# Patient Record
Sex: FEMALE | Race: WHITE | NOT HISPANIC OR LATINO | Employment: FULL TIME | URBAN - METROPOLITAN AREA
[De-identification: names, ages, dates, MRNs, and addresses within clinical notes are randomized per-mention and may not be internally consistent; named-entity substitution may affect disease eponyms.]

---

## 2018-01-10 NOTE — MISCELLANEOUS
Message  Return to work or school:   Ludin Baeza is under my professional care  She was seen in my office on 02/02/2016   She is able to return to work on  02/03/2016      Please excuse from work 02/02/2016     India Salgado MD       Signatures   Electronically signed by : BERNARDA Torres ; Feb 2 2016  2:50PM EST                       (Author)

## 2018-01-16 NOTE — PROGRESS NOTES
Assessment    1  Cough (786 2) (R05)   2  Sinusitis, acute (461 9) (J01 90)    Plan  Cough    · Mucinex DM Maximum Strength  MG Oral Tablet Extended Release 12  Hour; TAKE 1 TABLET EVERY 12 HOURS AS NEEDED FOR CONGESTION  Cough, Sinusitis, acute    · Benadryl 25 MG Oral Tablet; TAKE 1 TABLET EVERY 6 HOURS AS NEEDED   · You need to quit smoking ; Status:Complete;   Done: [id number] 01:38PM  PMH: History of diarrhea    · (1) C  DIFFICILE TOXIN BY PCR; Status:Canceled;    · (1) STOOL CULTURE; Status:Canceled;   Sinusitis, acute    · Azithromycin 250 MG Oral Tablet; TAKE 2 TABLETS ON DAY 1 THEN TAKE 1  TABLET A DAY FOR 4 DAYS    Discussion/Summary  The patient, patient's family was counseled regarding instructions for management, risk factor reductions, prognosis, impressions, risks and benefits of treatment options, importance of compliance with treatment  Possible side effects of new medications were reviewed with the patient/guardian today  The treatment plan was reviewed with the patient/guardian  The patient/guardian understands and agrees with the treatment plan      Chief Complaint  Pt exp sore throat and swollen glands x 1 wk      History of Present Illness  Sinusitis (Brief): The sinusitis is classified as acute  The patient is currently experiencing symptoms  facial pressure nasal congestion purulent rhinorrhea postnasal drainage   Associated symptoms:  chills, ear fullness, sore throat and cough, but no fever  The patient is not currently being treated for this problem  Risk factors:  tobacco use  Review of Systems    Constitutional: as noted in HPI    ENT: as noted in HPI  Cardiovascular: no complaints of slow or fast heart rate, no chest pain, no palpitations, no leg claudication or lower extremity edema  Respiratory: as noted in HPI  Active Problems    1   Backache (724 5) (M54 9)   2  Obesity (278 00) (E66 9)    Past Medical History  Active Problems And Past Medical History Reviewed: The active problems and past medical history were reviewed and updated today  Social History    · Current Every Day Smoker (305 1)   · 1ppd x 7 yrs   · Never Drank Alcohol  The social history was reviewed and updated today  The social history was reviewed and is unchanged  Current Meds    The medication list was reviewed and updated today  Allergies    1  Biaxin SUSR   2  Cefzil SUSR    Vitals   Recorded: [id number] 12:50PM   Temperature 97 6 F, Tympanic   Heart Rate 84   Systolic 290, LUE, Sitting   Diastolic 82, LUE, Sitting   Height 5 ft 4 in   Weight 235 lb    BMI Calculated 40 34   BSA Calculated 2 09     Physical Exam    Constitutional   General appearance: Abnormal   acutely ill and obese  Eyes   Conjunctiva and lids: No swelling, erythema or discharge  Pupils and irises: Equal, round and reactive to light  Ears, Nose, Mouth, and Throat   External inspection of ears and nose: Normal     Otoscopic examination: Tympanic membranes translucent with normal light reflex  Canals patent without erythema  Nasal mucosa, septum, and turbinates: Abnormal   marked congestion with purulent discharge  Oropharynx: Abnormal   post nasal drip  Pulmonary   Respiratory effort: No increased work of breathing or signs of respiratory distress  Auscultation of lungs: Clear to auscultation  Lymphatic   Palpation of lymph nodes in neck: No lymphadenopathy           Signatures   Electronically signed by : BERNARDA Costello ; Feb 2 2016  1:39PM EST                       (Author)

## 2018-05-11 ENCOUNTER — TELEPHONE (OUTPATIENT)
Dept: INTERNAL MEDICINE CLINIC | Age: 23
End: 2018-05-11

## 2019-10-11 ENCOUNTER — HOSPITAL ENCOUNTER (EMERGENCY)
Facility: HOSPITAL | Age: 24
Discharge: HOME | End: 2019-10-11
Attending: EMERGENCY MEDICINE
Payer: COMMERCIAL

## 2019-10-11 ENCOUNTER — APPOINTMENT (EMERGENCY)
Dept: CT IMAGING | Facility: HOSPITAL | Age: 24
End: 2019-10-11
Payer: COMMERCIAL

## 2019-10-11 VITALS
RESPIRATION RATE: 16 BRPM | TEMPERATURE: 98.7 F | OXYGEN SATURATION: 99 % | HEIGHT: 64 IN | HEART RATE: 76 BPM | DIASTOLIC BLOOD PRESSURE: 87 MMHG | SYSTOLIC BLOOD PRESSURE: 154 MMHG | BODY MASS INDEX: 31.95 KG/M2 | WEIGHT: 187.17 LBS

## 2019-10-11 DIAGNOSIS — K80.20 CHOLELITHIASIS: ICD-10-CM

## 2019-10-11 DIAGNOSIS — N23 RENAL COLIC ON RIGHT SIDE: Primary | ICD-10-CM

## 2019-10-11 LAB
ALBUMIN SERPL BCP-MCNC: 4.2 G/DL (ref 3.5–5)
ALP SERPL-CCNC: 73 U/L (ref 46–116)
ALT SERPL W P-5'-P-CCNC: 30 U/L (ref 12–78)
AMORPH PHOS CRY URNS QL MICRO: ABNORMAL /HPF
ANION GAP SERPL CALCULATED.3IONS-SCNC: 7 MMOL/L (ref 4–13)
AST SERPL W P-5'-P-CCNC: 28 U/L (ref 5–45)
BACTERIA UR QL AUTO: ABNORMAL /HPF
BACTERIA UR QL AUTO: ABNORMAL /HPF
BASOPHILS # BLD AUTO: 0.04 THOUSANDS/ΜL (ref 0–0.1)
BASOPHILS NFR BLD AUTO: 0 % (ref 0–1)
BILIRUB SERPL-MCNC: 0.2 MG/DL (ref 0.2–1)
BILIRUB UR QL STRIP: NEGATIVE
BILIRUB UR QL STRIP: NEGATIVE
BUN SERPL-MCNC: 17 MG/DL (ref 5–25)
CALCIUM SERPL-MCNC: 9.4 MG/DL (ref 8.3–10.1)
CHLORIDE SERPL-SCNC: 104 MMOL/L (ref 100–108)
CLARITY UR: CLEAR
CLARITY UR: CLEAR
CO2 SERPL-SCNC: 26 MMOL/L (ref 21–32)
COLOR UR: YELLOW
COLOR UR: YELLOW
CREAT SERPL-MCNC: 0.85 MG/DL (ref 0.6–1.3)
EOSINOPHIL # BLD AUTO: 0.07 THOUSAND/ΜL (ref 0–0.61)
EOSINOPHIL NFR BLD AUTO: 1 % (ref 0–6)
ERYTHROCYTE [DISTWIDTH] IN BLOOD BY AUTOMATED COUNT: 13 % (ref 11.6–15.1)
EXT PREG TEST URINE: NEGATIVE
EXT. CONTROL ED NAV: NORMAL
GFR SERPL CREATININE-BSD FRML MDRD: 95 ML/MIN/1.73SQ M
GLUCOSE SERPL-MCNC: 110 MG/DL (ref 65–140)
GLUCOSE UR STRIP-MCNC: NEGATIVE MG/DL
GLUCOSE UR STRIP-MCNC: NEGATIVE MG/DL
HCT VFR BLD AUTO: 43.7 % (ref 34.8–46.1)
HGB BLD-MCNC: 14.2 G/DL (ref 11.5–15.4)
HGB UR QL STRIP.AUTO: ABNORMAL
HGB UR QL STRIP.AUTO: ABNORMAL
IMM GRANULOCYTES # BLD AUTO: 0.04 THOUSAND/UL (ref 0–0.2)
IMM GRANULOCYTES NFR BLD AUTO: 0 % (ref 0–2)
KETONES UR STRIP-MCNC: ABNORMAL MG/DL
KETONES UR STRIP-MCNC: ABNORMAL MG/DL
LEUKOCYTE ESTERASE UR QL STRIP: NEGATIVE
LEUKOCYTE ESTERASE UR QL STRIP: NEGATIVE
LIPASE SERPL-CCNC: 73 U/L (ref 73–393)
LYMPHOCYTES # BLD AUTO: 1.43 THOUSANDS/ΜL (ref 0.6–4.47)
LYMPHOCYTES NFR BLD AUTO: 15 % (ref 14–44)
MCH RBC QN AUTO: 29.6 PG (ref 26.8–34.3)
MCHC RBC AUTO-ENTMCNC: 32.5 G/DL (ref 31.4–37.4)
MCV RBC AUTO: 91 FL (ref 82–98)
MONOCYTES # BLD AUTO: 0.73 THOUSAND/ΜL (ref 0.17–1.22)
MONOCYTES NFR BLD AUTO: 8 % (ref 4–12)
NEUTROPHILS # BLD AUTO: 7.26 THOUSANDS/ΜL (ref 1.85–7.62)
NEUTS SEG NFR BLD AUTO: 76 % (ref 43–75)
NITRITE UR QL STRIP: NEGATIVE
NITRITE UR QL STRIP: NEGATIVE
NON-SQ EPI CELLS URNS QL MICRO: ABNORMAL /HPF
NON-SQ EPI CELLS URNS QL MICRO: ABNORMAL /HPF
NRBC BLD AUTO-RTO: 0 /100 WBCS
PH UR STRIP.AUTO: 7 [PH] (ref 4.5–8)
PH UR STRIP.AUTO: 7 [PH] (ref 4.5–8)
PLATELET # BLD AUTO: 236 THOUSANDS/UL (ref 149–390)
PMV BLD AUTO: 9.5 FL (ref 8.9–12.7)
POTASSIUM SERPL-SCNC: 4 MMOL/L (ref 3.5–5.3)
PROT SERPL-MCNC: 8.1 G/DL (ref 6.4–8.2)
PROT UR STRIP-MCNC: ABNORMAL MG/DL
PROT UR STRIP-MCNC: NEGATIVE MG/DL
RBC # BLD AUTO: 4.8 MILLION/UL (ref 3.81–5.12)
RBC #/AREA URNS AUTO: ABNORMAL /HPF
RBC #/AREA URNS AUTO: ABNORMAL /HPF
SODIUM SERPL-SCNC: 137 MMOL/L (ref 136–145)
SP GR UR STRIP.AUTO: 1.01 (ref 1–1.03)
SP GR UR STRIP.AUTO: 1.02 (ref 1–1.03)
UROBILINOGEN UR QL STRIP.AUTO: 0.2 E.U./DL
UROBILINOGEN UR QL STRIP.AUTO: 0.2 E.U./DL
WBC # BLD AUTO: 9.57 THOUSAND/UL (ref 4.31–10.16)
WBC #/AREA URNS AUTO: ABNORMAL /HPF
WBC #/AREA URNS AUTO: ABNORMAL /HPF

## 2019-10-11 PROCEDURE — 81001 URINALYSIS AUTO W/SCOPE: CPT

## 2019-10-11 PROCEDURE — 81025 URINE PREGNANCY TEST: CPT | Performed by: EMERGENCY MEDICINE

## 2019-10-11 PROCEDURE — 99284 EMERGENCY DEPT VISIT MOD MDM: CPT | Performed by: EMERGENCY MEDICINE

## 2019-10-11 PROCEDURE — 36415 COLL VENOUS BLD VENIPUNCTURE: CPT | Performed by: EMERGENCY MEDICINE

## 2019-10-11 PROCEDURE — 96375 TX/PRO/DX INJ NEW DRUG ADDON: CPT

## 2019-10-11 PROCEDURE — 83690 ASSAY OF LIPASE: CPT | Performed by: EMERGENCY MEDICINE

## 2019-10-11 PROCEDURE — 99284 EMERGENCY DEPT VISIT MOD MDM: CPT

## 2019-10-11 PROCEDURE — 96374 THER/PROPH/DIAG INJ IV PUSH: CPT

## 2019-10-11 PROCEDURE — 74177 CT ABD & PELVIS W/CONTRAST: CPT

## 2019-10-11 PROCEDURE — 80053 COMPREHEN METABOLIC PANEL: CPT | Performed by: EMERGENCY MEDICINE

## 2019-10-11 PROCEDURE — 85025 COMPLETE CBC W/AUTO DIFF WBC: CPT | Performed by: EMERGENCY MEDICINE

## 2019-10-11 PROCEDURE — 96361 HYDRATE IV INFUSION ADD-ON: CPT

## 2019-10-11 RX ORDER — ONDANSETRON 2 MG/ML
4 INJECTION INTRAMUSCULAR; INTRAVENOUS ONCE
Status: COMPLETED | OUTPATIENT
Start: 2019-10-11 | End: 2019-10-11

## 2019-10-11 RX ORDER — KETOROLAC TROMETHAMINE 30 MG/ML
15 INJECTION, SOLUTION INTRAMUSCULAR; INTRAVENOUS ONCE
Status: COMPLETED | OUTPATIENT
Start: 2019-10-11 | End: 2019-10-11

## 2019-10-11 RX ORDER — IBUPROFEN 600 MG/1
600 TABLET ORAL EVERY 6 HOURS PRN
Qty: 28 TABLET | Refills: 0 | Status: SHIPPED | OUTPATIENT
Start: 2019-10-11 | End: 2022-03-25

## 2019-10-11 RX ORDER — OXYCODONE HYDROCHLORIDE AND ACETAMINOPHEN 5; 325 MG/1; MG/1
1 TABLET ORAL EVERY 6 HOURS PRN
Qty: 15 TABLET | Refills: 0 | Status: SHIPPED | OUTPATIENT
Start: 2019-10-11 | End: 2019-10-15

## 2019-10-11 RX ORDER — ONDANSETRON 4 MG/1
4 TABLET, ORALLY DISINTEGRATING ORAL EVERY 8 HOURS PRN
Qty: 12 TABLET | Refills: 0 | Status: SHIPPED | OUTPATIENT
Start: 2019-10-11 | End: 2022-03-25

## 2019-10-11 RX ADMIN — IOHEXOL 100 ML: 350 INJECTION, SOLUTION INTRAVENOUS at 19:03

## 2019-10-11 RX ADMIN — KETOROLAC TROMETHAMINE 15 MG: 30 INJECTION, SOLUTION INTRAMUSCULAR at 18:11

## 2019-10-11 RX ADMIN — SODIUM CHLORIDE 1000 ML: 0.9 INJECTION, SOLUTION INTRAVENOUS at 18:07

## 2019-10-11 RX ADMIN — MORPHINE SULFATE 2 MG: 2 INJECTION, SOLUTION INTRAMUSCULAR; INTRAVENOUS at 19:53

## 2019-10-11 RX ADMIN — ONDANSETRON 4 MG: 2 INJECTION INTRAMUSCULAR; INTRAVENOUS at 18:11

## 2019-10-12 NOTE — ED PROVIDER NOTES
History  Chief Complaint   Patient presents with    Abdominal Pain     Pt complains of abdominal pain since 1400 today, Pt complains of pain that is on her right side from back flank to front  Pt vomited 2 times before arrival         History provided by:  Patient   used: No       patient is a 49-year-old female presenting to emergency department with abdominal flank pain starting two p m     Associated nausea vomiting  No fevers  No chills  Having urinary frequency  No diarrhea  No history of kidney stones  No vaginal discharge  MDM  eval with abdominal labs, urine, urine preg, CT, pain control     patient does not want narcotic pain medications  None       History reviewed  No pertinent past medical history  History reviewed  No pertinent surgical history  History reviewed  No pertinent family history  I have reviewed and agree with the history as documented  Social History     Tobacco Use    Smoking status: Current Every Day Smoker     Packs/day: 0 50     Types: Cigarettes    Smokeless tobacco: Never Used   Substance Use Topics    Alcohol use: Yes     Comment: drinks liquor every day   Drug use: Not Currently        Review of Systems   Constitutional: Negative for chills, diaphoresis and fever  HENT: Negative for congestion and sore throat  Respiratory: Negative for cough, shortness of breath, wheezing and stridor  Cardiovascular: Negative for chest pain, palpitations and leg swelling  Gastrointestinal: Positive for abdominal pain, nausea and vomiting  Negative for blood in stool and diarrhea  Genitourinary: Positive for flank pain  Negative for dysuria, frequency and urgency  Musculoskeletal: Negative for neck pain and neck stiffness  Skin: Negative for pallor and rash  Neurological: Negative for dizziness, syncope, weakness, light-headedness and headaches  All other systems reviewed and are negative        Physical Exam  Physical Exam Constitutional: She is oriented to person, place, and time  She appears well-developed and well-nourished  HENT:   Head: Normocephalic and atraumatic  Eyes: Pupils are equal, round, and reactive to light  Neck: Neck supple  Cardiovascular: Normal rate, regular rhythm, normal heart sounds and intact distal pulses  Pulmonary/Chest: Effort normal and breath sounds normal  No respiratory distress  Abdominal: Soft  Bowel sounds are normal  There is tenderness in the right lower quadrant  Neurological: She is alert and oriented to person, place, and time  Skin: Skin is warm and dry  Capillary refill takes less than 2 seconds  Vitals reviewed        Vital Signs  ED Triage Vitals   Temperature Pulse Respirations Blood Pressure SpO2   10/11/19 1746 10/11/19 1741 10/11/19 1741 10/11/19 1741 10/11/19 1741   98 7 °F (37 1 °C) (!) 52 16 (!) 179/101 100 %      Temp Source Heart Rate Source Patient Position - Orthostatic VS BP Location FiO2 (%)   10/11/19 1746 10/11/19 1741 10/11/19 1741 10/11/19 1741 --   Oral Monitor Lying Right arm       Pain Score       10/11/19 1811       7           Vitals:    10/11/19 2000 10/11/19 2045 10/11/19 2100 10/11/19 2145   BP: 166/92 131/98 156/89 154/87   Pulse: 60 60 64 76   Patient Position - Orthostatic VS: Lying Lying Lying Lying         Visual Acuity      ED Medications  Medications   sodium chloride 0 9 % bolus 1,000 mL (0 mL Intravenous Stopped 10/11/19 2043)   ondansetron (ZOFRAN) injection 4 mg (4 mg Intravenous Given 10/11/19 1811)   ketorolac (TORADOL) injection 15 mg (15 mg Intravenous Given 10/11/19 1811)   iohexol (OMNIPAQUE) 350 MG/ML injection (SINGLE-DOSE) 100 mL (100 mL Intravenous Given 10/11/19 1903)   morphine injection 2 mg (2 mg Intravenous Given 10/11/19 1953)       Diagnostic Studies  Results Reviewed     Procedure Component Value Units Date/Time    Urine Microscopic [[id number]]  (Abnormal) Collected:  10/11/19 2017    Lab Status:  Final result Specimen:  Urine, Clean Catch Updated:  10/11/19 2123     RBC, UA 0-5 /hpf      WBC, UA 0-1 /hpf      Epithelial Cells Occasional /hpf      Bacteria, UA Occasional /hpf     ED Urine Macroscopic [[id number]]  (Abnormal) Collected:  10/11/19 2017    Lab Status:  Final result Specimen:  Urine Updated:  10/11/19 2001     Color, UA Yellow     Clarity, UA Clear     pH, UA 7 0     Leukocytes, UA Negative     Nitrite, UA Negative     Protein, UA Negative mg/dl      Glucose, UA Negative mg/dl      Ketones, UA 15 (1+) mg/dl      Urobilinogen, UA 0 2 E U /dl      Bilirubin, UA Negative     Blood, UA Moderate     Specific [place], UA 1 010    Narrative:       CLINITEK RESULT    Urine Microscopic [[id number]]  (Abnormal) Collected:  10/11/19 1851    Lab Status:  Final result Specimen:  Urine, Clean Catch Updated:  10/11/19 1915     RBC, UA 1-2 /hpf      WBC, UA 0-1 /hpf      Epithelial Cells Occasional /hpf      Bacteria, UA Moderate /hpf      AMORPH PHOSPATES Moderate /hpf     POCT pregnancy, urine [[id number]]  (Normal) Resulted:  10/11/19 1842    Lab Status:  Final result Updated:  10/11/19 1842     EXT PREG TEST UR (Ref: Negative) negative     Control valid    ED Urine Macroscopic [[id number]]  (Abnormal) Collected:  10/11/19 1851    Lab Status:  Final result Specimen:  Urine Updated:  10/11/19 1836     Color, UA Yellow     Clarity, UA Clear     pH, UA 7 0     Leukocytes, UA Negative     Nitrite, UA Negative     Protein, UA Trace mg/dl      Glucose, UA Negative mg/dl      Ketones, UA Trace mg/dl      Urobilinogen, UA 0 2 E U /dl      Bilirubin, UA Negative     Blood, UA Moderate     Specific Gravity, UA 1 025    Narrative:       CLINITEK RESULT    Comprehensive metabolic panel [[id number]] Collected:  10/11/19 1805    Lab Status:  Final result Specimen:  Blood from Arm, Right Updated:  10/11/19 1831     Sodium 137 mmol/L      Potassium 4 0 mmol/L      Chloride 104 mmol/L      CO2 26 mmol/L      ANION GAP 7 mmol/L      BUN 17 mg/dL      Creatinine 0 85 mg/dL      Glucose 110 mg/dL      Calcium 9 4 mg/dL      AST 28 U/L      ALT 30 U/L      Alkaline Phosphatase 73 U/L      Total Protein 8 1 g/dL      Albumin 4 2 g/dL      Total Bilirubin 0 20 mg/dL      eGFR 95 ml/min/1 73sq m     Narrative:       National Kidney Disease Foundation guidelines for Chronic Kidney Disease (CKD):     Stage 1 with normal or high GFR (GFR > 90 mL/min/1 73 square meters)    Stage 2 Mild CKD (GFR = 60-89 mL/min/1 73 square meters)    Stage 3A Moderate CKD (GFR = 45-59 mL/min/1 73 square meters)    Stage 3B Moderate CKD (GFR = 30-44 mL/min/1 73 square meters)    Stage 4 Severe CKD (GFR = 15-29 mL/min/1 73 square meters)    Stage 5 End Stage CKD (GFR <15 mL/min/1 73 square meters)  Note: GFR calculation is accurate only with a steady state creatinine    Lipase [[id number]]  (Normal) Collected:  10/11/19 1805    Lab Status:  Final result Specimen:  Blood from Arm, Right Updated:  10/11/19 1831     Lipase 73 u/L     CBC and differential [[id number]]  (Abnormal) Collected:  10/11/19 1805    Lab Status:  Final result Specimen:  Blood from Arm, Right Updated:  10/11/19 1814     WBC 9 57 Thousand/uL      RBC 4 80 Million/uL      Hemoglobin 14 2 g/dL      Hematocrit 43 7 %      MCV 91 fL      MCH 29 6 pg      MCHC 32 5 g/dL      RDW 13 0 %      MPV 9 5 fL      Platelets 234 Thousands/uL      nRBC 0 /100 WBCs      Neutrophils Relative 76 %      Immat GRANS % 0 %      Lymphocytes Relative 15 %      Monocytes Relative 8 %      Eosinophils Relative 1 %      Basophils Relative 0 %      Neutrophils Absolute 7 26 Thousands/µL      Immature Grans Absolute 0 04 Thousand/uL      Lymphocytes Absolute 1 43 Thousands/µL      Monocytes Absolute 0 73 Thousand/µL      Eosinophils Absolute 0 07 Thousand/µL      Basophils Absolute 0 04 Thousands/µL                  CT abdomen pelvis with contrast   Final Result by Rich Milan MD (10/11 1930)   1   4 mm obstructing calculus in the right UVJ, with mild hydroureteronephrosis, decreased enhancement and perinephric stranding  2   Cholelithiasis  Workstation performed: [id number]                    Procedures  Procedures       ED Course  ED Course as of Oct 14 1102   Fri Oct 11, 2019   1939 Patient still in pain  Will give dose of morphine, she requests small dose  1939 Will recheck urine is patient's last urine was not clean  MDM    Disposition  Final diagnoses:   Renal colic on right side   Cholelithiasis     Time reflects when diagnosis was documented in both MDM as applicable and the Disposition within this note     Time User Action Codes Description Comment    10/11/2019  9:46 PM Tadevosyan, Bria Add [A14] Renal colic on right side     10/11/2019  9:46 PM Tadevosyan, Bria Add [K80 20] Cholelithiases     10/11/2019  9:46 PM Tadevosyan, Bria Remove [K80 20] Cholelithiases     10/11/2019  9:46 PM Jesus Gan Bria Add [K80 20] Cholelithiasis       ED Disposition     ED Disposition Condition Date/Time Comment    Discharge Stable Fri Oct 11, 2019  9:46 PM [address] discharge to home/self care              Follow-up Information     Follow up With Specialties Details Why Contact Info Additional Information    [facility] Emergency Department Emergency Medicine  As needed, If symptoms worsen [address] [facility]  [facility] ED, [facility] [place], [place], [address], MD Internal Medicine In 3 days re-evaluation [address] [facility] Schedule an appointment as soon as possible for a visit in 5 days renal colic [address] [facility] [phone]  709  [facility] [place], [address] [place], [place], [address] [facility]          Discharge Medication List as of 10/11/2019  9:48 PM      START taking these medications    Details   ibuprofen (MOTRIN) 600 mg tablet Take 1 tablet (600 mg total) by mouth every 6 (six) hours as needed for mild pain for up to 7 days, Starting Fri 10/11/2019, Until Fri 10/18/2019, Print      ondansetron (ZOFRAN-ODT) 4 mg disintegrating tablet Take 1 tablet (4 mg total) by mouth every 8 (eight) hours as needed for nausea or vomiting for up to 4 days, Starting Fri 10/11/2019, Until Tue 10/15/2019, Print      oxyCODONE-acetaminophen (PERCOCET) 5-325 mg per tablet Take 1 tablet by mouth every 6 (six) hours as needed for moderate pain for up to 4 daysMax Daily Amount: 4 tablets, Starting Fri 10/11/2019, Until Tue 10/15/2019, Print           No discharge procedures on file      ED Provider  Electronically Signed by           Ilana Zamora MD  10/14/19 0074

## 2019-10-14 ENCOUNTER — TELEPHONE (OUTPATIENT)
Dept: UROLOGY | Facility: MEDICAL CENTER | Age: 24
End: 2019-10-14

## 2019-10-14 NOTE — TELEPHONE ENCOUNTER
[facility] NEW PATIENT  S/P ER visits  Diagnosis Kidney stone  No previous [address] for testing  No personal history of cancer  Prefers [facility] office  She can be reached at [phone]  Thank you

## 2019-10-14 NOTE — TELEPHONE ENCOUNTER
Patient seen in the emergency department on 22/99/04 for Renal colic on right side  Patients CT scan shows 4 mm obstructing calculus in the right UVJ, with mild hydroureteronephrosis, decreased enhancement and perinephric stranding  2   Cholelithiasis    Please review and advise on appropriate time frame for patient Can patient be seen with either an MD or PA? Thank you!

## 2019-10-14 NOTE — TELEPHONE ENCOUNTER
Call placed to patient, she states that she is having pain and requests appointment for tomorrow  Patient scheduled for [place] office due to availability  Office information provided

## 2019-10-15 ENCOUNTER — OFFICE VISIT (OUTPATIENT)
Dept: UROLOGY | Facility: CLINIC | Age: 24
End: 2019-10-15
Payer: COMMERCIAL

## 2019-10-15 VITALS
WEIGHT: 189 LBS | HEIGHT: 64 IN | BODY MASS INDEX: 32.27 KG/M2 | HEART RATE: 96 BPM | SYSTOLIC BLOOD PRESSURE: 112 MMHG | DIASTOLIC BLOOD PRESSURE: 60 MMHG

## 2019-10-15 DIAGNOSIS — N20.0 KIDNEY STONES: Primary | ICD-10-CM

## 2019-10-15 PROCEDURE — 99213 OFFICE O/P EST LOW 20 MIN: CPT | Performed by: PHYSICIAN ASSISTANT

## 2019-10-15 NOTE — PROGRESS NOTES
UROLOGY  NEW PATIENT NOTE     Patient Identifiers: Aliza Lozano (MRN: [id number])    Service Providing Consultation:  Urology, Gaviota Alvarez PA-C  Consults  Date of Service: 10/15/2019      History of Present Illness:     Aliza Lozano is a 30 y o  Female with no prior urologic history went to emergency room with right flank pain  CT scan showed a 4 mm right UVJ calculus with mild hydronephrosis  She has no burning  She did have some hematuria  No fever nausea or vomiting  There were no other stones seen  She may have a family history kidney stones but she has never had a stone in the past     Past Medical, Past Surgical History:     Past Medical History:   Diagnosis Date    Kidney stones    :    History reviewed  No pertinent surgical history :    Medications, Allergies:     Current Outpatient Medications:     ibuprofen (MOTRIN) 600 mg tablet, Take 1 tablet (600 mg total) by mouth every 6 (six) hours as needed for mild pain for up to 7 days, Disp: 28 tablet, Rfl: 0    ondansetron (ZOFRAN-ODT) 4 mg disintegrating tablet, Take 1 tablet (4 mg total) by mouth every 8 (eight) hours as needed for nausea or vomiting for up to 4 days, Disp: 12 tablet, Rfl: 0    oxyCODONE-acetaminophen (PERCOCET) 5-325 mg per tablet, Take 1 tablet by mouth every 6 (six) hours as needed for moderate pain for up to 4 daysMax Daily Amount: 4 tablets, Disp: 15 tablet, Rfl: 0    Allergies: Allergies   Allergen Reactions    Biaxin [Clarithromycin]     Other      cefcil     :    Social and Family History:   Social History:   Social History     Tobacco Use    Smoking status: Current Every Day Smoker     Packs/day: 0 50     Types: Cigarettes    Smokeless tobacco: Never Used   Substance Use Topics    Alcohol use: Yes     Comment: drinks liquor every day   Drug use: Not Currently         Social History     Tobacco Use   Smoking Status Current Every Day Smoker    Packs/day: 0 50    Types: Cigarettes   Smokeless Tobacco Never Used       Family History:  Family History   Problem Relation Age of Onset    Heart disease Mother     Stroke Mother     Diabetes Mother     Hypertension Mother     Cancer Paternal Grandmother     Kidney disease Paternal Grandmother     Diabetes Paternal Grandmother    :     Review of Systems:     General: Fever, chills, or night sweats: negative  Cardiac: Negative for chest pain  Pulmonary: Negative for shortness of breath  Gastrointestinal: Abdominal pain negative  Nausea, vomiting, or diarrhea negative,  Genitourinary: See HPI above  Patient does not have hematuria  All other systems queried were negative  Physical Exam:   General: Patient is pleasant and in NAD  Awake and alert  /60   Pulse 96   Ht 5' 4" (1 626 m)   Wt 85 7 kg (189 lb)   BMI 32 44 kg/m²   Cardiac: Peripheral edema: negative  Pulmonary: Non-labored breathing  Abdomen: Soft, non-tender, non-distended  No surgical scars  No masses, tenderness, hernias noted  Genitourinary: Negative CVA tenderness, positive suprapubic tenderness  Labs:     Lab Results   Component Value Date    HGB 14 2 10/11/2019    HCT 43 7 10/11/2019    WBC 9 57 10/11/2019     10/11/2019   ]    Lab Results   Component Value Date    K 4 0 10/11/2019     10/11/2019    CO2 26 10/11/2019    BUN 17 10/11/2019    CREATININE 0 85 10/11/2019    CALCIUM 9 4 10/11/2019   ]    Imaging:   I personally reviewed the images and report of the following studies, and reviewed them with the patient:  CT ABDOMEN AND PELVIS WITH IV CONTRAST        IMPRESSION:  1   4 mm obstructing calculus in the right UVJ, with mild hydroureteronephrosis, decreased enhancement and perinephric stranding  2   Cholelithiasis      ASSESSMENT:       1   Right ureteral calculus      PLAN:   - options of management reviewed with the patient and her mother  - I explained ureteroscopy laser stone removal and stent insertion if needed  - she will follow up in 10 days with ultrasound and KUB prior to visit as this stone is right at the UVJ and will hopefully pass  - she should call at any time with any questions or concerns or if she has increased pain or fever      Thank you for allowing me to participate in this patients care  Please do not hesitate to call with any additional questions    Kiarra Larsen PA-C

## 2019-10-22 ENCOUNTER — HOSPITAL ENCOUNTER (OUTPATIENT)
Dept: RADIOLOGY | Facility: MEDICAL CENTER | Age: 24
Discharge: HOME | End: 2019-10-22
Payer: COMMERCIAL

## 2019-10-22 ENCOUNTER — APPOINTMENT (OUTPATIENT)
Dept: RADIOLOGY | Facility: MEDICAL CENTER | Age: 24
End: 2019-10-22
Payer: COMMERCIAL

## 2019-10-22 DIAGNOSIS — N20.0 KIDNEY STONES: ICD-10-CM

## 2019-10-22 PROCEDURE — 76770 US EXAM ABDO BACK WALL COMP: CPT

## 2019-10-22 PROCEDURE — 74018 RADEX ABDOMEN 1 VIEW: CPT

## 2019-10-24 ENCOUNTER — OFFICE VISIT (OUTPATIENT)
Dept: UROLOGY | Facility: CLINIC | Age: 24
End: 2019-10-24
Payer: COMMERCIAL

## 2019-10-24 VITALS
SYSTOLIC BLOOD PRESSURE: 122 MMHG | HEIGHT: 64 IN | WEIGHT: 190 LBS | HEART RATE: 73 BPM | DIASTOLIC BLOOD PRESSURE: 80 MMHG | BODY MASS INDEX: 32.44 KG/M2

## 2019-10-24 DIAGNOSIS — N20.0 CALCULUS OF KIDNEY: Primary | ICD-10-CM

## 2019-10-24 PROCEDURE — 99213 OFFICE O/P EST LOW 20 MIN: CPT | Performed by: PHYSICIAN ASSISTANT

## 2019-10-25 NOTE — PROGRESS NOTES
UROLOGY PROGRESS NOTE   Patient Identifiers: Mike Rodrigez (MRN [id number])  Date of Service: 10/25/2019    Subjective:     78-year-old female I saw 2 weeks ago with a 4 mm right UVJ calculus  She was asymptomatic at that time  She is here for follow-up ultrasound KUB and urinalysis are all negative  There were no other stones seen  She is accompanied by her mother  Patient   Is here for kidney stone follow-up    Objective:     VITALS:    Vitals:    10/24/19 1123   BP: 122/80   Pulse: 73           LABS:  Lab Results   Component Value Date    HGB 14 2 10/11/2019    HCT 43 7 10/11/2019    WBC 9 57 10/11/2019     10/11/2019   ]    Lab Results   Component Value Date    K 4 0 10/11/2019     10/11/2019    CO2 26 10/11/2019    BUN 17 10/11/2019    CREATININE 0 85 10/11/2019    CALCIUM 9 4 10/11/2019   ]        INPATIENT MEDS:    Current Outpatient Medications:     ibuprofen (MOTRIN) 600 mg tablet, Take 1 tablet (600 mg total) by mouth every 6 (six) hours as needed for mild pain for up to 7 days, Disp: 28 tablet, Rfl: 0    ondansetron (ZOFRAN-ODT) 4 mg disintegrating tablet, Take 1 tablet (4 mg total) by mouth every 8 (eight) hours as needed for nausea or vomiting for up to 4 days, Disp: 12 tablet, Rfl: 0      Physical Exam:   /80 (BP Location: Left arm, Patient Position: Sitting, Cuff Size: Adult)   Pulse 73   Ht 5' 4" (1 626 m)   Wt 86 2 kg (190 lb)   BMI 32 61 kg/m²   GEN: no acute distress    RESP: breathing comfortably with no accessory muscle use    ABD: soft, non-tender, non-distended   INCISION:    EXT: no significant peripheral edema       RADIOLOGY:   RENAL ULTRASOUND      IMPRESSION:     Normal renal ultrasound  (No evidence of hydronephrosis  Bilateral ureteral jets are seen  No shadowing calculi are identified)      ABDOMEN   IMPRESSION:     No evidence of radiopaque urinary tract calculus     Cholelithiasis      Assessment:    1   Right  ureter calculus - now likely passed    Plan:   - It appears he passed the stone in question  - she may follow up on an as-needed basis  - we did review strategies of stone prevention in new stone formers  - she will call with any questions or concerns

## 2020-01-31 ENCOUNTER — OFFICE VISIT (OUTPATIENT)
Dept: URGENT CARE | Facility: MEDICAL CENTER | Age: 25
End: 2020-01-31
Payer: COMMERCIAL

## 2020-01-31 VITALS
RESPIRATION RATE: 18 BRPM | HEIGHT: 64 IN | WEIGHT: 207.2 LBS | OXYGEN SATURATION: 100 % | HEART RATE: 91 BPM | TEMPERATURE: 97.4 F | BODY MASS INDEX: 35.37 KG/M2

## 2020-01-31 DIAGNOSIS — J06.9 ACUTE URI: Primary | ICD-10-CM

## 2020-01-31 PROCEDURE — 99213 OFFICE O/P EST LOW 20 MIN: CPT | Performed by: PHYSICIAN ASSISTANT

## 2020-01-31 NOTE — LETTER
January 31, 2020     Patient: Cherelle Ronquillo   YOB: 1995   Date of Visit: 1/31/2020       To Whom it May Concern:    Cherelle Ronquillo was seen in my clinic on 1/31/2020  Please excuse from work today  If you have any questions or concerns, please don't hesitate to call           Sincerely,          Roman Segura PA-C        CC: Cherelle Yg

## 2020-01-31 NOTE — PROGRESS NOTES
[facility]        NAME: Nella Massey is a 30 y o  female  : 1995    MRN: [id number]  DATE: 2020  TIME: 4:21 PM    Assessment and Plan   Acute URI [J06 9]  1  Acute URI           Patient Instructions     Tylenol or Motrin for pain   Mucinex or Sudafed for congestion   follow-up with PCP if symptoms do not improve  Chief Complaint     Chief Complaint   Patient presents with    Cough     has been going on for three days, pt says it is sometimes dry, and sometimes mucusy  Greenish mucus   Sinus Problem     has been going on got 2 days, has been getting worse, has been taking dayquil & musinex, pt says it does not help much  History of Present Illness        Patient is a 22-year-old female presents today with complaints of cough, congestion, sore throat for the past 3 days  No fevers or body aches  Has been taking DayQuil and Mucinex with little relief  Review of Systems   Review of Systems   Constitutional: Negative for fever  HENT: Positive for congestion, sinus pressure and sore throat  Negative for ear pain  Respiratory: Positive for cough  Negative for shortness of breath  Cardiovascular: Negative for chest pain  Musculoskeletal: Negative for myalgias           Current Medications       Current Outpatient Medications:     ibuprofen (MOTRIN) 600 mg tablet, Take 1 tablet (600 mg total) by mouth every 6 (six) hours as needed for mild pain for up to 7 days, Disp: 28 tablet, Rfl: 0    ondansetron (ZOFRAN-ODT) 4 mg disintegrating tablet, Take 1 tablet (4 mg total) by mouth every 8 (eight) hours as needed for nausea or vomiting for up to 4 days, Disp: 12 tablet, Rfl: 0    Current Allergies     Allergies as of 2020 - Reviewed 2020   Allergen Reaction Noted    Biaxin [clarithromycin]  10/11/2019    Other  10/11/2019            The following portions of the patient's history were reviewed and updated as appropriate: allergies, current medications, past family history, past medical history, past social history, past surgical history and problem list      Past Medical History:   Diagnosis Date    Kidney stones        History reviewed  No pertinent surgical history  Family History   Problem Relation Age of Onset    Heart disease Mother     Stroke Mother     Diabetes Mother     Hypertension Mother     Cancer Paternal Grandmother     Kidney disease Paternal Grandmother     Diabetes Paternal Grandmother          Medications have been verified  Objective   Pulse 91   Temp (!) 97 4 °F (36 3 °C) (Temporal)   Resp 18   Ht 5' 4" (1 626 m)   Wt 94 kg (207 lb 3 2 oz)   SpO2 100%   BMI 35 57 kg/m²        Physical Exam     Physical Exam   Constitutional: She appears well-developed and well-nourished  No distress  HENT:   Head: Normocephalic and atraumatic  Right Ear: Tympanic membrane and ear canal normal    Left Ear: Tympanic membrane and ear canal normal    Nose: Mucosal edema present  Mouth/Throat: Uvula is midline, oropharynx is clear and moist and mucous membranes are normal  Tonsils are 0 on the right  Tonsils are 0 on the left  No tonsillar exudate  Eyes: Pupils are equal, round, and reactive to light  Conjunctivae and EOM are normal    Neck: Neck supple  Cardiovascular: Normal rate and regular rhythm  Pulmonary/Chest: Effort normal and breath sounds normal    Lymphadenopathy:     She has no cervical adenopathy  Skin: Skin is warm and dry

## 2022-03-21 ENCOUNTER — HOSPITAL ENCOUNTER (EMERGENCY)
Facility: HOSPITAL | Age: 27
Discharge: HOME | End: 2022-03-21
Attending: EMERGENCY MEDICINE | Admitting: EMERGENCY MEDICINE
Payer: COMMERCIAL

## 2022-03-21 ENCOUNTER — APPOINTMENT (EMERGENCY)
Dept: RADIOLOGY | Facility: HOSPITAL | Age: 27
End: 2022-03-21
Payer: COMMERCIAL

## 2022-03-21 ENCOUNTER — OFFICE VISIT (OUTPATIENT)
Dept: URGENT CARE | Facility: MEDICAL CENTER | Age: 27
End: 2022-03-21
Payer: COMMERCIAL

## 2022-03-21 VITALS
SYSTOLIC BLOOD PRESSURE: 170 MMHG | DIASTOLIC BLOOD PRESSURE: 110 MMHG | TEMPERATURE: 97.8 F | RESPIRATION RATE: 18 BRPM | HEART RATE: 78 BPM | HEIGHT: 63 IN | WEIGHT: 250 LBS | BODY MASS INDEX: 44.3 KG/M2 | OXYGEN SATURATION: 99 %

## 2022-03-21 VITALS
DIASTOLIC BLOOD PRESSURE: 108 MMHG | HEIGHT: 63 IN | HEART RATE: 78 BPM | BODY MASS INDEX: 44.3 KG/M2 | OXYGEN SATURATION: 99 % | TEMPERATURE: 97.9 F | SYSTOLIC BLOOD PRESSURE: 165 MMHG | WEIGHT: 250 LBS | RESPIRATION RATE: 17 BRPM

## 2022-03-21 DIAGNOSIS — R03.0 ELEVATED BP WITHOUT DIAGNOSIS OF HYPERTENSION: ICD-10-CM

## 2022-03-21 DIAGNOSIS — R07.9 CHEST PAIN, UNSPECIFIED TYPE: Primary | ICD-10-CM

## 2022-03-21 PROBLEM — Z82.49 FAMILY HISTORY OF CORONARY ARTERY DISEASE OCCURRING PRIOR TO 55 YEARS OF AGE: Status: ACTIVE | Noted: 2022-03-21

## 2022-03-21 PROBLEM — F10.11 ALCOHOL ABUSE, IN REMISSION: Chronic | Status: ACTIVE | Noted: 2022-03-21

## 2022-03-21 PROBLEM — F17.200 SMOKER: Status: ACTIVE | Noted: 2022-03-21

## 2022-03-21 LAB
ALBUMIN SERPL BCP-MCNC: 4.6 G/DL (ref 3.5–5)
ALP SERPL-CCNC: 78 U/L (ref 34–104)
ALT SERPL W P-5'-P-CCNC: 19 U/L (ref 7–52)
AMPHETAMINES SERPL QL SCN: NEGATIVE
ANION GAP SERPL CALCULATED.3IONS-SCNC: 8 MMOL/L (ref 4–13)
AST SERPL W P-5'-P-CCNC: 22 U/L (ref 13–39)
ATRIAL RATE: 73 BPM
BARBITURATES UR QL: NEGATIVE
BASOPHILS # BLD AUTO: 0.03 THOUSANDS/ΜL (ref 0–0.1)
BASOPHILS NFR BLD AUTO: 0 % (ref 0–1)
BENZODIAZ UR QL: NEGATIVE
BILIRUB SERPL-MCNC: 0.38 MG/DL (ref 0.2–1)
BILIRUB UR QL STRIP: NEGATIVE
BUN SERPL-MCNC: 8 MG/DL (ref 5–25)
CALCIUM SERPL-MCNC: 9.8 MG/DL (ref 8.4–10.2)
CARDIAC TROPONIN I PNL SERPL HS: <2 NG/L
CHLORIDE SERPL-SCNC: 101 MMOL/L (ref 96–108)
CLARITY UR: CLEAR
CO2 SERPL-SCNC: 26 MMOL/L (ref 21–32)
COCAINE UR QL: NEGATIVE
COLOR UR: YELLOW
CREAT SERPL-MCNC: 0.73 MG/DL (ref 0.6–1.3)
D DIMER PPP FEU-MCNC: 0.41 UG/ML FEU
EOSINOPHIL # BLD AUTO: 0.08 THOUSAND/ΜL (ref 0–0.61)
EOSINOPHIL NFR BLD AUTO: 1 % (ref 0–6)
ERYTHROCYTE [DISTWIDTH] IN BLOOD BY AUTOMATED COUNT: 13.3 % (ref 11.6–15.1)
EXT PREG TEST URINE: NEGATIVE
EXT. CONTROL ED NAV: NORMAL
GFR SERPL CREATININE-BSD FRML MDRD: 112 ML/MIN/1.73SQ M
GLUCOSE SERPL-MCNC: 80 MG/DL (ref 65–140)
GLUCOSE UR STRIP-MCNC: NEGATIVE MG/DL
HCT VFR BLD AUTO: 41.9 % (ref 34.8–46.1)
HGB BLD-MCNC: 13.5 G/DL (ref 11.5–15.4)
HGB UR QL STRIP.AUTO: NEGATIVE
IMM GRANULOCYTES # BLD AUTO: 0.03 THOUSAND/UL (ref 0–0.2)
IMM GRANULOCYTES NFR BLD AUTO: 0 % (ref 0–2)
KETONES UR STRIP-MCNC: NEGATIVE MG/DL
LEUKOCYTE ESTERASE UR QL STRIP: NEGATIVE
LYMPHOCYTES # BLD AUTO: 2.13 THOUSANDS/ΜL (ref 0.6–4.47)
LYMPHOCYTES NFR BLD AUTO: 21 % (ref 14–44)
MCH RBC QN AUTO: 26.8 PG (ref 26.8–34.3)
MCHC RBC AUTO-ENTMCNC: 32.2 G/DL (ref 31.4–37.4)
MCV RBC AUTO: 83 FL (ref 82–98)
METHADONE UR QL: NEGATIVE
MONOCYTES # BLD AUTO: 0.69 THOUSAND/ΜL (ref 0.17–1.22)
MONOCYTES NFR BLD AUTO: 7 % (ref 4–12)
NEUTROPHILS # BLD AUTO: 7.37 THOUSANDS/ΜL (ref 1.85–7.62)
NEUTS SEG NFR BLD AUTO: 71 % (ref 43–75)
NITRITE UR QL STRIP: NEGATIVE
NRBC BLD AUTO-RTO: 0 /100 WBCS
OPIATES UR QL SCN: NEGATIVE
OXYCODONE+OXYMORPHONE UR QL SCN: NEGATIVE
P AXIS: 5 DEGREES
PCP UR QL: NEGATIVE
PH UR STRIP.AUTO: 7 [PH]
PLATELET # BLD AUTO: 245 THOUSANDS/UL (ref 149–390)
PMV BLD AUTO: 9.9 FL (ref 8.9–12.7)
POTASSIUM SERPL-SCNC: 3.6 MMOL/L (ref 3.5–5.3)
PR INTERVAL: 134 MS
PROT SERPL-MCNC: 8 G/DL (ref 6.4–8.4)
PROT UR STRIP-MCNC: NEGATIVE MG/DL
QRS AXIS: 51 DEGREES
QRSD INTERVAL: 78 MS
QT INTERVAL: 404 MS
QTC INTERVAL: 445 MS
RBC # BLD AUTO: 5.04 MILLION/UL (ref 3.81–5.12)
SODIUM SERPL-SCNC: 135 MMOL/L (ref 135–147)
SP GR UR STRIP.AUTO: 1.01 (ref 1–1.03)
T WAVE AXIS: 3 DEGREES
THC UR QL: POSITIVE
UROBILINOGEN UR QL STRIP.AUTO: 0.2 E.U./DL
VENTRICULAR RATE: 73 BPM
WBC # BLD AUTO: 10.33 THOUSAND/UL (ref 4.31–10.16)

## 2022-03-21 PROCEDURE — 85379 FIBRIN DEGRADATION QUANT: CPT | Performed by: EMERGENCY MEDICINE

## 2022-03-21 PROCEDURE — 81003 URINALYSIS AUTO W/O SCOPE: CPT | Performed by: EMERGENCY MEDICINE

## 2022-03-21 PROCEDURE — 99285 EMERGENCY DEPT VISIT HI MDM: CPT | Performed by: EMERGENCY MEDICINE

## 2022-03-21 PROCEDURE — 71045 X-RAY EXAM CHEST 1 VIEW: CPT

## 2022-03-21 PROCEDURE — 99285 EMERGENCY DEPT VISIT HI MDM: CPT

## 2022-03-21 PROCEDURE — 81025 URINE PREGNANCY TEST: CPT | Performed by: EMERGENCY MEDICINE

## 2022-03-21 PROCEDURE — 80307 DRUG TEST PRSMV CHEM ANLYZR: CPT | Performed by: EMERGENCY MEDICINE

## 2022-03-21 PROCEDURE — 96365 THER/PROPH/DIAG IV INF INIT: CPT

## 2022-03-21 PROCEDURE — 93005 ELECTROCARDIOGRAM TRACING: CPT | Performed by: PHYSICIAN ASSISTANT

## 2022-03-21 PROCEDURE — 93010 ELECTROCARDIOGRAM REPORT: CPT | Performed by: INTERNAL MEDICINE

## 2022-03-21 PROCEDURE — 80053 COMPREHEN METABOLIC PANEL: CPT | Performed by: EMERGENCY MEDICINE

## 2022-03-21 PROCEDURE — 84484 ASSAY OF TROPONIN QUANT: CPT | Performed by: EMERGENCY MEDICINE

## 2022-03-21 PROCEDURE — 99213 OFFICE O/P EST LOW 20 MIN: CPT | Performed by: PHYSICIAN ASSISTANT

## 2022-03-21 PROCEDURE — 85025 COMPLETE CBC W/AUTO DIFF WBC: CPT | Performed by: EMERGENCY MEDICINE

## 2022-03-21 PROCEDURE — 36415 COLL VENOUS BLD VENIPUNCTURE: CPT | Performed by: EMERGENCY MEDICINE

## 2022-03-21 RX ORDER — LOSARTAN POTASSIUM 50 MG/1
50 TABLET ORAL DAILY
Qty: 30 TABLET | Refills: 0 | Status: SHIPPED | OUTPATIENT
Start: 2022-03-21 | End: 2022-04-22 | Stop reason: SDUPTHER

## 2022-03-21 RX ORDER — LOSARTAN POTASSIUM 50 MG/1
50 TABLET ORAL ONCE
Status: COMPLETED | OUTPATIENT
Start: 2022-03-21 | End: 2022-03-21

## 2022-03-21 RX ORDER — DIPHENHYDRAMINE HCL 25 MG
25 CAPSULE ORAL EVERY 6 HOURS PRN
COMMUNITY
End: 2022-03-25

## 2022-03-21 RX ADMIN — SODIUM CHLORIDE, SODIUM LACTATE, POTASSIUM CHLORIDE, AND CALCIUM CHLORIDE 1000 ML: .6; .31; .03; .02 INJECTION, SOLUTION INTRAVENOUS at 15:34

## 2022-03-21 RX ADMIN — LOSARTAN POTASSIUM 50 MG: 50 TABLET, FILM COATED ORAL at 16:50

## 2022-03-21 NOTE — PATIENT INSTRUCTIONS
Chest pain   Referred to the emergency room  Follow up with PCP in 3-5 days  Proceed to  ER if symptoms worsen

## 2022-03-21 NOTE — PROGRESS NOTES
[facility]        NAME: Dragan Bullock is a 27 y o  female  : 1995    MRN: [id number]  DATE: 2022  TIME: 1:24 PM    Assessment and Plan   Chest pain, unspecified type [R07 9]  1  Chest pain, unspecified type           Patient Instructions     Chest pain   Referred to the emergency room  Follow up with PCP in 3-5 days  Proceed to  ER if symptoms worsen  Chief Complaint     Chief Complaint   Patient presents with    Chest Pain     Pt  with pain in her left rib cage under left breast         History of Present Illness       43-year-old female who presents complaining of left-sided chest pain radiating to the back that started last night  Patient states the pain is associated with nausea, and diaphoresis, is intermittent and each episode lasts approximately 5 minutes  Patient states there is a history of cardiac disease in the family  Review of Systems   Review of Systems   Constitutional: Negative  HENT: Negative  Eyes: Negative  Respiratory: Negative  Negative for cough, chest tightness, shortness of breath, wheezing and stridor  Cardiovascular: Positive for chest pain  Negative for palpitations and leg swelling           Current Medications       Current Outpatient Medications:     diphenhydrAMINE (BENADRYL) 25 mg capsule, Take 25 mg by mouth every 6 (six) hours as needed for itching, Disp: , Rfl:     Naproxen Sodium (ALEVE PO), Take by mouth, Disp: , Rfl:     ibuprofen (MOTRIN) 600 mg tablet, Take 1 tablet (600 mg total) by mouth every 6 (six) hours as needed for mild pain for up to 7 days, Disp: 28 tablet, Rfl: 0    ondansetron (ZOFRAN-ODT) 4 mg disintegrating tablet, Take 1 tablet (4 mg total) by mouth every 8 (eight) hours as needed for nausea or vomiting for up to 4 days, Disp: 12 tablet, Rfl: 0    Current Allergies     Allergies as of 2022 - Reviewed 2022   Allergen Reaction Noted    Biaxin [clarithromycin]  10/11/2019    Other  10/11/2019 The following portions of the patient's history were reviewed and updated as appropriate: allergies, current medications, past family history, past medical history, past social history, past surgical history and problem list      Past Medical History:   Diagnosis Date    Kidney stones        No past surgical history on file  Family History   Problem Relation Age of Onset    Heart disease Mother     Stroke Mother     Diabetes Mother     Hypertension Mother     Cancer Paternal Grandmother     Kidney disease Paternal Grandmother     Diabetes Paternal Grandmother          Medications have been verified  Objective   Pulse 78   Temp 97 8 °F (36 6 °C)   Resp 18   Ht 5' 3" (1 6 m)   Wt 113 kg (250 lb)   SpO2 99%   BMI 44 29 kg/m²        Physical Exam     Physical Exam  Constitutional:       Appearance: She is well-developed  HENT:      Head: Normocephalic and atraumatic  Right Ear: External ear normal       Left Ear: External ear normal       Nose: Nose normal       Mouth/Throat:      Pharynx: No oropharyngeal exudate  Cardiovascular:      Rate and Rhythm: Normal rate and regular rhythm  Heart sounds: Normal heart sounds  Pulmonary:      Effort: Pulmonary effort is normal  No respiratory distress  Breath sounds: Normal breath sounds  No wheezing or rales  Chest:      Chest wall: No tenderness  Abdominal:      General: Bowel sounds are normal  There is no distension  Palpations: Abdomen is soft  There is no mass  Tenderness: There is no abdominal tenderness  There is no guarding or rebound  Musculoskeletal:      Cervical back: Normal range of motion and neck supple  Lymphadenopathy:      Cervical: No cervical adenopathy  Neurological:      Mental Status: She is alert           EKG sinus rhythm, rate 73, no ST abnormalities

## 2022-03-21 NOTE — ED PROVIDER NOTES
History  Chief Complaint   Patient presents with    Chest Pain     Pt presents to the ED from home with complaint of "yesterday I got a sharp pain under my left breast which has stayed the same until today"; states "I feel pain in my back too"; was seen at Urgent Care in [address] [place] [address] and sent to ED for further evaluation; states EKG completed and "normal"; admits to nausea; denies vomiting, diarrhea and/or fever; states pain is intermittent "like every 5 minutes"; took Aleve at 0500 today with no relief; denies SOB and/or diaphoresis; denies recent accident, injury and/or      Around 0430 developed pain in a band left inframammary fold wrapping around left side of chest  Comes for a few seconds, then goes for 5 minutes  Never had this before  PMH ex-EtOH, smoker, [facility] early onset CAD, obesity, [facility] DM  Some nausea, no vomiting, no d/f/c/sob/ex mike change/ex relationship  No dysuria/freq/hematuria/change bh  No rash  No shingles  No PMH DVT/PE  Prior to Admission Medications   Prescriptions Last Dose Informant Patient Reported? Taking? Naproxen Sodium (ALEVE PO)   Yes No   Sig: Take by mouth      Facility-Administered Medications: None       Past Medical History:   Diagnosis Date    Kidney stones        History reviewed  No pertinent surgical history  Family History   Problem Relation Age of Onset    Heart disease Mother     Stroke Mother     Diabetes Mother     Hypertension Mother     Cancer Paternal Grandmother     Kidney disease Paternal Grandmother     Diabetes Paternal Grandmother      I have reviewed and agree with the history as documented      E-Cigarette/Vaping    E-Cigarette Use Never User      E-Cigarette/Vaping Substances    Nicotine No     THC No     CBD No     Flavoring No     Other No     Unknown No      Social History     Tobacco Use    Smoking status: Current Every Day Smoker     Packs/day: 1 00     Types: Cigarettes    Smokeless tobacco: Never Used   Vaping Use    Vaping Use: Never used   Substance Use Topics    Alcohol use: Not Currently     Alcohol/week: 0 0 standard drinks     Comment: drinks liquor every day   Drug use: Yes     Comment: THC edibles       Review of Systems   Constitutional: Negative for fever  HENT: Negative for rhinorrhea  Eyes: Negative for visual disturbance  Respiratory: Negative for shortness of breath  Cardiovascular: Positive for chest pain  Gastrointestinal: Positive for nausea  Negative for abdominal pain, diarrhea and vomiting  Endocrine: Negative for polydipsia  Genitourinary: Negative for dysuria, frequency and hematuria  Musculoskeletal: Negative for neck stiffness  Skin: Negative for rash  Allergic/Immunologic: Negative for immunocompromised state  Neurological: Negative for speech difficulty, weakness and numbness  Psychiatric/Behavioral: Negative for suicidal ideas  Physical Exam  Physical Exam  Constitutional:       General: She is not in acute distress  HENT:      Head: Normocephalic and atraumatic  Right Ear: External ear normal       Left Ear: External ear normal       Nose: Nose normal       Mouth/Throat:      Pharynx: Oropharynx is clear  Eyes:      Conjunctiva/sclera: Conjunctivae normal    Cardiovascular:      Rate and Rhythm: Normal rate and regular rhythm  Heart sounds: Normal heart sounds  No murmur heard  Pulmonary:      Effort: Pulmonary effort is normal       Breath sounds: Normal breath sounds  Chest:      Chest wall: No tenderness  Abdominal:      General: Bowel sounds are normal       Palpations: Abdomen is soft  There is no mass  Tenderness: There is no abdominal tenderness  There is no guarding  Musculoskeletal:         General: No swelling or tenderness  Cervical back: Normal range of motion and neck supple  Right lower leg: No edema  Left lower leg: No edema  Skin:     General: Skin is warm and dry        Capillary Refill: Capillary refill takes less than 2 seconds  Findings: No rash  Neurological:      General: No focal deficit present  Mental Status: She is alert and oriented to person, place, and time  Psychiatric:         Mood and Affect: Mood normal          Vital Signs  ED Triage Vitals   Temperature Pulse Respirations Blood Pressure SpO2   03/21/22 1505 03/21/22 1505 03/21/22 1505 03/21/22 1508 03/21/22 1505   97 9 °F (36 6 °C) 78 17 (!) 165/108 99 %      Temp Source Heart Rate Source Patient Position - Orthostatic VS BP Location FiO2 (%)   03/21/22 1505 03/21/22 1505 03/21/22 1505 03/21/22 1505 --   Oral Monitor Sitting Left arm       Pain Score       03/21/22 1505       5           Vitals:    03/21/22 1505 03/21/22 1508   BP:  (!) 165/108   Pulse: 78    Patient Position - Orthostatic VS: Sitting          Visual Acuity      ED Medications  Medications   lactated ringers bolus 1,000 mL (0 mL Intravenous Stopped 3/21/22 1644)   losartan (COZAAR) tablet 50 mg (50 mg Oral Given 3/21/22 1650)       Diagnostic Studies  Results Reviewed     Procedure Component Value Units Date/Time    Rapid drug screen, urine [[id number]]  (Abnormal) Collected: 03/21/22 1532    Lab Status: Final result Specimen: Urine, Clean Catch Updated: 03/21/22 1624     Amph/Meth UR Negative     Barbiturate Ur Negative     Benzodiazepine Urine Negative     Cocaine Urine Negative     Methadone Urine Negative     Opiate Urine Negative     PCP Ur Negative     THC Urine Positive     Oxycodone Urine Negative    Narrative:      Presumptive report  If requested, specimen will be sent to reference lab for confirmation  FOR MEDICAL PURPOSES ONLY  IF CONFIRMATION NEEDED PLEASE CONTACT THE LAB WITHIN 5 DAYS      Drug Screen Cutoff Levels:  AMPHETAMINE/METHAMPHETAMINES  1000 ng/mL  BARBITURATES     200 ng/mL  BENZODIAZEPINES     200 ng/mL  COCAINE      300 ng/mL  METHADONE      300 ng/mL  OPIATES      300 ng/mL  PHENCYCLIDINE     25 ng/mL  THC       50 ng/mL  OXYCODONE      100 ng/mL    HS Troponin 0hr (reflex protocol) [[id number]]  (Normal) Collected: 03/21/22 1532    Lab Status: Final result Specimen: Blood from Arm, Left Updated: 03/21/22 1617     hs TnI 0hr <2 ng/L     Comprehensive metabolic panel [[id number]] Collected: 03/21/22 1532    Lab Status: Final result Specimen: Blood from Arm, Left Updated: 03/21/22 1612     Sodium 135 mmol/L      Potassium 3 6 mmol/L      Chloride 101 mmol/L      CO2 26 mmol/L      ANION GAP 8 mmol/L      BUN 8 mg/dL      Creatinine 0 73 mg/dL      Glucose 80 mg/dL      Calcium 9 8 mg/dL      AST 22 U/L      ALT 19 U/L      Alkaline Phosphatase 78 U/L      Total Protein 8 0 g/dL      Albumin 4 6 g/dL      Total Bilirubin 0 38 mg/dL      eGFR 112 ml/min/1 73sq m     Narrative:      National Kidney Disease Foundation guidelines for Chronic Kidney Disease (CKD):     Stage 1 with normal or high GFR (GFR > 90 mL/min/1 73 square meters)    Stage 2 Mild CKD (GFR = 60-89 mL/min/1 73 square meters)    Stage 3A Moderate CKD (GFR = 45-59 mL/min/1 73 square meters)    Stage 3B Moderate CKD (GFR = 30-44 mL/min/1 73 square meters)    Stage 4 Severe CKD (GFR = 15-29 mL/min/1 73 square meters)    Stage 5 End Stage CKD (GFR <15 mL/min/1 73 square meters)  Note: GFR calculation is accurate only with a steady state creatinine    D-Dimer [[id number]]  (Normal) Collected: 03/21/22 1532    Lab Status: Final result Specimen: Blood from Arm, Left Updated: 03/21/22 1612     D-Dimer, Quant 0 41 ug/ml FEU     UA w Reflex to Microscopic w Reflex to Culture [[id number]]  (Normal) Collected: 03/21/22 1531    Lab Status: Final result Specimen: Urine, Clean Catch Updated: 03/21/22 1604     Color, UA Yellow     Clarity, UA Clear     Specific Gravity, UA 1 010     pH, UA 7 0     Leukocytes, UA Negative     Nitrite, UA Negative     Protein, UA Negative mg/dl      Glucose, UA Negative mg/dl      Ketones, UA Negative mg/dl      Urobilinogen, UA 0 2 E U /dl      Bilirubin, UA Negative Blood, UA Negative    CBC and differential [[id number]]  (Abnormal) Collected: 03/21/22 1532    Lab Status: Final result Specimen: Blood from Arm, Left Updated: 03/21/22 1550     WBC 10 33 Thousand/uL      RBC 5 04 Million/uL      Hemoglobin 13 5 g/dL      Hematocrit 41 9 %      MCV 83 fL      MCH 26 8 pg      MCHC 32 2 g/dL      RDW 13 3 %      MPV 9 9 fL      Platelets 728 Thousands/uL      nRBC 0 /100 WBCs      Neutrophils Relative 71 %      Immat GRANS % 0 %      Lymphocytes Relative 21 %      Monocytes Relative 7 %      Eosinophils Relative 1 %      Basophils Relative 0 %      Neutrophils Absolute 7 37 Thousands/µL      Immature Grans Absolute 0 03 Thousand/uL      Lymphocytes Absolute 2 13 Thousands/µL      Monocytes Absolute 0 69 Thousand/µL      Eosinophils Absolute 0 08 Thousand/µL      Basophils Absolute 0 03 Thousands/µL     POCT pregnancy, urine [[id number]]  (Normal) Resulted: 03/21/22 1539    Lab Status: Final result Updated: 03/21/22 1540     EXT PREG TEST UR (Ref: Negative) negative     Control valid                 XR chest 1 view portable   ED Interpretation by Catracho Christianson MD (03/21 1615)   CXR: (my "wet" read interpretation) no acute cardiopulmonary abnormalities identified          Final Result by Chan Briggs MD (03/21 1345)      No acute cardiopulmonary disease  Workstation performed: [id number]                    Procedures  Procedures         ED Course  ED Course as of 03/27/22 1954   Mon Mar 21, 2022   1615 ECG 12 lead  EKG: Sinus rhythm, rate within normal limits, QRS axis within normal limits, no acute ischemic changes                                 SBIRT 20yo+      Most Recent Value   SBIRT (25 yo +)    In order to provide better care to our patients, we are screening all of our patients for alcohol and drug use  Would it be okay to ask you these screening questions?  No Filed at: 03/21/2022 1511                    MDM  Number of Diagnoses or Management Options  Chest pain, unspecified type  Elevated BP without diagnosis of hypertension  Diagnosis management comments: CP, no emergent findings, started Rx for HTN to be managed by PMD  Counseled on weight, diet, smoking, marijuana  Disposition  Final diagnoses:   Chest pain, unspecified type   Elevated BP without diagnosis of hypertension     Time reflects when diagnosis was documented in both MDM as applicable and the Disposition within this note     Time User Action Codes Description Comment    3/21/2022  4:36 PM Fannyfacundoraoul Corrales Add [R07 9] Chest pain, unspecified type     3/21/2022  4:36 PM Hanna Holliday Kirby Aburto [address] [R03 0] Elevated BP without diagnosis of hypertension       ED Disposition     ED Disposition Condition Date/Time Comment    Discharge Stable Mon Mar 21, 2022  4:36 PM Jorge Otero discharge to home/self care  Follow-up Information    None         Discharge Medication List as of 3/21/2022  4:37 PM      START taking these medications    Details   losartan (COZAAR) 50 mg tablet Take 1 tablet (50 mg total) by mouth daily, Starting Mon 3/21/2022, Normal         CONTINUE these medications which have NOT CHANGED    Details   Naproxen Sodium (ALEVE PO) Take by mouth, Historical Med      diphenhydrAMINE (BENADRYL) 25 mg capsule Take 25 mg by mouth every 6 (six) hours as needed for itching, Historical Med      ibuprofen (MOTRIN) 600 mg tablet Take 1 tablet (600 mg total) by mouth every 6 (six) hours as needed for mild pain for up to 7 days, Starting Fri 10/11/2019, Until Fri 10/18/2019, Print      ondansetron (ZOFRAN-ODT) 4 mg disintegrating tablet Take 1 tablet (4 mg total) by mouth every 8 (eight) hours as needed for nausea or vomiting for up to 4 days, Starting Fri 10/11/2019, Until Tue 10/15/2019, Print             No discharge procedures on file      PDMP Review     None          ED Provider  Electronically Signed by           Matt Ryan MD  03/27/22 0908

## 2022-03-21 NOTE — ED TRIAGE NOTES
Pt presents to the ED from home with complaint of "yesterday I got a sharp pain under my left breast which has stayed the same until today"; states "I feel pain in my back too"; was seen at Urgent Care in [address] [place] [address] and sent to ED for further evaluation; states EKG completed and "normal"; admits to nausea; denies vomiting, diarrhea and/or fever; states pain is intermittent "like every 5 minutes"; took Aleve at 0500 today with no relief; denies SOB and/or diaphoresis; denies recent accident, injury and/or trauma; states has been informed she has hypertension but has never been prescribed medications; BP in triage =156/108 &165/108

## 2022-03-25 ENCOUNTER — OFFICE VISIT (OUTPATIENT)
Dept: INTERNAL MEDICINE CLINIC | Age: 27
End: 2022-03-25
Payer: COMMERCIAL

## 2022-03-25 VITALS
HEIGHT: 63 IN | SYSTOLIC BLOOD PRESSURE: 140 MMHG | TEMPERATURE: 97.6 F | BODY MASS INDEX: 44.05 KG/M2 | WEIGHT: 248.6 LBS | DIASTOLIC BLOOD PRESSURE: 90 MMHG | HEART RATE: 104 BPM | OXYGEN SATURATION: 96 %

## 2022-03-25 DIAGNOSIS — R21 RASH: ICD-10-CM

## 2022-03-25 DIAGNOSIS — F10.11 ALCOHOL ABUSE, IN REMISSION: Chronic | ICD-10-CM

## 2022-03-25 DIAGNOSIS — F17.200 SMOKER: Primary | ICD-10-CM

## 2022-03-25 DIAGNOSIS — F41.9 ANXIETY: ICD-10-CM

## 2022-03-25 DIAGNOSIS — K21.9 GASTROESOPHAGEAL REFLUX DISEASE, UNSPECIFIED WHETHER ESOPHAGITIS PRESENT: ICD-10-CM

## 2022-03-25 PROBLEM — R07.89 OTHER CHEST PAIN: Status: ACTIVE | Noted: 2022-03-25

## 2022-03-25 PROBLEM — I10 PRIMARY HYPERTENSION: Status: ACTIVE | Noted: 2022-03-25

## 2022-03-25 PROCEDURE — 99214 OFFICE O/P EST MOD 30 MIN: CPT | Performed by: INTERNAL MEDICINE

## 2022-03-25 PROCEDURE — 3725F SCREEN DEPRESSION PERFORMED: CPT | Performed by: INTERNAL MEDICINE

## 2022-03-25 PROCEDURE — 3008F BODY MASS INDEX DOCD: CPT | Performed by: INTERNAL MEDICINE

## 2022-03-25 RX ORDER — OMEPRAZOLE 40 MG/1
40 CAPSULE, DELAYED RELEASE ORAL
Qty: 90 CAPSULE | Refills: 3 | Status: SHIPPED | OUTPATIENT
Start: 2022-03-25

## 2022-03-25 RX ORDER — NYSTATIN 100000 U/G
OINTMENT TOPICAL 2 TIMES DAILY
Qty: 30 G | Refills: 0 | Status: SHIPPED | OUTPATIENT
Start: 2022-03-25

## 2022-03-25 NOTE — PROGRESS NOTES
Assessment/Plan:    Alcohol abuse, in remission  She has not had alcohol in more than a year    Anxiety  Unfortunate 78 it does sound like she has rather severe anxiety with difficulty falling asleep or leaving the house  However she is holding down a job at [employer]  Will try Zoloft    Smoker  He does continue to smoke on a daily basis    Primary hypertension  She was found to have hypertension in the ER and  on placed on losartan 50 mg her blood pressure is up and down it is fine at home but a little bit up if she goes somewhere will observe her for a little longer specially since I placed her on Zoloft    Other chest pain  Under reason for going to the ER with his left-sided rib/chest pain  Workup was totally unremarkable and sounded atypical   It is either from her anxiety or from her chronic use of Advil    BMI 40 0-44 9, adult ([facility] [address] )  She was counseled on diet and exercise for weight on       Diagnoses and all orders for this visit:    Smoker    Alcohol abuse, in remission    Anxiety  -     sertraline (Zoloft) 50 mg tablet; Take 1 tablet (50 mg total) by mouth daily    Gastroesophageal reflux disease, unspecified whether esophagitis present  -     omeprazole (PriLOSEC) 40 MG capsule; Take 1 capsule (40 mg total) by mouth daily before breakfast    Rash  -     nystatin (MYCOSTATIN) ointment; Apply topically 2 (two) times a day          Subjective:      Patient ID: Ana Lazaro is a 27 y o  female  Anxiety  Presents for initial visit  Onset was 1 to 5 years ago  The problem has been gradually worsening  Symptoms include chest pain, depressed mood, excessive worry, insomnia and nervous/anxious behavior  Patient reports no confusion, dizziness, nausea, palpitations, shortness of breath or suicidal ideas  Symptoms occur most days  The severity of symptoms is moderate  The symptoms are aggravated by social activities  The quality of sleep is fair  Nighttime awakenings: one to two                 Review of Systems   Constitutional: Negative for chills, fatigue, fever and unexpected weight change  HENT: Negative for congestion, ear pain, hearing loss, postnasal drip, sinus pressure, sore throat, trouble swallowing and voice change  Eyes: Negative for visual disturbance  Respiratory: Negative for cough, chest tightness, shortness of breath and wheezing  Cardiovascular: Positive for chest pain  Negative for palpitations and leg swelling  Gastrointestinal: Negative for abdominal distention, abdominal pain, anal bleeding, blood in stool, constipation, diarrhea and nausea  Endocrine: Negative for cold intolerance, polydipsia, polyphagia and polyuria  Genitourinary: Negative for dysuria, flank pain, frequency, hematuria and urgency  Musculoskeletal: Negative for arthralgias, back pain, gait problem, joint swelling, myalgias and neck pain  Skin: Negative for rash  Allergic/Immunologic: Negative for immunocompromised state  Neurological: Negative for dizziness, syncope, facial asymmetry, weakness, light-headedness, numbness and headaches  Hematological: Negative for adenopathy  Psychiatric/Behavioral: Negative for confusion, sleep disturbance and suicidal ideas  The patient is nervous/anxious and has insomnia  Objective:      /90 (BP Location: Left arm, Patient Position: Sitting, Cuff Size: Standard)   Pulse 104   Temp 97 6 °F (36 4 °C)   Ht 5' 3" (1 6 m)   Wt 113 kg (248 lb 9 6 oz)   LMP 03/07/2022   SpO2 96%   BMI 44 04 kg/m²          Physical Exam  Constitutional:       General: She is not in acute distress  Appearance: She is well-developed  She is obese  HENT:      Right Ear: External ear normal       Left Ear: External ear normal       Nose: Nose normal       Mouth/Throat:      Pharynx: No oropharyngeal exudate  Eyes:      Pupils: Pupils are equal, round, and reactive to light  Neck:      Thyroid: No thyromegaly  Vascular: No JVD  Comments:  [facility] wound  Cardiovascular:      Rate and Rhythm: Normal rate and regular rhythm  Heart sounds: Normal heart sounds  No murmur heard  No gallop  Pulmonary:      Effort: Pulmonary effort is normal  No respiratory distress  Breath sounds: Normal breath sounds  No wheezing or rales  Abdominal:      General: Bowel sounds are normal  There is no distension  Palpations: Abdomen is soft  There is no mass  Tenderness: There is no abdominal tenderness  Musculoskeletal:         General: No tenderness  Normal range of motion  Cervical back: Normal range of motion and neck supple  Lymphadenopathy:      Cervical: No cervical adenopathy  Skin:     Findings: No rash  Neurological:      Mental Status: She is alert and oriented to person, place, and time  Cranial Nerves: No cranial nerve deficit  Coordination: Coordination normal    Psychiatric:         Behavior: Behavior normal          Thought Content:  Thought content normal          Judgment: Judgment normal       Comments: Anxious

## 2022-03-25 NOTE — ASSESSMENT & PLAN NOTE
Under reason for going to the ER with his left-sided rib/chest pain    Workup was totally unremarkable and sounded atypical   It is either from her anxiety or from her chronic use of Advil

## 2022-03-25 NOTE — ASSESSMENT & PLAN NOTE
Unfortunate 78 it does sound like she has rather severe anxiety with difficulty falling asleep or leaving the house  However she is holding down a job at [employer]    Will try Zoloft

## 2022-03-25 NOTE — ASSESSMENT & PLAN NOTE
She was found to have hypertension in the ER and  on placed on losartan 50 mg her blood pressure is up and down it is fine at home but a little bit up if she goes somewhere will observe her for a little longer specially since I placed her on Zoloft

## 2022-04-22 ENCOUNTER — OFFICE VISIT (OUTPATIENT)
Dept: INTERNAL MEDICINE CLINIC | Age: 27
End: 2022-04-22
Payer: COMMERCIAL

## 2022-04-22 VITALS
HEART RATE: 88 BPM | WEIGHT: 250.4 LBS | SYSTOLIC BLOOD PRESSURE: 146 MMHG | HEIGHT: 63 IN | DIASTOLIC BLOOD PRESSURE: 92 MMHG | BODY MASS INDEX: 44.37 KG/M2 | TEMPERATURE: 98.2 F | OXYGEN SATURATION: 98 %

## 2022-04-22 DIAGNOSIS — R03.0 ELEVATED BP WITHOUT DIAGNOSIS OF HYPERTENSION: ICD-10-CM

## 2022-04-22 PROCEDURE — 3008F BODY MASS INDEX DOCD: CPT | Performed by: INTERNAL MEDICINE

## 2022-04-22 PROCEDURE — 99213 OFFICE O/P EST LOW 20 MIN: CPT | Performed by: INTERNAL MEDICINE

## 2022-04-22 RX ORDER — LOSARTAN POTASSIUM 50 MG/1
50 TABLET ORAL DAILY
Qty: 90 TABLET | Refills: 3 | Status: SHIPPED | OUTPATIENT
Start: 2022-04-22

## 2022-04-22 NOTE — PROGRESS NOTES
Assessment/Plan:    Primary hypertension  She ran out of her blood pressure meds some going to put her back on it and she obviously needs it    Anxiety  For anxiety is dramatically better on the Zoloft given her coworkers have noticed       Diagnoses and all orders for this visit:    Elevated BP without diagnosis of hypertension  -     losartan (COZAAR) 50 mg tablet; Take 1 tablet (50 mg total) by mouth daily          Subjective:      Patient ID: Blake Smalls is a 27 y o  female  Anxiety  Presents for follow-up visit  Symptoms include decreased concentration, depressed mood, irritability and nervous/anxious behavior  Patient reports no chest pain, confusion, dizziness, insomnia, nausea, palpitations, shortness of breath or suicidal ideas  Symptoms occur occasionally  The severity of symptoms is mild  The quality of sleep is good  Nighttime awakenings: occasional      Compliance with medications is %  Review of Systems   Constitutional: Positive for irritability  Negative for chills, fatigue, fever and unexpected weight change  HENT: Negative for congestion, ear pain, hearing loss, postnasal drip, sinus pressure, sore throat, trouble swallowing and voice change  Eyes: Negative for visual disturbance  Respiratory: Negative for cough, chest tightness, shortness of breath and wheezing  Cardiovascular: Negative for chest pain, palpitations and leg swelling  Gastrointestinal: Negative for abdominal distention, abdominal pain, anal bleeding, blood in stool, constipation, diarrhea and nausea  Endocrine: Negative for cold intolerance, polydipsia, polyphagia and polyuria  Genitourinary: Negative for dysuria, flank pain, frequency, hematuria and urgency  Musculoskeletal: Negative for arthralgias, back pain, gait problem, joint swelling, myalgias and neck pain  Skin: Negative for rash  Allergic/Immunologic: Negative for immunocompromised state     Neurological: Negative for dizziness, syncope, facial asymmetry, weakness, light-headedness, numbness and headaches  Hematological: Negative for adenopathy  Psychiatric/Behavioral: Positive for decreased concentration  Negative for confusion, sleep disturbance and suicidal ideas  The patient is nervous/anxious  The patient does not have insomnia  Objective:      /92 (BP Location: Left arm, Patient Position: Sitting)   Pulse 88   Temp 98 2 °F (36 8 °C) (Tympanic)   Ht 5' 3" (1 6 m)   Wt 114 kg (250 lb 6 4 oz)   SpO2 98%   BMI 44 36 kg/m²          Physical Exam  Constitutional:       General: She is not in acute distress  Appearance: She is well-developed  She is obese  HENT:      Right Ear: External ear normal       Left Ear: External ear normal       Nose: Nose normal       Mouth/Throat:      Pharynx: No oropharyngeal exudate  Eyes:      Pupils: Pupils are equal, round, and reactive to light  Neck:      Thyroid: No thyromegaly  Vascular: No JVD  Cardiovascular:      Rate and Rhythm: Normal rate and regular rhythm  Heart sounds: Normal heart sounds  No murmur heard  No gallop  Pulmonary:      Effort: Pulmonary effort is normal  No respiratory distress  Breath sounds: Normal breath sounds  No wheezing or rales  Abdominal:      General: Bowel sounds are normal  There is no distension  Palpations: Abdomen is soft  There is no mass  Tenderness: There is no abdominal tenderness  Musculoskeletal:         General: No tenderness  Normal range of motion  Cervical back: Normal range of motion and neck supple  Lymphadenopathy:      Cervical: No cervical adenopathy  Skin:     Findings: No rash  Neurological:      Mental Status: She is alert and oriented to person, place, and time  Cranial Nerves: No cranial nerve deficit  Coordination: Coordination normal    Psychiatric:         Behavior: Behavior normal          Thought Content:  Thought content normal          Judgment: Judgment normal

## 2022-04-22 NOTE — PATIENT INSTRUCTIONS
Anxiety   AMBULATORY CARE:   Anxiety  is a condition that causes you to feel extremely worried or nervous  The feelings are so strong that they can cause problems with your daily activities or sleep  Anxiety may be triggered by something you fear, or it may happen without a cause  Family or work stress, smoking, caffeine, and alcohol can increase your risk for anxiety  Certain medicines or health conditions can also increase your risk  Anxiety can become a long-term condition if it is not managed or treated  Common signs and symptoms that may occur with anxiety:   · Fatigue or muscle tightness    · Shaking, restlessness, or irritability    · Problems focusing    · Trouble sleeping    · Feeling jumpy, easily startled, or dizzy    · Rapid heartbeat or shortness of breath    Call your local emergency number (911 in the [address]) if:   · You have chest pain, tightness, or heaviness that may spread to your shoulders, arms, jaw, neck, or back  · You feel like hurting yourself or someone else  Call your doctor if:   · Your symptoms get worse or do not get better with treatment  · Your anxiety keeps you from doing your regular daily activities  · You have new symptoms since your last visit  · You have questions or concerns about your condition or care  Treatment for anxiety  may include medicines to help you feel calm and relaxed, and decrease your symptoms  Medicines are usually given together with therapy or other treatments  Manage anxiety:   · Talk to someone about your anxiety  Your healthcare provider may suggest counseling  Cognitive behavioral therapy can help you understand and change how you react to events that trigger your symptoms  You might feel more comfortable talking with a friend or family member about your anxiety  Choose someone you know will be supportive and encouraging  · Find ways to relax  Activities such as exercise, meditation, or listening to music can help you relax   Spend time with friends, or do things you enjoy  · Practice deep breathing  Deep breathing can help you relax when you feel anxious  Focus on taking slow, deep breaths several times a day, or during an anxiety attack  Breathe in through your nose and out through your mouth  · Create a regular sleep routine  Regular sleep can help you feel calmer during the day  Go to sleep and wake up at the same times every day  Do not watch television or use the computer right before bed  Your room should be comfortable, dark, and quiet  · Eat a variety of healthy foods  Healthy foods include fruits, vegetables, low-fat dairy products, lean meats, fish, whole-grain breads, and cooked beans  Healthy foods can help you feel less anxious and have more energy  · Exercise regularly  Exercise can increase your energy level  Exercise may also lift your mood and help you sleep better  Your healthcare provider can help you create an exercise plan  · Do not smoke  Nicotine and other chemicals in cigarettes and cigars can increase anxiety  Ask your healthcare provider for information if you currently smoke and need help to quit  E-cigarettes or smokeless tobacco still contain nicotine  Talk to your healthcare provider before you use these products  · Do not have caffeine  Caffeine can make your symptoms worse  Do not have foods or drinks that are meant to increase your energy level  · Limit or do not drink alcohol  Ask your healthcare provider if alcohol is safe for you  You may not be able to drink alcohol if you take certain anxiety or depression medicines  Limit alcohol to 1 drink per day if you are a woman  Limit alcohol to 2 drinks per day if you are a man  A drink of alcohol is 12 ounces of beer, 5 ounces of wine, or 1½ ounces of liquor  · Do not use drugs  Drugs can make your anxiety worse  It can also make anxiety hard to manage   Talk to your healthcare provider if you use drugs and want help to quit     Follow up with your doctor within 2 weeks or as directed:  Write down your questions so you remember to ask them during your visits  © Copyright [employer] 2022 Information is for End User's use only and may not be sold, redistributed or otherwise used for commercial purposes  All illustrations and images included in CareNotes® are the copyrighted property of A D A M , Inc  or [facility] Noemi Friend   The above information is an  only  It is not intended as medical advice for individual conditions or treatments  Talk to your doctor, nurse or pharmacist before following any medical regimen to see if it is safe and effective for you

## 2023-06-20 ENCOUNTER — OFFICE VISIT (OUTPATIENT)
Dept: FAMILY MEDICINE CLINIC | Facility: CLINIC | Age: 28
End: 2023-06-20
Payer: COMMERCIAL

## 2023-06-20 ENCOUNTER — APPOINTMENT (OUTPATIENT)
Dept: LAB | Facility: MEDICAL CENTER | Age: 28
End: 2023-06-20
Payer: COMMERCIAL

## 2023-06-20 VITALS
BODY MASS INDEX: 41.46 KG/M2 | HEIGHT: 63 IN | WEIGHT: 234 LBS | DIASTOLIC BLOOD PRESSURE: 104 MMHG | TEMPERATURE: 97.2 F | SYSTOLIC BLOOD PRESSURE: 160 MMHG | OXYGEN SATURATION: 99 % | HEART RATE: 103 BPM

## 2023-06-20 DIAGNOSIS — F10.10 ETOH ABUSE: ICD-10-CM

## 2023-06-20 DIAGNOSIS — F41.9 ANXIETY: ICD-10-CM

## 2023-06-20 DIAGNOSIS — K29.70 GASTRITIS WITHOUT BLEEDING, UNSPECIFIED CHRONICITY, UNSPECIFIED GASTRITIS TYPE: ICD-10-CM

## 2023-06-20 DIAGNOSIS — I10 PRIMARY HYPERTENSION: ICD-10-CM

## 2023-06-20 DIAGNOSIS — Z13.220 SCREENING, LIPID: ICD-10-CM

## 2023-06-20 DIAGNOSIS — F17.200 SMOKER: ICD-10-CM

## 2023-06-20 DIAGNOSIS — K21.9 GASTROESOPHAGEAL REFLUX DISEASE, UNSPECIFIED WHETHER ESOPHAGITIS PRESENT: ICD-10-CM

## 2023-06-20 DIAGNOSIS — F41.8 MIXED ANXIETY DEPRESSIVE DISORDER: Primary | ICD-10-CM

## 2023-06-20 PROBLEM — R07.89 OTHER CHEST PAIN: Status: RESOLVED | Noted: 2022-03-25 | Resolved: 2023-06-20

## 2023-06-20 PROBLEM — F10.11 ALCOHOL ABUSE, IN REMISSION: Chronic | Status: RESOLVED | Noted: 2022-03-21 | Resolved: 2023-06-20

## 2023-06-20 LAB
ALBUMIN SERPL BCP-MCNC: 3.9 G/DL (ref 3.5–5)
ALP SERPL-CCNC: 133 U/L (ref 46–116)
ALT SERPL W P-5'-P-CCNC: 104 U/L (ref 12–78)
ANION GAP SERPL CALCULATED.3IONS-SCNC: 5 MMOL/L
AST SERPL W P-5'-P-CCNC: 118 U/L (ref 5–45)
BASOPHILS # BLD AUTO: 0.01 THOUSANDS/ÂΜL (ref 0–0.1)
BASOPHILS NFR BLD AUTO: 0 % (ref 0–1)
BILIRUB SERPL-MCNC: 0.95 MG/DL (ref 0.2–1)
BUN SERPL-MCNC: 12 MG/DL (ref 5–25)
CALCIUM SERPL-MCNC: 9.9 MG/DL (ref 8.3–10.1)
CHLORIDE SERPL-SCNC: 103 MMOL/L (ref 96–108)
CHOLEST SERPL-MCNC: 175 MG/DL
CO2 SERPL-SCNC: 25 MMOL/L (ref 21–32)
CREAT SERPL-MCNC: 0.9 MG/DL (ref 0.6–1.3)
EOSINOPHIL # BLD AUTO: 0.1 THOUSAND/ÂΜL (ref 0–0.61)
EOSINOPHIL NFR BLD AUTO: 1 % (ref 0–6)
ERYTHROCYTE [DISTWIDTH] IN BLOOD BY AUTOMATED COUNT: 14.1 % (ref 11.6–15.1)
GFR SERPL CREATININE-BSD FRML MDRD: 86 ML/MIN/1.73SQ M
GLUCOSE P FAST SERPL-MCNC: 95 MG/DL (ref 65–99)
HCT VFR BLD AUTO: 51 % (ref 34.8–46.1)
HDLC SERPL-MCNC: 80 MG/DL
HGB BLD-MCNC: 15.7 G/DL (ref 11.5–15.4)
IMM GRANULOCYTES # BLD AUTO: 0.03 THOUSAND/UL (ref 0–0.2)
IMM GRANULOCYTES NFR BLD AUTO: 0 % (ref 0–2)
LDLC SERPL CALC-MCNC: 79 MG/DL (ref 0–100)
LYMPHOCYTES # BLD AUTO: 1.13 THOUSANDS/ÂΜL (ref 0.6–4.47)
LYMPHOCYTES NFR BLD AUTO: 14 % (ref 14–44)
MCH RBC QN AUTO: 26.7 PG (ref 26.8–34.3)
MCHC RBC AUTO-ENTMCNC: 30.8 G/DL (ref 31.4–37.4)
MCV RBC AUTO: 87 FL (ref 82–98)
MONOCYTES # BLD AUTO: 0.65 THOUSAND/ÂΜL (ref 0.17–1.22)
MONOCYTES NFR BLD AUTO: 8 % (ref 4–12)
NEUTROPHILS # BLD AUTO: 6.47 THOUSANDS/ÂΜL (ref 1.85–7.62)
NEUTS SEG NFR BLD AUTO: 77 % (ref 43–75)
NONHDLC SERPL-MCNC: 95 MG/DL
NRBC BLD AUTO-RTO: 0 /100 WBCS
PLATELET # BLD AUTO: 320 THOUSANDS/UL (ref 149–390)
PMV BLD AUTO: 10.4 FL (ref 8.9–12.7)
POTASSIUM SERPL-SCNC: 4.3 MMOL/L (ref 3.5–5.3)
PROT SERPL-MCNC: 8.2 G/DL (ref 6.4–8.4)
RBC # BLD AUTO: 5.87 MILLION/UL (ref 3.81–5.12)
SODIUM SERPL-SCNC: 133 MMOL/L (ref 135–147)
TRIGL SERPL-MCNC: 79 MG/DL
TSH SERPL DL<=0.05 MIU/L-ACNC: 3.21 UIU/ML (ref 0.45–4.5)
WBC # BLD AUTO: 8.39 THOUSAND/UL (ref 4.31–10.16)

## 2023-06-20 PROCEDURE — 84443 ASSAY THYROID STIM HORMONE: CPT

## 2023-06-20 PROCEDURE — 85025 COMPLETE CBC W/AUTO DIFF WBC: CPT

## 2023-06-20 PROCEDURE — 36415 COLL VENOUS BLD VENIPUNCTURE: CPT

## 2023-06-20 PROCEDURE — 80053 COMPREHEN METABOLIC PANEL: CPT

## 2023-06-20 PROCEDURE — 99214 OFFICE O/P EST MOD 30 MIN: CPT | Performed by: INTERNAL MEDICINE

## 2023-06-20 PROCEDURE — 80061 LIPID PANEL: CPT

## 2023-06-20 RX ORDER — OMEPRAZOLE 40 MG/1
40 CAPSULE, DELAYED RELEASE ORAL
Qty: 90 CAPSULE | Refills: 3 | Status: SHIPPED | OUTPATIENT
Start: 2023-06-20

## 2023-06-20 RX ORDER — METOPROLOL SUCCINATE 50 MG/1
50 TABLET, EXTENDED RELEASE ORAL DAILY
Qty: 30 TABLET | Refills: 5 | Status: SHIPPED | OUTPATIENT
Start: 2023-06-20

## 2023-06-20 RX ORDER — CITALOPRAM 20 MG/1
20 TABLET ORAL DAILY
Qty: 30 TABLET | Refills: 5 | Status: SHIPPED | OUTPATIENT
Start: 2023-06-20

## 2023-06-20 NOTE — ASSESSMENT & PLAN NOTE
Patient has had a history of anxiety and depression but stopped her medications on her own she has been self-medicating with alcohol  We will restart her on an antidepressant and put referrals into psychiatry and psychology

## 2023-06-20 NOTE — ASSESSMENT & PLAN NOTE
She again has not taken her blood pressure meds and we will restart her on something to try beta-blocker to see if it helps calm her

## 2023-06-20 NOTE — ASSESSMENT & PLAN NOTE
She remains stable but obese at the moment this is one of her lesser problems although she does appear to be getting a fair amount of calories from alcohol

## 2023-06-20 NOTE — ASSESSMENT & PLAN NOTE
She also smokes at least a pack a day and was counseled 3 to 10 minutes on the benefits of a ways to quit smoking but to be honest her alcohol is the priority

## 2023-06-20 NOTE — ASSESSMENT & PLAN NOTE
She also is complaining of a lot of indigestion and has been drinking at least a bottle of liquor about every 2 days    We will restart her on a PPI and highly encouraged her to link up with AA

## 2023-06-20 NOTE — PROGRESS NOTES
Name: Radha Medina      : 1995      MRN: [id number]  Encounter Provider: Hi Morataya MD  Encounter Date: 2023   Encounter department: [address] [place] [address]  Mixed anxiety depressive disorder  Assessment & Plan:  Patient has had a history of anxiety and depression but stopped her medications on her own she has been self-medicating with alcohol  We will restart her on an antidepressant and put referrals into psychiatry and psychology  Orders:  -     Ambulatory Referral to Psychiatry; Future  -     Ambulatory Referral to Psychology; Future  -     citalopram (CeleXA) 20 mg tablet; Take 1 tablet (20 mg total) by mouth daily    2  Anxiety  -     TSH, 3rd generation with Free T4 reflex; Future    3  BMI 40 0-44 9, adult [facility])  Assessment & Plan:  She remains stable but obese at the moment this is one of her lesser problems although she does appear to be getting a fair amount of calories from alcohol      4  Primary hypertension  Assessment & Plan:  She again has not taken her blood pressure meds and we will restart her on something to try beta-blocker to see if it helps calm her    Orders:  -     metoprolol succinate (Toprol XL) 50 mg 24 hr tablet; Take 1 tablet (50 mg total) by mouth daily    5  ETOH abuse  Assessment & Plan:  As mentioned she is not alcohol abusing with hard liquor  Referred her to AA    Orders:  -     Comprehensive metabolic panel; Future    6  Smoker  Assessment & Plan:  She also smokes at least a pack a day and was counseled 3 to 10 minutes on the benefits of a ways to quit smoking but to be honest her alcohol is the priority      7  Gastritis without bleeding, unspecified chronicity, unspecified gastritis type  Assessment & Plan:  She also is complaining of a lot of indigestion and has been drinking at least a bottle of liquor about every 2 days    We will restart her on a PPI and highly encouraged her to link up with AA    Orders:  -     CBC and differential; Future    8  Screening, lipid  -     Lipid panel; Future      BMI Counseling: Body mass index is 41 45 kg/m²  The BMI is above normal  Nutrition recommendations include decreasing portion sizes and encouraging healthy choices of fruits and vegetables  Exercise recommendations include moderate physical activity 150 minutes/week  No pharmacotherapy was ordered  Rationale for BMI follow-up plan is due to patient being overweight or obese  Depression Screening and Follow-up Plan: Patient's depression screening was positive with a PHQ-2 score of 3  Their PHQ-9 score was 19  Subjective      Patient has had anxiety depression for a long period of time but is never consistently followed up with medications or treatment  At present she is severely depressed and anxious but is managing to keep a job    Depression  This is a chronic problem  The current episode started more than 1 month ago  The problem occurs constantly  The problem has been gradually worsening  Associated symptoms include abdominal pain and fatigue  Pertinent negatives include no arthralgias, chest pain, chills, coughing, fever, rash, sore throat or vomiting  The symptoms are aggravated by drinking  She has tried drinking for the symptoms  The treatment provided no relief  Review of Systems   Constitutional: Positive for fatigue  Negative for chills and fever  HENT: Negative for ear pain and sore throat  Eyes: Negative for pain and visual disturbance  Respiratory: Negative for cough and shortness of breath  Cardiovascular: Negative for chest pain and palpitations  Gastrointestinal: Positive for abdominal pain  Negative for anal bleeding, blood in stool, constipation, diarrhea and vomiting  Genitourinary: Negative for dysuria and hematuria  Musculoskeletal: Negative for arthralgias and back pain  Skin: Negative for color change and rash  Neurological: Negative for seizures and syncope  "  Psychiatric/Behavioral: Positive for behavioral problems, decreased concentration, depression, dysphoric mood and sleep disturbance  The patient is nervous/anxious  All other systems reviewed and are negative  Current Outpatient Medications on File Prior to Visit   Medication Sig   • [DISCONTINUED] Naproxen Sodium (ALEVE PO) Take by mouth   • omeprazole (PriLOSEC) 40 MG capsule Take 1 capsule (40 mg total) by mouth daily before breakfast   • [DISCONTINUED] losartan (COZAAR) 50 mg tablet Take 1 tablet (50 mg total) by mouth daily   • [DISCONTINUED] nystatin (MYCOSTATIN) ointment Apply topically 2 (two) times a day (Patient not taking: Reported on 6/20/2023)   • [DISCONTINUED] sertraline (Zoloft) 50 mg tablet Take 1 tablet (50 mg total) by mouth daily       Objective     BP (!) 160/104 (BP Location: Left arm, Patient Position: Sitting, Cuff Size: Extra-Large)   Pulse 103   Temp (!) 97 2 °F (36 2 °C) (Temporal)   Ht 5' 3\" (1 6 m)   Wt 106 kg (234 lb)   LMP 05/21/2023   SpO2 99%   BMI 41 45 kg/m²     Physical Exam  Vitals reviewed  Constitutional:       General: She is not in acute distress  Appearance: She is well-developed  She is obese  HENT:      Right Ear: External ear normal       Left Ear: External ear normal       Nose: Nose normal       Mouth/Throat:      Pharynx: No oropharyngeal exudate  Eyes:      Pupils: Pupils are equal, round, and reactive to light  Neck:      Thyroid: No thyromegaly  Vascular: No JVD  Cardiovascular:      Rate and Rhythm: Normal rate and regular rhythm  Heart sounds: Normal heart sounds  No murmur heard  No gallop  Pulmonary:      Effort: Pulmonary effort is normal  No respiratory distress  Breath sounds: Normal breath sounds  No wheezing or rales  Abdominal:      General: Bowel sounds are normal  There is no distension  Palpations: Abdomen is soft  There is no mass  Tenderness: There is no abdominal tenderness   " Musculoskeletal:         General: No tenderness  Normal range of motion  Cervical back: Normal range of motion and neck supple  Lymphadenopathy:      Cervical: No cervical adenopathy  Skin:     Findings: No rash  Neurological:      General: No focal deficit present  Mental Status: She is alert and oriented to person, place, and time  Cranial Nerves: No cranial nerve deficit  Coordination: Coordination normal    Psychiatric:         Behavior: Behavior normal          Thought Content:  Thought content normal          Judgment: Judgment normal       Comments: Steven Brown MD

## 2023-07-07 ENCOUNTER — TELEPHONE (OUTPATIENT)
Dept: PSYCHIATRY | Facility: CLINIC | Age: 28
End: 2023-07-07

## 2023-07-07 NOTE — TELEPHONE ENCOUNTER
Was calling pt in regards to routine referral and adding to proper wait list. LVM for pt to contact intake dept.

## 2023-07-19 ENCOUNTER — OFFICE VISIT (OUTPATIENT)
Dept: FAMILY MEDICINE CLINIC | Facility: CLINIC | Age: 28
End: 2023-07-19
Payer: COMMERCIAL

## 2023-07-19 VITALS
HEART RATE: 120 BPM | RESPIRATION RATE: 16 BRPM | OXYGEN SATURATION: 98 % | WEIGHT: 229 LBS | HEIGHT: 63 IN | DIASTOLIC BLOOD PRESSURE: 108 MMHG | TEMPERATURE: 97.4 F | SYSTOLIC BLOOD PRESSURE: 144 MMHG | BODY MASS INDEX: 40.57 KG/M2

## 2023-07-19 DIAGNOSIS — F17.200 SMOKER: ICD-10-CM

## 2023-07-19 DIAGNOSIS — Z12.4 SCREENING FOR CERVICAL CANCER: ICD-10-CM

## 2023-07-19 DIAGNOSIS — K29.70 GASTRITIS WITHOUT BLEEDING, UNSPECIFIED CHRONICITY, UNSPECIFIED GASTRITIS TYPE: ICD-10-CM

## 2023-07-19 DIAGNOSIS — F41.8 MIXED ANXIETY DEPRESSIVE DISORDER: Primary | ICD-10-CM

## 2023-07-19 DIAGNOSIS — I10 PRIMARY HYPERTENSION: ICD-10-CM

## 2023-07-19 DIAGNOSIS — F10.10 ETOH ABUSE: ICD-10-CM

## 2023-07-19 PROCEDURE — 99214 OFFICE O/P EST MOD 30 MIN: CPT | Performed by: INTERNAL MEDICINE

## 2023-07-19 RX ORDER — LISINOPRIL 20 MG/1
40 TABLET ORAL DAILY
Qty: 30 TABLET | Refills: 3 | Status: SHIPPED | OUTPATIENT
Start: 2023-07-19 | End: 2023-07-19

## 2023-07-19 RX ORDER — CITALOPRAM 20 MG/1
40 TABLET ORAL DAILY
Qty: 30 TABLET | Refills: 5 | Status: SHIPPED | OUTPATIENT
Start: 2023-07-19 | End: 2023-07-19 | Stop reason: SDUPTHER

## 2023-07-19 RX ORDER — LISINOPRIL 20 MG/1
20 TABLET ORAL DAILY
Qty: 30 TABLET | Refills: 3 | Status: SHIPPED | OUTPATIENT
Start: 2023-07-19

## 2023-07-19 RX ORDER — CITALOPRAM 40 MG/1
40 TABLET ORAL DAILY
Qty: 30 TABLET | Refills: 3 | Status: SHIPPED | OUTPATIENT
Start: 2023-07-19

## 2023-07-19 NOTE — ASSESSMENT & PLAN NOTE
She has not started with AA and was trying to get in with the doctor who is treating with her with psychotropic drugs or alcohol use.   Again encouraged her if she can find someone that she wants to go to she needs to go to [address] [place] [address]

## 2023-07-19 NOTE — ASSESSMENT & PLAN NOTE
She does not feel mentally equipped to quit smoking at this point in time with her anxiety depression and alcohol use.   She was however counseled 3 to 10 minutes on the benefits of the ways to quit smoking

## 2023-07-19 NOTE — ASSESSMENT & PLAN NOTE
Her blood pressure continues to be out of control although she is obviously quite anxious and upset.   She had been on lisinopril in the past we will try it again

## 2023-07-19 NOTE — PROGRESS NOTES
Name: Sri Bedoya      : 1995      MRN: [id number]  Encounter Provider: Nidia Lockwood MD  Encounter Date: 2023   Encounter department: [facility]     1. Mixed anxiety depressive disorder  Assessment & Plan:  She continues to wait for placement with psych so we will increase her citalopram to 40    Orders:  -     citalopram (CeleXA) 40 mg tablet; Take 1 tablet (40 mg total) by mouth daily    2. Screening for cervical cancer  -     Ambulatory Referral to Gynecology; Future    3. ETOH abuse  Assessment & Plan:  She has not started with AA and was trying to get in with the doctor who is treating with her with psychotropic drugs or alcohol use. Again encouraged her if she can find someone that she wants to go to she needs to go to AA      4. Primary hypertension  Assessment & Plan:  Her blood pressure continues to be out of control although she is obviously quite anxious and upset. She had been on lisinopril in the past we will try it again    Orders:  -     lisinopril (ZESTRIL) 20 mg tablet; Take 1 tablet (20 mg total) by mouth daily    5. Gastritis without bleeding, unspecified chronicity, unspecified gastritis type  Assessment & Plan:  She continues to have GI upset from alcohol but is taking her omeprazole regularly      6. Smoker  Assessment & Plan:  She does not feel mentally equipped to quit smoking at this point in time with her anxiety depression and alcohol use. She was however counseled 3 to 10 minutes on the benefits of the ways to quit smoking        Tobacco Cessation Counseling: Tobacco cessation counseling was provided. The patient is sincerely urged to quit consumption of tobacco. She is not ready to quit tobacco.         Subjective      Patient has a long history of anxiety and alcohol addiction for which she has been treated in the past by several chronic some doctors.   At present she is having a hard time finding someone to get her in in a timely manner. She is increasingly anxious depressed and her blood pressure is elevated. Alcohol Problem  The patient's primary symptoms include agitation. Pertinent negatives include no seizures. This is a recurrent problem. The current episode started more than 1 year ago. The problem has been waxing and waning since onset. Suspected agents include alcohol. Pertinent negatives include no fever, nausea or vomiting. Past treatments include Alcoholics Anonymous and outpatient rehab. The treatment provided mild relief. Her past medical history is significant for a mental illness. Review of Systems   Constitutional: Positive for fatigue. Negative for chills and fever. HENT: Negative for ear pain and sore throat. Eyes: Negative for pain and visual disturbance. Respiratory: Negative for cough and shortness of breath. Cardiovascular: Negative for chest pain and palpitations. Gastrointestinal: Positive for abdominal pain. Negative for nausea and vomiting. Genitourinary: Negative for dysuria and hematuria. Musculoskeletal: Negative for arthralgias and back pain. Skin: Negative for color change and rash. Neurological: Negative for seizures and syncope. Psychiatric/Behavioral: Positive for agitation, decreased concentration, dysphoric mood and sleep disturbance. Negative for suicidal ideas. The patient is nervous/anxious. All other systems reviewed and are negative.       Current Outpatient Medications on File Prior to Visit   Medication Sig   • omeprazole (PriLOSEC) 40 MG capsule Take 1 capsule (40 mg total) by mouth daily before breakfast   • [DISCONTINUED] citalopram (CeleXA) 20 mg tablet Take 1 tablet (20 mg total) by mouth daily   • [DISCONTINUED] metoprolol succinate (Toprol XL) 50 mg 24 hr tablet Take 1 tablet (50 mg total) by mouth daily       Objective     BP (!) 144/108 (BP Location: Left arm, Patient Position: Sitting, Cuff Size: Large)   Pulse (!) 120   Temp (!) 97.4 °F (36.3 °C) (Temporal)   Resp 16   Ht 5' 3" (1.6 m)   Wt 104 kg (229 lb)   LMP  (LMP Unknown) Comment: irregular periods  SpO2 98%   BMI 40.57 kg/m²     Physical Exam  Constitutional:       General: She is not in acute distress. Appearance: She is well-developed. She is obese. HENT:      Right Ear: External ear normal.      Left Ear: External ear normal.      Nose: Nose normal.      Mouth/Throat:      Pharynx: No oropharyngeal exudate. Eyes:      Pupils: Pupils are equal, round, and reactive to light. Neck:      Thyroid: No thyromegaly. Vascular: No JVD. Cardiovascular:      Rate and Rhythm: Normal rate and regular rhythm. Heart sounds: Normal heart sounds. No murmur heard. No gallop. Pulmonary:      Effort: Pulmonary effort is normal. No respiratory distress. Breath sounds: Normal breath sounds. No wheezing or rales. Abdominal:      General: Bowel sounds are normal. There is no distension. Palpations: Abdomen is soft. There is no mass. Tenderness: There is no abdominal tenderness. Musculoskeletal:         General: No tenderness. Normal range of motion. Cervical back: Normal range of motion and neck supple. Right lower leg: No edema. Left lower leg: No edema. Lymphadenopathy:      Cervical: No cervical adenopathy. Skin:     Findings: No rash. Neurological:      Mental Status: She is alert and oriented to person, place, and time. Mental status is at baseline. Cranial Nerves: No cranial nerve deficit. Coordination: Coordination normal.   Psychiatric:         Behavior: Behavior normal.         Thought Content:  Thought content normal.         Judgment: Judgment normal.      Comments: She is anxiously depressed       Lexy Hemphill MD

## 2023-08-03 ENCOUNTER — APPOINTMENT (EMERGENCY)
Dept: RADIOLOGY | Facility: HOSPITAL | Age: 28
End: 2023-08-03
Payer: COMMERCIAL

## 2023-08-03 ENCOUNTER — HOSPITAL ENCOUNTER (EMERGENCY)
Facility: HOSPITAL | Age: 28
End: 2023-08-03
Attending: EMERGENCY MEDICINE
Payer: COMMERCIAL

## 2023-08-03 ENCOUNTER — HOSPITAL ENCOUNTER (INPATIENT)
Facility: HOSPITAL | Age: 28
LOS: 2 days | DRG: 897 | End: 2023-08-05
Attending: EMERGENCY MEDICINE | Admitting: EMERGENCY MEDICINE
Payer: COMMERCIAL

## 2023-08-03 ENCOUNTER — APPOINTMENT (EMERGENCY)
Dept: CT IMAGING | Facility: HOSPITAL | Age: 28
End: 2023-08-03
Payer: COMMERCIAL

## 2023-08-03 VITALS
HEART RATE: 116 BPM | RESPIRATION RATE: 20 BRPM | SYSTOLIC BLOOD PRESSURE: 188 MMHG | TEMPERATURE: 98 F | OXYGEN SATURATION: 96 % | DIASTOLIC BLOOD PRESSURE: 122 MMHG

## 2023-08-03 DIAGNOSIS — F10.930 ALCOHOL WITHDRAWAL SYNDROME WITHOUT COMPLICATION (HCC): ICD-10-CM

## 2023-08-03 DIAGNOSIS — R45.851 SUICIDAL IDEATION: Primary | ICD-10-CM

## 2023-08-03 DIAGNOSIS — F10.939 ALCOHOL WITHDRAWAL SYNDROME WITH COMPLICATION (HCC): ICD-10-CM

## 2023-08-03 DIAGNOSIS — F41.8 MIXED ANXIETY DEPRESSIVE DISORDER: ICD-10-CM

## 2023-08-03 DIAGNOSIS — F10.10 ALCOHOL ABUSE: Primary | ICD-10-CM

## 2023-08-03 DIAGNOSIS — Z00.8 MEDICAL CLEARANCE FOR PSYCHIATRIC ADMISSION: ICD-10-CM

## 2023-08-03 DIAGNOSIS — R79.89 ELEVATED LFTS: ICD-10-CM

## 2023-08-03 DIAGNOSIS — E16.2 HYPOGLYCEMIA: ICD-10-CM

## 2023-08-03 PROBLEM — F10.90 ALCOHOL USE DISORDER: Status: ACTIVE | Noted: 2023-08-03

## 2023-08-03 LAB
2HR DELTA HS TROPONIN: -1 NG/L
ALBUMIN SERPL BCP-MCNC: 4.7 G/DL (ref 3.5–5)
ALP SERPL-CCNC: 151 U/L (ref 34–104)
ALT SERPL W P-5'-P-CCNC: 107 U/L (ref 7–52)
AMMONIA PLAS-SCNC: 40 UMOL/L (ref 18–72)
AMPHETAMINES SERPL QL SCN: NEGATIVE
ANION GAP SERPL CALCULATED.3IONS-SCNC: 22 MMOL/L
APAP SERPL-MCNC: <10 UG/ML (ref 10–20)
APTT PPP: 27 SECONDS (ref 23–37)
AST SERPL W P-5'-P-CCNC: 108 U/L (ref 13–39)
ATRIAL RATE: 103 BPM
ATRIAL RATE: 166 BPM
ATRIAL RATE: 94 BPM
BACTERIA UR QL AUTO: ABNORMAL /HPF
BARBITURATES UR QL: NEGATIVE
BASE EX.OXY STD BLDV CALC-SCNC: 83.2 % (ref 60–80)
BASE EXCESS BLDV CALC-SCNC: -7.7 MMOL/L
BASOPHILS # BLD AUTO: 0.04 THOUSANDS/ÂΜL (ref 0–0.1)
BASOPHILS NFR BLD AUTO: 0 % (ref 0–1)
BENZODIAZ UR QL: NEGATIVE
BILIRUB SERPL-MCNC: 0.59 MG/DL (ref 0.2–1)
BILIRUB UR QL STRIP: NEGATIVE
BUN SERPL-MCNC: 13 MG/DL (ref 5–25)
CALCIUM SERPL-MCNC: 10 MG/DL (ref 8.4–10.2)
CARDIAC TROPONIN I PNL SERPL HS: 4 NG/L
CARDIAC TROPONIN I PNL SERPL HS: 5 NG/L
CHLORIDE SERPL-SCNC: 102 MMOL/L (ref 96–108)
CLARITY UR: CLEAR
CO2 SERPL-SCNC: 16 MMOL/L (ref 21–32)
COCAINE UR QL: NEGATIVE
COLOR UR: ABNORMAL
CREAT SERPL-MCNC: 0.64 MG/DL (ref 0.6–1.3)
D DIMER PPP FEU-MCNC: 0.89 UG/ML FEU
EOSINOPHIL # BLD AUTO: 0.02 THOUSAND/ÂΜL (ref 0–0.61)
EOSINOPHIL NFR BLD AUTO: 0 % (ref 0–6)
ERYTHROCYTE [DISTWIDTH] IN BLOOD BY AUTOMATED COUNT: 17.6 % (ref 11.6–15.1)
ETHANOL SERPL-MCNC: 221 MG/DL
GFR SERPL CREATININE-BSD FRML MDRD: 120 ML/MIN/1.73SQ M
GLUCOSE SERPL-MCNC: 66 MG/DL (ref 65–140)
GLUCOSE SERPL-MCNC: 70 MG/DL (ref 65–140)
GLUCOSE SERPL-MCNC: 72 MG/DL (ref 65–140)
GLUCOSE SERPL-MCNC: 76 MG/DL (ref 65–140)
GLUCOSE SERPL-MCNC: 94 MG/DL (ref 65–140)
GLUCOSE UR STRIP-MCNC: ABNORMAL MG/DL
HCG SERPL QL: NEGATIVE
HCO3 BLDV-SCNC: 17 MMOL/L (ref 24–30)
HCT VFR BLD AUTO: 52.4 % (ref 34.8–46.1)
HGB BLD-MCNC: 16.7 G/DL (ref 11.5–15.4)
HGB UR QL STRIP.AUTO: ABNORMAL
IMM GRANULOCYTES # BLD AUTO: 0.04 THOUSAND/UL (ref 0–0.2)
IMM GRANULOCYTES NFR BLD AUTO: 0 % (ref 0–2)
INR PPP: 0.94 (ref 0.84–1.19)
KETONES UR STRIP-MCNC: ABNORMAL MG/DL
LEUKOCYTE ESTERASE UR QL STRIP: NEGATIVE
LIPASE SERPL-CCNC: 19 U/L (ref 11–82)
LYMPHOCYTES # BLD AUTO: 2.04 THOUSANDS/ÂΜL (ref 0.6–4.47)
LYMPHOCYTES NFR BLD AUTO: 21 % (ref 14–44)
MAGNESIUM SERPL-MCNC: 2.1 MG/DL (ref 1.9–2.7)
MCH RBC QN AUTO: 27.6 PG (ref 26.8–34.3)
MCHC RBC AUTO-ENTMCNC: 31.9 G/DL (ref 31.4–37.4)
MCV RBC AUTO: 87 FL (ref 82–98)
METHADONE UR QL: NEGATIVE
MONOCYTES # BLD AUTO: 0.7 THOUSAND/ÂΜL (ref 0.17–1.22)
MONOCYTES NFR BLD AUTO: 7 % (ref 4–12)
MUCOUS THREADS UR QL AUTO: ABNORMAL
NEUTROPHILS # BLD AUTO: 6.75 THOUSANDS/ÂΜL (ref 1.85–7.62)
NEUTS SEG NFR BLD AUTO: 72 % (ref 43–75)
NITRITE UR QL STRIP: NEGATIVE
NON-SQ EPI CELLS URNS QL MICRO: ABNORMAL /HPF
NRBC BLD AUTO-RTO: 0 /100 WBCS
O2 CT BLDV-SCNC: 20.9 ML/DL
OPIATES UR QL SCN: NEGATIVE
OXYCODONE+OXYMORPHONE UR QL SCN: NEGATIVE
P AXIS: 38 DEGREES
P AXIS: 43 DEGREES
P AXIS: 59 DEGREES
PCO2 BLDV: 33.5 MM HG (ref 42–50)
PCP UR QL: NEGATIVE
PH BLDV: 7.32 [PH] (ref 7.3–7.4)
PH UR STRIP.AUTO: 6 [PH]
PLATELET # BLD AUTO: 328 THOUSANDS/UL (ref 149–390)
PMV BLD AUTO: 9.2 FL (ref 8.9–12.7)
PO2 BLDV: 56.5 MM HG (ref 35–45)
POTASSIUM SERPL-SCNC: 4.2 MMOL/L (ref 3.5–5.3)
PR INTERVAL: 124 MS
PR INTERVAL: 128 MS
PR INTERVAL: 134 MS
PROT SERPL-MCNC: 8.5 G/DL (ref 6.4–8.4)
PROT UR STRIP-MCNC: ABNORMAL MG/DL
PROTHROMBIN TIME: 12.8 SECONDS (ref 11.6–14.5)
QRS AXIS: 15 DEGREES
QRS AXIS: 16 DEGREES
QRS AXIS: 35 DEGREES
QRSD INTERVAL: 74 MS
QRSD INTERVAL: 82 MS
QRSD INTERVAL: 84 MS
QT INTERVAL: 302 MS
QT INTERVAL: 334 MS
QT INTERVAL: 412 MS
QTC INTERVAL: 437 MS
QTC INTERVAL: 501 MS
QTC INTERVAL: 515 MS
RBC # BLD AUTO: 6.06 MILLION/UL (ref 3.81–5.12)
RBC #/AREA URNS AUTO: ABNORMAL /HPF
SALICYLATES SERPL-MCNC: <5 MG/DL (ref 3–20)
SODIUM SERPL-SCNC: 140 MMOL/L (ref 135–147)
SP GR UR STRIP.AUTO: >=1.05 (ref 1–1.03)
T WAVE AXIS: 12 DEGREES
T WAVE AXIS: 18 DEGREES
T WAVE AXIS: 22 DEGREES
THC UR QL: NEGATIVE
TSH SERPL DL<=0.05 MIU/L-ACNC: 1.15 UIU/ML (ref 0.45–4.5)
UROBILINOGEN UR STRIP-ACNC: <2 MG/DL
VENTRICULAR RATE: 103 BPM
VENTRICULAR RATE: 166 BPM
VENTRICULAR RATE: 94 BPM
WBC # BLD AUTO: 9.59 THOUSAND/UL (ref 4.31–10.16)
WBC #/AREA URNS AUTO: ABNORMAL /HPF

## 2023-08-03 PROCEDURE — G1004 CDSM NDSC: HCPCS

## 2023-08-03 PROCEDURE — 80179 DRUG ASSAY SALICYLATE: CPT

## 2023-08-03 PROCEDURE — NC001 PR NO CHARGE

## 2023-08-03 PROCEDURE — 83690 ASSAY OF LIPASE: CPT | Performed by: EMERGENCY MEDICINE

## 2023-08-03 PROCEDURE — 80307 DRUG TEST PRSMV CHEM ANLYZR: CPT | Performed by: EMERGENCY MEDICINE

## 2023-08-03 PROCEDURE — 80143 DRUG ASSAY ACETAMINOPHEN: CPT

## 2023-08-03 PROCEDURE — HZ2ZZZZ DETOXIFICATION SERVICES FOR SUBSTANCE ABUSE TREATMENT: ICD-10-PCS | Performed by: EMERGENCY MEDICINE

## 2023-08-03 PROCEDURE — 80053 COMPREHEN METABOLIC PANEL: CPT

## 2023-08-03 PROCEDURE — 36415 COLL VENOUS BLD VENIPUNCTURE: CPT

## 2023-08-03 PROCEDURE — 99291 CRITICAL CARE FIRST HOUR: CPT | Performed by: PHYSICIAN ASSISTANT

## 2023-08-03 PROCEDURE — 85730 THROMBOPLASTIN TIME PARTIAL: CPT | Performed by: EMERGENCY MEDICINE

## 2023-08-03 PROCEDURE — 82948 REAGENT STRIP/BLOOD GLUCOSE: CPT

## 2023-08-03 PROCEDURE — 83735 ASSAY OF MAGNESIUM: CPT

## 2023-08-03 PROCEDURE — 82077 ASSAY SPEC XCP UR&BREATH IA: CPT

## 2023-08-03 PROCEDURE — 70450 CT HEAD/BRAIN W/O DYE: CPT

## 2023-08-03 PROCEDURE — 93010 ELECTROCARDIOGRAM REPORT: CPT | Performed by: INTERNAL MEDICINE

## 2023-08-03 PROCEDURE — 71275 CT ANGIOGRAPHY CHEST: CPT

## 2023-08-03 PROCEDURE — 93010 ELECTROCARDIOGRAM REPORT: CPT

## 2023-08-03 PROCEDURE — 82805 BLOOD GASES W/O2 SATURATION: CPT

## 2023-08-03 PROCEDURE — 93005 ELECTROCARDIOGRAM TRACING: CPT

## 2023-08-03 PROCEDURE — 71045 X-RAY EXAM CHEST 1 VIEW: CPT

## 2023-08-03 PROCEDURE — 82140 ASSAY OF AMMONIA: CPT | Performed by: EMERGENCY MEDICINE

## 2023-08-03 PROCEDURE — 85610 PROTHROMBIN TIME: CPT | Performed by: EMERGENCY MEDICINE

## 2023-08-03 PROCEDURE — 84484 ASSAY OF TROPONIN QUANT: CPT | Performed by: EMERGENCY MEDICINE

## 2023-08-03 PROCEDURE — 81001 URINALYSIS AUTO W/SCOPE: CPT | Performed by: EMERGENCY MEDICINE

## 2023-08-03 PROCEDURE — 85025 COMPLETE CBC W/AUTO DIFF WBC: CPT

## 2023-08-03 PROCEDURE — 85379 FIBRIN DEGRADATION QUANT: CPT | Performed by: EMERGENCY MEDICINE

## 2023-08-03 PROCEDURE — 84703 CHORIONIC GONADOTROPIN ASSAY: CPT | Performed by: EMERGENCY MEDICINE

## 2023-08-03 PROCEDURE — 84443 ASSAY THYROID STIM HORMONE: CPT | Performed by: EMERGENCY MEDICINE

## 2023-08-03 RX ORDER — ONDANSETRON 2 MG/ML
4 INJECTION INTRAMUSCULAR; INTRAVENOUS ONCE
Status: COMPLETED | OUTPATIENT
Start: 2023-08-03 | End: 2023-08-03

## 2023-08-03 RX ORDER — DIAZEPAM 5 MG/ML
5 INJECTION, SOLUTION INTRAMUSCULAR; INTRAVENOUS ONCE
Status: COMPLETED | OUTPATIENT
Start: 2023-08-03 | End: 2023-08-03

## 2023-08-03 RX ORDER — DEXTROSE AND SODIUM CHLORIDE 5; .9 G/100ML; G/100ML
100 INJECTION, SOLUTION INTRAVENOUS CONTINUOUS
Status: DISCONTINUED | OUTPATIENT
Start: 2023-08-03 | End: 2023-08-04

## 2023-08-03 RX ORDER — DEXTROSE MONOHYDRATE 25 G/50ML
50 INJECTION, SOLUTION INTRAVENOUS ONCE
Status: COMPLETED | OUTPATIENT
Start: 2023-08-03 | End: 2023-08-03

## 2023-08-03 RX ORDER — DEXTROSE AND SODIUM CHLORIDE 5; .9 G/100ML; G/100ML
125 INJECTION, SOLUTION INTRAVENOUS CONTINUOUS
Status: DISCONTINUED | OUTPATIENT
Start: 2023-08-03 | End: 2023-08-03 | Stop reason: HOSPADM

## 2023-08-03 RX ORDER — NICOTINE 21 MG/24HR
1 PATCH, TRANSDERMAL 24 HOURS TRANSDERMAL DAILY
Status: DISCONTINUED | OUTPATIENT
Start: 2023-08-03 | End: 2023-08-05 | Stop reason: HOSPADM

## 2023-08-03 RX ORDER — ONDANSETRON 2 MG/ML
4 INJECTION INTRAMUSCULAR; INTRAVENOUS EVERY 6 HOURS PRN
Status: DISCONTINUED | OUTPATIENT
Start: 2023-08-03 | End: 2023-08-05 | Stop reason: HOSPADM

## 2023-08-03 RX ORDER — MAGNESIUM SULFATE HEPTAHYDRATE 40 MG/ML
2 INJECTION, SOLUTION INTRAVENOUS ONCE
Status: DISCONTINUED | OUTPATIENT
Start: 2023-08-03 | End: 2023-08-03

## 2023-08-03 RX ORDER — ACETAMINOPHEN 325 MG/1
650 TABLET ORAL EVERY 6 HOURS PRN
Status: DISCONTINUED | OUTPATIENT
Start: 2023-08-03 | End: 2023-08-05 | Stop reason: HOSPADM

## 2023-08-03 RX ORDER — ENOXAPARIN SODIUM 100 MG/ML
40 INJECTION SUBCUTANEOUS DAILY
Status: DISCONTINUED | OUTPATIENT
Start: 2023-08-03 | End: 2023-08-05 | Stop reason: HOSPADM

## 2023-08-03 RX ORDER — PHENOBARBITAL SODIUM 130 MG/ML
130 INJECTION INTRAMUSCULAR ONCE
Status: COMPLETED | OUTPATIENT
Start: 2023-08-03 | End: 2023-08-03

## 2023-08-03 RX ORDER — MAGNESIUM SULFATE HEPTAHYDRATE 40 MG/ML
4 INJECTION, SOLUTION INTRAVENOUS ONCE
Status: COMPLETED | OUTPATIENT
Start: 2023-08-03 | End: 2023-08-04

## 2023-08-03 RX ORDER — ONDANSETRON 4 MG/1
8 TABLET, ORALLY DISINTEGRATING ORAL ONCE
Status: COMPLETED | OUTPATIENT
Start: 2023-08-03 | End: 2023-08-03

## 2023-08-03 RX ORDER — PANTOPRAZOLE SODIUM 40 MG/1
40 TABLET, DELAYED RELEASE ORAL
Status: DISCONTINUED | OUTPATIENT
Start: 2023-08-04 | End: 2023-08-05 | Stop reason: HOSPADM

## 2023-08-03 RX ORDER — LISINOPRIL 20 MG/1
20 TABLET ORAL DAILY
Status: DISCONTINUED | OUTPATIENT
Start: 2023-08-03 | End: 2023-08-05 | Stop reason: HOSPADM

## 2023-08-03 RX ORDER — METOCLOPRAMIDE HYDROCHLORIDE 5 MG/ML
10 INJECTION INTRAMUSCULAR; INTRAVENOUS EVERY 6 HOURS PRN
Status: DISCONTINUED | OUTPATIENT
Start: 2023-08-03 | End: 2023-08-05 | Stop reason: HOSPADM

## 2023-08-03 RX ORDER — PHENOBARBITAL SODIUM 65 MG/ML
65 INJECTION INTRAMUSCULAR ONCE
Status: COMPLETED | OUTPATIENT
Start: 2023-08-03 | End: 2023-08-03

## 2023-08-03 RX ADMIN — METOCLOPRAMIDE 10 MG: 5 INJECTION, SOLUTION INTRAMUSCULAR; INTRAVENOUS at 20:01

## 2023-08-03 RX ADMIN — ONDANSETRON 4 MG: 2 INJECTION INTRAMUSCULAR; INTRAVENOUS at 06:22

## 2023-08-03 RX ADMIN — NICOTINE 1 PATCH: 21 PATCH, EXTENDED RELEASE TRANSDERMAL at 15:35

## 2023-08-03 RX ADMIN — IOHEXOL 55 ML: 350 INJECTION, SOLUTION INTRAVENOUS at 08:28

## 2023-08-03 RX ADMIN — DEXTROSE MONOHYDRATE 50 ML: 25 INJECTION, SOLUTION INTRAVENOUS at 06:30

## 2023-08-03 RX ADMIN — ONDANSETRON 8 MG: 4 TABLET, ORALLY DISINTEGRATING ORAL at 12:57

## 2023-08-03 RX ADMIN — MAGNESIUM SULFATE HEPTAHYDRATE 4 G: 40 INJECTION, SOLUTION INTRAVENOUS at 20:24

## 2023-08-03 RX ADMIN — LISINOPRIL 20 MG: 20 TABLET ORAL at 15:34

## 2023-08-03 RX ADMIN — PHENOBARBITAL SODIUM 65 MG: 65 INJECTION INTRAMUSCULAR; INTRAVENOUS at 17:59

## 2023-08-03 RX ADMIN — DEXTROSE AND SODIUM CHLORIDE 100 ML/HR: 5; .9 INJECTION, SOLUTION INTRAVENOUS at 15:34

## 2023-08-03 RX ADMIN — FOLIC ACID: 5 INJECTION, SOLUTION INTRAMUSCULAR; INTRAVENOUS; SUBCUTANEOUS at 17:47

## 2023-08-03 RX ADMIN — PHENOBARBITAL SODIUM 130 MG: 130 INJECTION INTRAMUSCULAR at 20:24

## 2023-08-03 RX ADMIN — PHENOBARBITAL SODIUM 1040 MG: 65 INJECTION INTRAMUSCULAR at 12:41

## 2023-08-03 RX ADMIN — THIAMINE HYDROCHLORIDE 100 MG: 100 INJECTION, SOLUTION INTRAMUSCULAR; INTRAVENOUS at 09:36

## 2023-08-03 RX ADMIN — ENOXAPARIN SODIUM 40 MG: 40 INJECTION SUBCUTANEOUS at 15:35

## 2023-08-03 RX ADMIN — DIAZEPAM 5 MG: 10 INJECTION, SOLUTION INTRAMUSCULAR; INTRAVENOUS at 09:09

## 2023-08-03 RX ADMIN — ONDANSETRON 4 MG: 2 INJECTION INTRAMUSCULAR; INTRAVENOUS at 07:04

## 2023-08-03 RX ADMIN — ONDANSETRON 4 MG: 2 INJECTION INTRAMUSCULAR; INTRAVENOUS at 09:17

## 2023-08-03 RX ADMIN — DEXTROSE AND SODIUM CHLORIDE 125 ML/HR: 5; .9 INJECTION, SOLUTION INTRAVENOUS at 06:26

## 2023-08-03 RX ADMIN — ONDANSETRON 4 MG: 2 INJECTION INTRAMUSCULAR; INTRAVENOUS at 17:59
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
## 2023-08-03 NOTE — H&P
HISTORY & PHYSICAL EXAM  DEPARTMENT OF MEDICAL TOXICOLOGY  LEVEL 4 MEDICAL DETOX UNIT  José Bourne 25 y.o. female MRN: [id number]  Unit/Bed#: 5T DETOX 512-01 Encounter: [id number]      Reason for Admission/Principal Problem: Ethanol withdrawal, Ethanol use disorder  Admitting Provider: Thierry Maldonado PA-C  Attending Provider: Radha Lyles DO   8/3/2023  2:13 PM        * Alcohol withdrawal syndrome with complication [facility])  Assessment & Plan  · Patient with a history of chronic daily alcohol use  · Last drink: Unknown believed yesterday - Reports black out since Thursday - Found on floor by family (I could not move)  · Serum alcohol 221 in the ED  · Received  in the ED PTA  · Initiate SEWS protocol for medical management of alcohol withdrawal  · Current alcohol withdrawal signs/symptoms include nausea, vomiting, tremors  · Received 1040 mg of phenobarbital as initial dose in the ED  · Continue monitoring under protocol and administer phenobarbital as indicated  · Continuous pulse ox and telemetry monitoring  · IV Thiamine started  · Open gap of 22, without acidosis starting on 100 and hour D5 normal saline  · We will trend the labs. Alcohol use disorder  Assessment & Plan  - Daily drinking  - Current withdrawal, see above for management  - No inpatient treatment in past, Only Outpatient, [facility]      Mixed anxiety depressive disorder  Assessment & Plan  - Long standing  - Take Citalopram - Holding for prolonged QTC  - + for SI - plan is to cut wrists  - Consult placed for psychiatry  - 1:1 Observation      Primary hypertension  Assessment & Plan  - Take lisinopril daily.   - Continuing home meds  - Escalated as needed    Smoker  Assessment & Plan  - Daily smoker 1 pack/day  - Starting on nicotine patch             VTE Prophylaxis: Enoxaparin (Lovenox)  / sequential compression device Encourage  Code Status: FULL Code      Anticipated Length of Stay:  Patient will be admitted on an Inpatient basis with an anticipated length of stay of  2  midnights. Justification for Hospital Stay: Medical management of Alcohol detox    For any questions or concerns, please Tiger Text the advanced practitioner in the role of [facility]-DETOX-AP On Call      This patient qualifies for Level IV medically managed intensive inpatient services under the criteria set by the American Society of Addiction Medicine, including dimensions 1-3. The patient is in withdrawal (or is intoxicated with high risk of withdrawal), with severe and unstable medical and/or psychiatric (dual diagnosis) problems, requiring requires 24-hour medical and nursing care and the full resources of a licensed hospital.          [facility] patient to medical detox unit and continue supportive care and stabilization of acute ethanol withdrawal per medical toxicology/detox treatment pathway. Monitor ethanol withdrawal severity via the Severity of Ethanol Withdrawal Scale (SEWS) Q4 hours and then hourly if/when SEWS > 6. Treat withdrawal per pathway and reassess Q30-60 minutes. Mild SEWS Score 1-6  Administer medications* (IV or PO; PO preferred):  • If initial SEWS score: diazepam 10mg PO/IV x 1 AND phenobarbital 65 mg PO/IV x 1  • If repeat SEWS score 1-6: phenobarbital 65 mg PO/IV q1 hour x 5 doses maximum   Reassessment:   • SEWS q1 hour after each dose until SEWS 0 x 2 hours  • VS q1 hours (until SEWS 0, then q4 hours)  • Notify provider for bedside evaluation if 5-dose maximum is reached, RASS of -3 to -5, or SEWS score escalates to moderate or severe.    Moderate SEWS Score 7-12  Administer medications* (IV):  • If initial SEWS score: diazepam 10mg IV x 1 AND phenobarbital 260 mg IV x 1  • If repeat SEWS score 7-12 or score escalated from mild: phenobarbital 130 mg IV q30 minutes x 5 doses maximum   Reassessment:  • SEWS q30 minutes after each dose until SEWS < 7 (then hourly until SEWS 0 x 2 hours)  • VS q30 minutes until SEWS < 7 (then hourly until SEWS 0, then q4 hours)  • Notify provider for bedside evaluation if 5-dose maximum is reached, RASS of -3 to -5, or SEWS score escalates to severe. Severe SEWS Score ? 13  Administer medications* (IV):  • If initial SEWS score: Diazepam 10 mg IV x 1 AND phenobarbital 650 mg IV piggyback x 1 over 15-30 minutes  • If repeat SEWS score ? 13 or score escalated from mild or moderate: phenobarbital 130 mg IV q30 minutes x 5 doses maximum   Reassessment:  • SEWS q30 minutes after each dose until SEWS < 7 (then hourly until SEWS 0 x 2 hours)   • VS q30 minutes until SEWS < 7 (then hourly until SEWS 0, then q4 hours)  • Notify provider for bedside evaluation if 5-dose maximum is reached or RASS of -3 to -5   *Hold medications and notify provider if CNS depression, respirations < 10/min, or RASS of -3 to -5. Medications to be administered adjunctively if more than 2 grams of phenobarbital is needed for stabilization of withdrawal; require attending approval.   • Dexmedetomidine infusion 0.1-1mcg/kg/hr IV infusion, titratable to reduced agitation (Goal: RASS -2)  • Ketamine   o Acute agitated delirium: 1-2 mg/kg IV or 4-5 mg/kg IM  o Refractory withdrawal: 0.1-1mg/kg/hr IV infusion, titratable to reduced agitation (Goal: RASS -2)    Further evaluation, screening and treatment:  Evaluate complete metabolic panel, transaminases, INR, and lipase. Assess hepatic ultrasound for any sign of alcoholic liver disease or cirrhosis, and ultimately refer for further hepatic evaluation and care as/if indicated. Additional medications for ethanol associated malnutrition:   Thiamine 100 mg IV daily, increase to 500 mg TID for signs/symptoms of Wernicke's Encephalopathy or Wernicke Korsakoff Syndrome   Folic acid 1 mg IV daily   Multivitamin PO daily      Will offer first monthly injection of Naltrexone 380 mg IM, once patient is stabilized, as it has been shown to assist in decreasing cravings for ethanol. Evaluate and treat for coexisting substance use, such as opioids and nicotine. Discuss risk factors for infectious disease, such as history of intravenous drug abuse, and offer hepatitis and HIV screening if indicated. Case management consultation to assist with coordination of subsequent treatment after discharge. Hx and PE limited by: N/A    HPI: José Bourne is a 25y.o. year old female who presents with alcohol withdrawal.  She was transferred here via [address] [place] [address] from [place]. It was reported that she was brought into the ER due to an episode of unresponsiveness, work-up in the ER and be found to be intoxicated, not withdrawing. Discussed with patient she states that she bought her alcohol on Thursday, July 27, and does not know what occurred. She states that she believes today is Tuesday. She currently is awake alert and oriented x3 with a GCS of 15. She is not experiencing any signs of withdrawal at this point, no agitation, no nausea or vomiting, no abdominal pain or diaphoresis. She received 1040 mg of phenobarbital in the ED. She states her go to alcohol is a problem, she drinks on a daily basis and will drink what ever is in front of her, meeting if there is a gallon she will drink a gallon if there is a half a gallon she will drink half gallon. She does admit to intermittently substituting alcohol with mouthwash. She denies any drug usage, she admits to daily 1 pack/day smoking tobacco.  She wishes to be a full code.     Preferred alcoholic beverage(s): Rum  Quantity and frequency of alcohol intake: Daily, will drink what ever is in front of her, meeting if there is a gallon she will drink a gallon if there is a half a gallon she will drink half gallon  Use of any ethanol substitutes (toxic alcohols): yes  Date/Time of last alcohol intake: Unknown, Believed to be yesterday  Current signs and symptoms of ethanol withdrawal: Anxious    SEWS Total Score: 0 (8/3/2023  3:00 PM)          Ethanol Withdrawal History  Previous ethanol withdrawal? yes  Prior inpatient treatment for ethanol withdrawal? no  Prior outpatient treatment for ethanol withdrawal? Yes - [facility]  History of seizures with prior ethanol withdrawal? no  Prior treatment with naltrexone (Vivitrol)? yes  Current treatment with naltrexone (Vivitrol)? no  Other current treatment for ethanol use disorder? no  Co-existing substance use? no    Review of PDMP: yes     Social History     Substance and Sexual Activity   Alcohol Use Yes    Comment: drinks liquor every day. excessive consumption up to 1 fifth a day     Social History     Substance and Sexual Activity   Drug Use Not Currently    Comment: THC edibles     Social History     Tobacco Use   Smoking Status Every Day   • Packs/day: 1.00   • Years: 18.00   • Total pack years: 18.00   • Types: Cigarettes   • Start date: 2006   Smokeless Tobacco Never       Review of Systems   Constitutional: Negative for chills and fever. HENT: Negative for ear pain and sore throat. Eyes: Negative for pain and visual disturbance. Respiratory: Negative for cough and shortness of breath. Cardiovascular: Negative for chest pain and palpitations. Gastrointestinal: Positive for nausea. Negative for abdominal pain and vomiting. Genitourinary: Negative for dysuria and hematuria. Musculoskeletal: Negative for arthralgias and back pain. Skin: Negative for color change and rash. Neurological: Negative for seizures and syncope. Psychiatric/Behavioral: Positive for suicidal ideas. The patient is nervous/anxious. All other systems reviewed and are negative. Historical Information   Past Medical History:   Diagnosis Date   • Anxiety    • Depression    • Hypertension    • Kidney stones      No past surgical history on file.   Family History   Problem Relation Age of Onset   • Heart disease Mother    • Stroke Mother    • Diabetes Mother    • Hypertension Mother    • Cancer Paternal Grandmother    • Kidney disease Paternal Grandmother    • Diabetes Paternal Grandmother      Social History   Marital Status: Single   Occupation: [employer]  Patient Pre-hospital Living Situation: Rents Home  Patient Pre-hospital Level of Mobility: Up as tolerated  Patient Pre-hospital Diet Restrictions: None    Allergies   Allergen Reactions   • Biaxin [Clarithromycin] GI Intolerance   • Other GI Intolerance     cefcil         Prior to Admission medications    Medication Sig Start Date End Date Taking?  Authorizing Provider   citalopram (CeleXA) 40 mg tablet Take 1 tablet (40 mg total) by mouth daily 7/19/23  Yes France Tripp MD   lisinopril (ZESTRIL) 20 mg tablet Take 1 tablet (20 mg total) by mouth daily 7/19/23  Yes France Tripp MD   omeprazole (PriLOSEC) 40 MG capsule Take 1 capsule (40 mg total) by mouth daily before breakfast 6/20/23  Yes France Tripp MD       Current Facility-Administered Medications   Medication Dose Route Frequency   • acetaminophen (TYLENOL) tablet 650 mg  650 mg Oral Q6H PRN   • dextrose 5 % and sodium chloride 0.9 % infusion  100 mL/hr Intravenous Continuous   • enoxaparin (LOVENOX) subcutaneous injection 40 mg  40 mg Subcutaneous Daily   • folic acid 1 mg, thiamine (VITAMIN B1) 100 mg in sodium chloride 0.9 % 100 mL IV piggyback   Intravenous Daily   • lisinopril (ZESTRIL) tablet 20 mg  20 mg Oral Daily   • nicotine (NICODERM CQ) 21 mg/24 hr TD 24 hr patch 1 patch  1 patch Transdermal Daily   • ondansetron (ZOFRAN) injection 4 mg  4 mg Intravenous Q6H PRN   • [START ON 8/4/2023] pantoprazole (PROTONIX) EC tablet 40 mg  40 mg Oral Early Morning       Continuous Infusions:dextrose 5 % and sodium chloride 0.9 %, 100 mL/hr, Last Rate: 100 mL/hr (08/03/23 1534)             Objective     No intake or output data in the 24 hours ending 08/03/23 1432    Invasive Devices:   Peripheral IV 08/03/23 Left;Ventral (anterior) Forearm (Active)   Site Assessment WDL;Clean;Dry; Intact 08/03/23 0610   Dressing Type Transparent 08/03/23 0610   Line Status Blood return noted; Saline locked; Flushed 08/03/23 0610   Dressing Status Dry;Clean; Intact 08/03/23 0610       Vitals   Vitals:    08/03/23 1420 08/03/23 1513   BP: 170/100 162/90   TempSrc: Temporal Temporal   Pulse: (!) 112 88   Resp: 18 18   Patient Position - Orthostatic VS:  Lying   Temp: 97.9 °F (36.6 °C) 97.9 °F (36.6 °C)       Physical Exam  HENT:      Head: Normocephalic. Nose: Nose normal.      Mouth/Throat:      Mouth: Mucous membranes are moist.   Eyes:      Extraocular Movements: Extraocular movements intact. Pupils: Pupils are equal, round, and reactive to light. Comments: No Nystagmus   Cardiovascular:      Rate and Rhythm: Normal rate. Pulses: Normal pulses. Heart sounds: Normal heart sounds. Pulmonary:      Effort: Pulmonary effort is normal.      Breath sounds: Normal breath sounds. Abdominal:      General: There is no distension. Palpations: Abdomen is soft. Tenderness: There is no abdominal tenderness. Musculoskeletal:         General: No swelling or tenderness. Normal range of motion. Cervical back: Normal range of motion. Skin:     General: Skin is warm and dry. Capillary Refill: Capillary refill takes less than 2 seconds. Neurological:      Mental Status: She is alert and oriented to person, place, and time. Mental status is at baseline. Comments: CN 2-12 intact  No Asterixis   Psychiatric:      Comments: Tearful, Will not make eye contact             Data:    EKG, Pathology, and Other Studies: I have personally reviewed pertinent reports. EKG: EKG interpreted by myself  Time: 1543 hrs.   Rhythm: Sinus arrhythmia  Rate 73  Interpretation: QTc is 445, no ST segment elevation or depression depression, there is a T wave inversion in V1  Comparison: No notable changes when compared      Lab Results:  CBC ETOH     Lab Results   Component Value Date WBC 9.59 08/03/2023    RBC 6.06 (H) 08/03/2023    HGB 16.7 (H) 08/03/2023    HCT 52.4 (H) 08/03/2023    MCV 87 08/03/2023    MCH 27.6 08/03/2023    MCHC 31.9 08/03/2023    RDW 17.6 (H) 08/03/2023     08/03/2023    MPV 9.2 08/03/2023      No results found for: "LACTICACID"   CMP UA         Component Value Date/Time    K 4.2 08/03/2023 0615     08/03/2023 0615    CO2 16 (L) 08/03/2023 0615    BUN 13 08/03/2023 0615    CREATININE 0.64 08/03/2023 0615         Component Value Date/Time    CALCIUM 10.0 08/03/2023 0615    ALKPHOS 151 (H) 08/03/2023 0615     (H) 08/03/2023 0615     (H) 08/03/2023 0615      Lab Results   Component Value Date    CLARITYU Clear 08/03/2023    COLORU Light Yellow 08/03/2023    SPECGRAV >=1.050 (H) 08/03/2023    PHUR 6.0 08/03/2023    PHUR 7.0 10/11/2019    GLUCOSEU 100 (1/10%) (A) 08/03/2023    KETONESU >=150 (4+) (A) 08/03/2023    BLOODU Small (A) 08/03/2023    PROTEIN UA 50 (1+) (A) 08/03/2023    NITRITE Negative 08/03/2023    BILIRUBINUR Negative 08/03/2023    UROBILINOGEN <2.0 08/03/2023    UROBILINOGEN 0.2 03/21/2022    LEUKOCYTESUR Negative 08/03/2023    WBCUA 1-2 08/03/2023    RBCUA 4-10 (A) 08/03/2023    BACTERIA Occasional 08/03/2023    EPIS Moderate (A) 08/03/2023        Liver Function Test: ASA     Lab Results   Component Value Date    TBILI 0.59 08/03/2023    ALKPHOS 151 (H) 08/03/2023     (H) 08/03/2023     (H) 08/03/2023    TP 8.5 (H) 08/03/2023    ALB 4.7 08/03/2023      Lab Results   Component Value Date    SALICYLATE <5 83/91/2639      Troponin APAP     No results found for: "TROPONINI"   Lab Results   Component Value Date    ACTMNPHEN <10 (L) 08/03/2023      VBG HCG     No results found for: "PHVEN", "HOW3ZYP", "PO2VEN", "TXA0MZX", "Jaylen Tawanna", "L8ZRQNXNA", "B4UAZTU"   No results found for: "HCGQUANT"   ABG Urine Drug Screen     No results found for: "PHART", "FHY2NSV", "PO2ART", "TLH6TDK", "BEART", "M5TXIUXGM", "O2HGB", "SOURC", "ELIZABETH", "VTAC", "Etheleen Mallet", "INSPIREDAIR", "PEEP"   Lab Results   Component Value Date    AMPMETHUR Negative 08/03/2023    BARBTUR Negative 08/03/2023    BDZUR Negative 08/03/2023    COCAINEUR Negative 08/03/2023    METHADONEUR Negative 08/03/2023    OPIATEUR Negative 08/03/2023    PCPUR Negative 08/03/2023    THCUR Negative 08/03/2023    OXYCODONEUR Negative 08/03/2023      Lactate INR     No results found for: "LACTICACID"   Lab Results   Component Value Date    INR 0.94 08/03/2023      PTT Protime     Lab Results   Component Value Date/Time    PTT 27 08/03/2023 06:20 AM        Lab Results   Component Value Date/Time    PROTIME 12.8 08/03/2023 06:20 AM              Imaging Studies: I have personally reviewed pertinent reports. Counseling / Coordination of Care  Total floor / unit time spent today 20 minutes. Greater than 50% of total time was spent with the patient and / or family counseling and / or coordination of care. Minutes of critical care time 30  -Critical care time was exclusive of separately billable procedures and teaching time.   -Critical care was necessary to treat or prevent imminent or life-threatening deterioration of the following condition: CNS failure/compromise, toxidrome (ethanol withdrawal),  withdrawal  -Critical care time was spent personally by me on the following activities as well as the above as per the course and rest of chart: obtaining history from patient/surrogate, development of a treatment plan, discussions with referring provider(s), evaluation of patient's response to the treatment, examination of the patient, performing treatments and interventions, re-evaluation of the patient's condition, review of old charts, ordering/interpreting laboratory studies, ordering/interpreting of radiographic studies. ** Please Note: This note has been constructed using a voice recognition system.  **

## 2023-08-03 NOTE — ED PROCEDURE NOTE
PROCEDURE  ECG 12 Lead Documentation Only    Date/Time: 8/3/2023 12:41 PM    Performed by: Darryl Blevins MD  Authorized by: Darryl Blevins MD    Indications / Diagnosis:  Tachycardic  ECG reviewed by me, the ED Provider: yes    Patient location:  ED and bedside  Previous ECG:     Previous ECG:  Unavailable    Comparison to cardiac monitor: Yes    Interpretation:     Interpretation: abnormal    Rate:     ECG rate:  160    ECG rate assessment: tachycardic    Rhythm:     Rhythm: sinus tachycardia    Ectopy:     Ectopy: none    QRS:     QRS axis:  Normal    QRS intervals:  Normal  Conduction:     Conduction: normal    ST segments:     ST segments:  Normal  T waves:     T waves: flattening      Flattening:  III and V1  Other findings:     Other findings: poor R wave progression and prolonged qTc interval           Darryl Blevins MD  08/03/23 1249

## 2023-08-03 NOTE — PLAN OF CARE
Problem: SUBSTANCE USE/ABUSE  Goal: By discharge, patient will have ongoing treatment plan addressing chemical dependency  Description: INTERVENTIONS:  - Assist patient with resources and/or appointments for ongoing recovery based living  Outcome: Progressing

## 2023-08-03 NOTE — ED NOTES
Patient transported to CT via stretcher by CT Tech, ED [facility] accompanied pt to CT/continued 1:1 observation     Demetrio Daniels RN  08/03/23 7321

## 2023-08-03 NOTE — ED PROCEDURE NOTE
PROCEDURE  CriticalCare Time    Date/Time: 8/3/2023 12:43 PM    Performed by: Neftaly Miles MD  Authorized by: Neftaly Miles MD    Critical care provider statement:     Critical care time (minutes):  60    Critical care start time:  8/3/2023 11:30 AM    Critical care end time:  8/3/2023 12:30 PM    Critical care time was exclusive of:  Separately billable procedures and treating other patients and teaching time    Critical care was necessary to treat or prevent imminent or life-threatening deterioration of the following conditions:  Toxidrome (acute alcohol withdrawal )    Critical care was time spent personally by me on the following activities:  Examination of patient, discussions with consultants, development of treatment plan with patient or surrogate, obtaining history from patient or surrogate, re-evaluation of patient's condition, ordering and review of radiographic studies, ordering and review of laboratory studies and ordering and performing treatments and interventions    I assumed direction of critical care for this patient from another provider in my specialty: no    Comments:      Frequent patient re-evaluations and assessments and ordering of intervention          Neftaly Miles MD  08/03/23 1244       Neftaly Miles MD  08/03/23 1253

## 2023-08-03 NOTE — ED NOTES
While attempting to obtain CIWA score, pt reluctant to answer questions.  Pt states, "you're annoying me"     Peter Miramontes RN  08/03/23 2005

## 2023-08-03 NOTE — ASSESSMENT & PLAN NOTE
- Daily drinking  - Current withdrawal, see above for management  - No inpatient treatment in past, Only Out[facility]  - Continue to monitor

## 2023-08-03 NOTE — ED NOTES
Transfer Information:   Ambulance Squad: [facility] Time: [address]: [facility] detox [address]   Accepting Physician: Ivon Joy   Report Number: [phone]      Primary RN made aware via [facility] Text     April Kory Bower RN  08/03/23 8529

## 2023-08-03 NOTE — ED PROVIDER NOTES
History  Chief Complaint   Patient presents with   • Alcohol Intoxication     Patient presents for alcohol intoxication at home. Patient has known issues with alcoholism. Per family may have had a brief period of unresponsiveness. • Hypertension     Patient initially for EMS ahd pressures in the 455 systolic. On arrival, around 160 without symptoms. • Hypoglycemia - no symptoms     Patient has history of hypoglycemia, sugar of 75 on initial EMS contact, rechecked multiple times at 69 and 63.    • Psychiatric Evaluation     When prompted for an IV by EMS, patient stated "let me die"     30yo F BIBEMS for unresponsiveness. Approximately 1hr ago, Pt was found unresponsive at home by family. When EMS arrived, they noticed Pt was hypoglycemia. Per family, the Pt has AUD and was intoxicated while at home. In the department, Pt did not answer questions appropriately, but did deny ingestion of substances other than EtOH. Pt does not remember losing conciousness. History provided by:  EMS personnel      Prior to Admission Medications   Prescriptions Last Dose Informant Patient Reported? Taking?   citalopram (CeleXA) 40 mg tablet   No No   Sig: Take 1 tablet (40 mg total) by mouth daily   lisinopril (ZESTRIL) 20 mg tablet   No No   Sig: Take 1 tablet (20 mg total) by mouth daily   omeprazole (PriLOSEC) 40 MG capsule  Self No No   Sig: Take 1 capsule (40 mg total) by mouth daily before breakfast      Facility-Administered Medications: None       Past Medical History:   Diagnosis Date   • Anxiety    • Depression    • Hypertension    • Kidney stones        History reviewed. No pertinent surgical history.     Family History   Problem Relation Age of Onset   • Heart disease Mother    • Stroke Mother    • Diabetes Mother    • Hypertension Mother    • Cancer Paternal Grandmother    • Kidney disease Paternal Grandmother    • Diabetes Paternal Grandmother      I have reviewed and agree with the history as documented. E-Cigarette/Vaping   • E-Cigarette Use Never User      E-Cigarette/Vaping Substances   • Nicotine No    • THC No    • CBD No    • Flavoring No    • Other No    • Unknown No      Social History     Tobacco Use   • Smoking status: Every Day     Packs/day: 1.00     Years: 18.00     Total pack years: 18.00     Types: Cigarettes     Start date: 2006   • Smokeless tobacco: Never   Vaping Use   • Vaping Use: Never used   Substance Use Topics   • Alcohol use: Yes     Comment: drinks liquor every day. excessive consumption up to 1 fifth a day   • Drug use: Not Currently     Comment: THC edibles        Review of Systems   Constitutional: Negative. HENT: Negative. Respiratory: Negative. Gastrointestinal: Positive for nausea and vomiting. Genitourinary: Negative. Musculoskeletal: Negative. Skin: Negative. Neurological: Negative. Psychiatric/Behavioral: Negative. Physical Exam  ED Triage Vitals [08/03/23 0558]   Temperature Pulse Respirations Blood Pressure SpO2   98 °F (36.7 °C) (!) 107 18 158/87 100 %      Temp Source Heart Rate Source Patient Position - Orthostatic VS BP Location FiO2 (%)   Oral Monitor Lying Right arm --      Pain Score       --             Orthostatic Vital Signs  Vitals:    08/03/23 1120 08/03/23 1137 08/03/23 1207 08/03/23 1330   BP: (!) 179/107  (!) 173/110 (!) 188/122   Pulse: (!) 110 93 (!) 142 (!) 116   Patient Position - Orthostatic VS:    Sitting       Physical Exam  Constitutional:       General: She is not in acute distress. Appearance: Normal appearance. HENT:      Head: Normocephalic and atraumatic. Right Ear: External ear normal.      Left Ear: External ear normal.      Nose: Nose normal. No rhinorrhea. Mouth/Throat:      Mouth: Mucous membranes are moist.      Pharynx: Oropharynx is clear. Eyes:      Extraocular Movements: Extraocular movements intact.       Conjunctiva/sclera: Conjunctivae normal.      Pupils: Pupils are equal, round, and reactive to light. Cardiovascular:      Rate and Rhythm: Regular rhythm. Tachycardia present. Pulses: Normal pulses. Heart sounds: Normal heart sounds. Pulmonary:      Effort: Pulmonary effort is normal.      Breath sounds: Normal breath sounds. Abdominal:      General: Abdomen is flat. Palpations: Abdomen is soft. Skin:     General: Skin is warm and dry. Capillary Refill: Capillary refill takes less than 2 seconds. Neurological:      Mental Status: She is alert.       Comments: Oriented to person and place         ED Medications  Medications   ondansetron (ZOFRAN) injection 4 mg (4 mg Intravenous Given 8/3/23 0622)   dextrose 50 % IV solution 50 mL (50 mL Intravenous Given 8/3/23 0630)   ondansetron (ZOFRAN) injection 4 mg (4 mg Intravenous Given 8/3/23 0704)   iohexol (OMNIPAQUE) 350 MG/ML injection (SINGLE-DOSE) 55 mL (55 mL Intravenous Given 8/3/23 0828)   ondansetron (ZOFRAN) injection 4 mg (4 mg Intravenous Given 8/3/23 0917)   diazepam (VALIUM) injection 5 mg (5 mg Intravenous Given 8/3/23 0909)   thiamine (VITAMIN B1) 100 mg in sodium chloride 0.9 % 50 mL IVPB (0 mg Intravenous Stopped 8/3/23 1006)   PHENobarbital 1,040 mg in sodium chloride 0.9 % 100 mL IVPB (0 mg Intravenous Stopped 8/3/23 1330)   ondansetron (ZOFRAN-ODT) dispersible tablet 8 mg (8 mg Oral Given 8/3/23 1257)       Diagnostic Studies  Results Reviewed     Procedure Component Value Units Date/Time    Fingerstick Glucose (POCT) [[id number]]  (Normal) Collected: 08/03/23 1308    Lab Status: Final result Updated: 08/03/23 1310     POC Glucose 72 mg/dl     Fingerstick Glucose (POCT) [[id number]]  (Normal) Collected: 08/03/23 1300    Lab Status: Final result Updated: 08/03/23 1301     POC Glucose 70 mg/dl     Rapid drug screen, urine [[id number]]  (Normal) Collected: 08/03/23 0931    Lab Status: Final result Specimen: Urine, Catheter Updated: 08/03/23 1054     Amph/Meth UR Negative     Barbiturate Ur Negative Benzodiazepine Urine Negative     Cocaine Urine Negative     Methadone Urine Negative     Opiate Urine Negative     PCP Ur Negative     THC Urine Negative     Oxycodone Urine Negative    Narrative:      FOR MEDICAL PURPOSES ONLY. IF CONFIRMATION NEEDED PLEASE CONTACT THE LAB WITHIN 5 DAYS.     Drug Screen Cutoff Levels:  AMPHETAMINE/METHAMPHETAMINES  1000 ng/mL  BARBITURATES     200 ng/mL  BENZODIAZEPINES     200 ng/mL  COCAINE      300 ng/mL  METHADONE      300 ng/mL  OPIATES      300 ng/mL  PHENCYCLIDINE     25 ng/mL  THC       50 ng/mL  OXYCODONE      100 ng/mL    Urine Microscopic [[id number]]  (Abnormal) Collected: 08/03/23 0931    Lab Status: Final result Specimen: Urine, Clean Catch Updated: 08/03/23 0953     RBC, UA 4-10 /hpf      WBC, UA 1-2 /hpf      Epithelial Cells Moderate /hpf      Bacteria, UA Occasional /hpf      MUCUS THREADS Occasional    UA w Reflex to Microscopic w Reflex to Culture [[id number]]  (Abnormal) Collected: 08/03/23 0931    Lab Status: Final result Specimen: Urine, Clean Catch Updated: 08/03/23 0951     Color, UA Light Yellow     Clarity, UA Clear     Specific Gravity, UA >=1.050     pH, UA 6.0     Leukocytes, UA Negative     Nitrite, UA Negative     Protein, UA 50 (1+) mg/dl      Glucose,  (1/10%) mg/dl      Ketones, UA >=150 (4+) mg/dl      Urobilinogen, UA <2.0 mg/dl      Bilirubin, UA Negative     Occult Blood, UA Small    Fingerstick Glucose (POCT) [[id number]]  (Normal) Collected: 08/03/23 0909    Lab Status: Final result Updated: 08/03/23 0923     POC Glucose 94 mg/dl     HS Troponin I 2hr [[id number]]  (Normal) Collected: 08/03/23 0835    Lab Status: Final result Specimen: Blood from Arm, Left Updated: 08/03/23 0916     hs TnI 2hr 4 ng/L      Delta 2hr hsTnI -1 ng/L     HS Troponin 0hr (reflex protocol) [[id number]]  (Normal) Collected: 08/03/23 0616    Lab Status: Final result Specimen: Blood from Arm, Left Updated: 08/03/23 0824     hs TnI 0hr 5 ng/L     D-Dimer [[id number]]  (Abnormal) Collected: 08/03/23 0620    Lab Status: Final result Specimen: Blood from Arm, Left Updated: 08/03/23 0715     D-Dimer, Quant 0.89 ug/ml FEU     Protime-INR [[id number]]  (Normal) Collected: 08/03/23 0620    Lab Status: Final result Specimen: Blood from Arm, Left Updated: 08/03/23 0708     Protime 12.8 seconds      INR 0.94    APTT [[id number]]  (Normal) Collected: 08/03/23 0620    Lab Status: Final result Specimen: Blood from Arm, Left Updated: 08/03/23 0708     PTT 27 seconds     TSH, 3rd generation with Free T4 reflex [[id number]]  (Normal) Collected: 08/03/23 0616    Lab Status: Final result Specimen: Blood from Arm, Left Updated: 08/03/23 0707     TSH 3RD GENERATON 1.145 uIU/mL     Lipase [[id number]]  (Normal) Collected: 08/03/23 0616    Lab Status: Final result Specimen: Blood from Arm, Left Updated: 08/03/23 0707     Lipase 19 u/L     hCG, qualitative pregnancy [[id number]]  (Normal) Collected: 08/03/23 0616    Lab Status: Final result Specimen: Blood from Arm, Left Updated: 08/03/23 0707     Preg, Serum Negative    Salicylate level [[id number]]  (Normal) Collected: 08/03/23 0615    Lab Status: Final result Specimen: Blood from Arm, Left Updated: 65/11/61 3994     Salicylate Lvl <5 mg/dL     Acetaminophen level-If concentration is detectable, please discuss with medical  on call.  [[id number]]  (Abnormal) Collected: 08/03/23 0615    Lab Status: Final result Specimen: Blood from Arm, Left Updated: 08/03/23 0705     Acetaminophen Level <10 ug/mL     Ammonia [[id number]]  (Normal) Collected: 08/03/23 0616    Lab Status: Final result Specimen: Blood from Arm, Left Updated: 08/03/23 0702     Ammonia 40 umol/L     Comprehensive metabolic panel [[id number]]  (Abnormal) Collected: 08/03/23 0615    Lab Status: Final result Specimen: Blood from Arm, Left Updated: 08/03/23 0657     Sodium 140 mmol/L      Potassium 4.2 mmol/L      Chloride 102 mmol/L      CO2 16 mmol/L      ANION GAP 22 mmol/L BUN 13 mg/dL      Creatinine 0.64 mg/dL      Glucose 76 mg/dL      Calcium 10.0 mg/dL       U/L       U/L      Alkaline Phosphatase 151 U/L      Total Protein 8.5 g/dL      Albumin 4.7 g/dL      Total Bilirubin 0.59 mg/dL      eGFR 120 ml/min/1.73sq m     Narrative:      National Kidney Disease Foundation guidelines for Chronic Kidney Disease (CKD):   •  Stage 1 with normal or high GFR (GFR > 90 mL/min/1.73 square meters)  •  Stage 2 Mild CKD (GFR = 60-89 mL/min/1.73 square meters)  •  Stage 3A Moderate CKD (GFR = 45-59 mL/min/1.73 square meters)  •  Stage 3B Moderate CKD (GFR = 30-44 mL/min/1.73 square meters)  •  Stage 4 Severe CKD (GFR = 15-29 mL/min/1.73 square meters)  •  Stage 5 End Stage CKD (GFR <15 mL/min/1.73 square meters)  Note: GFR calculation is accurate only with a steady state creatinine    Magnesium [[id number]]  (Normal) Collected: 08/03/23 0615    Lab Status: Final result Specimen: Blood from Arm, Left Updated: 08/03/23 0657     Magnesium 2.1 mg/dL     Ethanol [[id number]]  (Abnormal) Collected: 08/03/23 0615    Lab Status: Final result Specimen: Blood from Arm, Left Updated: 08/03/23 0649     Ethanol Lvl 221 mg/dL     CBC and differential [[id number]]  (Abnormal) Collected: 08/03/23 0615    Lab Status: Final result Specimen: Blood from Arm, Left Updated: 08/03/23 0631     WBC 9.59 Thousand/uL      RBC 6.06 Million/uL      Hemoglobin 16.7 g/dL      Hematocrit 52.4 %      MCV 87 fL      MCH 27.6 pg      MCHC 31.9 g/dL      RDW 17.6 %      MPV 9.2 fL      Platelets 830 Thousands/uL      nRBC 0 /100 WBCs      Neutrophils Relative 72 %      Immat GRANS % 0 %      Lymphocytes Relative 21 %      Monocytes Relative 7 %      Eosinophils Relative 0 %      Basophils Relative 0 %      Neutrophils Absolute 6.75 Thousands/µL      Immature Grans Absolute 0.04 Thousand/uL      Lymphocytes Absolute 2.04 Thousands/µL      Monocytes Absolute 0.70 Thousand/µL      Eosinophils Absolute 0.02 Thousand/µL Basophils Absolute 0.04 Thousands/µL     Blood gas, venous [[id number]]  (Abnormal) Collected: 08/03/23 0615    Lab Status: Final result Specimen: Blood from Arm, Left Updated: 08/03/23 0629     pH, Rachid 7.324     pCO2, Rachid 33.5 mm Hg      pO2, Rachid 56.5 mm Hg      HCO3, Rachid 17.0 mmol/L      Base Excess, Rachid -7.7 mmol/L      O2 Content, Rachid 20.9 ml/dL      O2 HGB, VENOUS 83.2 %     Fingerstick Glucose (POCT) [[id number]]  (Normal) Collected: 08/03/23 0558    Lab Status: Final result Updated: 08/03/23 0559     POC Glucose 66 mg/dl                  CTA ED chest PE study   Final Result by Adele Christianson MD (08/03 2631)      No pulmonary embolus. No acute pulmonary disease. Workstation performed: [id number]         CT head without contrast   Final Result by Cuong Paulino MD (08/03 4719)      No acute intracranial abnormality. Workstation performed: [id number]         XR chest 1 view portable   ED Interpretation by Yuli Berrios MD (08/03 9275)   No evidence of acute cardiopulmonary pathology. Final Result by Adele Christianson MD (08/03 7695)      No acute cardiopulmonary disease. Workstation performed: [id number]               Procedures  ECG 12 Lead Documentation Only    Date/Time: 8/3/2023 6:44 AM    Performed by: Yuli Berrios MD  Authorized by: Yuli Berrios MD    ECG reviewed by me, the ED Provider: yes    Patient location:  ED  Interpretation:     Interpretation: abnormal    Rate:     ECG rate:  103    ECG rate assessment: tachycardic    Rhythm:     Rhythm: sinus rhythm    Ectopy:     Ectopy: PVCs      PVCs:  Infrequent  QRS:     QRS axis:  Normal    QRS intervals:  Normal  Conduction:     Conduction: normal    ST segments:     ST segments:  Normal  T waves:     T waves: normal            ED Course  ED Course as of 08/03/23 2326   Thu Aug 03, 2023   0657 MEDICAL ALCOHOL(!): 221   0711 Pt with acute EtOH intoxication.  Labs/imaging ordered to r/o tachyarrhythmia, ACS, negro-conde tear, hepatic encephalopathy, co-ingestion, endocrinopathy, AKA, pancreatitis, PE, sepsis, UTI.   0713 Lipase: 19   0713 Ammonia: 40   0713 POC Glucose: 66  50mL D50 given. 5052 D-Dimer, Quant(!): 0.89  CT PE study ordered. [id number] PREGNANCY, SERUM: Negative   0741 CT head without contrast  IMPRESSION:     No acute intracranial abnormality.                        PERC Rule for PE    Flowsheet Row Most Recent Value   PERC Rule for PE    Age >=50 0 Filed at: 08/03/2023 0718   HR >=100 1 Filed at: 08/03/2023 0718   O2 Sat on room air < 95% --   History of PE or DVT 0 Filed at: 08/03/2023 0720   Recent trauma or surgery 0 Filed at: 08/03/2023 0718   Hemoptysis 1 Filed at: 08/03/2023 8637   Exogenous estrogen 0 Filed at: 08/03/2023 1786   Unilateral leg swelling 0 Filed at: 08/03/2023 0733   PERC Rule for PE Results 2 Filed at: 08/03/2023 4909              SBIRT 20yo+    Flowsheet Row Most Recent Value   Initial Alcohol Screen: US AUDIT-C     1. How often do you have a drink containing alcohol? 6 Filed at: 08/03/2023 0611   2. How many drinks containing alcohol do you have on a typical day you are drinking? 6 Filed at: 08/03/2023 0611   3b. FEMALE Any Age, or MALE 65+: How often do you have 4 or more drinks on one occassion? 6 Filed at: 08/03/2023 1909   Audit-C Score 18 Filed at: 08/03/2023 5449   Full Alcohol Screen: US AUDIT    4. How often during the last year have you found that you were not able to stop drinking once you had started? 4 Filed at: 08/03/2023 0611   5. How often during past year have you failed to do what was normally expected of you because of drinking? 4 Filed at: 08/03/2023 0611   6. How often in past year have you needed a first drink in the morning to get yourself going after a heavy drinking session? 4 Filed at: 08/03/2023 0611   7. How often in past year have you had feeling of guilt or remorse after drinking? 4 Filed at: 08/03/2023 0611   8.  How often in past year have you been unable to remember what happened night before because you had been drinking? 4 Filed at: 08/03/2023 0611   9. Have you or someone else been injured as a result of your drinking? 0 Filed at: 08/03/2023 0611   10. Has a relative, friend, doctor or other health worker been concerned about your drinking and suggested you cut down? 4 Filed at: 08/03/2023 8796   AUDIT Total Score 42 Filed at: 08/03/2023 0609   SIDDHARTH: How many times in the past year have you. .. Used an illegal drug or used a prescription medication for non-medical reasons? Never Filed at: 08/03/2023 6785          Wells' Criteria for PE    Flowsheet Row Most Recent Value   Wells' Criteria for PE    Clinical signs and symptoms of DVT 0 Filed at: 08/03/2023 7712   PE is primary diagnosis or equally likely 3 Filed at: 08/03/2023 0718   HR >100 1.5 Filed at: 08/03/2023 9528   Immobilization at least 3 days or Surgery in the previous 4 weeks 0 Filed at: 08/03/2023 7240   Previous, objectively diagnosed PE or DVT 0 Filed at: 08/03/2023 0718   Hemoptysis 1 Filed at: 08/03/2023 1898   Malignancy with treatment within 6 months or palliative 0 Filed at: 08/03/2023 5477   Wells' Criteria Total 5.5 Filed at: 08/03/2023 6357            Medical Decision Making  Pt was signed out to Dr. Romario Simon for further workup and management. While under my care she remained stable with improvement in her sx. Labs and physical exam most c/w acute alcohol intoxication. CT PE study ordered given syncopal episode, tachycardia, and elevated D dimer. Amount and/or Complexity of Data Reviewed  Labs: ordered. Decision-making details documented in ED Course. Radiology: ordered and independent interpretation performed. Decision-making details documented in ED Course. Risk  Prescription drug management.             Disposition  Final diagnoses:   Hypoglycemia   Alcohol abuse   Alcohol withdrawal syndrome without complication (HCC)   Elevated LFTs     Time reflects when diagnosis was documented in both MDM as applicable and the Disposition within this note     Time User Action Codes Description Comment    8/3/2023  7:34 AM Vero Flavors Add [E16.2] Hypoglycemia     8/3/2023  7:34 AM Vero Flavors Add [F10.10] Alcohol abuse     8/3/2023  7:34 AM Vero Flavors Modify [E16.2] Hypoglycemia     8/3/2023  7:34 AM Vero Flavors Modify [F10.10] Alcohol abuse     8/3/2023  9:18 AM Ayush Royalty D Add [F10.930] Alcohol withdrawal syndrome without complication ([facility])     8/1/3755  9:44 AM Ole Dies Add [R79.89] Elevated LFTs       ED Disposition     ED Disposition   Transfer to Another Facility-In Network    Condition   --    Date/Time   Thu Aug 3, 2023  9:23 AM    Comment   Nader Villa should be transferred out to [facility]            MD Documentation    [facility] Sam Most Recent Value   Patient Condition The patient has been stabilized such that within reasonable medical probability, no material deterioration of the patient condition or the condition of the unborn child(angeles) is likely to result from the transfer   Reason for Transfer Level of Care needed not available at this facility   Risks of Transfer Potential for delay in receiving treatment   Accepting Physician DR. Sor[facility] Name, [address]    (Name & Tel number) PACS   Sending MD Guera Hernandez   Provider Certification General risk, such as traffic hazards, adverse weather conditions, rough terrain or turbulence, possible failure of equipment (including vehicle or aircraft), or consequences of actions of persons outside the control of the transport personnel      RN Documentation    [address] Name, [address]    (Name & Tel number) PACS      Follow-up Information    None         Discharge Medication List as of 8/3/2023  1:43 PM      CONTINUE these medications which have NOT CHANGED    Details   citalopram (CeleXA) 40 mg tablet Take 1 tablet (40 mg total) by mouth daily, Starting Wed 7/19/2023, Normal      lisinopril (ZESTRIL) 20 mg tablet Take 1 tablet (20 mg total) by mouth daily, Starting Wed 7/19/2023, Normal      omeprazole (PriLOSEC) 40 MG capsule Take 1 capsule (40 mg total) by mouth daily before breakfast, Starting Tue 6/20/2023, Normal           No discharge procedures on file. PDMP Review       Value Time User    PDMP Reviewed  Yes 8/3/2023  5:58 AM Juan Parra MD           ED Provider  Attending physically available and evaluated Esperanza Score. I managed the patient along with the ED Attending.     Electronically Signed by         Joce Bauer MD  08/03/23 6571

## 2023-08-03 NOTE — ED ATTENDING ATTESTATION
8/3/2023  I, Paulo Abad MD, saw and evaluated the patient. I have discussed the patient with the resident/non-physician practitioner and agree with the resident's/non-physician practitioner's findings, Plan of Care, and MDM as documented in the resident's/non-physician practitioner's note, except where noted. All available labs and Radiology studies were reviewed. I was present for key portions of any procedure(s) performed by the resident/non-physician practitioner and I was immediately available to provide assistance. At this point I agree with the current assessment done in the Emergency Department. I have conducted an independent evaluation of this patient a history and physical is as follows: Patient is a 25year old female who was unresponsive at home this AM with low blood sugar. (+) etoh use. (+) N/V. No chest pain or sob. No fever. Denies SI or HI. Was last seen at [facility] on 7/19/23 for mixed anxiety and depressive disorder. [facility] website checked on this patient and no Rx found. NCAT. Mucous membranes somewhat moist. PERRL. Lungs clear. Tachycardia without murmur. Abdomen soft and nontender. No rash noted. No gross focal deficits. Knows place and year and states it is July. DDx including but not limited to: metabolic abnormality, intracranial etiology, cardiac etiology, substance abuse, infectious etiology including UTI, thyroid disease, hyperammonemia, delirium, overmedication, syncope, PE, ACS, MI. Will check labs, EKG, CXR and give IVFs with dextrose.      ED Course         Critical Care Time  Procedures

## 2023-08-03 NOTE — DISCHARGE INSTR - OTHER ORDERS
Drug and Alcohol Resources in [facility]    If you have health insurance, including medical assistance, there should be a phone number on your insurance card that you can call to find out how to access services. The card may say, “For [address] or “For Drug and Alcohol Services” or “For Substance Abuse Services” call the number provided. [facility]  [facility], [facility], [facility]. [phone]. A  is available Monday through Friday from 8:00 am to 5:00 pm to provide you with assistance on accessing services for substance abuse. If you do not have health insurance and are in financial need, this office may also help you get the funding for the services that are necessary. [address] [place] [address] Drug & Alcohol provides funding to support three Recovery Centers in [facility]. These centers offer a safe, sober environment to those in recovery. A variety of programming including 12-Step Meetings, Kaleigh Cashing, Life Skills Workshops, etc. is offered at each location. [address] [facility], [address] [place] [address]  [phone]  www. cleanslatebangor. org [address]  [facility]  [phone]  cavgoj-po-ityn. 301 [facility] 111  [facility], [address] [place] [address]  [phone]   [address] [place] [address]  [phone]  www. [facility]. [facility]  [address], [address] [place] [address]  [phone]  [facility]. [facility]  Confidential free help, from public health agencies, to find substance use treatment and information. [phone]    Link for Zoom Codes for Virtual 12 step Meetings  Lauren.co.uk. aspx  Alcoholics Anonymous  [facility]  [phone]    http://www.letsmote.com.com/  Narcotics Anonymous  [phone]  [email or web address]

## 2023-08-03 NOTE — ASSESSMENT & PLAN NOTE
· Takes Citalopram - Holding for prolonged QTC  · Per Psych recs:  · Patient may have bipolar disorder   · Will start patient on Abilify  · Agrees with inpatient psych for treatment   · Continue on SEWS until medically cleared for inpatient psych

## 2023-08-03 NOTE — EMTALA/ACUTE CARE TRANSFER
[address] [address]  Dept: [phone]      EMTALA TRANSFER CONSENT    NAME Denis Escobar                                         1995                              MRN [id number]    I have been informed of my rights regarding examination, treatment, and transfer   by Dr. Zahida Cruz MD    Benefits:      Risks: Potential for delay in receiving treatment      Transfer Request   I acknowledge that my medical condition has been evaluated and explained to me by the emergency department physician or other qualified medical person and/or my attending physician who has recommended and offered to me further medical examination and treatment. I understand the Hospital's obligation with respect to the treatment and stabilization of my emergency medical condition. I nevertheless request to be transferred. I release the Hospital, the doctor, and any other persons caring for me from all responsibility or liability for any injury or ill effects that may result from my transfer and agree to accept all responsibility for the consequences of my choice to transfer, rather than receive stabilizing treatment at the Hospital. I understand that because the transfer is my request, my insurance may not provide reimbursement for the services. The Hospital will assist and direct me and my family in how to make arrangements for transfer, but the hospital is not liable for any fees charged by the transport service. In spite of this understanding, I refuse to consent to further medical examination and treatment which has been offered to me, and request transfer [address] Name, [facility] : [address]. I authorize the performance of emergency medical procedures and treatments upon me in both transit and upon arrival at the receiving facility.   Additionally, I authorize the release of any and all medical records to the receiving facility and request they be transported with me, if possible. I authorize the performance of emergency medical procedures and treatments upon me in both transit and upon arrival at the receiving facility. Additionally, I authorize the release of any and all medical records to the receiving facility and request they be transported with me, if possible. I understand that the safest mode of transportation during a medical emergency is an ambulance and that the Hospital advocates the use of this mode of transport. Risks of traveling to the receiving facility by car, including absence of medical control, life sustaining equipment, such as oxygen, and medical personnel has been explained to me and I fully understand them. (NORBERTO CORRECT BOX BELOW)  [ X ]  I consent to the stated transfer and to be transported by ambulance/helicopter. [  ]  I consent to the stated transfer, but refuse transportation by ambulance and accept full responsibility for my transportation by car. I understand the risks of non-ambulance transfers and I exonerate the Hospital and its staff from any deterioration in my condition that results from this refusal.    X___________________________________________    DATE  23  TIME________  Signature of patient or legally responsible individual signing on patient behalf           RELATIONSHIP TO PATIENT_________________________          Provider Certification    NAME Srikanth Lucero                                        [facility] 1995                              MRN [id number]    A medical screening exam was performed on the above named patient. Based on the examination:    Condition Necessitating Transfer The primary encounter diagnosis was Alcohol abuse. Diagnoses of Hypoglycemia and Alcohol withdrawal syndrome without complication ([address]) were also pertinent to this visit.     Patient Condition: The patient has been stabilized such that within reasonable medical probability, no material deterioration of the patient condition or the condition of the unborn child(angeles) is likely to result from the transfer    Reason for Transfer: Level of Care needed not available at this facility    Transfer Requirements: [address]   · Space available and qualified personnel available for treatment as acknowledged by PACS  · Agreed to accept transfer and to provide appropriate medical treatment as acknowledged by       DR. MALCOLM  · Appropriate medical records of the examination and treatment of the patient are provided at the time of transfer   [address] [facility] [address] [place] [address] _______  · Transfer will be performed by qualified personnel from    and appropriate transfer equipment as required, including the use of necessary and appropriate life support measures. Provider Certification: I have examined the patient and explained the following risks and benefits of being transferred/refusing transfer to the patient/family:  General risk, such as traffic hazards, adverse weather conditions, rough terrain or turbulence, possible failure of equipment (including vehicle or aircraft), or consequences of actions of persons outside the control of the transport personnel      Based on these reasonable risks and benefits to the patient and/or the unborn child(angeles), and based upon the information available at the time of the patient’s examination, I certify that the medical benefits reasonably to be expected from the provision of appropriate medical treatments at another medical facility outweigh the increasing risks, if any, to the individual’s medical condition, and in the case of labor to the unborn child, from effecting the transfer.     X____________________________________________ DATE 08/03/23        TIME_______      ORIGINAL - SEND TO MEDICAL RECORDS   COPY - SEND WITH PATIENT DURING TRANSFER

## 2023-08-03 NOTE — ED RE-EVALUATION NOTE
PT - RE-EVALUATED BY KARINA CONNELL --  PT ADMITS TO DRINKING ALCOHOL DAILY FOR ALONG TIME-- ASKED ABOUT DETOX-- PT STATES AT [facility] TO GO TO INPATIENT DETOX--  PT CURRENTLY ASKED ABOUT SI-- PT CURRENTLY DENIES THIS -- CASED D/W--  INPATIENT DETOX PA -- WHO ACCEPTS PT -- AT PRESENT WILL WATCH PT FOR ANY INCREASING WITHDRAWAL  AND IF PROGRESSIVE WITHDRAWAL -- WILL GIVEN IV PHENOBARB 10 MG/ KG IV LOAD     Josh Al MD  08/03/23 6346

## 2023-08-03 NOTE — ASSESSMENT & PLAN NOTE
· Patient with a history of chronic daily alcohol use  · Last drink: Unknown believed yesterday - Reports black out since Thursday - Found on floor by family (I could not move)  · Serum alcohol 221 in the ED  · Received  in the ED PTA  · Initiate SEWS protocol for medical management of alcohol withdrawal  · Current alcohol withdrawal signs/symptoms include headache  · Continue monitoring under protocol and administer phenobarbital as indicated  · Received 1430 mg Phenobarbital total   · Continuous pulse ox and telemetry monitoring  · Continue IV Thiamine  · Continue to monitor SEWS

## 2023-08-04 PROBLEM — E87.6 HYPOKALEMIA: Status: ACTIVE | Noted: 2023-08-04

## 2023-08-04 PROBLEM — R74.01 TRANSAMINITIS: Status: ACTIVE | Noted: 2023-08-04

## 2023-08-04 LAB
ALBUMIN SERPL BCP-MCNC: 3.7 G/DL (ref 3.5–5)
ALP SERPL-CCNC: 104 U/L (ref 34–104)
ALT SERPL W P-5'-P-CCNC: 60 U/L (ref 7–52)
ANION GAP SERPL CALCULATED.3IONS-SCNC: 9 MMOL/L
AST SERPL W P-5'-P-CCNC: 56 U/L (ref 13–39)
ATRIAL RATE: 80 BPM
BETA-HYDROXYBUTYRATE: 0.5 MMOL/L
BILIRUB SERPL-MCNC: 0.86 MG/DL (ref 0.2–1)
BUN SERPL-MCNC: 11 MG/DL (ref 5–25)
CALCIUM SERPL-MCNC: 8.8 MG/DL (ref 8.4–10.2)
CHLORIDE SERPL-SCNC: 103 MMOL/L (ref 96–108)
CO2 SERPL-SCNC: 24 MMOL/L (ref 21–32)
CREAT SERPL-MCNC: 0.57 MG/DL (ref 0.6–1.3)
ERYTHROCYTE [DISTWIDTH] IN BLOOD BY AUTOMATED COUNT: 16.3 % (ref 11.6–15.1)
GFR SERPL CREATININE-BSD FRML MDRD: 124 ML/MIN/1.73SQ M
GLUCOSE SERPL-MCNC: 97 MG/DL (ref 65–140)
HCT VFR BLD AUTO: 41.5 % (ref 34.8–46.1)
HGB BLD-MCNC: 13.3 G/DL (ref 11.5–15.4)
MAGNESIUM SERPL-MCNC: 2.3 MG/DL (ref 1.9–2.7)
MCH RBC QN AUTO: 26.9 PG (ref 26.8–34.3)
MCHC RBC AUTO-ENTMCNC: 32 G/DL (ref 31.4–37.4)
MCV RBC AUTO: 84 FL (ref 82–98)
P AXIS: 26 DEGREES
PLATELET # BLD AUTO: 177 THOUSANDS/UL (ref 149–390)
PMV BLD AUTO: 9.6 FL (ref 8.9–12.7)
POTASSIUM SERPL-SCNC: 3.3 MMOL/L (ref 3.5–5.3)
PR INTERVAL: 142 MS
PROT SERPL-MCNC: 6.4 G/DL (ref 6.4–8.4)
QRS AXIS: 39 DEGREES
QRSD INTERVAL: 88 MS
QT INTERVAL: 414 MS
QTC INTERVAL: 477 MS
RBC # BLD AUTO: 4.95 MILLION/UL (ref 3.81–5.12)
SODIUM SERPL-SCNC: 136 MMOL/L (ref 135–147)
T WAVE AXIS: 15 DEGREES
VENTRICULAR RATE: 80 BPM
WBC # BLD AUTO: 4.29 THOUSAND/UL (ref 4.31–10.16)

## 2023-08-04 PROCEDURE — 82010 KETONE BODYS QUAN: CPT | Performed by: PHYSICIAN ASSISTANT

## 2023-08-04 PROCEDURE — 80053 COMPREHEN METABOLIC PANEL: CPT | Performed by: PHYSICIAN ASSISTANT

## 2023-08-04 PROCEDURE — 93010 ELECTROCARDIOGRAM REPORT: CPT

## 2023-08-04 PROCEDURE — 99233 SBSQ HOSP IP/OBS HIGH 50: CPT | Performed by: EMERGENCY MEDICINE

## 2023-08-04 PROCEDURE — 85027 COMPLETE CBC AUTOMATED: CPT | Performed by: PHYSICIAN ASSISTANT

## 2023-08-04 PROCEDURE — 99223 1ST HOSP IP/OBS HIGH 75: CPT | Performed by: PSYCHIATRY & NEUROLOGY

## 2023-08-04 PROCEDURE — 93005 ELECTROCARDIOGRAM TRACING: CPT

## 2023-08-04 PROCEDURE — 83735 ASSAY OF MAGNESIUM: CPT | Performed by: PHYSICIAN ASSISTANT

## 2023-08-04 PROCEDURE — NC001 PR NO CHARGE

## 2023-08-04 RX ORDER — ARIPIPRAZOLE 5 MG/1
5 TABLET ORAL DAILY
Status: DISCONTINUED | OUTPATIENT
Start: 2023-08-04 | End: 2023-08-05 | Stop reason: HOSPADM

## 2023-08-04 RX ORDER — POTASSIUM CHLORIDE 20 MEQ/1
40 TABLET, EXTENDED RELEASE ORAL ONCE
Status: COMPLETED | OUTPATIENT
Start: 2023-08-04 | End: 2023-08-04

## 2023-08-04 RX ORDER — PHENOBARBITAL SODIUM 130 MG/ML
130 INJECTION INTRAMUSCULAR ONCE
Status: COMPLETED | OUTPATIENT
Start: 2023-08-04 | End: 2023-08-04

## 2023-08-04 RX ORDER — PHENOBARBITAL 64.8 MG/1
64.8 TABLET ORAL ONCE
Status: COMPLETED | OUTPATIENT
Start: 2023-08-04 | End: 2023-08-04

## 2023-08-04 RX ADMIN — ACETAMINOPHEN 650 MG: 325 TABLET ORAL at 09:17

## 2023-08-04 RX ADMIN — ARIPIPRAZOLE 5 MG: 5 TABLET ORAL at 11:56

## 2023-08-04 RX ADMIN — NICOTINE 1 PATCH: 21 PATCH, EXTENDED RELEASE TRANSDERMAL at 08:06

## 2023-08-04 RX ADMIN — LISINOPRIL 20 MG: 20 TABLET ORAL at 08:07

## 2023-08-04 RX ADMIN — PHENOBARBITAL 64.8 MG: 64.8 TABLET ORAL at 08:06

## 2023-08-04 RX ADMIN — PHENOBARBITAL SODIUM 130 MG: 130 INJECTION INTRAMUSCULAR at 02:02

## 2023-08-04 RX ADMIN — PHENOBARBITAL SODIUM 130 MG: 130 INJECTION INTRAMUSCULAR at 16:42

## 2023-08-04 RX ADMIN — ONDANSETRON 4 MG: 2 INJECTION INTRAMUSCULAR; INTRAVENOUS at 16:46

## 2023-08-04 RX ADMIN — ONDANSETRON 4 MG: 2 INJECTION INTRAMUSCULAR; INTRAVENOUS at 02:01

## 2023-08-04 RX ADMIN — FOLIC ACID: 5 INJECTION, SOLUTION INTRAMUSCULAR; INTRAVENOUS; SUBCUTANEOUS at 08:07

## 2023-08-04 RX ADMIN — POTASSIUM CHLORIDE 40 MEQ: 1500 TABLET, EXTENDED RELEASE ORAL at 08:07

## 2023-08-04 RX ADMIN — PANTOPRAZOLE SODIUM 40 MG: 40 TABLET, DELAYED RELEASE ORAL at 05:37

## 2023-08-04 RX ADMIN — ACETAMINOPHEN 650 MG: 325 TABLET ORAL at 17:26

## 2023-08-04 RX ADMIN — PHENOBARBITAL 64.8 MG: 64.8 TABLET ORAL at 15:38

## 2023-08-04 RX ADMIN — DEXTROSE AND SODIUM CHLORIDE 100 ML/HR: 5; .9 INJECTION, SOLUTION INTRAVENOUS at 02:56

## 2023-08-04 NOTE — UTILIZATION REVIEW
Initial Clinical Review    Pt initially presented to [address] [place] [address] ED. Pt was transferred by EMS to [facility] for its Level IV medically managed intensive inpatient detox unit, not available at [facility]. Admission: Date/Time/Statement:   Admission Orders (From admission, onward)     Ordered        08/03/23 1504  Inpatient Admission  Once                      Orders Placed This Encounter   Procedures   • Inpatient Admission     Standing Status:   Standing     Number of Occurrences:   1     Order Specific Question:   Level of Care     Answer:   Med Surg [16]     Order Specific Question:   Estimated length of stay     Answer:   More than 2 Midnights     Order Specific Question:   Certification     Answer:   I certify that inpatient services are medically necessary for this patient for a duration of greater than two midnights. See H&P and MD Progress Notes for additional information about the patient's course of treatment. Initial Presentation: 25 y.o. female who presented to medical detox. Inpatient admission for evaluation and treatment of alcohol withdrawal syndrome. Presented w/ need for detox from alcohol. Received 1040 mg of phenobarbital prior to transfer to [facility]. Serum ETOH: 221. Reports drinking as much as she can afford daily likely between half a galon and a gallon of rum, last drink on 8/2. Has no prior rehab treatment for withdrawal. Reports no hx of withdrawal seizures. On exam, anxious, tearful. SEWS 0. Plan: SEWS monitoring w/ phenobarbital management, IV thiamine/folic acid supplement, IVF, telemetry, continuous pulse ox, continue PTA meds, trend labs, replete electrolytes as needed. Continual observation. Behavioral Health consulted. Date: 08/04/23       Day 2: Pt reports headache. On exam, unremarkable.  SEWS 6. Plan: continue SEWS monitoring w/ phenobarbital management, IV thiamine/folic acid supplement, IVF, telemetry, continuous pulse ox, continue PTA meds, trend labs, replete electrolytes as needed. Behavioral Health: Pt signed 201 for voluntary admission to inpatient psychiatric unit once medically cleared. Start Abilify, discontinue Celexa. ED Triage Vitals   Temperature Pulse Respirations Blood Pressure SpO2   08/03/23 1420 08/03/23 1420 08/03/23 1420 08/03/23 1420 08/03/23 1420   97.9 °F (36.6 °C) (!) 112 18 170/100 100 %      Temp Source Heart Rate Source Patient Position - Orthostatic VS BP Location FiO2 (%)   08/03/23 1420 08/03/23 1420 08/03/23 1513 08/03/23 1513 --   Temporal Monitor Lying Right arm       Pain Score       08/03/23 1420       7          Wt Readings from Last 1 Encounters:   08/03/23 103 kg (226 lb 9.6 oz)     Vital Signs:   Date/Time Temp Pulse Resp BP MAP (mmHg) SpO2 O2 Device   08/04/23 0748 96.4 °F (35.8 °C) Abnormal  77 16 167/110 Abnormal  -- 98 % None (Room air)   08/04/23 0527 97.5 °F (36.4 °C) 79 16 154/87 109 96 % None (Room air)   08/04/23 0157 -- 100 -- -- -- -- --   08/03/23 1751 97.3 °F (36.3 °C) Abnormal  102 16 147/82 -- 98 % None (Room air)   08/03/23 1513 97.9 °F (36.6 °C) 88 18 162/90 114 97 % None (Room air)       Severity of Ethanol Withdrawal Scale (SEWS):   Row Name 08/04/23 0920 08/04/23 0800 08/04/23 0154 08/04/23 0039 08/03/23 1956   Severity of Ethanol Withdrawal Scale (SEWS)   ANXIETY: Do you feel that something bad is about to happen to you right now? 0  -LL 0  -LL 3  -CR 0  -CR 0  -CR   NAUSEA and DRY HEAVES or VOMITING? 0  -LL 3  -LL 3  -CR 0  -CR 3  -CR   SWEATING: (includes moist palms, sweating now)? Score 0 or 2 0  -LL 0  -LL 2  -CR 0  -CR 2  -CR   TREMOR: with arms extended eyes closed? 0  -LL 0  -LL 2  -CR 0  -CR 0  -CR   AGITATION: Fidgety, restless, pacing?  0  -LL 0  -LL 0  -CR 0  -CR 0  -CR   DISORIENTATION: 0  -LL 0  -LL 0  -CR 0  -CR 0  -CR   HALLUCINATIONS: 0  -LL 0  -LL 0  -CR 0  -CR 0  -CR   VITAL SIGNS: ANY (Pulse >298, Diastolic BP >05, Temp >17.3) 0  -LL 3  -LL 3  -CR 0  pt resting comfortably at this time  -CR 3  -CR   SEWS Total Score 0  -LL 6  -LL 13  -CR 0  -CR 8  -CR   Dewitt Agitation Sedation Scale (RASS)   Dewitt Agitation Sedation Scale (RASS) 0  -LL 0  -LL +1  -CR -- 0  -CR   Row Name 08/03/23 1753 08/03/23 1500      Severity of Ethanol Withdrawal Scale (SEWS)   ANXIETY: Do you feel that something bad is about to happen to you right now? 0  -LB 0  -LB      NAUSEA and DRY HEAVES or VOMITING? 3  -LB 0  -LB      SWEATING: (includes moist palms, sweating now)? Score 0 or 2 0  -LB 0  -LB      TREMOR: with arms extended eyes closed? 0  -LB 0  -LB      AGITATION: Fidgety, restless, pacing?  0  -LB 0  -LB      DISORIENTATION: 0  -LB 0  -LB      HALLUCINATIONS: 0  -LB 0  -LB      VITAL SIGNS: ANY (Pulse >885, Diastolic BP >09, Temp >91.2) 0  -LB 0  -LB      SEWS Total Score 3  -LB 0  -LB            Pertinent Labs/Diagnostic Test Results:   Results from last 7 days   Lab Units 08/04/23  0535 08/03/23  0615   WBC Thousand/uL 4.29* 9.59   HEMOGLOBIN g/dL 13.3 16.7*   HEMATOCRIT % 41.5 52.4*   PLATELETS Thousands/uL 177 328   NEUTROS ABS Thousands/µL  --  6.75         Results from last 7 days   Lab Units 08/04/23  0535 08/03/23  0615   SODIUM mmol/L 136 140   POTASSIUM mmol/L 3.3* 4.2   CHLORIDE mmol/L 103 102   CO2 mmol/L 24 16*   ANION GAP mmol/L 9 22   BUN mg/dL 11 13   CREATININE mg/dL 0.57* 0.64   EGFR ml/min/1.73sq m 124 120   CALCIUM mg/dL 8.8 10.0   MAGNESIUM mg/dL 2.3 2.1     Results from last 7 days   Lab Units 08/04/23  0535 08/03/23  0616 08/03/23  0615   AST U/L 56*  --  108*   ALT U/L 60*  --  107*   ALK PHOS U/L 104  --  151*   TOTAL PROTEIN g/dL 6.4  --  8.5*   ALBUMIN g/dL 3.7  --  4.7   TOTAL BILIRUBIN mg/dL 0.86  --  0.59   AMMONIA umol/L  --  40  --      Results from last 7 days   Lab Units 08/03/23  1308 08/03/23  1300 08/03/23  0909 08/03/23  0558   POC GLUCOSE mg/dl 72 70 94 66     Results from last 7 days   Lab Units 08/04/23  0535 08/03/23  0615   GLUCOSE RANDOM mg/dL 97 76     BETA-HYDROXYBUTYRATE   Date Value Ref Range Status   08/04/2023 0.5 <0.6 mmol/L Final          Results from last 7 days   Lab Units 08/03/23  0615   PH CARLA  7.324   PCO2 CARLA mm Hg 33.5*   PO2 CARLA mm Hg 56.5*   HCO3 CARLA mmol/L 17.0*   BASE EXC CARLA mmol/L -7.7   O2 CONTENT CARLA ml/dL 20.9   O2 HGB, VENOUS % 83.2*             Results from last 7 days   Lab Units 08/03/23  0835 08/03/23  0616   HS TNI 0HR ng/L  --  5   HS TNI 2HR ng/L 4  --    HSTNI D2 ng/L -1  --      Results from last 7 days   Lab Units 08/03/23  0620   D-DIMER QUANTITATIVE ug/ml FEU 0.89*     Results from last 7 days   Lab Units 08/03/23  0620   PROTIME seconds 12.8   INR  0.94   PTT seconds 27     Results from last 7 days   Lab Units 08/03/23  0616   TSH 3RD GENERATON uIU/mL 1.145     Results from last 7 days   Lab Units 08/03/23  0616   LIPASE u/L 19     Results from last 7 days   Lab Units 08/03/23  0931   CLARITY UA  Clear   COLOR UA  Light Yellow   SPEC GRAV UA  >=1.050*   PH UA  6.0   GLUCOSE UA mg/dl 100 (1/10%)*   KETONES UA mg/dl >=150 (4+)*   BLOOD UA  Small*   PROTEIN UA mg/dl 50 (1+)*   NITRITE UA  Negative   BILIRUBIN UA  Negative   UROBILINOGEN UA (BE) mg/dl <2.0   LEUKOCYTES UA  Negative   WBC UA /hpf 1-2   RBC UA /hpf 4-10*   BACTERIA UA /hpf Occasional   EPITHELIAL CELLS WET PREP /hpf Moderate*   MUCUS THREADS  Occasional*             Results from last 7 days   Lab Units 08/03/23  0931   AMPH/METH  Negative   BARBITURATE UR  Negative   BENZODIAZEPINE UR  Negative   COCAINE UR  Negative   METHADONE URINE  Negative   OPIATE UR  Negative   PCP UR  Negative   THC UR  Negative     Results from last 7 days   Lab Units 08/03/23  0615   ETHANOL LVL mg/dL 221*   ACETAMINOPHEN LVL ug/mL <52*   SALICYLATE LVL mg/dL <5         Past Medical History:   Diagnosis Date   • Anxiety    • Depression    • Hypertension    • Kidney stones      Present on Admission:  • Mixed anxiety depressive disorder  • Primary hypertension  • Smoker      Admitting Diagnosis: Alcohol abuse [F10.10]  Age/Sex: 25 y.o. female  Admission Orders:  Regular Diet. SCDs. Fall & Seizure Precautions. SEWS monitoring. Telemetry & Continuous Pulse Ox. Scheduled Medications:  ARIPiprazole, 5 mg, Oral, Daily  enoxaparin, 40 mg, Subcutaneous, Daily  folic acid 1 mg, thiamine (VITAMIN B1) 100 mg in sodium chloride 0.9 % 100 mL IV piggyback, , Intravenous, Daily  lisinopril, 20 mg, Oral, Daily  nicotine, 1 patch, Transdermal, Daily  pantoprazole, 40 mg, Oral, Early Morning    Continuous IV Infusions:  dextrose 5 % and sodium chloride 0.9 %, 100 mL/hr, Intravenous, Continuous    PRN Meds:  acetaminophen, 650 mg, Oral, Q6H PRN; 8/4 x1  magnesium sulfate, 2 g, Intravenous; 8/3 x1  metoclopramide, 10 mg, Intravenous, Q6H PRN; 8/3 x1  ondansetron, 4 mg, Intravenous, Q6H PRN; 8/3 x1, 8/4 x1   phenobarbital, 130 mg, Intravenous; 8/3 x1, 8/4 x1  phenobarbital, 65 mg, Intravenous; 8/3 x1  phenobarbital, 64.8 mg, Oral; 8/4 x1  potassium chloride, 40 mEq, Oral; 8/4 x1      IP CONSULT TO PSYCHIATRY  IP CONSULT TO CASE MANAGEMENT    Network Utilization Review Department  ATTENTION: Please call with any questions or concerns to [phone] and carefully listen to the prompts so that you are directed to the right person. All voicemails are confidential.  Gwen Zelaya all requests for admission clinical reviews, approved or denied determinations and any other requests to dedicated fax number below belonging to the campus where the patient is receiving treatment.  List of dedicated fax numbers for the Facilities:  [place] DENIALS (Administrative/Medical Necessity) [phone]   [facility] [address] [facility] [phone]   [facility] [phone]   [address] [address] [phone] [address] [facility] [address] [address] [facility] [address] [place] [address] [address]  [phone]

## 2023-08-04 NOTE — MALNUTRITION/BMI
This medical record reflects one or more clinical indicators suggestive of malnutrition and/or morbid obesity. Malnutrition Findings:                                 BMI Findings:  Adult BMI Classifications: Morbid Obesity 40-44.9        Body mass index is 40.14 kg/m². See Nutrition note dated 8/4/2023 for additional details. Completed nutrition assessment is viewable in the nutrition documentation.

## 2023-08-04 NOTE — PROGRESS NOTES
PROGRESS NOTE  DEPARTMENT OF MEDICAL TOXICOLOGY  LEVEL 4 MEDICAL DETOX UNIT  Lucia Means 25 y.o. female MRN: [id number]  Unit/Bed#: 5T DETOX 512-01 Encounter: [id number]      Reason for Admission/Principal Problem: Ethanol Withdrawal  Rounding Provider: Kenia Ram MD  Attending Provider: Ileana Louie DO   8/3/2023  2:13 PM           * Alcohol withdrawal syndrome with complication [facility])  Assessment & Plan  · Patient with a history of chronic daily alcohol use  · Last drink: Unknown believed yesterday - Reports black out since Thursday - Found on floor by family (I could not move)  · Serum alcohol 221 in the ED  · Received  in the ED PTA  · Initiate SEWS protocol for medical management of alcohol withdrawal  · Current alcohol withdrawal signs/symptoms include headache  · Continue monitoring under protocol and administer phenobarbital as indicated  · Received 1430 mg Phenobarbital total   · Continuous pulse ox and telemetry monitoring  · Continue IV Thiamine  · Continue to monitor SEWS    Alcohol use disorder  Assessment & Plan  - Daily drinking  - Current withdrawal, see above for management  - No inpatient treatment in past, Only Outpatient, [facility]  - Continue to monitor      Transaminitis  Assessment & Plan  Recent Labs     08/04/23  0535   AST 56*   ALT 60*   ALKPHOS 104     · Elevated LFTs on admission  · Likely secondary to AUD  · Denies abdominal pain  · LFTs this AM are down trending  · Continue to monitor    Hypokalemia  Assessment & Plan  Recent Labs     08/04/23  0535   K 3.3*     · Provide supplementation  · Continue to monitor    Mixed anxiety depressive disorder  Assessment & Plan  · Takes Citalopram - Holding for prolonged QTC  · Per Psych recs:  · Patient may have bipolar disorder   · Will start patient on Abilify  · Agrees with inpatient psych for treatment   · Continue on SEWS until medically cleared for inpatient psych        Primary hypertension  Assessment & Plan  - Takes Lisinopril daily  - Continue home med  - Monitor BP and adjust as needed    Smoker  Assessment & Plan  - Daily smoker 1 pack/day  - Continue nicotine patch      VTE Pharmacologic Prophylaxis:   Pharmacologic: Enoxaparin (Lovenox)  Mechanical VTE Prophylaxis in Place: no    Code Status: Level 1 - Full Code    Patient Centered Rounds: I have performed bedside rounds with nursing staff today. Discussions with Specialists or Other Care Team Provider: psych     Education and Discussions with Family / Patient: none    Time Spent for Care: 30 minutes. More than 50% of total time spent on counseling and coordination of care as described above. Current Length of Stay: 1 day(s)    Current Patient Status: Inpatient     Certification Statement: The patient will continue to require additional inpatient hospital stay due to monitoring for ethanol withdrawal Discharge Plan: inpatient psych         Subjective:   Patient seen and evaluated at bedside this morning in NAD. She is cooperative and endorses a 4/10 headache. Denies anxiety, restlessness. Was able to eat breakfast this morning. Denies SI, HI, AVH, chest pain, SOB, abdominal pain at this time.     Objective:     Clinical Opiate Withdrawal Scale  Pulse: 90    SEWS Total Score: 0 (8/4/2023 10:30 AM)        Last 24 Hours Medication List:   Current Facility-Administered Medications   Medication Dose Route Frequency Provider Last Rate   • acetaminophen  650 mg Oral Q6H PRN Mercy Shahid PA-C     • ARIPiprazole  5 mg Oral Daily Vance Brady DO     • dextrose 5 % and sodium chloride 0.9 %  100 mL/hr Intravenous Continuous Mercy Shahid PA-C 100 mL/hr (08/04/23 0256)   • enoxaparin  40 mg Subcutaneous Daily April Simental PA-C     • folic acid 1 mg, thiamine (VITAMIN B1) 100 mg in sodium chloride 0.9 % 100 mL IV piggyback   Intravenous Daily Mercy Shahid PA-C     • lisinopril  20 mg Oral Daily April Simental PA-C     • metoclopramide  10 mg Intravenous Q6H PRN Deanna Diaz PA-C     • nicotine  1 patch Transdermal Daily Sonali Trejo     • ondansetron  4 mg Intravenous Q6H PRN Kamlesh Leonard PA-C     • pantoprazole  40 mg Oral Early Morning Jorge Araoul Manuel Nye PA-C           Vitals:   Temp (24hrs), Av.4 °F (36.3 °C), Min:96.4 °F (35.8 °C), Max:97.9 °F (36.6 °C)    Temp:  [96.4 °F (35.8 °C)-97.9 °F (36.6 °C)] 96.4 °F (35.8 °C)  HR:  [] 90  Resp:  [16-20] 18  BP: (147-188)/() 159/89  SpO2:  [96 %-100 %] 98 %  Body mass index is 40.14 kg/m². Input and Output Summary (last 24 hours): Intake/Output Summary (Last 24 hours) at 2023 1139  Last data filed at 2023 0917  Gross per 24 hour   Intake 120 ml   Output --   Net 120 ml       Physical Exam:   Physical Exam  Vitals and nursing note reviewed. Constitutional:       General: She is not in acute distress. Appearance: She is well-developed. HENT:      Head: Normocephalic and atraumatic. Nose: Nose normal.   Eyes:      Conjunctiva/sclera: Conjunctivae normal.   Cardiovascular:      Rate and Rhythm: Normal rate and regular rhythm. Pulses: Normal pulses. Heart sounds: No murmur heard. Pulmonary:      Effort: Pulmonary effort is normal. No respiratory distress. Breath sounds: Normal breath sounds. Abdominal:      Palpations: Abdomen is soft. Tenderness: There is no abdominal tenderness. Musculoskeletal:         General: No swelling. Cervical back: Neck supple. Skin:     General: Skin is warm and dry. Capillary Refill: Capillary refill takes less than 2 seconds. Neurological:      General: No focal deficit present. Mental Status: She is alert. Psychiatric:         Mood and Affect: Mood normal.         Behavior: Behavior is cooperative.          Additional Data:     Labs:   Results from last 7 days   Lab Units 23  0535 23  0615   WBC Thousand/uL 4.29* 9. 59   HEMOGLOBIN g/dL 13.3 16.7*   HEMATOCRIT % 41.5 52.4*   PLATELETS Thousands/uL 177 328   NEUTROS PCT %  --  72   LYMPHS PCT %  --  21   MONOS PCT %  --  7   EOS PCT %  --  0      Results from last 7 days   Lab Units 08/04/23  0535   SODIUM mmol/L 136   POTASSIUM mmol/L 3.3*   CHLORIDE mmol/L 103   CO2 mmol/L 24   BUN mg/dL 11   CREATININE mg/dL 0.57*   ANION GAP mmol/L 9   CALCIUM mg/dL 8.8   ALBUMIN g/dL 3.7   TOTAL BILIRUBIN mg/dL 0.86   ALK PHOS U/L 104   ALT U/L 60*   AST U/L 56*   GLUCOSE RANDOM mg/dL 97      Results from last 7 days   Lab Units 08/03/23  0620   INR  0.94      Results from last 7 days   Lab Units 08/03/23  1308 08/03/23  1300 08/03/23  0909 08/03/23  0558   POC GLUCOSE mg/dl 72 70 94 66                    * I Have Reviewed All Lab Data Listed Above. * Additional Pertinent Lab Tests Reviewed: [address] [place] Admission Reviewed      Imaging Studies: I have personally reviewed pertinent reports. Recent Cultures (last 7 days):           Today, Patient Was Seen By: Vandana Eldridge MD

## 2023-08-04 NOTE — DISCHARGE SUMMARY
MEDICAL DETOX UNIT, LEVEL 4  Department of Medical Toxicology  Reason for Admission/Principal Problem: Alcohol withdrawal   Admitting provider: KUMAR Blanchard,    8/3/2023  2:13 PM       Discharging Physician / Practitioner: Marbella Sun PA-C  PCP: Salina Hodgson MD  Admission Date:   Admission Orders (From admission, onward)     Ordered        08/05/23 1242  ED TO SAME CAMPUS IP [address] [facility] UNIT (using Admission Navigator) - Admit Patient to [facility] Unit  Once            08/03/23 1504  Inpatient Admission  Once                      Discharge Date: 08/05/23    Medical Problems     Resolved Problems  Date Reviewed: 8/3/2023   None         * Alcohol withdrawal syndrome with complication [facility])  Assessment & Plan  · Patient with a history of chronic daily alcohol use  · Last drink: Unknown believed yesterday - Reports black out since Thursday - Found on floor by family (I could not move)  · Serum alcohol 221 in the ED on 8/3/23   · Received  in the ED PTA  · Discontinue SEWS protocol for medical management of alcohol withdrawal  · Denies further withdrawal symptoms  · Received a total of 1625 mg of phenobarbital. Last dose administered at 1642 on 8/4/23. · Has been monitored off of protocol does not exhibit further signs or symptoms of alcohol withdrawal.  · Patient is medically stable from a withdrawal perspective for U placement.       Alcohol use disorder  Assessment & Plan  - Daily drinking  - No inpatient treatment in past, Only Outpatient, [facility]  - Case management consulted- 201 inpatient psych upon discharge       Hypokalemia  Assessment & Plan  Recent Labs     08/05/23  0519   K 3.3*     · Provide supplementation  · Continue to monitor    Primary hypertension  Assessment & Plan  - Takes Lisinopril daily  - Continue home med  - Monitor BP and adjust as needed      Consultations During Hospital Stay:  · Case Management   · Psychiatry     Procedures Performed: · None    Significant Findings / Test Results:   · Transaminitis- improving   · Hypokalemia- repleated   ·     Incidental Findings:   · None    Test Results Pending at Discharge (will require follow up): · None     Outpatient Tests/Follow ups Requested:  · PCP follow up  · Psychiatry follow up     Complications:  None    Reason for Admission: Alcohol withdrawal     Hospital Course:     Kvng Coley is a 25 y.o. female patient who originally presented to the hospital on 8/3/2023 due to alcohol withdrawal. Patient initially presented to the  ED requesting detoxification from alcohol. Patient was admitted to the [facility] medical detox unit under [facility] protocol for medically assisted alcohol withdrawal and received a total of 1625 mg phenobarbital without complication. Patient's alcohol withdrawal symptoms subsequently resolved, and she has remained without objective evidence of alcohol withdrawal at this time. Patient reported suicidal ideation during admission and was seen by psychiatry. They made medication recommendations and the patient signed a 201. During this hospitalization, patient was found to have hypokalemia, which resolved with electrolyte supplementation. Case management was consulted for assistance with aftercare resources, and patient will be discharged to inpatient psychiatry at [facility] 3B. Please see above list of diagnoses and related plan for additional information. Condition at Discharge: stable     DDiscussion with Family: Spoke to patient regarding treatment plan     Discharge instructions/Information to patient and family:   See after visit summary for information provided to patient and family. Provisions for Follow-Up Care:  See after visit summary for information related to follow-up care and any pertinent home health orders.       Disposition:     Inpatient Psychiatry at [address] [place] [address] SNF:   · Not Applicable to this Patient - Not Applicable to this Patient    Planned Readmission: None     Discharge Statement:  I spent 32 minutes discharging the patient. This time was spent on the day of discharge. I had direct contact with the patient on the day of discharge. Greater than 50% of the total time was spent examining patient, answering all patient questions, arranging and discussing plan of care with patient as well as directly providing post-discharge instructions. Additional time then spent on discharge activities. Discharge Medications:  See after visit summary for reconciled discharge medications provided to patient and family.       ** Please Note: This note has been constructed using a voice recognition system **

## 2023-08-04 NOTE — ASSESSMENT & PLAN NOTE
Recent Labs     08/05/23  0519   AST 62*   ALT 60*   ALKPHOS 112*     · Elevated LFTs on admission  · Likely secondary to AUD  · Denies abdominal pain  · LFTs this AM are down trending  · Continue to monitor

## 2023-08-04 NOTE — ASSESSMENT & PLAN NOTE
· Patient with a history of chronic daily alcohol use  · Last drink: Unknown believed yesterday - Reports black out since Thursday - Found on floor by family (I could not move)  · Serum alcohol 221 in the ED on 8/3/23   · Received  in the ED PTA  · Discontinue SEWS protocol for medical management of alcohol withdrawal  · Denies further withdrawal symptoms  · Received a total of 1625 mg of phenobarbital. Last dose administered at 1642 on 8/4/23. · Has been monitored off of protocol does not exhibit further signs or symptoms of alcohol withdrawal.  · Patient is medically stable from a withdrawal perspective for BHU placement.

## 2023-08-04 NOTE — ASSESSMENT & PLAN NOTE
· Takes Citalopram - Holding for prolonged QTC  · Per Psych recs:  · Patient may have bipolar disorder   · Continue  Abilify  · Agrees with inpatient psych for treatment - signed a 201   · Patient is medically stable from withdrawal perspective for BHU placement

## 2023-08-04 NOTE — PROCEDURES
EKG: normal sinus rhythm, ventricular rate 97 bpm, nonspecific T wave abnormality which has been noted on prior EKG 8/3/23 at 0629, no acute ST segment elevation/depression, normal LA and QRS intervals, prolonged QTc interval at 515 ms. Compared to prior EKG tracing 8/3/23 at 1214, NSR has replaced sinus tachycardia with concordant decrease in ventricular rate, and QTc prolongation has increased from 501 to 515 ms.
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
## 2023-08-04 NOTE — CASE MANAGEMENT
Worker consulted with medical provider, pt not medically cleared for discharge. Worker consulted with psychiatry, pt signed [phone] for inpatient psych once pt is medically cleared.

## 2023-08-04 NOTE — ASSESSMENT & PLAN NOTE
- Daily drinking  - No inpatient treatment in past, Only Outpatient, [facility]  - Case management consulted- 201 inpatient psych upon discharge

## 2023-08-04 NOTE — PROCEDURES
EKG: normal EKG, normal sinus rhythm, ventricular rate 80 bpm, ventricular rate 80 bpm, no acute ST segment/T wave changes, normal axis and intervals, QTc 477 ms. Compared to prior EKG tracing, QTc prolongation has improved.

## 2023-08-05 ENCOUNTER — HOSPITAL ENCOUNTER (INPATIENT)
Facility: HOSPITAL | Age: 28
LOS: 6 days | Discharge: HOME | End: 2023-08-11
Attending: STUDENT IN AN ORGANIZED HEALTH CARE EDUCATION/TRAINING PROGRAM | Admitting: PSYCHIATRY & NEUROLOGY
Payer: COMMERCIAL

## 2023-08-05 VITALS
BODY MASS INDEX: 40.15 KG/M2 | DIASTOLIC BLOOD PRESSURE: 100 MMHG | TEMPERATURE: 97.8 F | HEIGHT: 63 IN | HEART RATE: 97 BPM | OXYGEN SATURATION: 100 % | SYSTOLIC BLOOD PRESSURE: 156 MMHG | WEIGHT: 226.6 LBS | RESPIRATION RATE: 18 BRPM

## 2023-08-05 DIAGNOSIS — F43.10 PTSD (POST-TRAUMATIC STRESS DISORDER): ICD-10-CM

## 2023-08-05 DIAGNOSIS — F39 UNSPECIFIED MOOD (AFFECTIVE) DISORDER (HCC): Primary | ICD-10-CM

## 2023-08-05 DIAGNOSIS — E55.9 VITAMIN D DEFICIENCY: ICD-10-CM

## 2023-08-05 DIAGNOSIS — Z00.8 MEDICAL CLEARANCE FOR PSYCHIATRIC ADMISSION: ICD-10-CM

## 2023-08-05 DIAGNOSIS — K21.9 GERD WITHOUT ESOPHAGITIS: ICD-10-CM

## 2023-08-05 DIAGNOSIS — I10 HYPERTENSION: ICD-10-CM

## 2023-08-05 LAB
ALBUMIN SERPL BCP-MCNC: 3.8 G/DL (ref 3.5–5)
ALP SERPL-CCNC: 112 U/L (ref 34–104)
ALT SERPL W P-5'-P-CCNC: 60 U/L (ref 7–52)
ANION GAP SERPL CALCULATED.3IONS-SCNC: 10 MMOL/L
AST SERPL W P-5'-P-CCNC: 62 U/L (ref 13–39)
ATRIAL RATE: 73 BPM
BILIRUB SERPL-MCNC: 0.85 MG/DL (ref 0.2–1)
BUN SERPL-MCNC: 9 MG/DL (ref 5–25)
CALCIUM SERPL-MCNC: 9.4 MG/DL (ref 8.4–10.2)
CHLORIDE SERPL-SCNC: 103 MMOL/L (ref 96–108)
CO2 SERPL-SCNC: 23 MMOL/L (ref 21–32)
CREAT SERPL-MCNC: 0.59 MG/DL (ref 0.6–1.3)
ERYTHROCYTE [DISTWIDTH] IN BLOOD BY AUTOMATED COUNT: 16.2 % (ref 11.6–15.1)
GFR SERPL CREATININE-BSD FRML MDRD: 123 ML/MIN/1.73SQ M
GLUCOSE SERPL-MCNC: 69 MG/DL (ref 65–140)
HCT VFR BLD AUTO: 43.6 % (ref 34.8–46.1)
HGB BLD-MCNC: 14.4 G/DL (ref 11.5–15.4)
MAGNESIUM SERPL-MCNC: 1.9 MG/DL (ref 1.9–2.7)
MCH RBC QN AUTO: 28.2 PG (ref 26.8–34.3)
MCHC RBC AUTO-ENTMCNC: 33 G/DL (ref 31.4–37.4)
MCV RBC AUTO: 85 FL (ref 82–98)
P AXIS: 29 DEGREES
PLATELET # BLD AUTO: 174 THOUSANDS/UL (ref 149–390)
PMV BLD AUTO: 10.2 FL (ref 8.9–12.7)
POTASSIUM SERPL-SCNC: 3.3 MMOL/L (ref 3.5–5.3)
PR INTERVAL: 148 MS
PROT SERPL-MCNC: 6.7 G/DL (ref 6.4–8.4)
QRS AXIS: 39 DEGREES
QRSD INTERVAL: 86 MS
QT INTERVAL: 408 MS
QTC INTERVAL: 449 MS
RBC # BLD AUTO: 5.11 MILLION/UL (ref 3.81–5.12)
SODIUM SERPL-SCNC: 136 MMOL/L (ref 135–147)
T WAVE AXIS: 11 DEGREES
VENTRICULAR RATE: 73 BPM
WBC # BLD AUTO: 3.52 THOUSAND/UL (ref 4.31–10.16)

## 2023-08-05 PROCEDURE — NC001 PR NO CHARGE

## 2023-08-05 PROCEDURE — 83735 ASSAY OF MAGNESIUM: CPT

## 2023-08-05 PROCEDURE — 85027 COMPLETE CBC AUTOMATED: CPT

## 2023-08-05 PROCEDURE — 93010 ELECTROCARDIOGRAM REPORT: CPT | Performed by: INTERNAL MEDICINE

## 2023-08-05 PROCEDURE — 80053 COMPREHEN METABOLIC PANEL: CPT

## 2023-08-05 PROCEDURE — 93005 ELECTROCARDIOGRAM TRACING: CPT

## 2023-08-05 PROCEDURE — 99239 HOSP IP/OBS DSCHRG MGMT >30: CPT

## 2023-08-05 RX ORDER — OLANZAPINE 5 MG/1
5 TABLET ORAL
Status: CANCELLED | OUTPATIENT
Start: 2023-08-05

## 2023-08-05 RX ORDER — LANOLIN ALCOHOL/MO/W.PET/CERES
3 CREAM (GRAM) TOPICAL
Status: DISCONTINUED | OUTPATIENT
Start: 2023-08-05 | End: 2023-08-11 | Stop reason: HOSPADM

## 2023-08-05 RX ORDER — HALOPERIDOL 5 MG/1
5 TABLET ORAL
Status: DISCONTINUED | OUTPATIENT
Start: 2023-08-05 | End: 2023-08-11 | Stop reason: HOSPADM

## 2023-08-05 RX ORDER — HALOPERIDOL 1 MG/1
1 TABLET ORAL EVERY 6 HOURS PRN
Status: DISCONTINUED | OUTPATIENT
Start: 2023-08-05 | End: 2023-08-11 | Stop reason: HOSPADM

## 2023-08-05 RX ORDER — HYDROXYZINE 50 MG/1
50 TABLET, FILM COATED ORAL
Status: CANCELLED | OUTPATIENT
Start: 2023-08-05

## 2023-08-05 RX ORDER — LORAZEPAM 0.5 MG/1
0.5 TABLET ORAL EVERY 6 HOURS PRN
Status: DISCONTINUED | OUTPATIENT
Start: 2023-08-05 | End: 2023-08-11 | Stop reason: HOSPADM

## 2023-08-05 RX ORDER — OLANZAPINE 10 MG/1
10 TABLET ORAL
Status: DISCONTINUED | OUTPATIENT
Start: 2023-08-05 | End: 2023-08-11 | Stop reason: HOSPADM

## 2023-08-05 RX ORDER — IBUPROFEN 600 MG/1
600 TABLET ORAL EVERY 6 HOURS PRN
Status: CANCELLED | OUTPATIENT
Start: 2023-08-05

## 2023-08-05 RX ORDER — BENZTROPINE MESYLATE 1 MG/ML
0.5 INJECTION INTRAMUSCULAR; INTRAVENOUS
Status: CANCELLED | OUTPATIENT
Start: 2023-08-05

## 2023-08-05 RX ORDER — TRAZODONE HYDROCHLORIDE 50 MG/1
50 TABLET ORAL
Status: DISCONTINUED | OUTPATIENT
Start: 2023-08-05 | End: 2023-08-11 | Stop reason: HOSPADM

## 2023-08-05 RX ORDER — NICOTINE 21 MG/24HR
1 PATCH, TRANSDERMAL 24 HOURS TRANSDERMAL DAILY
Status: CANCELLED | OUTPATIENT
Start: 2023-08-06

## 2023-08-05 RX ORDER — OLANZAPINE 10 MG/1
10 TABLET ORAL
Status: CANCELLED | OUTPATIENT
Start: 2023-08-05

## 2023-08-05 RX ORDER — IBUPROFEN 400 MG/1
800 TABLET ORAL EVERY 8 HOURS PRN
Status: DISCONTINUED | OUTPATIENT
Start: 2023-08-05 | End: 2023-08-09

## 2023-08-05 RX ORDER — LANOLIN ALCOHOL/MO/W.PET/CERES
3 CREAM (GRAM) TOPICAL
Status: CANCELLED | OUTPATIENT
Start: 2023-08-05

## 2023-08-05 RX ORDER — HYDROXYZINE 50 MG/1
100 TABLET, FILM COATED ORAL
Status: DISCONTINUED | OUTPATIENT
Start: 2023-08-05 | End: 2023-08-11 | Stop reason: HOSPADM

## 2023-08-05 RX ORDER — TRAZODONE HYDROCHLORIDE 50 MG/1
50 TABLET ORAL
Status: CANCELLED | OUTPATIENT
Start: 2023-08-05

## 2023-08-05 RX ORDER — LORAZEPAM 2 MG/ML
1 INJECTION INTRAMUSCULAR
Status: CANCELLED | OUTPATIENT
Start: 2023-08-05

## 2023-08-05 RX ORDER — HALOPERIDOL 5 MG/ML
2.5 INJECTION INTRAMUSCULAR
Status: CANCELLED | OUTPATIENT
Start: 2023-08-05

## 2023-08-05 RX ORDER — HALOPERIDOL 5 MG/1
2.5 TABLET ORAL
Status: CANCELLED | OUTPATIENT
Start: 2023-08-05

## 2023-08-05 RX ORDER — HALOPERIDOL 5 MG/ML
5 INJECTION INTRAMUSCULAR
Status: DISCONTINUED | OUTPATIENT
Start: 2023-08-05 | End: 2023-08-11 | Stop reason: HOSPADM

## 2023-08-05 RX ORDER — LORAZEPAM 2 MG/ML
2 INJECTION INTRAMUSCULAR EVERY 6 HOURS PRN
Status: DISCONTINUED | OUTPATIENT
Start: 2023-08-05 | End: 2023-08-11 | Stop reason: HOSPADM

## 2023-08-05 RX ORDER — OLANZAPINE 10 MG/1
10 INJECTION, POWDER, LYOPHILIZED, FOR SOLUTION INTRAMUSCULAR
Status: CANCELLED | OUTPATIENT
Start: 2023-08-05

## 2023-08-05 RX ORDER — OLANZAPINE 5 MG/1
5 TABLET ORAL
Status: DISCONTINUED | OUTPATIENT
Start: 2023-08-05 | End: 2023-08-11 | Stop reason: HOSPADM

## 2023-08-05 RX ORDER — HALOPERIDOL 5 MG/ML
2.5 INJECTION INTRAMUSCULAR
Status: DISCONTINUED | OUTPATIENT
Start: 2023-08-05 | End: 2023-08-11 | Stop reason: HOSPADM

## 2023-08-05 RX ORDER — HYDROXYZINE HYDROCHLORIDE 25 MG/1
25 TABLET, FILM COATED ORAL
Status: DISCONTINUED | OUTPATIENT
Start: 2023-08-05 | End: 2023-08-11 | Stop reason: HOSPADM

## 2023-08-05 RX ORDER — ARIPIPRAZOLE 5 MG/1
5 TABLET ORAL DAILY
Status: ON HOLD | COMMUNITY
End: 2023-08-10 | Stop reason: SDUPTHER

## 2023-08-05 RX ORDER — BENZTROPINE MESYLATE 1 MG/ML
1 INJECTION INTRAMUSCULAR; INTRAVENOUS
Status: DISCONTINUED | OUTPATIENT
Start: 2023-08-05 | End: 2023-08-11 | Stop reason: HOSPADM

## 2023-08-05 RX ORDER — OLANZAPINE 10 MG/1
5 INJECTION, POWDER, LYOPHILIZED, FOR SOLUTION INTRAMUSCULAR
Status: DISCONTINUED | OUTPATIENT
Start: 2023-08-05 | End: 2023-08-11 | Stop reason: HOSPADM

## 2023-08-05 RX ORDER — HYDROXYZINE HYDROCHLORIDE 25 MG/1
25 TABLET, FILM COATED ORAL
Status: CANCELLED | OUTPATIENT
Start: 2023-08-05

## 2023-08-05 RX ORDER — BENZTROPINE MESYLATE 1 MG/ML
1 INJECTION INTRAMUSCULAR; INTRAVENOUS
Status: CANCELLED | OUTPATIENT
Start: 2023-08-05

## 2023-08-05 RX ORDER — LORAZEPAM 2 MG/ML
2 INJECTION INTRAMUSCULAR
Status: CANCELLED | OUTPATIENT
Start: 2023-08-05

## 2023-08-05 RX ORDER — LORAZEPAM 0.5 MG/1
0.5 TABLET ORAL EVERY 6 HOURS PRN
Status: CANCELLED | OUTPATIENT
Start: 2023-08-05

## 2023-08-05 RX ORDER — IBUPROFEN 400 MG/1
800 TABLET ORAL EVERY 8 HOURS PRN
Status: CANCELLED | OUTPATIENT
Start: 2023-08-05

## 2023-08-05 RX ORDER — LORAZEPAM 2 MG/ML
2 INJECTION INTRAMUSCULAR EVERY 6 HOURS PRN
Status: CANCELLED | OUTPATIENT
Start: 2023-08-05

## 2023-08-05 RX ORDER — IBUPROFEN 600 MG/1
600 TABLET ORAL EVERY 6 HOURS PRN
Status: DISCONTINUED | OUTPATIENT
Start: 2023-08-05 | End: 2023-08-09

## 2023-08-05 RX ORDER — LISINOPRIL 20 MG/1
20 TABLET ORAL DAILY
Status: DISCONTINUED | OUTPATIENT
Start: 2023-08-06 | End: 2023-08-11 | Stop reason: HOSPADM

## 2023-08-05 RX ORDER — ARIPIPRAZOLE 5 MG/1
5 TABLET ORAL DAILY
Status: DISCONTINUED | OUTPATIENT
Start: 2023-08-06 | End: 2023-08-11 | Stop reason: HOSPADM

## 2023-08-05 RX ORDER — DIPHENHYDRAMINE HYDROCHLORIDE 50 MG/ML
50 INJECTION INTRAMUSCULAR; INTRAVENOUS EVERY 6 HOURS PRN
Status: CANCELLED | OUTPATIENT
Start: 2023-08-05

## 2023-08-05 RX ORDER — HYDROXYZINE 50 MG/1
50 TABLET, FILM COATED ORAL
Status: DISCONTINUED | OUTPATIENT
Start: 2023-08-05 | End: 2023-08-11 | Stop reason: HOSPADM

## 2023-08-05 RX ORDER — HYDROXYZINE 50 MG/1
100 TABLET, FILM COATED ORAL
Status: CANCELLED | OUTPATIENT
Start: 2023-08-05

## 2023-08-05 RX ORDER — BENZTROPINE MESYLATE 1 MG/ML
0.5 INJECTION INTRAMUSCULAR; INTRAVENOUS
Status: DISCONTINUED | OUTPATIENT
Start: 2023-08-05 | End: 2023-08-11 | Stop reason: HOSPADM

## 2023-08-05 RX ORDER — BENZTROPINE MESYLATE 1 MG/1
1 TABLET ORAL
Status: DISCONTINUED | OUTPATIENT
Start: 2023-08-05 | End: 2023-08-11 | Stop reason: HOSPADM

## 2023-08-05 RX ORDER — OLANZAPINE 10 MG/1
10 INJECTION, POWDER, LYOPHILIZED, FOR SOLUTION INTRAMUSCULAR
Status: DISCONTINUED | OUTPATIENT
Start: 2023-08-05 | End: 2023-08-11 | Stop reason: HOSPADM

## 2023-08-05 RX ORDER — IBUPROFEN 400 MG/1
400 TABLET ORAL EVERY 4 HOURS PRN
Status: CANCELLED | OUTPATIENT
Start: 2023-08-05

## 2023-08-05 RX ORDER — DIPHENHYDRAMINE HYDROCHLORIDE 50 MG/ML
50 INJECTION INTRAMUSCULAR; INTRAVENOUS EVERY 6 HOURS PRN
Status: DISCONTINUED | OUTPATIENT
Start: 2023-08-05 | End: 2023-08-11 | Stop reason: HOSPADM

## 2023-08-05 RX ORDER — HALOPERIDOL 5 MG/ML
5 INJECTION INTRAMUSCULAR
Status: CANCELLED | OUTPATIENT
Start: 2023-08-05

## 2023-08-05 RX ORDER — PANTOPRAZOLE SODIUM 40 MG/1
40 TABLET, DELAYED RELEASE ORAL
Status: DISCONTINUED | OUTPATIENT
Start: 2023-08-06 | End: 2023-08-11 | Stop reason: HOSPADM

## 2023-08-05 RX ORDER — LISINOPRIL 20 MG/1
20 TABLET ORAL DAILY
Status: CANCELLED | OUTPATIENT
Start: 2023-08-06

## 2023-08-05 RX ORDER — OLANZAPINE 2.5 MG/1
2.5 TABLET ORAL
Status: DISCONTINUED | OUTPATIENT
Start: 2023-08-05 | End: 2023-08-11 | Stop reason: HOSPADM

## 2023-08-05 RX ORDER — OLANZAPINE 10 MG/1
5 INJECTION, POWDER, LYOPHILIZED, FOR SOLUTION INTRAMUSCULAR
Status: CANCELLED | OUTPATIENT
Start: 2023-08-05

## 2023-08-05 RX ORDER — ARIPIPRAZOLE 5 MG/1
5 TABLET ORAL DAILY
Status: CANCELLED | OUTPATIENT
Start: 2023-08-06

## 2023-08-05 RX ORDER — LORAZEPAM 2 MG/ML
1 INJECTION INTRAMUSCULAR
Status: DISCONTINUED | OUTPATIENT
Start: 2023-08-05 | End: 2023-08-11 | Stop reason: HOSPADM

## 2023-08-05 RX ORDER — HALOPERIDOL 5 MG/1
5 TABLET ORAL
Status: CANCELLED | OUTPATIENT
Start: 2023-08-05

## 2023-08-05 RX ORDER — OLANZAPINE 5 MG/1
2.5 TABLET ORAL
Status: CANCELLED | OUTPATIENT
Start: 2023-08-05

## 2023-08-05 RX ORDER — HALOPERIDOL 1 MG/1
1 TABLET ORAL EVERY 6 HOURS PRN
Status: CANCELLED | OUTPATIENT
Start: 2023-08-05

## 2023-08-05 RX ORDER — IBUPROFEN 400 MG/1
400 TABLET ORAL EVERY 4 HOURS PRN
Status: DISCONTINUED | OUTPATIENT
Start: 2023-08-05 | End: 2023-08-09

## 2023-08-05 RX ORDER — BENZTROPINE MESYLATE 0.5 MG/1
1 TABLET ORAL
Status: CANCELLED | OUTPATIENT
Start: 2023-08-05

## 2023-08-05 RX ORDER — NICOTINE 21 MG/24HR
1 PATCH, TRANSDERMAL 24 HOURS TRANSDERMAL DAILY
Status: DISCONTINUED | OUTPATIENT
Start: 2023-08-06 | End: 2023-08-11 | Stop reason: HOSPADM

## 2023-08-05 RX ORDER — LORAZEPAM 2 MG/ML
2 INJECTION INTRAMUSCULAR
Status: DISCONTINUED | OUTPATIENT
Start: 2023-08-05 | End: 2023-08-11 | Stop reason: HOSPADM

## 2023-08-05 RX ORDER — PANTOPRAZOLE SODIUM 40 MG/1
40 TABLET, DELAYED RELEASE ORAL
Status: CANCELLED | OUTPATIENT
Start: 2023-08-06

## 2023-08-05 RX ADMIN — NICOTINE 1 PATCH: 21 PATCH, EXTENDED RELEASE TRANSDERMAL at 08:44

## 2023-08-05 RX ADMIN — PANTOPRAZOLE SODIUM 40 MG: 40 TABLET, DELAYED RELEASE ORAL at 05:13

## 2023-08-05 RX ADMIN — FOLIC ACID: 5 INJECTION, SOLUTION INTRAMUSCULAR; INTRAVENOUS; SUBCUTANEOUS at 08:43

## 2023-08-05 RX ADMIN — Medication 3 MG: at 21:31

## 2023-08-05 RX ADMIN — METOCLOPRAMIDE 10 MG: 5 INJECTION, SOLUTION INTRAMUSCULAR; INTRAVENOUS at 08:48

## 2023-08-05 RX ADMIN — ARIPIPRAZOLE 5 MG: 5 TABLET ORAL at 08:43

## 2023-08-05 RX ADMIN — LISINOPRIL 20 MG: 20 TABLET ORAL at 08:43

## 2023-08-05 RX ADMIN — ONDANSETRON 4 MG: 2 INJECTION INTRAMUSCULAR; INTRAVENOUS at 05:02

## 2023-08-05 RX ADMIN — ACETAMINOPHEN 650 MG: 325 TABLET ORAL at 05:13

## 2023-08-05 RX ADMIN — IBUPROFEN 800 MG: 400 TABLET ORAL at 20:03

## 2023-08-05 NOTE — SOCIAL WORK
CM spoke with Randy Vázquez at [employer] ([phone]) to complete admission pre authorization:    Reviewer is Juan B:  x [id number]  Approval for 6 days in-patient U beginning 8/5  Review 8/10  Auth #: [id number]    Call ended mutually.

## 2023-08-05 NOTE — SOCIAL WORK
Dr. Neri Malave accepted pt to go to [address] [facility]. Awaiting orders and discharge/admission time.

## 2023-08-05 NOTE — SOCIAL WORK
Pt given options of in patient [facility] facilities. Pt chose to remain within [facility] vs dual program d/t to the distance of the dual program. Pt discussed post the need for PTSD specific counseling and dual out patient services post [facility] discharge. CM called [facility] intake: [phone]  TigerTexted: [facility]  Faxed 201 and Facesheet: [phone]  Awaiting response. 2

## 2023-08-05 NOTE — PLAN OF CARE
Problem: SELF HARM/SUICIDALITY  Goal: Will have no self-injury during hospital stay  Description: INTERVENTIONS:  - Q 15 MINUTES: Routine safety checks  - Q WAKING SHIFT & PRN: Assess risk to determine if routine checks are adequate to maintain patient safety  - Encourage patient to participate actively in care by formulating a plan to combat response to suicidal ideation, identify supports and resources  Outcome: Progressing     Problem: DEPRESSION  Goal: Will be euthymic at discharge  Description: INTERVENTIONS:  - Administer medication as ordered  - Provide emotional support via 1:1 interaction with staff  - Encourage involvement in milieu/groups/activities  - Monitor for social isolation  Outcome: Progressing     Problem: ANXIETY  Goal: Will report anxiety at manageable levels  Description: INTERVENTIONS:  - Administer medication as ordered  - Teach and encourage coping skills  - Provide emotional support  - Assess patient/family for anxiety and ability to cope  Outcome: Progressing  Goal: By discharge: Patient will verbalize 2 strategies to deal with anxiety  Description: Interventions:  - Identify any obvious source/trigger to anxiety  - Staff will assist patient in applying identified coping technique/skills  - Encourage attendance of scheduled groups and activities  Outcome: Progressing     Problem: SUBSTANCE USE/ABUSE  Goal: Will have no detox symptoms and will verbalize plan for changing substance-related behavior  Description: INTERVENTIONS:  - Monitor physical status and assess for symptoms of withdrawal  - Administer medication as ordered  - Provide emotional support with 1 on 1 interaction with staff  - Encourage recovery focused program/ addiction education  - Assess for verbalization of changing behaviors related to substance abuse  - Initiate consults and referrals as appropriate (Case Management, Spiritual Care, etc.)  Outcome: Progressing  Goal: By discharge, will develop insight into their chemical dependency and sustain motivation to continue in recovery  Description: INTERVENTIONS:  - Attends all daily group sessions and scheduled AA groups  - Actively practices coping skills through participation in the therapeutic community and adherence to program rules  - Reviews and completes assignments from individual treatment plan  - Assist patient development of understanding of their personal cycle of addiction and relapse triggers  Outcome: Progressing  Goal: By discharge, patient will have ongoing treatment plan addressing chemical dependency  Description: INTERVENTIONS:  - Assist patient with resources and/or appointments for ongoing recovery based living  Outcome: Progressing     Problem: Ineffective Coping  Goal: Cooperates with admission process  Description: Interventions:   - Complete admission process  Outcome: Progressing  Goal: Identifies ineffective coping skills  Outcome: Progressing  Goal: Identifies healthy coping skills  Outcome: Progressing  Goal: Demonstrates healthy coping skills  Outcome: Progressing  Goal: Participates in unit activities  Description: Interventions:  - Provide therapeutic environment   - Provide required programming   - Redirect inappropriate behaviors   Outcome: Progressing  Goal: Patient/Family participate in treatment and DC plans  Description: Interventions:  - Provide therapeutic environment  Outcome: Progressing  Goal: Patient/Family verbalizes awareness of resources  Outcome: Progressing  Goal: Understands least restrictive measures  Description: Interventions:  - Utilize least restrictive behavior  Outcome: Progressing  Goal: Free from restraint events  Description: - Utilize least restrictive measures   - Provide behavioral interventions   - Redirect inappropriate behaviors   Outcome: Progressing     Problem: Depression  Goal: Treatment Goal: Demonstrate behavioral control of depressive symptoms, verbalize feelings of improved mood/affect, and adopt new coping skills prior to discharge  Outcome: Progressing  Goal: Verbalize thoughts and feelings  Description: Interventions:  - Assess and re-assess patient's level of risk   - Engage patient in 1:1 interactions, daily, for a minimum of 15 minutes   - Encourage patient to express feelings, fears, frustrations, hopes   Outcome: Progressing  Goal: Refrain from harming self  Description: Interventions:  - Monitor patient closely, per order   - Supervise medication ingestion, monitor effects and side effects   Outcome: Progressing  Goal: Refrain from isolation  Description: Interventions:  - Develop a trusting relationship   - Encourage socialization   Outcome: Progressing  Goal: Refrain from self-neglect  Outcome: Progressing  Goal: Attend and participate in unit activities, including therapeutic, recreational, and educational groups  Description: Interventions:  - Provide therapeutic and educational activities daily, encourage attendance and participation, and document same in the medical record   Outcome: Progressing  Goal: Complete daily ADLs, including personal hygiene independently, as able  Description: Interventions:  - Observe, teach, and assist patient with ADLS  -  Monitor and promote a balance of rest/activity, with adequate nutrition and elimination   Outcome: Progressing

## 2023-08-05 NOTE — PLAN OF CARE
Problem: DISCHARGE PLANNING - CARE MANAGEMENT  Goal: Discharge to post-acute care or home with appropriate resources  Description: INTERVENTIONS:  - Conduct assessment to determine patient/family and health care team treatment goals, and need for post-acute services based on payer coverage, community resources, and patient preferences, and barriers to discharge  - Address psychosocial, clinical, and financial barriers to discharge as identified in assessment in conjunction with the patient/family and health care team  - Arrange appropriate level of post-acute services according to patient’s   needs and preference and payer coverage in collaboration with the physician and health care team  - Communicate with and update the patient/family, physician, and health care team regarding progress on the discharge plan  - Arrange appropriate transportation to post-acute venues  Outcome: Completed  Patient in agreement to in-patient [facility], accepted to [facility], will be transported via wheelchair via staff to [address] [place] [address] at .

## 2023-08-05 NOTE — PLAN OF CARE
Problem: PAIN - ADULT  Goal: Verbalizes/displays adequate comfort level or baseline comfort level  Description: Interventions:  - Encourage patient to monitor pain and request assistance  - Assess pain using appropriate pain scale  - Administer analgesics based on type and severity of pain and evaluate response  - Implement non-pharmacological measures as appropriate and evaluate response  - Consider cultural and social influences on pain and pain management  - Notify physician/advanced practitioner if interventions unsuccessful or patient reports new pain  Outcome: Progressing     Problem: ANXIETY  Goal: Will report anxiety at manageable levels  Description: INTERVENTIONS:  - Administer medication as ordered  - Teach and encourage coping skills  - Provide emotional support  - Assess patient/family for anxiety and ability to cope  Outcome: Progressing  Goal: By discharge: Patient will verbalize 2 strategies to deal with anxiety  Description: Interventions:  - Identify any obvious source/trigger to anxiety  - Staff will assist patient in applying identified coping technique/skills  - Encourage attendance of scheduled groups and activities  Outcome: Progressing     Problem: SUBSTANCE USE/ABUSE  Goal: Will have no detox symptoms and will verbalize plan for changing substance-related behavior  Description: INTERVENTIONS:  - Monitor physical status and assess for symptoms of withdrawal  - Administer medication as ordered  - Provide emotional support with 1 on 1 interaction with staff  - Encourage recovery focused program/ addiction education  - Assess for verbalization of changing behaviors related to substance abuse  - Initiate consults and referrals as appropriate (Case Management, Spiritual Care, etc.)  Outcome: Progressing  Goal: By discharge, will develop insight into their chemical dependency and sustain motivation to continue in recovery  Description: INTERVENTIONS:  - Attends all daily group sessions and scheduled AA groups  - Actively practices coping skills through participation in the therapeutic community and adherence to program rules  - Reviews and completes assignments from individual treatment plan  - Assist patient development of understanding of their personal cycle of addiction and relapse triggers  Outcome: Progressing  Goal: By discharge, patient will have ongoing treatment plan addressing chemical dependency  Description: INTERVENTIONS:  - Assist patient with resources and/or appointments for ongoing recovery based living  Outcome: Progressing     Problem: Ineffective Coping  Goal: Cooperates with admission process  Description: Interventions:   - Complete admission process  Outcome: Progressing  Goal: Identifies ineffective coping skills  Outcome: Progressing  Goal: Identifies healthy coping skills  Outcome: Progressing  Goal: Demonstrates healthy coping skills  Outcome: Progressing  Goal: Participates in unit activities  Description: Interventions:  - Provide therapeutic environment   - Provide required programming   - Redirect inappropriate behaviors   Outcome: Progressing  Goal: Patient/Family participate in treatment and DC plans  Description: Interventions:  - Provide therapeutic environment  Outcome: Progressing  Goal: Patient/Family verbalizes awareness of resources  Outcome: Progressing  Goal: Understands least restrictive measures  Description: Interventions:  - Utilize least restrictive behavior  Outcome: Progressing  Goal: Free from restraint events  Description: - Utilize least restrictive measures   - Provide behavioral interventions   - Redirect inappropriate behaviors   Outcome: Progressing     Problem: Depression  Goal: Treatment Goal: Demonstrate behavioral control of depressive symptoms, verbalize feelings of improved mood/affect, and adopt new coping skills prior to discharge  Outcome: Progressing  Goal: Verbalize thoughts and feelings  Description: Interventions:  - Assess and re-assess patient's level of risk   - Engage patient in 1:1 interactions, daily, for a minimum of 15 minutes   - Encourage patient to express feelings, fears, frustrations, hopes   Outcome: Progressing  Goal: Refrain from harming self  Description: Interventions:  - Monitor patient closely, per order   - Supervise medication ingestion, monitor effects and side effects   Outcome: Progressing  Goal: Refrain from isolation  Description: Interventions:  - Develop a trusting relationship   - Encourage socialization   Outcome: Progressing  Goal: Refrain from self-neglect  Outcome: Progressing  Goal: Attend and participate in unit activities, including therapeutic, recreational, and educational groups  Description: Interventions:  - Provide therapeutic and educational activities daily, encourage attendance and participation, and document same in the medical record   Outcome: Progressing  Goal: Complete daily ADLs, including personal hygiene independently, as able  Description: Interventions:  - Observe, teach, and assist patient with ADLS  -  Monitor and promote a balance of rest/activity, with adequate nutrition and elimination   Outcome: Progressing

## 2023-08-06 PROBLEM — K21.9 GERD WITHOUT ESOPHAGITIS: Status: ACTIVE | Noted: 2023-08-06

## 2023-08-06 PROBLEM — Z00.8 MEDICAL CLEARANCE FOR PSYCHIATRIC ADMISSION: Status: ACTIVE | Noted: 2023-08-06

## 2023-08-06 PROBLEM — F43.10 PTSD (POST-TRAUMATIC STRESS DISORDER): Status: ACTIVE | Noted: 2023-08-06

## 2023-08-06 PROBLEM — F39 MOOD DISORDER (HCC): Status: ACTIVE | Noted: 2023-08-06

## 2023-08-06 LAB
25(OH)D3 SERPL-MCNC: 12.4 NG/ML (ref 30–100)
ANION GAP SERPL CALCULATED.3IONS-SCNC: 10 MMOL/L
BUN SERPL-MCNC: 10 MG/DL (ref 5–25)
CALCIUM SERPL-MCNC: 9.4 MG/DL (ref 8.4–10.2)
CHLORIDE SERPL-SCNC: 103 MMOL/L (ref 96–108)
CO2 SERPL-SCNC: 23 MMOL/L (ref 21–32)
CREAT SERPL-MCNC: 0.61 MG/DL (ref 0.6–1.3)
GFR SERPL CREATININE-BSD FRML MDRD: 122 ML/MIN/1.73SQ M
GLUCOSE P FAST SERPL-MCNC: 73 MG/DL (ref 65–99)
GLUCOSE SERPL-MCNC: 73 MG/DL (ref 65–140)
POTASSIUM SERPL-SCNC: 3.7 MMOL/L (ref 3.5–5.3)
SODIUM SERPL-SCNC: 136 MMOL/L (ref 135–147)
VIT B12 SERPL-MCNC: 983 PG/ML (ref 180–914)

## 2023-08-06 PROCEDURE — 99223 1ST HOSP IP/OBS HIGH 75: CPT | Performed by: STUDENT IN AN ORGANIZED HEALTH CARE EDUCATION/TRAINING PROGRAM

## 2023-08-06 PROCEDURE — 99222 1ST HOSP IP/OBS MODERATE 55: CPT | Performed by: NURSE PRACTITIONER

## 2023-08-06 PROCEDURE — 80048 BASIC METABOLIC PNL TOTAL CA: CPT | Performed by: STUDENT IN AN ORGANIZED HEALTH CARE EDUCATION/TRAINING PROGRAM

## 2023-08-06 PROCEDURE — 82607 VITAMIN B-12: CPT | Performed by: NURSE PRACTITIONER

## 2023-08-06 PROCEDURE — 82306 VITAMIN D 25 HYDROXY: CPT | Performed by: NURSE PRACTITIONER

## 2023-08-06 RX ORDER — SERTRALINE HYDROCHLORIDE 25 MG/1
25 TABLET, FILM COATED ORAL
Status: DISCONTINUED | OUTPATIENT
Start: 2023-08-06 | End: 2023-08-11 | Stop reason: HOSPADM

## 2023-08-06 RX ADMIN — ARIPIPRAZOLE 5 MG: 5 TABLET ORAL at 08:06

## 2023-08-06 RX ADMIN — LISINOPRIL 20 MG: 20 TABLET ORAL at 08:06

## 2023-08-06 RX ADMIN — Medication 3 MG: at 21:02

## 2023-08-06 RX ADMIN — NICOTINE 1 PATCH: 21 PATCH, EXTENDED RELEASE TRANSDERMAL at 08:06

## 2023-08-06 RX ADMIN — IBUPROFEN 800 MG: 400 TABLET ORAL at 20:37

## 2023-08-06 RX ADMIN — PANTOPRAZOLE SODIUM 40 MG: 40 TABLET, DELAYED RELEASE ORAL at 05:49

## 2023-08-06 RX ADMIN — IBUPROFEN 600 MG: 600 TABLET, FILM COATED ORAL at 08:05

## 2023-08-06 RX ADMIN — HYDROXYZINE HYDROCHLORIDE 25 MG: 25 TABLET ORAL at 20:40

## 2023-08-06 RX ADMIN — SERTRALINE HYDROCHLORIDE 25 MG: 25 TABLET ORAL at 21:02
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
## 2023-08-06 NOTE — ASSESSMENT & PLAN NOTE
· Vital signs stable afebrile patient seen and examined by myself Labs from today reviewed potassium 3.6 sodium 136 creatinine 0.6  · Patient medically stable for admit  · [facility] IM will sign off please call with any questions or concerns

## 2023-08-06 NOTE — ASSESSMENT & PLAN NOTE
· When patient was in the medical detox unit she had elevated transaminitis AST 60 on 8/5  ALT 60 on 8/5  · I will order a CMP for Monday, 8/7/2023 to recheck that these are trending down

## 2023-08-06 NOTE — ASSESSMENT & PLAN NOTE
· Patient has primary hypertension she will continue on her home dose of lisinopril 20 mg p.o. daily  · We will monitor her blood pressure closely and add or delete agents as needed  · She will follow-up with her PCP upon discharge from the [facility]

## 2023-08-06 NOTE — ASSESSMENT & PLAN NOTE
· Patient is an active tobacco abuser greater than 1 year  · Patient will use a nicotine patch while she is in inpatient in the [address]  · Smoking cessation education

## 2023-08-06 NOTE — PLAN OF CARE
Problem: DISCHARGE PLANNING  Goal: Discharge to home or other facility with appropriate resources  Description: INTERVENTIONS:  - Identify barriers to discharge w/patient and caregiver  - Arrange for needed discharge resources and transportation as appropriate  - Identify discharge learning needs (meds, wound care, etc.)  - Arrange for interpretive services to assist at discharge as needed  - Refer to Case Management Department for coordinating discharge planning if the patient needs post-hospital services based on physician/advanced practitioner order or complex needs related to functional status, cognitive ability, or social support system  Outcome: Progressing     Problem: PAIN - ADULT  Goal: Verbalizes/displays adequate comfort level or baseline comfort level  Description: Interventions:  - Encourage patient to monitor pain and request assistance  - Assess pain using appropriate pain scale  - Administer analgesics based on type and severity of pain and evaluate response  - Implement non-pharmacological measures as appropriate and evaluate response  - Consider cultural and social influences on pain and pain management  - Notify physician/advanced practitioner if interventions unsuccessful or patient reports new pain  Outcome: Progressing     Problem: SELF HARM/SUICIDALITY  Goal: Will have no self-injury during hospital stay  Description: INTERVENTIONS:  - Q 15 MINUTES: Routine safety checks  - Q WAKING SHIFT & PRN: Assess risk to determine if routine checks are adequate to maintain patient safety  - Encourage patient to participate actively in care by formulating a plan to combat response to suicidal ideation, identify supports and resources  Outcome: Progressing     Problem: ANXIETY  Goal: Will report anxiety at manageable levels  Description: INTERVENTIONS:  - Administer medication as ordered  - Teach and encourage coping skills  - Provide emotional support  - Assess patient/family for anxiety and ability to cope  Outcome: Progressing  Goal: By discharge: Patient will verbalize 2 strategies to deal with anxiety  Description: Interventions:  - Identify any obvious source/trigger to anxiety  - Staff will assist patient in applying identified coping technique/skills  - Encourage attendance of scheduled groups and activities  Outcome: Progressing     Problem: SUBSTANCE USE/ABUSE  Goal: Will have no detox symptoms and will verbalize plan for changing substance-related behavior  Description: INTERVENTIONS:  - Monitor physical status and assess for symptoms of withdrawal  - Administer medication as ordered  - Provide emotional support with 1 on 1 interaction with staff  - Encourage recovery focused program/ addiction education  - Assess for verbalization of changing behaviors related to substance abuse  - Initiate consults and referrals as appropriate (Case Management, Spiritual Care, etc.)  Outcome: Progressing  Goal: By discharge, will develop insight into their chemical dependency and sustain motivation to continue in recovery  Description: INTERVENTIONS:  - Attends all daily group sessions and scheduled AA groups  - Actively practices coping skills through participation in the therapeutic community and adherence to program rules  - Reviews and completes assignments from individual treatment plan  - Assist patient development of understanding of their personal cycle of addiction and relapse triggers  Outcome: Progressing  Goal: By discharge, patient will have ongoing treatment plan addressing chemical dependency  Description: INTERVENTIONS:  - Assist patient with resources and/or appointments for ongoing recovery based living  Outcome: Progressing     Problem: Ineffective Coping  Goal: Cooperates with admission process  Description: Interventions:   - Complete admission process  Outcome: Progressing  Goal: Identifies ineffective coping skills  Outcome: Progressing  Goal: Identifies healthy coping skills  Outcome: Progressing  Goal: Demonstrates healthy coping skills  Outcome: Progressing  Goal: Participates in unit activities  Description: Interventions:  - Provide therapeutic environment   - Provide required programming   - Redirect inappropriate behaviors   Outcome: Progressing  Goal: Patient/Family participate in treatment and DC plans  Description: Interventions:  - Provide therapeutic environment  Outcome: Progressing  Goal: Patient/Family verbalizes awareness of resources  Outcome: Progressing  Goal: Understands least restrictive measures  Description: Interventions:  - Utilize least restrictive behavior  Outcome: Progressing  Goal: Free from restraint events  Description: - Utilize least restrictive measures   - Provide behavioral interventions   - Redirect inappropriate behaviors   Outcome: Progressing     Problem: Depression  Goal: Treatment Goal: Demonstrate behavioral control of depressive symptoms, verbalize feelings of improved mood/affect, and adopt new coping skills prior to discharge  Outcome: Progressing  Goal: Verbalize thoughts and feelings  Description: Interventions:  - Assess and re-assess patient's level of risk   - Engage patient in 1:1 interactions, daily, for a minimum of 15 minutes   - Encourage patient to express feelings, fears, frustrations, hopes   Outcome: Progressing  Goal: Refrain from harming self  Description: Interventions:  - Monitor patient closely, per order   - Supervise medication ingestion, monitor effects and side effects   Outcome: Progressing  Goal: Refrain from isolation  Description: Interventions:  - Develop a trusting relationship   - Encourage socialization   Outcome: Progressing  Goal: Refrain from self-neglect  Outcome: Progressing  Goal: Attend and participate in unit activities, including therapeutic, recreational, and educational groups  Description: Interventions:  - Provide therapeutic and educational activities daily, encourage attendance and participation, and document same in the medical record   Outcome: Progressing  Goal: Complete daily ADLs, including personal hygiene independently, as able  Description: Interventions:  - Observe, teach, and assist patient with ADLS  -  Monitor and promote a balance of rest/activity, with adequate nutrition and elimination   Outcome: Progressing

## 2023-08-06 NOTE — ASSESSMENT & PLAN NOTE
· Patient has a significant alcohol abuse history greater than 1 year  · She is a transfer from the detox medical unit   · continue with alcohol cessation education
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
## 2023-08-06 NOTE — NURSING NOTE
0958 - Patient with c/o 6/10 sudden onset of headache. PRN ibuprofen PO administered.      0545 - Patient reports headache is "almost completely gone."

## 2023-08-06 NOTE — CONSULTS
[address] [facility]  Consult  Name: Kallie Amin 25 y.o. female I MRN: [id number]  Unit/Bed#: [address] [place] [address] Date of Admission: 8/5/2023   Date of Service: 8/6/2023 I Hospital Day: 1    Inpatient consult for Medical Clearance for [address] [facility] patient  Consult performed by: MONTY Archuleta  Consult ordered by: Gaviota Shaffer MD          Assessment/Plan   Medical clearance for psychiatric admission  Assessment & Plan  · Vital signs stable afebrile patient seen and examined by myself Labs from today reviewed potassium 3.6 sodium 136 creatinine 0.6  · Patient medically stable for admit  ·  IM will sign off please call with any questions or concerns    GERD without esophagitis  Assessment & Plan  · Patient will continue to take her outpatient Protonix 40 mg p.o. daily    Transaminitis  Assessment & Plan  · When patient was in the medical detox unit she had elevated transaminitis AST 60 on 8/5  ALT 60 on 8/5  · I will order a CMP for Monday, 8/7/2023 to recheck that these are trending down    Alcohol use disorder  Assessment & Plan  · Patient has a significant alcohol abuse history greater than 1 year  · She is a transfer from the detox medical unit   · continue with alcohol cessation education    BMI 40.0-44.9, adult [facility])  Assessment & Plan  · Patient has a BMI greater than 40  · She was seen by nutrition when she was at [facility]  · Lifestyle modifications    Primary hypertension  Assessment & Plan  · Patient has primary hypertension she will continue on her home dose of lisinopril 20 mg p.o. daily  · We will monitor her blood pressure closely and add or delete agents as needed  · She will follow-up with her PCP upon discharge from the [address] [facility]  · Patient is an active tobacco abuser greater than 1 year  · Patient will use a nicotine patch while she is in inpatient in the [facility]  · Smoking cessation education        Counseling / Coordination of Care Time: 45 minutes. Greater than 50% of total time spent on patient counseling and coordination of care. Collaboration of Care: Were Recommendations Directly Discussed with Primary Treatment Team? - No     History of Present Illness:    Nicole Neely is a 25 y.o. female who is originally admitted to the psychiatry service due to anxiety and depression after a stay in the detox unit for a longstanding history of alcohol abuse. We are consulted for medical clearance for admission to [facility] Unit and treatment of underlying psychiatric illness. On 8/3/2023 patient was brought in by ambulance to [facility] ED where she was found unconscious at home and unresponsive she was also found to be hypoglycemic patient has a longstanding history greater than 1 year of alcohol abuse with bipolar history anxiety and depression she was then transferred to [facility] detox unit. Once she was medically stable she was then admitted to the [facility]. Past medical history which includes her psych history her alcohol abuse she has a tobacco abuse history and past medical history is significant for hypertension and GERD as well as morbid obesity. On evaluation patient denies any physical complaints such as headache, dizziness, chest pain, shortness of breath, nausea/vomiting/diarrhea at this time. Inpatient admission for psychiatric evaluation. Labs ECG were reviewed. Review of Systems:    Review of Systems   Constitutional: Negative for chills and fever. HENT: Negative for ear pain and sore throat. Eyes: Negative for pain and visual disturbance. Respiratory: Negative for cough and shortness of breath. Cardiovascular: Negative for chest pain and palpitations. Gastrointestinal: Negative for abdominal pain and vomiting. Genitourinary: Negative for dysuria and hematuria. Musculoskeletal: Negative for arthralgias and back pain. Skin: Negative for color change and rash. Neurological: Negative for seizures and syncope.    All other systems reviewed and are negative. Past Medical and Surgical History:     Past Medical History:   Diagnosis Date   • Alcohol abuse    • Alcoholism ([address])    • Anxiety    • Bipolar disorder ([address])    • Depression    • Hypertension    • Kidney stones    • PTSD (post-traumatic stress disorder)    • Self-injurious behavior    • Suicide attempt ([address])    • Withdrawal symptoms, drug or narcotic ([address])        No past surgical history on file. Meds/Allergies:    all medications and allergies reviewed    Allergies: Allergies   Allergen Reactions   • Biaxin [Clarithromycin] GI Intolerance   • Other GI Intolerance     cefcil         Social History:     Marital Status: Single    Substance Use History:   Social History     Substance and Sexual Activity   Alcohol Use Yes   • Alcohol/week: 80.0 standard drinks of alcohol   • Types: 80 Shots of liquor per week    Comment: drinks liquor every day. excessive consumption up to 1 fifth a day     Social History     Tobacco Use   Smoking Status Every Day   • Packs/day: 1.00   • Years: 18.00   • Total pack years: 18.00   • Types: Cigarettes   • Start date: 2006   • Passive exposure: Past   Smokeless Tobacco Never     Social History     Substance and Sexual Activity   Drug Use Not Currently    Comment: THC edibles       Family History:    non-contributory    Physical Exam:     Vitals:   Blood Pressure: (!) 172/110 (08/06/23 0736)  Pulse: 91 (08/06/23 0736)  Temperature: (!) 96.6 °F (35.9 °C) (08/06/23 0736)  Temp Source: Temporal (08/06/23 0736)  Respirations: 16 (08/06/23 0736)  Height: 5' 3" (160 cm) (08/05/23 1550)  Weight - Scale: 105 kg (231 lb 12.8 oz) (08/06/23 0736)  SpO2: 99 % (08/06/23 0736)    Physical Exam  Constitutional:       General: She is not in acute distress. Appearance: Normal appearance. She is not ill-appearing. HENT:      Head: Normocephalic and atraumatic.       Nose: Nose normal.      Mouth/Throat:      Mouth: Mucous membranes are moist.   Eyes: Extraocular Movements: Extraocular movements intact. Cardiovascular:      Rate and Rhythm: Normal rate and regular rhythm. Heart sounds: Normal heart sounds. Pulmonary:      Breath sounds: Normal breath sounds. No wheezing. Abdominal:      General: Abdomen is flat. Bowel sounds are normal.      Palpations: Abdomen is soft. Skin:     General: Skin is warm and dry. Neurological:      Mental Status: She is alert. Additional Data:     Lab Results: I have personally reviewed pertinent reports. Results from last 7 days   Lab Units 08/05/23  0519 08/04/23  0535 08/03/23  0615   WBC Thousand/uL 3.52*   < > 9.59   HEMOGLOBIN g/dL 14.4   < > 16.7*   HEMATOCRIT % 43.6   < > 52.4*   PLATELETS Thousands/uL 174   < > 328   NEUTROS PCT %  --   --  72   LYMPHS PCT %  --   --  21   MONOS PCT %  --   --  7   EOS PCT %  --   --  0    < > = values in this interval not displayed.      Results from last 7 days   Lab Units 08/06/23  0604 08/05/23  0519   SODIUM mmol/L 136 136   POTASSIUM mmol/L 3.7 3.3*   CHLORIDE mmol/L 103 103   CO2 mmol/L 23 23   BUN mg/dL 10 9   CREATININE mg/dL 0.61 0.59*   ANION GAP mmol/L 10 10   CALCIUM mg/dL 9.4 9.4   ALBUMIN g/dL  --  3.8   TOTAL BILIRUBIN mg/dL  --  0.85   ALK PHOS U/L  --  112*   ALT U/L  --  60*   AST U/L  --  62*   GLUCOSE RANDOM mg/dL 73 69     Results from last 7 days   Lab Units 08/03/23  0620   INR  0.94         No results found for: "HGBA1C"  Results from last 7 days   Lab Units 08/03/23  1308 08/03/23  1300 08/03/23  0909 08/03/23  0558   POC GLUCOSE mg/dl 72 70 94 66       EKG, Pathology, and Other Studies Reviewed on Admission:   · EKG 8/5/2023 twelve-lead EKG strip reviewed by myself ventricular rate 73 QTc 449 normal ECG when compared with an EKG of 8/4/2023 earlier in the day there were no significant acute changes found I also reviewed an EKG from 3/21/2022 there were no new acute EKG changes found    ** Please Note: This note has been constructed using a voice recognition system. **    I have spent a total time of 45 minutes on 08/06/23 in caring for this patient including Patient and family education, Importance of tx compliance, Impressions, Counseling / Coordination of care, Documenting in the medical record, Reviewing / ordering tests, medicine, procedures   and Obtaining or reviewing history  .

## 2023-08-06 NOTE — NURSING NOTE
Patient is smiling on the phone, friendly and cooperative. Denies all psych symptoms at this time. Medication compliant. No s/s of distress.

## 2023-08-06 NOTE — NURSING NOTE
Patient appears cheerful and bright. Visible on the unit. Medication and meal compliant. Social with peers and cooperative with staff. Denies SI, HI, and A/V hallucinations. Denies unmet needs. New order for zoloft 25mg PO at HS. Patient made aware of same.

## 2023-08-06 NOTE — TREATMENT PLAN
TREATMENT PLAN REVIEW - [address] [facility] 25 y.o. 1995 female MRN: [id number]    [address] [facility] [address] Room / Bed: [place] [address] Encounter: [id number]        Admit Date/Time:  8/5/2023  3:41 PM    Treatment Team: Attending Provider: Valarie Diaz MD; Consulting Physician: Angy Lemus MD; Licensed Practical Nurse: David Muñoz LPN; Patient Care Assistant: Allen Mendoza; Patient Care Technician: Elizabeth Vega;  Resident: Alejandra Mahajan DO; Nursing Student: Heidi Daniel    Diagnosis: Active Problems:    Smoker    Primary hypertension    BMI 40.0-44.9, adult ([facility])    Alcohol use disorder    Transaminitis    Medical clearance for psychiatric admission    GERD without esophagitis      Patient Strengths/Assets: ability for insight, capable of independent living, cooperative, communication skills, financial means, motivation for treatment/growth, negotiates basic needs, patient is on a voluntary commitment, patient is willing to work on problems, stable housing, supportive mother, work skills      Patient Barriers/Limitations: chronic mental illness, limited education, low self esteem, substance abuse    Short Term Goals: decrease in depressive symptoms, decrease in anxiety symptoms, decrease in suicidal thoughts, ability to stay safe on the unit, ability to stay free from restraints, improvement in ability to express basic needs, improvement in insight, improvement in reasoning ability, improvement in self care, improvement in impulse control, sleep improvement, improvement in appetite, tolerate medications, mood stabilization, increase in group attendance, increase in group participation, increase in socialization with peers on the unit, acceptance of psychiatric medications    Long Term Goals: improvement in depression, improvement in anxiety, stabilization of mood, free of suicidal thoughts, no self abusive behavior, improved reasoning ability, improved impulse control, improved insight, no agitation on the unit, no aggressive behavior on the unit, able to express basic needs, acceptance of need for psychiatric medications, acceptance of need for psychiatric follow up after discharge, adequate self care, adequate sleep, adequate appetite, adequate oral intake, appropriate interaction with peers, appropriate interaction with family, establishment of family support upon discharge    Progress Towards Goals: starting psychiatric medications as prescribed, continue psychiatric medications as prescribed    Recommended Treatment: medication management, patient medication education, group therapy, milieu therapy, continued Behavioral Health psychiatric evaluation/assessment process     Treatment Frequency: daily medication monitoring, group and milieu therapy daily, monitoring through interdisciplinary rounds, monitoring through weekly patient care conferences    Expected Discharge Date: 7 days - 8/13/2023    Discharge Plan: return to previous living arrangement    Treatment Plan Created/Updated By: Loki Alvarez DO

## 2023-08-06 NOTE — ASSESSMENT & PLAN NOTE
· Patient has a BMI greater than 40  · She was seen by nutrition when she was at [employer]  · Lifestyle modifications

## 2023-08-06 NOTE — NURSING NOTE
Patient approached nurses station, c/o a HA. Rated pain 7/10. Patient was given Motrin 800 mg po without incident. Will reassess.

## 2023-08-07 LAB
ALBUMIN SERPL BCP-MCNC: 3.8 G/DL (ref 3.5–5)
ALP SERPL-CCNC: 102 U/L (ref 34–104)
ALT SERPL W P-5'-P-CCNC: 68 U/L (ref 7–52)
ANION GAP SERPL CALCULATED.3IONS-SCNC: 8 MMOL/L
AST SERPL W P-5'-P-CCNC: 71 U/L (ref 13–39)
ATRIAL RATE: 166 BPM
BILIRUB SERPL-MCNC: 0.47 MG/DL (ref 0.2–1)
BUN SERPL-MCNC: 10 MG/DL (ref 5–25)
CALCIUM SERPL-MCNC: 9.1 MG/DL (ref 8.4–10.2)
CHLORIDE SERPL-SCNC: 104 MMOL/L (ref 96–108)
CO2 SERPL-SCNC: 24 MMOL/L (ref 21–32)
CREAT SERPL-MCNC: 0.59 MG/DL (ref 0.6–1.3)
GFR SERPL CREATININE-BSD FRML MDRD: 123 ML/MIN/1.73SQ M
GLUCOSE P FAST SERPL-MCNC: 82 MG/DL (ref 65–99)
GLUCOSE SERPL-MCNC: 82 MG/DL (ref 65–140)
P AXIS: 38 DEGREES
POTASSIUM SERPL-SCNC: 3.7 MMOL/L (ref 3.5–5.3)
PR INTERVAL: 128 MS
PROT SERPL-MCNC: 6.9 G/DL (ref 6.4–8.4)
QRS AXIS: 15 DEGREES
QRSD INTERVAL: 74 MS
QT INTERVAL: 302 MS
QTC INTERVAL: 501 MS
SODIUM SERPL-SCNC: 136 MMOL/L (ref 135–147)
T WAVE AXIS: 22 DEGREES
VENTRICULAR RATE: 166 BPM

## 2023-08-07 PROCEDURE — 80053 COMPREHEN METABOLIC PANEL: CPT | Performed by: NURSE PRACTITIONER

## 2023-08-07 PROCEDURE — 99232 SBSQ HOSP IP/OBS MODERATE 35: CPT | Performed by: PSYCHIATRY & NEUROLOGY

## 2023-08-07 PROCEDURE — 93010 ELECTROCARDIOGRAM REPORT: CPT | Performed by: INTERNAL MEDICINE

## 2023-08-07 RX ORDER — ERGOCALCIFEROL 1.25 MG/1
50000 CAPSULE ORAL WEEKLY
Status: DISCONTINUED | OUTPATIENT
Start: 2023-08-07 | End: 2023-08-11 | Stop reason: HOSPADM

## 2023-08-07 RX ORDER — CLONIDINE HYDROCHLORIDE 0.1 MG/1
0.2 TABLET ORAL ONCE
Status: COMPLETED | OUTPATIENT
Start: 2023-08-07 | End: 2023-08-07

## 2023-08-07 RX ADMIN — ARIPIPRAZOLE 5 MG: 5 TABLET ORAL at 08:24

## 2023-08-07 RX ADMIN — LISINOPRIL 20 MG: 20 TABLET ORAL at 08:24

## 2023-08-07 RX ADMIN — Medication 3 MG: at 21:22

## 2023-08-07 RX ADMIN — NICOTINE 1 PATCH: 21 PATCH, EXTENDED RELEASE TRANSDERMAL at 08:24

## 2023-08-07 RX ADMIN — SERTRALINE HYDROCHLORIDE 25 MG: 25 TABLET ORAL at 21:21

## 2023-08-07 RX ADMIN — PANTOPRAZOLE SODIUM 40 MG: 40 TABLET, DELAYED RELEASE ORAL at 05:49

## 2023-08-07 RX ADMIN — CLONIDINE HYDROCHLORIDE 0.2 MG: 0.1 TABLET ORAL at 16:17

## 2023-08-07 RX ADMIN — ERGOCALCIFEROL 50000 UNITS: 1.25 CAPSULE ORAL at 10:54

## 2023-08-07 RX ADMIN — IBUPROFEN 800 MG: 400 TABLET ORAL at 08:28

## 2023-08-07 NOTE — UTILIZATION REVIEW
NOTIFICATION OF ADMISSION DISCHARGE   This is a Notification of Discharge from [facility]. Please be advised that this patient has been discharge from our facility. Below you will find the admission and discharge date and time including the patient’s disposition. UTILIZATION REVIEW CONTACT:  Placido Rey MA  Utilization   Network Utilization Review Department  Phone: [phone] x carefully listen to the prompts. All voicemails are confidential.  Email: [email or web address]     ADMISSION INFORMATION  PRESENTATION DATE: 8/3/2023  2:13 PM  OBERVATION ADMISSION DATE:   INPATIENT ADMISSION DATE: 8/3/23  2:13 PM   DISCHARGE DATE: 8/5/2023  3:38 PM   DISPOSITION:[facility]    IMPORTANT INFORMATION:  Send all requests for admission clinical reviews, approved or denied determinations and any other requests to dedicated fax number below belonging to the campus where the patient is receiving treatment.  List of dedicated fax numbers:  [place] DENIALS (Administrative/Medical Necessity) [phone] [address] [facility] (Maternity/NICU/Pediatrics) [phone]   [facility] [phone]   [facility] [phone] [address] [facility] [phone]   [address] [place] [address] [phone]   [facility] [phone]   [address] [place] [address] [address] [place] [address] [phone]   [address] [place] [address] [phone] 3[address] [facility] [phone] 27[address] [facility] [address] [facility] [phone] no swelling/capillary refill time < 2 seconds/no paresthesia/fingers/toes warm to touch/no cyanosis of extremity no paresthesia/fingers/toes warm to touch/capillary refill time < 2 seconds/no swelling/no cyanosis of extremity

## 2023-08-07 NOTE — TREATMENT TEAM
08/07/23 0993   Activity/Group Checklist   Group Community meeting   Attendance Attended   Attendance Duration (min) 16-30   Interactions Interacted appropriately   Affect/Mood Appropriate   Goals Achieved Able to listen to others; Able to engage in interactions; Able to self-disclose; Able to recieve feedback

## 2023-08-07 NOTE — NURSING NOTE
Patient is calm and cooperative upon approach. Patient is visible in the dayroom, socializing with peers. Patient denies SI/HI/AH/VH. Patient is compliant with meds and meals.
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
## 2023-08-07 NOTE — NURSING NOTE
Patient reports headache "and pressure"  She experienced is much better"not completely gone but much better."
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
## 2023-08-07 NOTE — NURSING NOTE
Patient was c/o HA, doctor on call, through [address] [place] [address], verbalized to give patient her prn atarax 25 mg po. Med given without incident. Will reassess.

## 2023-08-07 NOTE — QUICK NOTE
Patient has had 3 elevated BP readings today:     168/103 in AM, 151/111 and 160/120 most recently at 1547. Per nurse, patient reports symptom of a 4/10 HA as of 3:55 PM. Discussed with medical provider Ignacio Felton NP who ordered one time dose of Clonidine 0.2 mg po.      Sakina Campbell DO
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
## 2023-08-07 NOTE — TREATMENT TEAM
08/07/23 1300   Activity/Group Checklist   Group   (recovery group)   Attendance Attended   Attendance Duration (min) 46-60   Interactions Interacted appropriately   Affect/Mood Appropriate   Goals Achieved Discussed coping strategies; Discussed self-esteem issues; Displayed empathy;Able to listen to others; Able to engage in interactions; Able to reflect/comment on own behavior;Able to self-disclose; Able to recieve feedback; Able to give feedback to another

## 2023-08-07 NOTE — NURSING NOTE
Patient is sitting in the small tv room watching tv with peers. Friendly, smiling, calm and cooperative. Denies all psych symptoms at this time. Medication compliant.

## 2023-08-08 ENCOUNTER — TELEPHONE (OUTPATIENT)
Dept: OTHER | Age: 28
End: 2023-08-08

## 2023-08-08 PROCEDURE — 99232 SBSQ HOSP IP/OBS MODERATE 35: CPT | Performed by: PSYCHIATRY & NEUROLOGY

## 2023-08-08 RX ORDER — CLONIDINE HYDROCHLORIDE 0.1 MG/1
0.2 TABLET ORAL ONCE
Status: COMPLETED | OUTPATIENT
Start: 2023-08-08 | End: 2023-08-08

## 2023-08-08 RX ORDER — AMLODIPINE BESYLATE 5 MG/1
5 TABLET ORAL DAILY
Status: DISCONTINUED | OUTPATIENT
Start: 2023-08-08 | End: 2023-08-09

## 2023-08-08 RX ADMIN — AMLODIPINE BESYLATE 5 MG: 5 TABLET ORAL at 11:15

## 2023-08-08 RX ADMIN — CLONIDINE HYDROCHLORIDE 0.2 MG: 0.1 TABLET ORAL at 09:58

## 2023-08-08 RX ADMIN — IBUPROFEN 800 MG: 400 TABLET ORAL at 04:00

## 2023-08-08 RX ADMIN — PANTOPRAZOLE SODIUM 40 MG: 40 TABLET, DELAYED RELEASE ORAL at 06:40

## 2023-08-08 RX ADMIN — ARIPIPRAZOLE 5 MG: 5 TABLET ORAL at 09:05

## 2023-08-08 RX ADMIN — NICOTINE 1 PATCH: 21 PATCH, EXTENDED RELEASE TRANSDERMAL at 09:05

## 2023-08-08 RX ADMIN — TRAZODONE HYDROCHLORIDE 50 MG: 50 TABLET ORAL at 21:15

## 2023-08-08 RX ADMIN — Medication 3 MG: at 21:11

## 2023-08-08 RX ADMIN — IBUPROFEN 600 MG: 600 TABLET, FILM COATED ORAL at 16:16

## 2023-08-08 RX ADMIN — CLONIDINE HYDROCHLORIDE 0.2 MG: 0.1 TABLET ORAL at 23:05

## 2023-08-08 RX ADMIN — SERTRALINE HYDROCHLORIDE 25 MG: 25 TABLET ORAL at 21:11

## 2023-08-08 RX ADMIN — LISINOPRIL 20 MG: 20 TABLET ORAL at 09:06

## 2023-08-08 NOTE — PROGRESS NOTES
08/08/23 0840   Team Meeting   Meeting Type Daily Rounds   Team Members Present   Team Members Present Physician;Nurse;   Physician Team Member [id number] Team Member [facility] Team Member Yumiko   Patient/Family Present   Patient Present No   Patient's Family Present No   Pt has been pleasant, cooperative. Denies all. Medical to be contacted about Lisinopril. DC Friday.

## 2023-08-08 NOTE — NURSING NOTE
Pt visible on unit watching tv in small tv room with peers. Denies SI/HI and hallucinations. Denies pain and anxiety. Pt does not feel like she is having symptoms of withdrawal. She is compliant with HS meds and care. 7min checks continue.

## 2023-08-08 NOTE — NURSING NOTE
Patient given 600mg of ibuprofen for 6/10 neck pain, moderate pain. 1716: Patient reports medication effective for 6/10 neck pain. Reports no pain.

## 2023-08-08 NOTE — TELEPHONE ENCOUNTER
Hayley Solorzano CM in the [facility] Unit, called with a new pt referral,  for a patient, to be discharged this Friday, requesting the [facility] and to receive the Vivitrol injections for her alcohol abuse. Spoke directly to Anagran who states that she last drank on 8/3, but did not elaborate on how much or how often she drinks alcohol. Ivette states that she has no language barrier or hearing impairment. She has psychiatric diagnoses of Bi Polar II and PTSD. She feels safe and currently has no SI or Hi and no current community treatment team.  Scheduled her to see you on 8/16. For your review.

## 2023-08-08 NOTE — NURSING NOTE
Patient denies AVH/SI/HI. She is pleasant, cooperative, visible, and social. She is attending groups. She has had no behavioral issues to note.

## 2023-08-08 NOTE — TREATMENT TEAM
08/08/23 1300   Activity/Group Checklist   Group   (recovery group)   Attendance Attended   Attendance Duration (min) 46-60   Interactions Interacted appropriately   Affect/Mood Appropriate   Goals Achieved Discussed coping strategies; Discussed self-esteem issues; Able to listen to others; Able to engage in interactions; Able to reflect/comment on own behavior;Able to self-disclose; Able to recieve feedback; Displayed empathy

## 2023-08-08 NOTE — PLAN OF CARE
Problem: DISCHARGE PLANNING  Goal: Discharge to home or other facility with appropriate resources  Description: INTERVENTIONS:  - Identify barriers to discharge w/patient and caregiver  - Arrange for needed discharge resources and transportation as appropriate  - Identify discharge learning needs (meds, wound care, etc.)  - Arrange for interpretive services to assist at discharge as needed  - Refer to Case Management Department for coordinating discharge planning if the patient needs post-hospital services based on physician/advanced practitioner order or complex needs related to functional status, cognitive ability, or social support system  Outcome: Progressing     Problem: SELF HARM/SUICIDALITY  Goal: Will have no self-injury during hospital stay  Description: INTERVENTIONS:  - Q 15 MINUTES: Routine safety checks  - Q WAKING SHIFT & PRN: Assess risk to determine if routine checks are adequate to maintain patient safety  - Encourage patient to participate actively in care by formulating a plan to combat response to suicidal ideation, identify supports and resources  Outcome: Progressing     Problem: ANXIETY  Goal: Will report anxiety at manageable levels  Description: INTERVENTIONS:  - Administer medication as ordered  - Teach and encourage coping skills  - Provide emotional support  - Assess patient/family for anxiety and ability to cope  Outcome: Progressing  Goal: By discharge: Patient will verbalize 2 strategies to deal with anxiety  Description: Interventions:  - Identify any obvious source/trigger to anxiety  - Staff will assist patient in applying identified coping technique/skills  - Encourage attendance of scheduled groups and activities  Outcome: Progressing     Problem: SUBSTANCE USE/ABUSE  Goal: Will have no detox symptoms and will verbalize plan for changing substance-related behavior  Description: INTERVENTIONS:  - Monitor physical status and assess for symptoms of withdrawal  - Administer medication as ordered  - Provide emotional support with 1 on 1 interaction with staff  - Encourage recovery focused program/ addiction education  - Assess for verbalization of changing behaviors related to substance abuse  - Initiate consults and referrals as appropriate (Case Management, Spiritual Care, etc.)  Outcome: Progressing  Goal: By discharge, will develop insight into their chemical dependency and sustain motivation to continue in recovery  Description: INTERVENTIONS:  - Attends all daily group sessions and scheduled AA groups  - Actively practices coping skills through participation in the therapeutic community and adherence to program rules  - Reviews and completes assignments from individual treatment plan  - Assist patient development of understanding of their personal cycle of addiction and relapse triggers  Outcome: Progressing  Goal: By discharge, patient will have ongoing treatment plan addressing chemical dependency  Description: INTERVENTIONS:  - Assist patient with resources and/or appointments for ongoing recovery based living  Outcome: Progressing     Problem: Ineffective Coping  Goal: Cooperates with admission process  Description: Interventions:   - Complete admission process  Outcome: Progressing  Goal: Identifies ineffective coping skills  Outcome: Progressing  Goal: Identifies healthy coping skills  Outcome: Progressing  Goal: Demonstrates healthy coping skills  Outcome: Progressing  Goal: Participates in unit activities  Description: Interventions:  - Provide therapeutic environment   - Provide required programming   - Redirect inappropriate behaviors   Outcome: Progressing  Goal: Patient/Family participate in treatment and DC plans  Description: Interventions:  - Provide therapeutic environment  Outcome: Progressing  Goal: Patient/Family verbalizes awareness of resources  Outcome: Progressing  Goal: Understands least restrictive measures  Description: Interventions:  - Utilize least restrictive behavior  Outcome: Progressing  Goal: Free from restraint events  Description: - Utilize least restrictive measures   - Provide behavioral interventions   - Redirect inappropriate behaviors   Outcome: Progressing     Problem: Depression  Goal: Treatment Goal: Demonstrate behavioral control of depressive symptoms, verbalize feelings of improved mood/affect, and adopt new coping skills prior to discharge  Outcome: Progressing  Goal: Verbalize thoughts and feelings  Description: Interventions:  - Assess and re-assess patient's level of risk   - Engage patient in 1:1 interactions, daily, for a minimum of 15 minutes   - Encourage patient to express feelings, fears, frustrations, hopes   Outcome: Progressing  Goal: Refrain from harming self  Description: Interventions:  - Monitor patient closely, per order   - Supervise medication ingestion, monitor effects and side effects   Outcome: Progressing  Goal: Refrain from isolation  Description: Interventions:  - Develop a trusting relationship   - Encourage socialization   Outcome: Progressing  Goal: Refrain from self-neglect  Outcome: Progressing  Goal: Attend and participate in unit activities, including therapeutic, recreational, and educational groups  Description: Interventions:  - Provide therapeutic and educational activities daily, encourage attendance and participation, and document same in the medical record   Outcome: Progressing  Goal: Complete daily ADLs, including personal hygiene independently, as able  Description: Interventions:  - Observe, teach, and assist patient with ADLS  -  Monitor and promote a balance of rest/activity, with adequate nutrition and elimination   Outcome: Progressing     Problem: PAIN - ADULT  Goal: Verbalizes/displays adequate comfort level or baseline comfort level  Description: Interventions:  - Encourage patient to monitor pain and request assistance  - Assess pain using appropriate pain scale  - Administer analgesics based on type and severity of pain and evaluate response  - Implement non-pharmacological measures as appropriate and evaluate response  - Consider cultural and social influences on pain and pain management  - Notify physician/advanced practitioner if interventions unsuccessful or patient reports new pain  Outcome: Progressing

## 2023-08-08 NOTE — PROGRESS NOTES
Met with Ivette for individual therapy. We identified that she holds resentments and internalizes her anger which leads to her drinking. She did review the [facility] [facility] and she noted that she needs to maintain hr sobriety. She states the difficulty was she was unable see the professionals and continue on the Vivitrol shot. We discussed this is an excuse and had her think about her behavioral pattern of isolation and avoidance that enhanced her desire to use. Patient agreed to write on her powerlessness and unmanageable behaviors. She also will begin to identify her resentments. We discussed this process can continue with her outpatient provider. Gage Suh

## 2023-08-08 NOTE — TREATMENT TEAM
08/08/23 0930   Activity/Group Checklist   Group Community meeting   Attendance Attended   Attendance Duration (min) 16-30   Interactions Interacted appropriately   Affect/Mood Appropriate   Goals Achieved Able to listen to others; Able to engage in interactions; Able to reflect/comment on own behavior;Able to self-disclose; Able to recieve feedback; Able to give feedback to another

## 2023-08-08 NOTE — PROGRESS NOTES
Progress Note - [address] 25 y.o. female MRN: [id number]  Unit/Bed#: -02 Encounter: [id number]    Assessment/Plan   Principal Problem:    Unspecified mood (affective) disorder ([address])  Active Problems:    Smoker    Primary hypertension    BMI 40.0-44.9, adult (HCC)    Alcohol use disorder    Transaminitis    Medical clearance for psychiatric admission    GERD without esophagitis    PTSD (post-traumatic stress disorder)      Recommended Treatment:   Continue Zoloft 25 mg nightly for mood/anxiety  Continue Abilify 5 mg daily for mood/augmentation  Discussed patient's elevated blood pressures with [facility] provider - one time clonidine dose added, and Norvasc 5 mg daily scheduled by SL    Continue with group therapy, milieu therapy and occupational therapy. Continue frequent safety checks and vitals per unit protocol. Case discussed with treatment team.  Risks, benefits and possible side effects of Medications: Risks, benefits, and possible side effects of medications have been explained to the patient, who verbalizes understanding    Current Legal Status: 201  Disposition: Coordinating with case management  ------------------------------------------------------------    Subjective: Per nursing report, Alicia Pradhan has been calm, cooperative on the unit and compliant with scheduled medications. She was visible on the unit watching TV in the small TV room with peers. She denied anxiety, withdrawal symptoms or pain yesterday night. Patient met with individual therapist.   PRNs: Ibuprofen    Today, Crystal was seen and evaluated for continuity of care . On evaluation, patient is less constricted and brighter than previous. She reports feeling "good" this morning. Patient states she slept 6 hrs last night. She reports noting improvement in her energy and with medications is feeling "more happier", "upbeat". She denies medication side effects at this time. Patient denies SI/HI/AVH.  She reports her individual therapy session went well and has been enjoying spending time with peers on the unit. She reports after the initial therapy session she is planning to work on her negative thoughts and states she understands "what I tell myself is not what is actually happening". Discussed SLIM recommendations with patient of one time clonidine dose and Norvasc 5 mg daily for hypertension to which she was agreeable. She reports she has a home blood pressure monitor. Encouraged patient to monitor her home blood pressures     Progress Toward Goals: slow improvement    Psychiatric Review of Systems:  Behavior over the last 24 hours: improving slowly  Sleep: adequate  Appetite: adequate, improving   Medication side effects: none verbalized  ROS: Complete review of systems is negative except as noted above.     Vital signs in last 24 hours:   Temp:  [96.6 °F (35.9 °C)-97.5 °F (36.4 °C)] 97.5 °F (36.4 °C)  HR:  [] 89  Resp:  [16] 16  BP: (123-160)/() 123/83    Mental Status Exam:  Appearance:  alert, good eye contact, appears stated age, casually dressed and smiling appropriately   Behavior:  calm, cooperative and sitting comfortably   Motor: no abnormal movements and normal gait and balance   Speech:  spontaneous, clear, normal rate, normal volume and coherent   Mood:  "good"   Affect:  brighter than previous and less constricted   Thought Process:  organized, logical   Thought Content: no verbalized delusions or overt paranoia   Perceptual disturbances: no reported hallucinations and does not appear to be responding to internal stimuli at this time   Risk Potential: No active or passive suicidal or homicidal ideation was verbalized during interview   Cognition: oriented to self and situation, appears to be of average intelligence and cognition not formally tested   Insight:  Limited   Judgment: Limited     Current Medications:  Current Facility-Administered Medications   Medication Dose Route Frequency Provider Last Rate   • amLODIPine  5 mg Oral Daily MONTY Frnacisco     • ARIPiprazole  5 mg Oral Daily Leida Mo MD     • haloperidol lactate  2.5 mg Intramuscular Q4H PRN Max 4/day Leida Mo MD      And   • LORazepam  1 mg Intramuscular Q4H PRN Max 4/day Leida Mo MD      And   • benztropine  0.5 mg Intramuscular Q4H PRN Max 4/day Leida Mo MD     • haloperidol lactate  5 mg Intramuscular Q4H PRN Max 4/day Leida Mo MD      And   • LORazepam  2 mg Intramuscular Q4H PRN Max 4/day Leida Mo MD      And   • benztropine  1 mg Intramuscular Q4H PRN Max 4/day Leida Mo MD     • benztropine  1 mg Oral Q4H PRN Max 6/day Leida Mo MD     • hydrOXYzine HCL  50 mg Oral Q6H PRN Max 4/day Leida Mo MD      Or   • diphenhydrAMINE  50 mg Intramuscular Q6H PRN Leida Mo MD     • ergocalciferol  50,000 Units Oral Weekly MONTY Francisco     • haloperidol  1 mg Oral Q6H PRN Leida Mo MD     • haloperidol  2.5 mg Oral Q4H PRN Max 4/day Leida Mo MD     • haloperidol  5 mg Oral Q4H PRN Max 4/day Leida Mo MD     • hydrOXYzine HCL  100 mg Oral Q6H PRN Max 4/day Leida Mo MD      Or   • LORazepam  2 mg Intramuscular Q6H PRN Leida Mo MD     • hydrOXYzine HCL  25 mg Oral Q6H PRN Max 4/day Leida Mo MD     • ibuprofen  400 mg Oral Q4H PRN Leida Mo MD     • ibuprofen  600 mg Oral Q6H PRN Leida Mo MD     • ibuprofen  800 mg Oral Q8H PRN Leida Mo MD     • lisinopril  20 mg Oral Daily Leida Mo MD     • LORazepam  0.5 mg Oral Q6H PRN Leida Mo MD     • melatonin  3 mg Oral HS Leida Mo MD     • nicotine  1 patch Transdermal Daily Leida Mo MD     • OLANZapine  10 mg Oral Q3H PRN Max 3/day Leida Mo MD      Or   • OLANZapine  10 mg Intramuscular Q3H PRN Max 3/day Leida Mo MD     • OLANZapine  5 mg Oral Q3H PRN Max 6/day Steven Solomon Sid Pulido MD      Or   • OLANZapine  5 mg Intramuscular Q3H PRN Max 6/day Gume Valencia MD     • OLANZapine  2.5 mg Oral Q3H PRN Max 8/day Gume Valencia MD     • pantoprazole  40 mg Oral Early Morning Gume Valencia MD     • sertraline  25 mg Oral HS Cat Santana DO     • traZODone  50 mg Oral HS PRN Gume Valencia MD         Behavioral Health Medications: all current active meds have been reviewed. Changes as in plan section above. Laboratory results:  I have personally reviewed all pertinent laboratory/tests results.   Recent Results (from the past 48 hour(s))   Comprehensive metabolic panel    Collection Time: 08/07/23  4:45 AM   Result Value Ref Range    Sodium 136 135 - 147 mmol/L    Potassium 3.7 3.5 - 5.3 mmol/L    Chloride 104 96 - 108 mmol/L    CO2 24 21 - 32 mmol/L    ANION GAP 8 mmol/L    BUN 10 5 - 25 mg/dL    Creatinine 0.59 (L) 0.60 - 1.30 mg/dL    Glucose 82 65 - 140 mg/dL    Glucose, Fasting 82 65 - 99 mg/dL    Calcium 9.1 8.4 - 10.2 mg/dL    AST 71 (H) 13 - 39 U/L    ALT 68 (H) 7 - 52 U/L    Alkaline Phosphatase 102 34 - 104 U/L    Total Protein 6.9 6.4 - 8.4 g/dL    Albumin 3.8 3.5 - 5.0 g/dL    Total Bilirubin 0.47 0.20 - 1.00 mg/dL    eGFR 123 ml/min/1.73sq m        Cat Santana DO

## 2023-08-08 NOTE — NURSING NOTE
Pt BP reassessed at 2200. She states it normally runs high and states she is not having any other symptoms.  Denies pain, blurred sight, headaches, etc.

## 2023-08-08 NOTE — PROGRESS NOTES
08/08/23 1537   Team Meeting   Meeting Type Tx Team Meeting   Team Members Present   Team Members Present Physician;Nurse;   Physician Team Member [id number] Team Member [facility] Management Team Member Yumiko   Patient/Family Present   Patient Present Yes   Patient's Family Present No     Pt seen for tx team meeting. Pt educated on med management, case management and group therapy. Tx plan reviewed and signed.

## 2023-08-09 PROCEDURE — 99232 SBSQ HOSP IP/OBS MODERATE 35: CPT | Performed by: PSYCHIATRY & NEUROLOGY

## 2023-08-09 RX ORDER — ACETAMINOPHEN 325 MG/1
975 TABLET ORAL EVERY 6 HOURS PRN
Status: DISCONTINUED | OUTPATIENT
Start: 2023-08-09 | End: 2023-08-11 | Stop reason: HOSPADM

## 2023-08-09 RX ORDER — GABAPENTIN 100 MG/1
200 CAPSULE ORAL 3 TIMES DAILY
Status: DISCONTINUED | OUTPATIENT
Start: 2023-08-09 | End: 2023-08-11 | Stop reason: HOSPADM

## 2023-08-09 RX ORDER — AMLODIPINE BESYLATE 10 MG/1
10 TABLET ORAL DAILY
Status: DISCONTINUED | OUTPATIENT
Start: 2023-08-10 | End: 2023-08-11 | Stop reason: HOSPADM

## 2023-08-09 RX ORDER — ACETAMINOPHEN 325 MG/1
650 TABLET ORAL EVERY 6 HOURS PRN
Status: DISCONTINUED | OUTPATIENT
Start: 2023-08-09 | End: 2023-08-11 | Stop reason: HOSPADM

## 2023-08-09 RX ORDER — CLONIDINE HYDROCHLORIDE 0.1 MG/1
0.2 TABLET ORAL DAILY PRN
Status: DISCONTINUED | OUTPATIENT
Start: 2023-08-09 | End: 2023-08-11 | Stop reason: HOSPADM

## 2023-08-09 RX ORDER — AMLODIPINE BESYLATE 5 MG/1
5 TABLET ORAL ONCE
Status: COMPLETED | OUTPATIENT
Start: 2023-08-09 | End: 2023-08-09

## 2023-08-09 RX ORDER — ACETAMINOPHEN 325 MG/1
650 TABLET ORAL EVERY 4 HOURS PRN
Status: DISCONTINUED | OUTPATIENT
Start: 2023-08-09 | End: 2023-08-11 | Stop reason: HOSPADM

## 2023-08-09 RX ADMIN — GABAPENTIN 200 MG: 100 CAPSULE ORAL at 12:19

## 2023-08-09 RX ADMIN — GABAPENTIN 200 MG: 100 CAPSULE ORAL at 16:36

## 2023-08-09 RX ADMIN — Medication 3 MG: at 21:07

## 2023-08-09 RX ADMIN — AMLODIPINE BESYLATE 5 MG: 5 TABLET ORAL at 08:30

## 2023-08-09 RX ADMIN — SERTRALINE HYDROCHLORIDE 25 MG: 25 TABLET ORAL at 21:07

## 2023-08-09 RX ADMIN — ARIPIPRAZOLE 5 MG: 5 TABLET ORAL at 08:30

## 2023-08-09 RX ADMIN — GABAPENTIN 200 MG: 100 CAPSULE ORAL at 21:07

## 2023-08-09 RX ADMIN — NICOTINE 1 PATCH: 21 PATCH, EXTENDED RELEASE TRANSDERMAL at 08:31

## 2023-08-09 RX ADMIN — AMLODIPINE BESYLATE 5 MG: 5 TABLET ORAL at 12:19

## 2023-08-09 RX ADMIN — LISINOPRIL 20 MG: 20 TABLET ORAL at 08:30

## 2023-08-09 RX ADMIN — IBUPROFEN 800 MG: 400 TABLET ORAL at 06:35

## 2023-08-09 RX ADMIN — PANTOPRAZOLE SODIUM 40 MG: 40 TABLET, DELAYED RELEASE ORAL at 05:56

## 2023-08-09 NOTE — PROGRESS NOTES
08/09/23 1000   Activity/Group Checklist   Group Other (Comment)  (Group Art Therapy/Psychodynamic, Creatively Exploring Relationship with Identifying Alternative Options)   Attendance Attended   Attendance Duration (min) Greater than 60   Interactions Interacted appropriately   Affect/Mood Appropriate   Goals Achieved Discussed coping strategies; Increased hopefulness; Able to listen to others; Able to engage in interactions; Able to recieve feedback; Able to give feedback to another  (Able to engage materials and directive; full participation)

## 2023-08-09 NOTE — NURSING NOTE
Pt visible and social on unit. Pleasant on approach and denies SI/HI and hallucinations. BP remains elevated but pt states it was taken when she was having some anxiety r/t disruptive peer on unit. Pt denies any BP related symptoms. Pt declined need for PRN medication for anxiety. Pt accepted PRN trazodone to assist with sleep with HS medication. 7minute checks continue.

## 2023-08-09 NOTE — NURSING NOTE
Pt is pleasant and appropriate with peers and staff. Visible on the unit watching tv and attending groups. Medication and meal compliant. Denies SI/HI/AH/VH at this time. Hopeful for discharge. Denies any unmet needs or complaints at this time.

## 2023-08-09 NOTE — PROGRESS NOTES
08/09/23 0891   Team Meeting   Meeting Type Daily Rounds   Team Members Present   Team Members Present Physician;Nurse;   Physician Team Member [id number] [facility] Team Member [place] Management Team Member Yumiko   Patient/Family Present   Patient Present No   Patient's Family Present No   Blood pressure fluctuations, medical is aware. Pt pleasant, bright, visible, social. Pt anxious about peers' interactions on the unit. Pt denies all. Med/meal compliant. Zoloft increase today. DC Friday.

## 2023-08-09 NOTE — PROGRESS NOTES
Progress Note - [address] 25 y.o. female MRN: [id number]  Unit/Bed#: U 342-02 Encounter: [id number]    Assessment/Plan   Principal Problem:    Unspecified mood (affective) disorder ([address])  Active Problems:    Smoker    Primary hypertension    BMI 40.0-44.9, adult (HCC)    Alcohol use disorder    Transaminitis    Medical clearance for psychiatric admission    GERD without esophagitis    PTSD (post-traumatic stress disorder)      Recommended Treatment:   Initiate gabapentin 200 mg TID for anxiety/mood  Continue Zoloft 25 mg qHS for mood/anxiety  Continue Abilify 5 mg QD for mood  Continue Melatonin 3 mg HS for insomnia     Continue with group therapy, milieu therapy and occupational therapy. Continue frequent safety checks and vitals per unit protocol. Case discussed with treatment team.  Risks, benefits and possible side effects of Medications: Risks, benefits, and possible side effects of medications have been explained to the patient, who verbalizes understanding    Current Legal Status: 201  Disposition: TBD, coordinating with CM  ------------------------------------------------------------    Subjective: Per nursing report, Ashleigh Hughes has been pleasant, cooperative on the unit and compliant with scheduled medications. Patient endorsed some anxiety due to disruptive peer on the unit yesterday night but declined as needed anxiety medication. She received trazodone for sleep. Yesterday night, patient had a blood pressure of 200/110 and medical was contacted with Cat[employer]res ordered subsequently. This morning, patient was noted to be pleasant and appropriate with peers and staff, visible on the unit, watching TV, attending groups. Today, Ashleigh Hughes was seen and evaluated for continuity of care. On evaluation, patient is brighter than previous and more reactive, but anxious and tearful at times. She reports feeling "better than yesterday" this morning.  Patient states there was a lot of activity on the unit yesterday night and was overwhelming. She reports she felt uncomfortable with being asked to go in her room. "It's a PTSD thing - being confined to a room" but then "I got over it". Patient denied any feelings of current anxiety or depression but was anxious appearing and tearful when discussing the overnight events. She reports she was able to get her average 6 hours of sleep overnight and had an adequate appetite for breakfast this morning. Patient denies SI/HI/AVH and is terese for safety on the unit. She denies any medication side effects at this time. Recommended initiating gabapentin 100 mg 3 times daily for anxiety/mood to which patient was agreeable. Progress Toward Goals: slow improvement    Psychiatric Review of Systems:  Behavior over the last 24 hours: improving  Sleep: adequate  Appetite: adequate, improving  Medication side effects: none verbalized  ROS: Complete review of systems is negative except as noted above.     Vital signs in last 24 hours:   Temp:  [97.1 °F (36.2 °C)-97.6 °F (36.4 °C)] 97.6 °F (36.4 °C)  HR:  [80-90] 87  Resp:  [17] 17  BP: (120-200)/() 124/90    Mental Status Exam:  Appearance:  alert, good eye contact, appears stated age, casually dressed and improving grooming/hygiene, smiling appropriately at times   Behavior:  calm, cooperative and sitting comfortably   Motor: no abnormal movements and normal gait and balance   Speech:  spontaneous, normal rate, normal volume and coherent   Mood:  "better than yesterday"   Affect:  brighter than previous, more reactive, anxious at times and tearful at times   Thought Process:  organized, logical   Thought Content: no verbalized delusions or overt paranoia   Perceptual disturbances: no reported hallucinations and does not appear to be responding to internal stimuli at this time   Risk Potential: No active or passive suicidal or homicidal ideation was verbalized during interview   Cognition: oriented to self and situation, appears to be of average intelligence and cognition not formally tested   Insight:  Limited   Judgment: Limited     Current Medications:  Current Facility-Administered Medications   Medication Dose Route Frequency Provider Last Rate   • amLODIPine  5 mg Oral Daily MONTY Francisco     • ARIPiprazole  5 mg Oral Daily Hortensia Frank MD     • haloperidol lactate  2.5 mg Intramuscular Q4H PRN Max 4/day Hortensia Frank MD      And   • LORazepam  1 mg Intramuscular Q4H PRN Max 4/day Hortensia Frank MD      And   • benztropine  0.5 mg Intramuscular Q4H PRN Max 4/day Hortensia Frank MD     • haloperidol lactate  5 mg Intramuscular Q4H PRN Max 4/day Hortensia Frank MD      And   • LORazepam  2 mg Intramuscular Q4H PRN Max 4/day Hortensia Frank MD      And   • benztropine  1 mg Intramuscular Q4H PRN Max 4/day Hortensia Frank MD     • benztropine  1 mg Oral Q4H PRN Max 6/day Hortensia Frank MD     • hydrOXYzine HCL  50 mg Oral Q6H PRN Max 4/day Hortensia Frank MD      Or   • diphenhydrAMINE  50 mg Intramuscular Q6H PRN Hortensia Frank MD     • ergocalciferol  50,000 Units Oral Weekly MONTY Francisco     • gabapentin  200 mg Oral TID Smita Enamorado DO     • haloperidol  1 mg Oral Q6H PRN Hortensia Frank MD     • haloperidol  2.5 mg Oral Q4H PRN Max 4/day Hortensia Frank MD     • haloperidol  5 mg Oral Q4H PRN Max 4/day Hortensia Frank MD     • hydrOXYzine HCL  100 mg Oral Q6H PRN Max 4/day Hortensia Frank MD      Or   • LORazepam  2 mg Intramuscular Q6H PRN Hortensia Frank MD     • hydrOXYzine HCL  25 mg Oral Q6H PRN Max 4/day Hortensia Frank MD     • ibuprofen  400 mg Oral Q4H PRN Hortensia Frank MD     • ibuprofen  600 mg Oral Q6H PRN Hortensia Frank MD     • ibuprofen  800 mg Oral Q8H PRN Hortensia Frank MD     • lisinopril  20 mg Oral Daily Hortensia Frank MD     • LORazepam  0.5 mg Oral Q6H PRN Hortensia Frank MD     • melatonin  3 mg Oral HS Ragini Lord MD     • nicotine  1 patch Transdermal Daily Ragini Lord MD     • OLANZapine  10 mg Oral Q3H PRN Max 3/day Ragini Lord MD      Or   • OLANZapine  10 mg Intramuscular Q3H PRN Max 3/day Ragini Lord MD     • OLANZapine  5 mg Oral Q3H PRN Max 6/day Ragini Lord MD      Or   • OLANZapine  5 mg Intramuscular Q3H PRN Max 6/day Ragini Lord MD     • OLANZapine  2.5 mg Oral Q3H PRN Max 8/day Ragini Lord MD     • pantoprazole  40 mg Oral Early Morning Ragini Lord MD     • sertraline  25 mg Oral HS Jennie Joel DO     • traZODone  50 mg Oral HS PRN Ragini Lord MD         Behavioral Health Medications: all current active meds have been reviewed. Changes as in plan section above. Laboratory results:  I have personally reviewed all pertinent laboratory/tests results. No results found for this or any previous visit (from the past 48 hour(s)).      Jennie Joel DO

## 2023-08-10 PROCEDURE — 99232 SBSQ HOSP IP/OBS MODERATE 35: CPT | Performed by: PSYCHIATRY & NEUROLOGY

## 2023-08-10 RX ORDER — SERTRALINE HYDROCHLORIDE 25 MG/1
25 TABLET, FILM COATED ORAL
Qty: 30 TABLET | Refills: 1 | Status: SHIPPED | OUTPATIENT
Start: 2023-08-10 | End: 2023-10-09

## 2023-08-10 RX ORDER — GABAPENTIN 100 MG/1
200 CAPSULE ORAL 3 TIMES DAILY
Qty: 180 CAPSULE | Refills: 1 | Status: SHIPPED | OUTPATIENT
Start: 2023-08-10 | End: 2023-10-09

## 2023-08-10 RX ORDER — LANOLIN ALCOHOL/MO/W.PET/CERES
3 CREAM (GRAM) TOPICAL
Qty: 30 TABLET | Refills: 0 | Status: SHIPPED | OUTPATIENT
Start: 2023-08-10 | End: 2023-09-09

## 2023-08-10 RX ORDER — PANTOPRAZOLE SODIUM 40 MG/1
40 TABLET, DELAYED RELEASE ORAL
Qty: 30 TABLET | Refills: 0 | Status: SHIPPED | OUTPATIENT
Start: 2023-08-11 | End: 2023-09-10

## 2023-08-10 RX ORDER — AMLODIPINE BESYLATE 10 MG/1
10 TABLET ORAL DAILY
Qty: 30 TABLET | Refills: 0 | Status: SHIPPED | OUTPATIENT
Start: 2023-08-11 | End: 2023-09-10

## 2023-08-10 RX ORDER — ARIPIPRAZOLE 5 MG/1
5 TABLET ORAL DAILY
Qty: 30 TABLET | Refills: 1 | Status: SHIPPED | OUTPATIENT
Start: 2023-08-10 | End: 2023-10-09

## 2023-08-10 RX ORDER — ERGOCALCIFEROL 1.25 MG/1
50000 CAPSULE ORAL WEEKLY
Qty: 7 CAPSULE | Refills: 0 | Status: SHIPPED | OUTPATIENT
Start: 2023-08-14 | End: 2023-09-26

## 2023-08-10 RX ADMIN — LISINOPRIL 20 MG: 20 TABLET ORAL at 08:24

## 2023-08-10 RX ADMIN — TRAZODONE HYDROCHLORIDE 50 MG: 50 TABLET ORAL at 21:15

## 2023-08-10 RX ADMIN — PANTOPRAZOLE SODIUM 40 MG: 40 TABLET, DELAYED RELEASE ORAL at 06:27

## 2023-08-10 RX ADMIN — NICOTINE 1 PATCH: 21 PATCH, EXTENDED RELEASE TRANSDERMAL at 08:24

## 2023-08-10 RX ADMIN — GABAPENTIN 200 MG: 100 CAPSULE ORAL at 15:04

## 2023-08-10 RX ADMIN — ACETAMINOPHEN 650 MG: 325 TABLET ORAL at 06:33

## 2023-08-10 RX ADMIN — GABAPENTIN 200 MG: 100 CAPSULE ORAL at 08:24

## 2023-08-10 RX ADMIN — SERTRALINE HYDROCHLORIDE 25 MG: 25 TABLET ORAL at 21:15

## 2023-08-10 RX ADMIN — Medication 3 MG: at 21:15

## 2023-08-10 RX ADMIN — GABAPENTIN 200 MG: 100 CAPSULE ORAL at 21:15

## 2023-08-10 RX ADMIN — ARIPIPRAZOLE 5 MG: 5 TABLET ORAL at 08:24

## 2023-08-10 RX ADMIN — AMLODIPINE BESYLATE 10 MG: 10 TABLET ORAL at 08:24

## 2023-08-10 NOTE — NURSING NOTE
Pt awake and alert, calm and cooperative. Visible on the unit for meals, attending groups, social with peers. Pt denies SI/HI/AVH at this time. Compliant with scheduled medications. BP WNL this morning. Pt reports excitement regarding upcoming discharge. Denies any unmet needs. Q7 minute safety checks maintained.

## 2023-08-10 NOTE — TREATMENT TEAM
08/10/23 1300   Activity/Group Checklist   Group   (kameron group)   Attendance Attended   Attendance Duration (min) 46-60   Interactions Interacted appropriately   Affect/Mood Appropriate   Goals Achieved Discussed coping strategies; Discussed self-esteem issues; Able to listen to others; Able to engage in interactions; Able to reflect/comment on own behavior;Able to self-disclose; Able to recieve feedback

## 2023-08-10 NOTE — DISCHARGE INSTR - OTHER ORDERS
CRISIS INFORMATION  If you are experiencing a mental health emergency, you may call the [facility] 24 hours a day, 7 days per week at [phone]. In [place], call [phone]. Gab Presley is a confidential 24/7 telephone support service manned by trained mental health consumers. [facility] provides support, a listening ear and can provide information about available services. [facility] specializes in the concerns of mental health consumers, their families and friends. However, we are also here for anyone who has a mental health concern, is confused about or just doesn't know anything about mental health or where to get information. To reach Gab Presley, call [phone]. HOW TO GET SUBSTANCE ABUSE HELP:  If you or someone you know has a drug or alcohol problem, there is help:  [address]: [address]: [phone]  An assessment is the first step. In addition to those listed there are other programs available in the area but assessment is best to determine an appropriate level of care. If you DO NOT have Medical Assistance (MA) or [employer], an assessment can be scheduled at one of these providers:  [address]  [address] [facility], [address] [place] [address]  [phone]   [facility]  [address] [facility],2 And 3 S Floors., [address]  [address] [facility].  [facility], [address]  [address]  [address] [facility]   Step by [address]  [address]  [address] [facility]., [facility], [address] [place] [address]  [phone]     If you [address], an assessment can be scheduled at one of these providers:  [place] [facility]  [facility] [place] [facility], [address] [facility]  [address], [address] [facility]  [facility]  [address] [facility]., 1st Floor, Yaw FRAGA  [phone]  1 [facility], [facility] [address] [facility]  [address],2 [address] 3 S Floors., [facility], 350 [facility]  [address] [facility]. PHILL[place], [address] [facility] [address]  [phone]   NET ([address])  90 [facility] 18[address] [facility], [place]  [address]  [address]   Step by [address] [facility], [address] [place] [address]  [address] [facility], [address] [place] [address]  [address] [facility] [address] [facility], 350 [facility]  [phone]     If you [address] [facility], an assessment can be scheduled at one of these providers. Please contact these Providers to determine if they are in your network plan:  [place] [facility]  [address] 1113 [facility], 1st Floor, [place]  [facility]  [address] [facility],2 And 3 S Floors., [facility], 350 [facility]  [phone]   [address] [facility] [place], [address] [facility] [address]  [phone]   NET ([address])  90 [facility]  [address] [facility], [place]AGA  [address]  [address] [facility] [address] [facility]., [facility], [address]

## 2023-08-10 NOTE — PLAN OF CARE
Intake at [facility] scheduled on 8/16 [address] [facility].

## 2023-08-10 NOTE — TREATMENT TEAM
08/10/23 0996   Activity/Group Checklist   Group Community meeting   Attendance Attended   Attendance Duration (min) 16-30   Interactions Interacted appropriately   Affect/Mood Appropriate   Goals Achieved Displayed empathy;Able to listen to others; Able to engage in interactions; Able to self-disclose; Able to recieve feedback

## 2023-08-10 NOTE — BH TRANSITION RECORD
Contact Information: If you have any questions, concerns, pended studies, tests and/or procedures, or emergencies regarding your inpatient behavioral health visit. Please contact [address] behavioral health unit 3B [phone] and ask to speak to a , nurse or physician. A contact is available 24 hours/ 7 days a week at this number. Summary of Procedures Performed During your Stay:  Below is a list of major procedures performed during your hospital stay and a summary of results:  - Cardiac Procedures/Studies: serial EKGs due to prolonged QTc    08/03: NSR, QT/QTc = 412/ 515 ms    08/05: NSR, QT/QTc = 408/449 ms    - Major Imaging Studies:    CTA Chest 08/03: No acute pulmonary disease, no pulmonary embolus   CT head 08/03: No acute intracranial abnormality   CXR 08/03: No acute cardiopulmonary disease    If studies are pending at discharge, follow up with your PCP and/or referring provider.
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
## 2023-08-10 NOTE — NURSING NOTE
Patient is in the community and social with peers. Patient denies SI/AVH. Patient is compliant with scheduled medications. Staff to maintain continuous rounding for safety and support.

## 2023-08-10 NOTE — DISCHARGE INSTR - APPOINTMENTS
Tremaine Lara or Jo, our Jarod and Elsy, will be calling you after your discharge, on the phone number that you provided. They will be available as an additional support, if needed. If you wish to speak with one of them, you may contact [facility] at [phone] or Myke Velez at [phone].

## 2023-08-10 NOTE — PLAN OF CARE
Problem: ANXIETY  Goal: Will report anxiety at manageable levels  Description: INTERVENTIONS:  - Administer medication as ordered  - Teach and encourage coping skills  - Provide emotional support  - Assess patient/family for anxiety and ability to cope  Outcome: Progressing  Goal: By discharge: Patient will verbalize 2 strategies to deal with anxiety  Description: Interventions:  - Identify any obvious source/trigger to anxiety  - Staff will assist patient in applying identified coping technique/skills  - Encourage attendance of scheduled groups and activities  Outcome: Progressing     Problem: SUBSTANCE USE/ABUSE  Goal: Will have no detox symptoms and will verbalize plan for changing substance-related behavior  Description: INTERVENTIONS:  - Monitor physical status and assess for symptoms of withdrawal  - Administer medication as ordered  - Provide emotional support with 1 on 1 interaction with staff  - Encourage recovery focused program/ addiction education  - Assess for verbalization of changing behaviors related to substance abuse  - Initiate consults and referrals as appropriate (Case Management, Spiritual Care, etc.)  Outcome: Progressing  Goal: By discharge, will develop insight into their chemical dependency and sustain motivation to continue in recovery  Description: INTERVENTIONS:  - Attends all daily group sessions and scheduled AA groups  - Actively practices coping skills through participation in the therapeutic community and adherence to program rules  - Reviews and completes assignments from individual treatment plan  - Assist patient development of understanding of their personal cycle of addiction and relapse triggers  Outcome: Progressing  Goal: By discharge, patient will have ongoing treatment plan addressing chemical dependency  Description: INTERVENTIONS:  - Assist patient with resources and/or appointments for ongoing recovery based living  Outcome: Progressing     Problem: Ineffective Coping  Goal: Cooperates with admission process  Description: Interventions:   - Complete admission process  Outcome: Progressing  Goal: Identifies ineffective coping skills  Outcome: Progressing  Goal: Identifies healthy coping skills  Outcome: Progressing  Goal: Demonstrates healthy coping skills  Outcome: Progressing  Goal: Participates in unit activities  Description: Interventions:  - Provide therapeutic environment   - Provide required programming   - Redirect inappropriate behaviors   Outcome: Progressing  Goal: Patient/Family participate in treatment and DC plans  Description: Interventions:  - Provide therapeutic environment  Outcome: Progressing  Goal: Patient/Family verbalizes awareness of resources  Outcome: Progressing  Goal: Understands least restrictive measures  Description: Interventions:  - Utilize least restrictive behavior  Outcome: Progressing  Goal: Free from restraint events  Description: - Utilize least restrictive measures   - Provide behavioral interventions   - Redirect inappropriate behaviors   Outcome: Progressing

## 2023-08-10 NOTE — PROGRESS NOTES
Progress Note - [address] 25 y.o. female MRN: [id number]  Unit/Bed#: [facility] 342-02 Encounter: [id number]    Assessment/Plan   Principal Problem:    Unspecified mood (affective) disorder ([address])  Active Problems:    Smoker    Primary hypertension    BMI 40.0-44.9, adult (HCC)    Alcohol use disorder    Transaminitis    Medical clearance for psychiatric admission    GERD without esophagitis    PTSD (post-traumatic stress disorder)      Recommended Treatment:   Continue gabapentin 200 mg 3 times daily for anxiety  Continue Zoloft 25 mg nightly for mood/anxiety  Continue Abilify 5 mg daily for mood    Continue with group therapy, milieu therapy and occupational therapy. Continue frequent safety checks and vitals per unit protocol. Case discussed with treatment team.  Risks, benefits and possible side effects of Medications: Risks, benefits, and possible side effects of medications have been explained to the patient, who verbalizes understanding    Current Legal Status: 201  Disposition: discharge likely tomorrow    ------------------------------------------------------------    Subjective: Per nursing report, Vonda Lane has been cooperative on the unit and compliant with scheduled medications. Today, Vonda Lane was seen and evaluated for continuity of care. On evaluation, patient is brighter than previous and more reactive. She is smiling appropriately at times. She reports feeling fantastic" this morning. Patient states she has noticed her energy is improving and she is beginning to feel more "like normal me". She reports an adequate appetite for breakfast and reports getting 6 hours of sleep overnight. She does note that her roommate woke her up 2 times but she was able to go back to sleep. She rates her anxiety and depression both at 4/5 out of 10 but reports it is due to activity on the unit/being on the [facility] and anticipates it to be lower once she is back to her normal routine.   She reports feels she has "better control" of her racing thoughts and she was able to "think properly instead of going off the rails" in terms of her anxiety. Patient is tolerating current regimen well and denies any medication side effects at this time. She would like to hold off on any medication adjustments at this time and is looking to follow-up with an outpatient psychiatrist/therapist and being able to continue with her job. Patient denies SI/HI/AVH at this time. Progress Toward Goals: slow improvement    Psychiatric Review of Systems:  Behavior over the last 24 hours: Improving slowly  Sleep: Adequate  Appetite: adequate  Medication side effects: none verbalized  ROS: Complete review of systems is negative except as noted above.     Vital signs in last 24 hours:   Temp:  [96.7 °F (35.9 °C)-97.5 °F (36.4 °C)] 97.5 °F (36.4 °C)  HR:  [] 99  Resp:  [16] 16  BP: (125-138)/(86-94) 138/91    Mental Status Exam:  Appearance:  alert, good eye contact, appears stated age, casually dressed, fair grooming and hygiene and smiling appropriately at times   Behavior:  calm, cooperative and sitting comfortably   Motor: no abnormal movements and normal gait and balance   Speech:  spontaneous, normal rate, normal volume and coherent   Mood:  "Fantastic"   Affect:  Approaching normal range and intensity, brighter than previous and more reactive, less anxious   Thought Process:  Organized, logical, goal-directed   Thought Content: no verbalized delusions or overt paranoia   Perceptual disturbances: no reported hallucinations and does not appear to be responding to internal stimuli at this time   Risk Potential: No active or passive suicidal or homicidal ideation was verbalized during interview   Cognition: oriented to self and situation, appears to be of average intelligence and cognition not formally tested   Insight:  Limited, improving   Judgment: Limited, improving     Current Medications:  Current Facility-Administered Medications   Medication Dose Route Frequency Provider Last Rate   • acetaminophen  650 mg Oral Q4H PRN MONTY Francisco     • acetaminophen  650 mg Oral Q6H PRN MONTY Francisco     • acetaminophen  975 mg Oral Q6H PRN MONTY Francisco     • amLODIPine  10 mg Oral Daily MONTY Francisco     • ARIPiprazole  5 mg Oral Daily Anastasiia Booth MD     • haloperidol lactate  2.5 mg Intramuscular Q4H PRN Max 4/day Anastasiia Booth MD      And   • LORazepam  1 mg Intramuscular Q4H PRN Max 4/day Anastasiia Booth MD      And   • benztropine  0.5 mg Intramuscular Q4H PRN Max 4/day Anastasiia Booth MD     • haloperidol lactate  5 mg Intramuscular Q4H PRN Max 4/day Anastasiia Booth MD      And   • LORazepam  2 mg Intramuscular Q4H PRN Max 4/day Anastasiia Booth MD      And   • benztropine  1 mg Intramuscular Q4H PRN Max 4/day Anastasiia Booth MD     • benztropine  1 mg Oral Q4H PRN Max 6/day Anastasiia Booth MD     • cloNIDine  0.2 mg Oral Daily PRN MONTY Francisco     • hydrOXYzine HCL  50 mg Oral Q6H PRN Max 4/day Anastasiia Booth MD      Or   • diphenhydrAMINE  50 mg Intramuscular Q6H PRN Anastasiia Booth MD     • ergocalciferol  50,000 Units Oral Weekly MONTY Francisco     • gabapentin  200 mg Oral TID Gilmer Winters DO     • haloperidol  1 mg Oral Q6H PRN Anastasiia Booth MD     • haloperidol  2.5 mg Oral Q4H PRN Max 4/day Anastasiia Booth MD     • haloperidol  5 mg Oral Q4H PRN Max 4/day Anastasiia Booth MD     • hydrOXYzine HCL  100 mg Oral Q6H PRN Max 4/day Anastasiia Booth MD      Or   • LORazepam  2 mg Intramuscular Q6H PRN Anastasiia Booth MD     • hydrOXYzine HCL  25 mg Oral Q6H PRN Max 4/day Anastasiia Booth MD     • lisinopril  20 mg Oral Daily Anastasiia Booth MD     • LORazepam  0.5 mg Oral Q6H PRN Anastasiia Booth MD     • melatonin  3 mg Oral HS Anastasiia Booth MD     • nicotine  1 patch Transdermal Daily Anastasiia Booth MD     • OLANZapine  10 mg Oral Q3H PRN Max 3/day Kishore Roberto MD      Or   • OLANZapine  10 mg Intramuscular Q3H PRN Max 3/day Kishore Roberto MD     • OLANZapine  5 mg Oral Q3H PRN Max 6/day Kishore Roberto MD      Or   • OLANZapine  5 mg Intramuscular Q3H PRN Max 6/day Kishore Roberto MD     • OLANZapine  2.5 mg Oral Q3H PRN Max 8/day Kishore Roberto MD     • pantoprazole  40 mg Oral Early Morning Kishore Roberto MD     • sertraline  25 mg Oral HS Saqib Carter DO     • traZODone  50 mg Oral HS PRN Kishore Roberto MD         Behavioral Health Medications: all current active meds have been reviewed. Changes as in plan section above. Laboratory results:  I have personally reviewed all pertinent laboratory/tests results. No results found for this or any previous visit (from the past 48 hour(s)).      Saqib Carter DO

## 2023-08-11 ENCOUNTER — TELEPHONE (OUTPATIENT)
Dept: OTHER | Facility: OTHER | Age: 28
End: 2023-08-11

## 2023-08-11 VITALS
RESPIRATION RATE: 15 BRPM | HEART RATE: 98 BPM | OXYGEN SATURATION: 98 % | SYSTOLIC BLOOD PRESSURE: 130 MMHG | WEIGHT: 231.8 LBS | DIASTOLIC BLOOD PRESSURE: 90 MMHG | TEMPERATURE: 96.8 F | BODY MASS INDEX: 41.07 KG/M2 | HEIGHT: 63 IN

## 2023-08-11 PROCEDURE — 99238 HOSP IP/OBS DSCHRG MGMT 30/<: CPT | Performed by: PSYCHIATRY & NEUROLOGY

## 2023-08-11 RX ADMIN — PANTOPRAZOLE SODIUM 40 MG: 40 TABLET, DELAYED RELEASE ORAL at 06:12

## 2023-08-11 RX ADMIN — NICOTINE 1 PATCH: 21 PATCH, EXTENDED RELEASE TRANSDERMAL at 08:41

## 2023-08-11 RX ADMIN — ARIPIPRAZOLE 5 MG: 5 TABLET ORAL at 08:24

## 2023-08-11 RX ADMIN — AMLODIPINE BESYLATE 10 MG: 10 TABLET ORAL at 08:24

## 2023-08-11 RX ADMIN — GABAPENTIN 200 MG: 100 CAPSULE ORAL at 08:24

## 2023-08-11 RX ADMIN — LISINOPRIL 20 MG: 20 TABLET ORAL at 08:24

## 2023-08-11 NOTE — PROGRESS NOTES
Met with Ivette completed her relapse prevention and reviewed her resentment list. She was able to see how doing her resentment list helped her identify faulty beliefs and how they impacted her life. We discussed her resilience and need to practice rick. She was able to identify her symptoms, warning signs, coping skills, support people. We also reviewed the community support. She is looking forward to discharge today.

## 2023-08-11 NOTE — PLAN OF CARE
Pt to dc today. Pt denies SI/HI, AVH. Pt oriented x3. Pt to return home and will be transported via lyft at 1200. Pt to follow up with [facility] on 8/16 at 1400 for d/a. Pt to follow up with Anabel on 8/16 at 1600 telehealth for mental health. [facility]. Pt to follow up with OBGYN on 9/8 at 1300. Work note placed in pt's blue dc folder. Scripts sent to preferred pharmacy.     dc address: [address] [place] [address] Carl Toth  P: [phone]

## 2023-08-11 NOTE — NURSING NOTE
Pt bright and excited for her discharge. Denies SI/HI/AH/VH at this time. Medication and meal compliant. AVS gone over with pt and pt verbalized understanding and intentions to take medications as ordered and attend follow up appointments. When discussing smoking cessation pt states  She is not ready to quit but is aware of resources should she change her mind at a later time. Pt left unit with belongings at her side. Medications sent to her own pharmacy for pickup. Pt picked up via lyft and discharged home.

## 2023-08-11 NOTE — PROGRESS NOTES
Discussed with patient: AUDIT score of 16 UDS/Identified Substance(s) used: Alcohol (ethanol level 221)  Risks discussed included: physical risks, mental health risks, legal risks, financial risks, relationship conflict  Recommendations discussed: inpatient vs outpatient d/a  Patient's response: Pt agreeable to outpatient d/a and MAT

## 2023-08-11 NOTE — PROGRESS NOTES
08/11/23 0838   Team Meeting   Meeting Type Daily Rounds   Team Members Present   Team Members Present Physician;;Nurse   Physician Team Member [id number] [facility] Team Member [facility] Management Team Member Yumiko   Patient/Family Present   Patient Present No   Patient's Family Present No     DC today.

## 2023-08-11 NOTE — PROGRESS NOTES
08/11/23 1000   Activity/Group Checklist   Group Other (Comment)  (Group Art Therapy/Psychodynamic, Creative exploration of a randomly chosen word to provoke self-reflection)   Attendance Attended   Attendance Duration (min) Greater than 60   Interactions Interacted appropriately   Affect/Mood Appropriate   Goals Achieved Verbalized increased hopefulness; Able to engage in interactions; Able to listen to others

## 2023-08-11 NOTE — TREATMENT TEAM
08/11/23 8640   Activity/Group Checklist   Group Community meeting   Attendance Attended   Attendance Duration (min) 16-30   Interactions Interacted appropriately   Affect/Mood Appropriate   Goals Achieved Able to listen to others; Able to engage in interactions; Able to self-disclose; Able to recieve feedback

## 2023-08-11 NOTE — NURSING NOTE
Pt visible and social on unit. Bright in conversation and looking forward to discharge tomorrow. Jokes that she just has to get through the night with her roommate; ear plugs provided as pt states roommate awakened her several times last night and pt appreciative. Pt denies all symptoms and looks forward to next phase of care. Compliant with unit routines, meds and care. 7 minute checks continue for support and safety.

## 2023-08-16 ENCOUNTER — DOCUMENTATION (OUTPATIENT)
Dept: PSYCHIATRY | Facility: CLINIC | Age: 28
End: 2023-08-16

## 2023-08-22 ENCOUNTER — DOCUMENTATION (OUTPATIENT)
Dept: PSYCHIATRY | Facility: CLINIC | Age: 28
End: 2023-08-22

## 2023-08-24 NOTE — PROGRESS NOTES
Note Type:  Note                  Date of Service: [unfilled]  Service:  [facility] MAT Office    Note:  Note  April Keys 25 y.o. female [id number]  Attending  Substance Use History     Social History     Substance and Sexual Activity   Alcohol Use Yes   • Alcohol/week: 80.0 standard drinks of alcohol   • Types: 80 Shots of liquor per week    Comment: drinks liquor every day. excessive consumption up to 1 fifth a day        Social History     Substance and Sexual Activity   Drug Use Not Currently    Comment: THC edibles       Encounter Type:   Patient Face-to-Face    Start Time 1100pm  End Time 1200pm    Recovery needs addressed at this meeting:  Peer Support  and Recovery Resources    Note  Patient presented in office, on time, well dressed and very relaxed. Patient explained she was previously 1 1/2 years sober and had a recurrence of use for about 1 1/2 years. Patient lives alone and works full time as . Patient also has a supportive boyfriend who is encouraging her to maintain abstinence. Patient did attend AA on line but has not gone to any in person. Patient tends to isolate after work. Moses and I discussed other types of recovery groups as well as the importnace of going to in person meetings and building her recovery capital.  Patient also interested in MAT and appointment set for Monday.      Referrals made  AA   [facility] recovery     Next appointment date and time  08/28/2023   10:00 am

## 2023-08-28 ENCOUNTER — OFFICE VISIT (OUTPATIENT)
Dept: OTHER | Age: 28
End: 2023-08-28

## 2023-08-28 VITALS
HEART RATE: 98 BPM | BODY MASS INDEX: 42.88 KG/M2 | SYSTOLIC BLOOD PRESSURE: 132 MMHG | HEIGHT: 63 IN | WEIGHT: 242 LBS | DIASTOLIC BLOOD PRESSURE: 82 MMHG

## 2023-08-28 DIAGNOSIS — F10.90 ALCOHOL USE DISORDER: Primary | ICD-10-CM

## 2023-08-28 DIAGNOSIS — F39 UNSPECIFIED MOOD (AFFECTIVE) DISORDER (HCC): ICD-10-CM

## 2023-08-28 RX ORDER — SERTRALINE HYDROCHLORIDE 25 MG/1
50 TABLET, FILM COATED ORAL
Qty: 60 TABLET | Refills: 0 | Status: SHIPPED | OUTPATIENT
Start: 2023-08-28 | End: 2023-09-01 | Stop reason: SDUPTHER

## 2023-08-28 RX ORDER — NALTREXONE HYDROCHLORIDE 50 MG/1
50 TABLET, FILM COATED ORAL DAILY
Qty: 30 TABLET | Refills: 0 | Status: SHIPPED | OUTPATIENT
Start: 2023-08-28 | End: 2023-09-27

## 2023-08-28 NOTE — PROGRESS NOTES
SHARE Program     History and Physical  Kaye Ramirez 25 y.o. female MRN: [id number]   @ Encounter: [id number]       8. Alcohol use disorder  Assessment & Plan:  · Patient seen in office for establishment in [address] [facility]  · She was admitted to [facility] medical detox from 8/3-8/6 for alcohol withdrawal.   · Reports that she started drinking at the age of 13 and has been struggling with periods of on and off sobriety  · Was previously on vivitrol injections 1.5 years ago and had 7 months of sobriety  · She states that her outpatient clinic closed that prescribed her this medication last year and she began to drink 750 ml of rum daily   · Reports that she has remained sober since detox admission but is having cravings. · LFTS:  · Will restart Naltrexone at this time and schedule Vivitrol injections     Orders:  -     naltrexone (REVIA) 50 mg tablet; Take 1 tablet (50 mg total) by mouth daily    2. Unspecified mood (affective) disorder (HCC)  Assessment & Plan:  · Patient had recent inpatient psychiatric hospitalization   · She was started on Zoloft 25 mg daily, Abilify 5mg daily, and Gabapentin 200mg TID  · Notes continued anxiety and insomnia  · Scheduled to see psychiatry in October  · Will increase Zoloft 50 mg  · Denies SI/HI       Orders:  -     sertraline (ZOLOFT) 25 mg tablet; Take 2 tablets (50 mg total) by mouth daily at bedtime      In addition to the above recommendations, the patient will also be referred to the [facility] Certified Addiction Counselors for additional evaluation and psychotherapy. We will also engage our Certified  for patient support. HPI: Kaye Ramirez is a 25y.o. year old female who presents for establishment in [address] [facility]. Patient was previously admitted to the medical detox unit form 8/3-8/6 for alcohol withdrawal. She reports that after this admission she was transferred to the behavioral health unit for ongoing treatment for anxiety and depression. Patient reports that she started drinking at age 13. Reports that 1 1/2 ago she was receiving MAT treatment for alcohol use disorder with Vivitrol. At this time patient states that she had her longest period of sobriety of 7 months. However this clinic closed and patient relapsed due to not being engaged in any treatment. Prior to her inpatient medical detox unit patient reports she was drinking 750 ml of rum daily. She has now remained sober since her medical detox unit and is looking to restart the Vivitrol injections. Alcohol History   Preferred alcoholic beverage(s): Rum    Quantity and frequency of alcohol intake: previously drinking 750 ml daily    Use of any ethanol substitutes (toxic alcohols): no   Date/Time of last alcohol intake: 08/3/23         Alcohol Withdrawal History   Previous ethanol withdrawal? yes   Prior inpatient treatment for ethanol withdrawal? yes   Prior outpatient treatment for ethanol withdrawal? yes   History of seizures with prior ethanol withdrawal? yes     Review of PDMP:     PDMP Review       Value Time User    PDMP Reviewed  Yes 8/11/2023 10:20 AM Davin Turner MD             Social History     Tobacco Use   Smoking Status Every Day   • Packs/day: 1.00   • Years: 18.00   • Total pack years: 18.00   • Types: Cigarettes   • Start date: 2006   • Passive exposure: Past   Smokeless Tobacco Never        Review of Systems   Cardiovascular: Negative for chest pain. Gastrointestinal: Negative. Negative for abdominal pain. Neurological: Negative for tremors. Psychiatric/Behavioral: Negative for suicidal ideas. The patient is nervous/anxious.      ROS     Historical Information      Past Medical History:   Diagnosis Date   • Alcohol abuse    • Alcoholism ([address])    • Anxiety    • Bipolar disorder ([address])    • Depression    • Hypertension    • Kidney stones    • PTSD (post-traumatic stress disorder)    • Self-injurious behavior    • Suicide attempt ([address])    • Withdrawal symptoms, drug or narcotic ([address])         No past surgical history on file. Family History   Problem Relation Age of Onset   • Heart disease Mother    • Stroke Mother    • Diabetes Mother    • Hypertension Mother    • Cancer Paternal Grandmother    • Kidney disease Paternal Grandmother    • Diabetes Paternal Grandmother         Social History      Marital Status: Single    Occupation: [employer]     Patient Pre-hospital Living Situation: Lives alone      Communicable diseases: Allergies   Allergen Reactions   • Biaxin [Clarithromycin] GI Intolerance   • Other GI Intolerance     cefcil          Prior to Admission medications    Medication Sig Start Date End Date Taking?  Authorizing Provider   amLODIPine (NORVASC) 10 mg tablet Take 1 tablet (10 mg total) by mouth daily Do not start before August 11, 2023. 8/11/23 9/10/23 Yes Dannielle Lightning, DO   ARIPiprazole (ABILIFY) 5 mg tablet Take 1 tablet (5 mg total) by mouth daily 8/10/23 10/9/23 Yes Dannielle Lightning, DO   ergocalciferol (VITAMIN D2) 50,000 units Take 1 capsule (50,000 Units total) by mouth once a week for 7 doses Do not start before August 14, 2023. 8/14/23 9/26/23 Yes Dannielle Lightning, DO   gabapentin (NEURONTIN) 100 mg capsule Take 2 capsules (200 mg total) by mouth 3 (three) times a day 8/10/23 10/9/23 Yes Dannielle Lightning, DO   lisinopril (ZESTRIL) 20 mg tablet Take 1 tablet (20 mg total) by mouth daily 7/19/23  Yes Sheryl Farley MD   melatonin 3 mg Take 1 tablet (3 mg total) by mouth daily at bedtime 8/10/23 9/9/23 Yes Dannielle Lightning, DO   naltrexone (REVIA) 50 mg tablet Take 1 tablet (50 mg total) by mouth daily 8/28/23 9/27/23 Yes Linda Gage PA-C   omeprazole (PriLOSEC) 40 MG capsule Take 1 capsule (40 mg total) by mouth daily before breakfast 6/20/23  Yes Sheryl Farley MD   pantoprazole (PROTONIX) 40 mg tablet Take 1 tablet (40 mg total) by mouth daily in the early morning Do not start before August 11, 2023. 8/11/23 9/10/23 Yes Miguel A Gibbons Jero,    sertraline (ZOLOFT) 25 mg tablet Take 2 tablets (50 mg total) by mouth daily at bedtime 8/28/23 9/27/23 Yes Julia Kasper PA-C   sertraline (ZOLOFT) 25 mg tablet Take 1 tablet (25 mg total) by mouth daily at bedtime 8/10/23 8/28/23 Yes Simran Nogueira,        naltrexone     Objective      Vitals    Vitals:    08/28/23 0958   BP: 132/82   Pulse: 98       Physical Exam  Constitutional:       General: She is not in acute distress. Appearance: She is obese. She is not diaphoretic. Eyes:      General: No scleral icterus. Pupils: Pupils are equal, round, and reactive to light. Pulmonary:      Effort: Pulmonary effort is normal.   Neurological:      Mental Status: She is alert and oriented to person, place, and time. Motor: No tremor. Coordination: Coordination is intact. Psychiatric:         Mood and Affect: Mood is anxious. Mood is not depressed. COWS Score:          Data:     Lab Results:  Results from last 6 Months   Lab Units 08/05/23  0519   WBC Thousand/uL 3.52*   HEMOGLOBIN g/dL 14.4   HEMATOCRIT % 43.6   PLATELETS Thousands/uL 174        Results from last 6 Months   Lab Units 08/07/23  0445   POTASSIUM mmol/L 3.7   CHLORIDE mmol/L 104   CO2 mmol/L 24   BUN mg/dL 10   CREATININE mg/dL 0.59*   CALCIUM mg/dL 9.1   ALBUMIN g/dL 3.8   ALK PHOS U/L 102   ALT U/L 68*   AST U/L 71*        Hepatitis panel:        HIV results:       TB:        Imaging Studies: I have personally reviewed pertinent reports. EKG, Pathology, and Other Studies: I have personally reviewed pertinent reports. Counseling / Coordination of Care     Total time spent today 45 minutes. Greater than 50% of total time was spent with the patient and / or family counseling and / or coordination of care. ** Please Note: This note may have been constructed using a voice recognition system.  **     Julia Kasper PA-C

## 2023-08-28 NOTE — ASSESSMENT & PLAN NOTE
· Patient had recent inpatient psychiatric hospitalization   · She was started on Zoloft 25 mg daily, Abilify 5mg daily, and Gabapentin 200mg TID  · Notes continued anxiety and insomnia  · Scheduled to see psychiatry in October  · Will increase Zoloft 50 mg  · Denies SI/HI

## 2023-08-28 NOTE — ASSESSMENT & PLAN NOTE
· Patient seen in office for establishment in [facility]  · She was admitted to [facility] from 8/3-8/6 for alcohol withdrawal.   · Reports that she started drinking at the age of 13 and has been struggling with periods of on and off sobriety  · Was previously on vivitrol injections 1.5 years ago and had 7 months of sobriety  · She states that her outpatient clinic closed that prescribed her this medication last year and she began to drink 750 ml of rum daily   · Reports that she has remained sober since detox admission but is having cravings.    · LFTS:  · Will restart Naltrexone at this time and schedule Vivitrol injections

## 2023-09-01 ENCOUNTER — OFFICE VISIT (OUTPATIENT)
Dept: FAMILY MEDICINE CLINIC | Facility: CLINIC | Age: 28
End: 2023-09-01
Payer: COMMERCIAL

## 2023-09-01 VITALS
DIASTOLIC BLOOD PRESSURE: 80 MMHG | RESPIRATION RATE: 16 BRPM | WEIGHT: 240 LBS | HEART RATE: 70 BPM | BODY MASS INDEX: 42.52 KG/M2 | HEIGHT: 63 IN | OXYGEN SATURATION: 90 % | TEMPERATURE: 97 F | SYSTOLIC BLOOD PRESSURE: 102 MMHG

## 2023-09-01 DIAGNOSIS — F39 UNSPECIFIED MOOD (AFFECTIVE) DISORDER (HCC): ICD-10-CM

## 2023-09-01 DIAGNOSIS — I10 HYPERTENSION: ICD-10-CM

## 2023-09-01 DIAGNOSIS — F41.8 MIXED ANXIETY DEPRESSIVE DISORDER: ICD-10-CM

## 2023-09-01 DIAGNOSIS — I10 PRIMARY HYPERTENSION: Primary | ICD-10-CM

## 2023-09-01 DIAGNOSIS — F17.200 SMOKER: ICD-10-CM

## 2023-09-01 PROCEDURE — 99214 OFFICE O/P EST MOD 30 MIN: CPT | Performed by: INTERNAL MEDICINE

## 2023-09-01 RX ORDER — AMLODIPINE BESYLATE 10 MG/1
10 TABLET ORAL DAILY
Qty: 30 TABLET | Refills: 2 | Status: SHIPPED | OUTPATIENT
Start: 2023-09-01

## 2023-09-01 NOTE — PROGRESS NOTES
Name: April Keys      : 1995      MRN: [id number]  Encounter Provider: Kristina Muir MD  Encounter Date: 2023   Encounter department: [facility]     1. Primary hypertension  Assessment & Plan:  She did have elevated blood pressure in the hospital and had Norvasc added to her regimen. She is currently is still on lisinopril 20 and Norvasc 10 mg with good blood pressures. We will continue with same dosing for now although she was instructed that if her blood pressure suddenly starts decreasing she should contact me      2. Unspecified mood (affective) disorder (HCC)  Assessment & Plan:  She continues on Zoloft 50 mg along with Abilify 5 mg and gabapentin 100 mg 3 times daily. She is due to see the psychiatrist beginning of October. Orders:  -     sertraline (ZOLOFT) 50 mg tablet; Take 1 tablet (50 mg total) by mouth daily at bedtime    3. Hypertension  -     amLODIPine (NORVASC) 10 mg tablet; Take 1 tablet (10 mg total) by mouth daily    4. Mixed anxiety depressive disorder    5. Smoker  Assessment & Plan:  She continues to smoke while she is getting her alcohol and emotional problems under control             Subjective      Patient is here for recheck on her blood pressure and mood. She is kept all of her for follow-up appointments and had her meds adjusted by psych. However she does not see psychiatry itself at the beginning of October. Mother is here with her and agrees that she seems to be stable. Patient feels that the Zoloft is helping    Depression  This is a recurrent problem. The current episode started more than 1 month ago. The problem occurs daily. The problem has been gradually improving. Pertinent negatives include no abdominal pain, arthralgias, chest pain, chills, congestion, coughing, fatigue, fever, headaches, joint swelling, myalgias, nausea, neck pain, numbness, rash, sore throat or weakness.  She has tried relaxation and acetaminophen for the symptoms. The treatment provided moderate relief. Review of Systems   Constitutional: Negative for chills, fatigue, fever and unexpected weight change. HENT: Negative for congestion, ear pain, hearing loss, postnasal drip, sinus pressure, sore throat, trouble swallowing and voice change. Eyes: Negative for visual disturbance. Respiratory: Negative for cough, chest tightness, shortness of breath and wheezing. Cardiovascular: Negative for chest pain, palpitations and leg swelling. Gastrointestinal: Negative for abdominal distention, abdominal pain, anal bleeding, blood in stool, constipation, diarrhea and nausea. Endocrine: Negative for cold intolerance, polydipsia, polyphagia and polyuria. Genitourinary: Negative for dysuria, flank pain, frequency, hematuria and urgency. Musculoskeletal: Negative for arthralgias, back pain, gait problem, joint swelling, myalgias and neck pain. Skin: Negative for rash. Allergic/Immunologic: Negative for immunocompromised state. Neurological: Negative for dizziness, syncope, facial asymmetry, weakness, light-headedness, numbness and headaches. Hematological: Negative for adenopathy. Psychiatric/Behavioral: Positive for decreased concentration and depression. Negative for confusion, sleep disturbance and suicidal ideas. The patient is nervous/anxious. Current Outpatient Medications on File Prior to Visit   Medication Sig   • ARIPiprazole (ABILIFY) 5 mg tablet Take 1 tablet (5 mg total) by mouth daily   • ergocalciferol (VITAMIN D2) 50,000 units Take 1 capsule (50,000 Units total) by mouth once a week for 7 doses Do not start before August 14, 2023.    • gabapentin (NEURONTIN) 100 mg capsule Take 2 capsules (200 mg total) by mouth 3 (three) times a day   • lisinopril (ZESTRIL) 20 mg tablet Take 1 tablet (20 mg total) by mouth daily   • melatonin 3 mg Take 1 tablet (3 mg total) by mouth daily at bedtime   • naltrexone (REVIA) 50 mg tablet Take 1 tablet (50 mg total) by mouth daily   • pantoprazole (PROTONIX) 40 mg tablet Take 1 tablet (40 mg total) by mouth daily in the early morning Do not start before August 11, 2023. • [DISCONTINUED] amLODIPine (NORVASC) 10 mg tablet Take 1 tablet (10 mg total) by mouth daily Do not start before August 11, 2023. • [DISCONTINUED] sertraline (ZOLOFT) 25 mg tablet Take 2 tablets (50 mg total) by mouth daily at bedtime   • omeprazole (PriLOSEC) 40 MG capsule Take 1 capsule (40 mg total) by mouth daily before breakfast (Patient not taking: Reported on 9/1/2023)       Objective     /80 (BP Location: Left arm, Patient Position: Sitting, Cuff Size: Large)   Pulse 70   Temp (!) 97 °F (36.1 °C) (Temporal)   Resp 16   Ht 5' 3" (1.6 m)   Wt 109 kg (240 lb)   LMP 08/26/2023 (Exact Date) Comment: irregular periods  SpO2 90%   BMI 42.51 kg/m²     Physical Exam  Vitals reviewed. Constitutional:       General: She is not in acute distress. Appearance: She is well-developed. She is obese. HENT:      Right Ear: External ear normal.      Left Ear: External ear normal.      Nose: Nose normal.      Mouth/Throat:      Pharynx: No oropharyngeal exudate. Eyes:      Pupils: Pupils are equal, round, and reactive to light. Neck:      Thyroid: No thyromegaly. Vascular: No JVD. Cardiovascular:      Rate and Rhythm: Normal rate and regular rhythm. Heart sounds: Normal heart sounds. No murmur heard. No gallop. Pulmonary:      Effort: Pulmonary effort is normal. No respiratory distress. Breath sounds: Normal breath sounds. No wheezing or rales. Abdominal:      General: Bowel sounds are normal. There is no distension. Palpations: Abdomen is soft. There is no mass. Tenderness: There is no abdominal tenderness. Musculoskeletal:         General: No tenderness. Normal range of motion. Cervical back: Normal range of motion and neck supple. Right lower leg: No edema.       Left lower leg: No edema. Lymphadenopathy:      Cervical: No cervical adenopathy. Skin:     Findings: No rash. Neurological:      General: No focal deficit present. Mental Status: She is alert and oriented to person, place, and time. Mental status is at baseline. Cranial Nerves: No cranial nerve deficit. Coordination: Coordination normal.   Psychiatric:         Mood and Affect: Mood normal.         Behavior: Behavior normal.         Thought Content:  Thought content normal.         Judgment: Judgment normal.       Gilbert Abraham MD

## 2023-09-01 NOTE — ASSESSMENT & PLAN NOTE
She did have elevated blood pressure in the hospital and had Norvasc added to her regimen. She is currently is still on lisinopril 20 and Norvasc 10 mg with good blood pressures.   We will continue with same dosing for now although she was instructed that if her blood pressure suddenly starts decreasing she should contact me

## 2023-09-01 NOTE — ASSESSMENT & PLAN NOTE
She continues on Zoloft 50 mg along with Abilify 5 mg and gabapentin 100 mg 3 times daily. She is due to see the psychiatrist beginning of October.

## 2023-09-07 NOTE — PROGRESS NOTES
Subjective:      Lefty Del Toro is a 25 y.o. female. Here for Gynecologic Exam      GYN HPI  Here for annual gyn exam  Menstrual cycle:  C/o irregular menses, and bleeding in between menses. Will skip a month or so . Denies BTB as spotty and not heavy. This has been present for the past 6 months or so. Denies cramping. Vaginal c/o: Denies presently  Urinary c/o: Denies  Breast complaints:Denies  She does not do self breast Exams    Sexually active: yes, monogamous for the last month   Feels bloated and swollen abdominally after sex  No condom use  Contraception: current partner with vasectomy  She reports she feels safe at home. The following portions of the patient's history were reviewed and updated as appropriate: allergies, current medications, past family history, past medical history, past social history, past surgical history, and problem list.    Hereditary Cancer Screening  Cancer-related family history includes Cancer in her paternal grandmother. There is no history of Uterine cancer, Breast cancer, Cervical cancer, Ovarian cancer, or Colon cancer. Substance Abuse Screening Completed. See hx and flowsheet. Screens Positive for tobacco use, and hx of TCH use- edibles at night to sleep. Last drink 36 days ago  Just completed alcohol w/d program  Pt congratulated on efforts       HEALTH MAINTENANCE SCREENINGS:    Last Papanicolaou test:  09/08/2023 History of abnormal pap: No,       Review of Systems   Constitutional: Negative for appetite change, chills, fatigue, fever and unexpected weight change. HENT: Negative. Eyes: Negative. Respiratory: Negative for chest tightness and shortness of breath. Cardiovascular: Negative for chest pain and palpitations. Gastrointestinal: Negative for abdominal pain, constipation and vomiting. Endocrine: Negative for cold intolerance and heat intolerance.    Genitourinary:        As per HPI   Musculoskeletal: Negative for back pain, joint swelling and neck pain. Skin: Negative for color change and rash. Neurological: Negative for dizziness, weakness and numbness. Hematological: Does not bruise/bleed easily. Psychiatric/Behavioral: Negative. Objective:  /80 (BP Location: Left arm, Patient Position: Sitting, Cuff Size: Large)   Ht 5' 3" (1.6 m)   Wt 111 kg (245 lb)   LMP 08/26/2023 (Exact Date) Comment: irregular periods  BMI 43.40 kg/m²        OBGyn Exam          Assessment/Plan:           ANNUAL GYN EXAM- Primary  Annual GYN examination completed today. Risk prevention and anticipatory guidance provided including:  • Risk for hereditary cancers: Family history reviewed and patient does not qualify for referral for genetics consult/testing. Referral to genetics oncology offered as indicated. • Calcium and vitamin D supplementation. • Dietary and lifestyle recommendations based on her age and weight. body mass index is 43.4 kg/m². .    • Tobacco and alcohol use, intervention ordered if applicable. Cervical cancer screening  Previous pap smears and ASCCP screening guidelines have been reviewed. Pap smear is indicated at this time. Contraception   present partner has vasectomy. will consider in the future , but declines for now  STI Screening  STI screening is desired   STI prevention discussed with use of condoms. Reviewed routine STI screening includes Gonorrhea, chlamydia, HIV, Syphilis, Hepatitis B, and Hepatitis C. HSV screening is only ordered for certain high risk individuals. Breast exam and breast cancer screening  Breast exam was done; , breast cancer imaging/screening is not indicated at this time. Problem List Items Addressed This Visit     Irregular menses     Pt c/o irregular menses and BTB for the past 6 months. nml TSH (done in august-ordered by another clinician)  GC/CT ordered and will check US   F/U after results.             Relevant Orders    US pelvis complete non OB   Other Visit Diagnoses Encounter for screening for cervical cancer    -  Primary    Relevant Orders    Liquid-based pap, screening    Screening for cervical cancer        Bloating        Screen for STD (sexually transmitted disease)        Relevant Orders    Chlamydia/GC amplified DNA by PCR    Hepatitis C antibody    Hepatitis B surface antigen    HIV 1/2 AG/AB w Reflex [facility] for 2 yr old and above    RPR-Syphilis Screening (Total Syphilis IGG/IGM)          Orders Placed This Encounter   Procedures   • Chlamydia/GC amplified DNA by PCR   • US pelvis complete non OB   • Hepatitis C antibody   • Hepatitis B surface antigen   • HIV 1/2 AG/AB w Reflex [facility] for 2 yr old and above   • RPR-Syphilis Screening (Total Syphilis IGG/IGM)

## 2023-09-08 ENCOUNTER — APPOINTMENT (OUTPATIENT)
Dept: LAB | Facility: MEDICAL CENTER | Age: 28
End: 2023-09-08
Payer: COMMERCIAL

## 2023-09-08 ENCOUNTER — OFFICE VISIT (OUTPATIENT)
Dept: OBGYN CLINIC | Facility: MEDICAL CENTER | Age: 28
End: 2023-09-08
Payer: COMMERCIAL

## 2023-09-08 VITALS
SYSTOLIC BLOOD PRESSURE: 112 MMHG | HEIGHT: 63 IN | BODY MASS INDEX: 43.41 KG/M2 | DIASTOLIC BLOOD PRESSURE: 80 MMHG | WEIGHT: 245 LBS

## 2023-09-08 DIAGNOSIS — Z12.4 ENCOUNTER FOR SCREENING FOR CERVICAL CANCER: Primary | ICD-10-CM

## 2023-09-08 DIAGNOSIS — Z11.3 SCREEN FOR STD (SEXUALLY TRANSMITTED DISEASE): ICD-10-CM

## 2023-09-08 DIAGNOSIS — N92.6 IRREGULAR MENSES: ICD-10-CM

## 2023-09-08 DIAGNOSIS — Z12.4 SCREENING FOR CERVICAL CANCER: ICD-10-CM

## 2023-09-08 DIAGNOSIS — R14.0 BLOATING: ICD-10-CM

## 2023-09-08 PROCEDURE — 87389 HIV-1 AG W/HIV-1&-2 AB AG IA: CPT

## 2023-09-08 PROCEDURE — 99202 OFFICE O/P NEW SF 15 MIN: CPT | Performed by: CLINICAL NURSE SPECIALIST

## 2023-09-08 PROCEDURE — 36415 COLL VENOUS BLD VENIPUNCTURE: CPT

## 2023-09-08 PROCEDURE — 87491 CHLMYD TRACH DNA AMP PROBE: CPT | Performed by: CLINICAL NURSE SPECIALIST

## 2023-09-08 PROCEDURE — G0124 SCREEN C/V THIN LAYER BY MD: HCPCS | Performed by: PATHOLOGY

## 2023-09-08 PROCEDURE — 86803 HEPATITIS C AB TEST: CPT

## 2023-09-08 PROCEDURE — 86780 TREPONEMA PALLIDUM: CPT

## 2023-09-08 PROCEDURE — G0476 HPV COMBO ASSAY CA SCREEN: HCPCS | Performed by: CLINICAL NURSE SPECIALIST

## 2023-09-08 PROCEDURE — 87591 N.GONORRHOEAE DNA AMP PROB: CPT | Performed by: CLINICAL NURSE SPECIALIST

## 2023-09-08 PROCEDURE — G0145 SCR C/V CYTO,THINLAYER,RESCR: HCPCS | Performed by: PATHOLOGY

## 2023-09-08 PROCEDURE — 87340 HEPATITIS B SURFACE AG IA: CPT

## 2023-09-08 NOTE — ASSESSMENT & PLAN NOTE
Pt c/o irregular menses and BTB for the past 6 months. nml TSH (done in august-ordered by another clinician)  GC/CT ordered and will check US   F/U after results.

## 2023-09-08 NOTE — PROGRESS NOTES
Patient presents for a routine annual visit. Last Pap Smear-    Hx of abnormal paps? Birth control- none  Mammogram-  Colonoscopy-  Dexa:  LMP:  23/ irregular periods     Smoker   Social drinker  Currently sexually active  No family history of uterine, ovarian, cervical or breast cancer.    No concerns/questions for today's visit

## 2023-09-09 LAB
HBV SURFACE AG SER QL: NORMAL
HCV AB SER QL: NORMAL
HIV 1+2 AB+HIV1 P24 AG SERPL QL IA: NORMAL
HIV 2 AB SERPL QL IA: NORMAL
HIV1 AB SERPL QL IA: NORMAL
HIV1 P24 AG SERPL QL IA: NORMAL
TREPONEMA PALLIDUM IGG+IGM AB [PRESENCE] IN SERUM OR PLASMA BY IMMUNOASSAY: NORMAL

## 2023-09-11 LAB
HPV HR 12 DNA CVX QL NAA+PROBE: POSITIVE
HPV16 DNA CVX QL NAA+PROBE: POSITIVE
HPV18 DNA CVX QL NAA+PROBE: NEGATIVE

## 2023-09-12 ENCOUNTER — OFFICE VISIT (OUTPATIENT)
Dept: FAMILY MEDICINE CLINIC | Facility: CLINIC | Age: 28
End: 2023-09-12
Payer: COMMERCIAL

## 2023-09-12 VITALS
HEART RATE: 110 BPM | HEIGHT: 63 IN | WEIGHT: 246.8 LBS | OXYGEN SATURATION: 97 % | TEMPERATURE: 97.7 F | DIASTOLIC BLOOD PRESSURE: 78 MMHG | SYSTOLIC BLOOD PRESSURE: 112 MMHG | BODY MASS INDEX: 43.73 KG/M2

## 2023-09-12 DIAGNOSIS — K21.9 GERD WITHOUT ESOPHAGITIS: ICD-10-CM

## 2023-09-12 DIAGNOSIS — F39 UNSPECIFIED MOOD (AFFECTIVE) DISORDER (HCC): Primary | ICD-10-CM

## 2023-09-12 LAB
C TRACH DNA SPEC QL NAA+PROBE: NEGATIVE
N GONORRHOEA DNA SPEC QL NAA+PROBE: NEGATIVE

## 2023-09-12 PROCEDURE — 99213 OFFICE O/P EST LOW 20 MIN: CPT | Performed by: INTERNAL MEDICINE

## 2023-09-12 RX ORDER — OMEPRAZOLE 20 MG/1
20 CAPSULE, DELAYED RELEASE ORAL DAILY
COMMUNITY
End: 2023-09-12 | Stop reason: SDUPTHER

## 2023-09-12 RX ORDER — OMEPRAZOLE 20 MG/1
20 CAPSULE, DELAYED RELEASE ORAL DAILY
Qty: 90 CAPSULE | Refills: 3 | Status: SHIPPED | OUTPATIENT
Start: 2023-09-12

## 2023-09-12 NOTE — PROGRESS NOTES
Name: Bella Valdovinos      : 1995      MRN: [id number]  Encounter Provider: Quinton Alonso MD  Encounter Date: 2023   Encounter department: [facility]     1. GERD without esophagitis  Assessment & Plan:  Patient needs refills of her omeprazole for her GERD    Orders:  -     omeprazole (PriLOSEC) 20 mg delayed release capsule; Take 1 capsule (20 mg total) by mouth daily    2. Unspecified mood (affective) disorder [facility])  Assessment & Plan:  Patient has seen psych in the past and after a brief admission in August she was placed on additional medications but never had outpatient treatment. He states she feels ready to go back to work and is alert and oriented           Subjective      Patient is here because she wants to go back to work and she is needs a note to say that she is cleared to go back to work. Patient states she feels very mentally than she did but still has not gotten into outpatient therapy. Review of Systems   Constitutional: Negative for chills, fatigue, fever and unexpected weight change. HENT: Negative for congestion, ear pain, hearing loss, postnasal drip, sinus pressure, sore throat, trouble swallowing and voice change. Eyes: Negative for visual disturbance. Respiratory: Negative for cough, chest tightness, shortness of breath and wheezing. Cardiovascular: Negative for chest pain, palpitations and leg swelling. Gastrointestinal: Negative for abdominal distention, abdominal pain, anal bleeding, blood in stool, constipation, diarrhea and nausea. Endocrine: Negative for cold intolerance, polydipsia, polyphagia and polyuria. Genitourinary: Negative for dysuria, flank pain, frequency, hematuria and urgency. Musculoskeletal: Negative for arthralgias, back pain, gait problem, joint swelling, myalgias and neck pain. Skin: Negative for rash. Allergic/Immunologic: Negative for immunocompromised state.    Neurological: Negative for dizziness, syncope, facial asymmetry, weakness, light-headedness, numbness and headaches. Hematological: Negative for adenopathy. Psychiatric/Behavioral: Negative for confusion, sleep disturbance and suicidal ideas. Current Outpatient Medications on File Prior to Visit   Medication Sig   • amLODIPine (NORVASC) 10 mg tablet Take 1 tablet (10 mg total) by mouth daily   • ARIPiprazole (ABILIFY) 5 mg tablet Take 1 tablet (5 mg total) by mouth daily   • ergocalciferol (VITAMIN D2) 50,000 units Take 1 capsule (50,000 Units total) by mouth once a week for 7 doses Do not start before August 14, 2023. • gabapentin (NEURONTIN) 100 mg capsule Take 2 capsules (200 mg total) by mouth 3 (three) times a day   • lisinopril (ZESTRIL) 20 mg tablet Take 1 tablet (20 mg total) by mouth daily   • naltrexone (REVIA) 50 mg tablet Take 1 tablet (50 mg total) by mouth daily   • sertraline (ZOLOFT) 50 mg tablet Take 1 tablet (50 mg total) by mouth daily at bedtime   • [DISCONTINUED] omeprazole (PriLOSEC) 20 mg delayed release capsule Take 20 mg by mouth daily   • [DISCONTINUED] pantoprazole (PROTONIX) 40 mg tablet Take 1 tablet (40 mg total) by mouth daily in the early morning Do not start before August 11, 2023. Objective     /78 (BP Location: Left arm, Patient Position: Sitting, Cuff Size: Large)   Pulse (!) 110   Temp 97.7 °F (36.5 °C) (Temporal)   Ht 5' 3" (1.6 m)   Wt 112 kg (246 lb 12.8 oz)   LMP 08/26/2023 (Exact Date) Comment: irregular periods  SpO2 97%   BMI 43.72 kg/m²     Physical Exam  Constitutional:       General: She is not in acute distress. Appearance: She is well-developed. She is obese. HENT:      Right Ear: External ear normal.      Left Ear: External ear normal.      Nose: Nose normal.      Mouth/Throat:      Pharynx: No oropharyngeal exudate. Eyes:      Pupils: Pupils are equal, round, and reactive to light. Neck:      Thyroid: No thyromegaly. Vascular: No JVD. Cardiovascular:      Rate and Rhythm: Normal rate and regular rhythm. Heart sounds: Normal heart sounds. No murmur heard. No gallop. Pulmonary:      Effort: Pulmonary effort is normal. No respiratory distress. Breath sounds: Normal breath sounds. No wheezing or rales. Abdominal:      General: Bowel sounds are normal. There is no distension. Palpations: Abdomen is soft. There is no mass. Tenderness: There is no abdominal tenderness. Musculoskeletal:         General: No tenderness. Normal range of motion. Cervical back: Normal range of motion and neck supple. Right lower leg: No edema. Left lower leg: No edema. Lymphadenopathy:      Cervical: No cervical adenopathy. Skin:     Findings: No rash. Neurological:      General: No focal deficit present. Mental Status: She is alert and oriented to person, place, and time. Cranial Nerves: No cranial nerve deficit. Coordination: Coordination normal.   Psychiatric:         Behavior: Behavior normal.         Thought Content:  Thought content normal.         Judgment: Judgment normal.       Ahmet Arango MD

## 2023-09-12 NOTE — ASSESSMENT & PLAN NOTE
Patient has seen psych in the past and after a brief admission in August she was placed on additional medications but never had outpatient treatment.   He states she feels ready to go back to work and is alert and oriented

## 2023-09-13 ENCOUNTER — TELEPHONE (OUTPATIENT)
Dept: OBGYN CLINIC | Facility: MEDICAL CENTER | Age: 28
End: 2023-09-13

## 2023-09-13 NOTE — TELEPHONE ENCOUNTER
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  Pap is not back yet, but she will need colpo to evaluate this further. Please schedule her and educate on procedure. Pre-medicate with NSAID prior to appt

## 2023-09-13 NOTE — TELEPHONE ENCOUNTER
Patient came into the office to ask that she speak with someone regarding her test results. Says she hasn't heard back yet.

## 2023-09-14 ENCOUNTER — HOSPITAL ENCOUNTER (OUTPATIENT)
Dept: RADIOLOGY | Age: 28
Discharge: HOME | End: 2023-09-14
Payer: COMMERCIAL

## 2023-09-14 DIAGNOSIS — N92.6 IRREGULAR MENSES: ICD-10-CM

## 2023-09-14 DIAGNOSIS — K21.9 GASTROESOPHAGEAL REFLUX DISEASE, UNSPECIFIED WHETHER ESOPHAGITIS PRESENT: ICD-10-CM

## 2023-09-14 LAB
LAB AP GYN PRIMARY INTERPRETATION: ABNORMAL
Lab: ABNORMAL
PATH INTERP SPEC-IMP: ABNORMAL

## 2023-09-14 PROCEDURE — 76830 TRANSVAGINAL US NON-OB: CPT

## 2023-09-14 PROCEDURE — 76856 US EXAM PELVIC COMPLETE: CPT

## 2023-09-14 RX ORDER — OMEPRAZOLE 40 MG/1
40 CAPSULE, DELAYED RELEASE ORAL
Qty: 90 CAPSULE | Refills: 3 | Status: SHIPPED | OUTPATIENT
Start: 2023-09-14

## 2023-09-20 DIAGNOSIS — F10.90 ALCOHOL USE DISORDER: ICD-10-CM

## 2023-09-20 RX ORDER — NALTREXONE HYDROCHLORIDE 50 MG/1
50 TABLET, FILM COATED ORAL DAILY
Qty: 30 TABLET | Refills: 0 | Status: SHIPPED | OUTPATIENT
Start: 2023-09-20 | End: 2023-10-20

## 2023-09-20 NOTE — TELEPHONE ENCOUNTER
[facility] for [address] notifying her that the requested prescription was sent to the pharmacy. MAT office number provided.

## 2023-09-20 NOTE — TELEPHONE ENCOUNTER
Ivette called requesting a refill of her Naltrexone. She states that Aubrey ladd, provider, was to start her on the Vivitrol injection but she was never called to schedule this with the [address] [place]. She also states that she has just one more tab for tomorrow and needs a bridge until her next appt with [facility] on 9/25. This writer asked Ivette why she does not have enough tabs to last until 9/27, as the 8/28 prescription was written. Ivette states "I don't know. I have been taking just one per day and picked it up on 8/28."    Last fill 8/28  Last OV 8/28  Next OV 9/25    Dr. Anastacio Chu, please advise.

## 2023-09-25 ENCOUNTER — OFFICE VISIT (OUTPATIENT)
Dept: OTHER | Age: 28
End: 2023-09-25

## 2023-09-25 VITALS
HEART RATE: 72 BPM | HEIGHT: 63 IN | SYSTOLIC BLOOD PRESSURE: 123 MMHG | WEIGHT: 262 LBS | DIASTOLIC BLOOD PRESSURE: 83 MMHG | BODY MASS INDEX: 46.42 KG/M2

## 2023-09-25 DIAGNOSIS — F43.10 PTSD (POST-TRAUMATIC STRESS DISORDER): ICD-10-CM

## 2023-09-25 DIAGNOSIS — F10.90 ALCOHOL USE DISORDER: Primary | ICD-10-CM

## 2023-09-25 NOTE — PROGRESS NOTES
[facility]    Progress Note  Domo Dueñas 25 y.o. female MRN: [id number]  @ Encounter: [id number]      0. Alcohol use disorder  Assessment & Plan:  · Patient presents for follow up appointment. Vivitrol Infusion was ordered at last visit. Unfortunately patient was never scheduled last month. She was utilizing naltrexone PO that was prescribed to her  · She denies any re-ocurrence of use. Tolerated Naltrexone and is still interested in proceeding with Vivitrol injection  · Therapy plan updated. 2. PTSD (post-traumatic stress disorder)        Support Services  Case Management and Certified  are following. Patient was referred to the [facility] Certified Addiction Counselors: Yes  The patient is actively engaged with the [facility] Certified Addiction Counselor’s psychotherapy plan: Yes    naltrexone      PDMP reviewed:     PDMP Review       Value Time User    PDMP Reviewed  Yes 8/11/2023 10:20 AM Armand Harp MD          SUBJECTIVE  Patient presents for follow up appointment. She was suppose to receive vivitrol injection last month, however was never contacted for an appointment. In the meantime patient was utilizing Naltrexone. She states she is still 58 days sober. Tolerated Naltrexone but is interested in the Vivitrol Injection. Patient also states she is interested in outpatient therapy.      Drug cravings: none   Motivation to continue treatment: yes       Current Outpatient Medications:   •  amLODIPine (NORVASC) 10 mg tablet, Take 1 tablet (10 mg total) by mouth daily, Disp: 30 tablet, Rfl: 2  •  ARIPiprazole (ABILIFY) 5 mg tablet, Take 1 tablet (5 mg total) by mouth daily, Disp: 30 tablet, Rfl: 1  •  gabapentin (NEURONTIN) 100 mg capsule, Take 2 capsules (200 mg total) by mouth 3 (three) times a day, Disp: 180 capsule, Rfl: 1  •  lisinopril (ZESTRIL) 20 mg tablet, Take 1 tablet (20 mg total) by mouth daily, Disp: 30 tablet, Rfl: 3  •  naltrexone (REVIA) 50 mg tablet, Take 1 tablet (50 mg total) by mouth daily, Disp: 30 tablet, Rfl: 0  •  omeprazole (PriLOSEC) 40 MG capsule, TAKE ONE CAPSULE BY MOUTH EVERY DAY BEFORE BREAKFAST, Disp: 90 capsule, Rfl: 3  •  sertraline (ZOLOFT) 50 mg tablet, Take 1 tablet (50 mg total) by mouth daily at bedtime, Disp: 30 tablet, Rfl: 3  •  ergocalciferol (VITAMIN D2) 50,000 units, Take 1 capsule (50,000 Units total) by mouth once a week for 7 doses Do not start before August 14, 2023. (Patient not taking: Reported on 9/25/2023), Disp: 7 capsule, Rfl: 0  •  omeprazole (PriLOSEC) 20 mg delayed release capsule, Take 1 capsule (20 mg total) by mouth daily (Patient not taking: Reported on 9/25/2023), Disp: 90 capsule, Rfl: 3    Objective     Vitals:    09/25/23 1521   BP: 123/83   Pulse: 72       Physical Exam  Cardiovascular:      Rate and Rhythm: Normal rate and regular rhythm. Neurological:      Mental Status: She is alert and oriented to person, place, and time. Psychiatric:         Mood and Affect: Mood is anxious. Lab Results:   Results from last 6 Months   Lab Units 08/05/23  0519   WBC Thousand/uL 3.52*   HEMOGLOBIN g/dL 14.4   HEMATOCRIT % 43.6   PLATELETS Thousands/uL 174      Results from last 6 Months   Lab Units 08/07/23  0445   POTASSIUM mmol/L 3.7   CHLORIDE mmol/L 104   CO2 mmol/L 24   BUN mg/dL 10   CREATININE mg/dL 0.59*   CALCIUM mg/dL 9.1   ALBUMIN g/dL 3.8   ALK PHOS U/L 102   ALT U/L 68*   AST U/L 71*     Results from last 6 Months   Lab Units 09/08/23  1334   HEP B S AG  Non-reactive   HEP C AB  Non-reactive     Results from last 6 Months   Lab Units 09/08/23  1334   HIV-1 P24 ANTIGEN-BIOPLEX  Non-Reactive                 Counseling / Coordination of Care  Total time spent today 15 minutes. Greater than 50% of total time was spent with the patient and / or family counseling and / or coordination of care.      Grabiel Nieto PA-C

## 2023-09-25 NOTE — ASSESSMENT & PLAN NOTE
· Patient presents for follow up appointment. Vivitrol Infusion was ordered at last visit. Unfortunately patient was never scheduled last month. She was utilizing naltrexone PO that was prescribed to her  · She denies any re-ocurrence of use. Tolerated Naltrexone and is still interested in proceeding with Vivitrol injection  · Therapy plan updated.

## 2023-09-26 ENCOUNTER — TELEPHONE (OUTPATIENT)
Dept: PSYCHIATRY | Facility: CLINIC | Age: 28
End: 2023-09-26

## 2023-10-04 ENCOUNTER — SOCIAL WORK (OUTPATIENT)
Dept: PSYCHIATRY | Facility: CLINIC | Age: 28
End: 2023-10-04

## 2023-10-04 ENCOUNTER — HOSPITAL ENCOUNTER (OUTPATIENT)
Dept: INFUSION CENTER | Facility: HOSPITAL | Age: 28
Discharge: HOME | End: 2023-10-04
Attending: EMERGENCY MEDICINE
Payer: COMMERCIAL

## 2023-10-04 DIAGNOSIS — F10.90 ALCOHOL USE DISORDER: ICD-10-CM

## 2023-10-04 DIAGNOSIS — F43.10 PTSD (POST-TRAUMATIC STRESS DISORDER): ICD-10-CM

## 2023-10-04 DIAGNOSIS — F41.8 MIXED ANXIETY DEPRESSIVE DISORDER: Primary | ICD-10-CM

## 2023-10-04 DIAGNOSIS — F10.90 ALCOHOL USE DISORDER: Primary | ICD-10-CM

## 2023-10-04 PROBLEM — R87.810 ASCUS WITH POSITIVE HIGH RISK HPV CERVICAL: Status: ACTIVE | Noted: 2023-10-04

## 2023-10-04 PROBLEM — R87.610 ASCUS WITH POSITIVE HIGH RISK HPV CERVICAL: Status: ACTIVE | Noted: 2023-10-04

## 2023-10-04 PROCEDURE — 96372 THER/PROPH/DIAG INJ SC/IM: CPT

## 2023-10-04 RX ADMIN — NALTREXONE 380 MG: KIT at 15:46

## 2023-10-04 NOTE — PROGRESS NOTES
Patient tolerated R buttocks naltrexone injection without issues. Next appointment confirmed, AVS declined.

## 2023-10-04 NOTE — BH CRISIS PLAN
Client Name: Nicole Bryant       Client YOB: 1995  : 1995    Treatment Team (include name and contact information):     Psychotherapist: Aleks James    Psychiatrist: Dr. Hunter Crandall   Release of information completed: yes        Healthcare Provider  Luis E Hernandez MD  [address] [facility] [address] [facility]  [phone]    Type of Plan   * Child plans (children 15 yo and younger) must be completed and signed by the child's legal guardian   * Plans for all individuals 15 yo and above must be signed by the client. Plan Type: adolescent/adult (15 and over) Initial      My Personal Strengths are (in the client's own words):  "hard working, self reliant, good support system"  The stressors and triggers that may put me at risk are:  feeling numb, alcohol use problems, limited support and stress at work    Coping skills I can use to keep myself calm and safe:  Listen to music, Call a friend or family member, Journal and Other (describe) coloring    Coping skills/supports I can use to maintain abstinence from substance use:   therapy, taking my medications    The people that provide me with help and support: (Include name, contact, and how they can help)   Support person #1: Bailey Sender (mother)    * Phone number: in cell phone    * How can they help me? Talks me through my feelings, distracts me   Support person #2:Bronson    * Phone number: in cell phone    * How can they help me?  Staying sober with me        In the past, the following has helped me in times of crisis:    Going into the hospital and Breathing exercises (or other mindfulness-based activities)      If it is an emergency and you need immediate help, call     If there is a possibility of danger to yourself or others, call the following crisis hotline resources:     Adult Crisis Numbers  Suicide Prevention Hotline - Dial   [facility]: [address]: [address]: [address]: [phone]  [address] [place] [address] Street: [phone]  [facility]: [address]: [address]: [phone] (daytime). [phone] (after hours, weekends, holidays)     Child/Adolescent Crisis Numbers   [facility]: [address]: [phone]   Akanksha Herbert: [phone]   [address] [place] [address] Street: [phone]    Please note: Some [facility] do not have a separate number for Child/Adolescent specific crisis. If your county is not listed under Child/Adolescent, please call the adult number for your county     National Talk to Text Line   [facility] [facility] - [phone]    In the event your feelings become unmanageable, and you cannot reach your support system, you will call 911 immediately or go to the nearest hospital emergency room.

## 2023-10-04 NOTE — PLAN OF CARE
Problem: DISCHARGE PLANNING  Goal: Discharge to home or other facility with appropriate resources  Description: INTERVENTIONS:  - Identify barriers to discharge w/patient and caregiver  - Arrange for needed discharge resources and transportation as appropriate  - Identify discharge learning needs (meds, wound care, etc.)  - Arrange for interpretive services to assist at discharge as needed  - Refer to Case Management Department for coordinating discharge planning if the patient needs post-hospital services based on physician/advanced practitioner order or complex needs related to functional status, cognitive ability, or social support system  Reactivated
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
## 2023-10-05 ENCOUNTER — PROCEDURE VISIT (OUTPATIENT)
Dept: OBGYN CLINIC | Facility: MEDICAL CENTER | Age: 28
End: 2023-10-05
Payer: COMMERCIAL

## 2023-10-05 VITALS
WEIGHT: 253 LBS | HEIGHT: 63 IN | DIASTOLIC BLOOD PRESSURE: 70 MMHG | BODY MASS INDEX: 44.83 KG/M2 | SYSTOLIC BLOOD PRESSURE: 140 MMHG

## 2023-10-05 DIAGNOSIS — Z32.02 NEGATIVE PREGNANCY TEST: ICD-10-CM

## 2023-10-05 DIAGNOSIS — R87.611 ATYPICAL SQUAMOUS CELLS CANNOT EXCLUDE HIGH GRADE SQUAMOUS INTRAEPITHELIAL LESION ON CYTOLOGIC SMEAR OF CERVIX (ASC-H): Primary | ICD-10-CM

## 2023-10-05 LAB — SL AMB POCT URINE HCG: NORMAL

## 2023-10-05 PROCEDURE — 81025 URINE PREGNANCY TEST: CPT | Performed by: CLINICAL NURSE SPECIALIST

## 2023-10-05 PROCEDURE — 57454 BX/CURETT OF CERVIX W/SCOPE: CPT | Performed by: CLINICAL NURSE SPECIALIST

## 2023-10-05 PROCEDURE — 88305 TISSUE EXAM BY PATHOLOGIST: CPT | Performed by: PATHOLOGY

## 2023-10-05 NOTE — PATIENT INSTRUCTIONS
POST COLPOSCOPY INSTRUCTIONS  Light spotting and mild cramping are normal up to 48 hrs after procedure. Please call us if you have heavy vaginal bleeding, severe pelvic pain/cramping or for fever > 101  DO NOT insert anything in the vagina for the next 2-3 days. (no sex, tampons, douche etc)  Use ibuprofen/ NSAIDS (or other over the counter pain relievers) as needed for discomfort. Results should be available in 5-7 days and we will contact you by phone with results.

## 2023-10-05 NOTE — PROGRESS NOTES
Colposcopy       Colposcopy     Date/Time 10/5/2023 3:00 PM     Universal Protocol   Consent: Verbal consent obtained. Risks and benefits: risks, benefits and alternatives were discussed  Consent given by: patient  Time out: Immediately prior to procedure a "time out" was called to verify the correct patient, procedure, equipment, support staff and site/side marked as required. Patient understanding: patient states understanding of the procedure being performed  Required items: required blood products, implants, devices, and special equipment available  Patient identity confirmed: verbally with patient       Performed by  MONTY Arceo   Authorized by MONTY Arceo       Pre-procedure details     Pre-procedure timeout performed: yes       Indication    ASC-US and ASC-H (+ HPV type 16 and type other)     Procedure Details   Procedure: Colposcopy w/ cervical biopsy and ECC      Custer speculum was placed in the vagina: yes      Under colposcopic examination the transition zone was seen in entirety: yes      Intracervical block was performed: yes      Endocervix was curetted using a Kevorkian curette: yes      Cervical biopsy performed with a cervical biopsy punch: yes      Monsel's solution was applied: yes      Biopsy(s): yes      Location:  6 oclock    Specimen to pathology: yes       Post-procedure      Findings comment:  Normal findings    Impression: Low grade cervical dysplasia       Comments       Colposcopy completed w/o difficulty. Pt aware we will call with results- usually in 5-7 days. She was instructed on post care and written instructions given.    nothing Per vagina for the next 2-3 days   OTC analgesics PRN for discomfort  To call if heavy vaginal bleeding, severe pelvic pain/cramping or fever

## 2023-10-09 ENCOUNTER — SOCIAL WORK (OUTPATIENT)
Dept: PSYCHIATRY | Facility: CLINIC | Age: 28
End: 2023-10-09
Payer: COMMERCIAL

## 2023-10-09 ENCOUNTER — OFFICE VISIT (OUTPATIENT)
Dept: PSYCHIATRY | Facility: CLINIC | Age: 28
End: 2023-10-09
Payer: COMMERCIAL

## 2023-10-09 VITALS
HEART RATE: 76 BPM | BODY MASS INDEX: 45.18 KG/M2 | DIASTOLIC BLOOD PRESSURE: 84 MMHG | SYSTOLIC BLOOD PRESSURE: 120 MMHG | HEIGHT: 63 IN | WEIGHT: 255 LBS

## 2023-10-09 DIAGNOSIS — F41.8 MIXED ANXIETY DEPRESSIVE DISORDER: Primary | ICD-10-CM

## 2023-10-09 DIAGNOSIS — F43.10 PTSD (POST-TRAUMATIC STRESS DISORDER): ICD-10-CM

## 2023-10-09 DIAGNOSIS — R79.89 ABNORMAL LFTS: ICD-10-CM

## 2023-10-09 DIAGNOSIS — F10.90 ALCOHOL USE DISORDER: ICD-10-CM

## 2023-10-09 DIAGNOSIS — F39 UNSPECIFIED MOOD (AFFECTIVE) DISORDER (HCC): Primary | ICD-10-CM

## 2023-10-09 DIAGNOSIS — R79.89 ABNORMAL CBC: ICD-10-CM

## 2023-10-09 DIAGNOSIS — R45.1 RESTLESSNESS: ICD-10-CM

## 2023-10-09 DIAGNOSIS — F41.9 ANXIETY DISORDER, UNSPECIFIED TYPE: ICD-10-CM

## 2023-10-09 PROCEDURE — 90837 PSYTX W PT 60 MINUTES: CPT | Performed by: COUNSELOR

## 2023-10-09 PROCEDURE — 99205 OFFICE O/P NEW HI 60 MIN: CPT | Performed by: PSYCHIATRY & NEUROLOGY

## 2023-10-09 RX ORDER — ARIPIPRAZOLE 10 MG/1
10 TABLET ORAL DAILY
Qty: 30 TABLET | Refills: 3 | Status: SHIPPED | OUTPATIENT
Start: 2023-10-09

## 2023-10-09 RX ORDER — GABAPENTIN 400 MG/1
400 CAPSULE ORAL
Qty: 30 CAPSULE | Refills: 3 | Status: SHIPPED | OUTPATIENT
Start: 2023-10-09

## 2023-10-09 RX ORDER — TOPIRAMATE 25 MG/1
25 TABLET ORAL
Qty: 30 TABLET | Refills: 3 | Status: SHIPPED | OUTPATIENT
Start: 2023-10-09

## 2023-10-09 RX ORDER — GABAPENTIN 100 MG/1
200 CAPSULE ORAL 2 TIMES DAILY
Qty: 60 CAPSULE | Refills: 3 | Status: SHIPPED | OUTPATIENT
Start: 2023-10-09

## 2023-10-09 NOTE — BH TREATMENT PLAN
Outpatient Behavioral Health Psychotherapy Treatment Plan    Kevin Rogers  1995     Date of Initial Psychotherapy Assessment: 10/4/2023   Date of Current Treatment Plan: 10/09/23  Treatment Plan Target Date: 10/4/2024  Treatment Plan Expiration Date: 4/9/2024    Diagnosis:   1. Mixed anxiety depressive disorder        2. Alcohol use disorder        3. PTSD (post-traumatic stress disorder)            Area(s) of Need: Abstinence support, Emotional support, Trauma processing    Long Term Goal 1 (in the client's own words): Be more supportive of myself mentally and physically    Stage of Change: Action    Target Date for completion: 10/9/2024     Anticipated therapeutic modalities: CBT, relapse prevention therapy, Cognitive processing     People identified to complete this goal: cordelia Antoine therapist      Objective 1: (identify the means of measuring success in meeting the objective): Be more aware of what I'm feeling and how it is influencing my life      Objective 2: (identify the means of measuring success in meeting the objective): Have a recovery support network      Long Term Goal 2 (in the client's own words): Unpack the trauma    Stage of Change: Preparation    Target Date for completion: 10/9/2024     Anticipated therapeutic modalities: cognitive processing, trauma processing     People identified to complete this goal: janna Steele      Objective 1: (identify the means of measuring success in meeting the objective): Talking about trauma with therapist      Objective 2: (identify the means of measuring success in meeting the objective): finding a way to speak the truth about the trauma.           I am currently under the care of a [facility] psychiatric provider: yes    My [facility] psychiatric provider is: Dr. Neto Fuentes    I am currently taking psychiatric medications: Yes, as prescribed    I feel that I will be ready for discharge from mental health care when I reach the following (measurable goal/objective): Able to better communicate and process emotions. Know how to handle my feelings and situations. For children and adults who have a legal guardian:   Has there been any change to custody orders and/or guardianship status? NA. If yes, attach updated documentation. I have created my Crisis Plan and have been offered a copy of this plan    [address]: Diagnosis and Treatment Plan explained to Craig Sherman acknowledges an understanding of their diagnosis. Papi Lee agrees to this treatment plan.     I have been offered a copy of this Treatment Plan. yes

## 2023-10-09 NOTE — PSYCH
Psychiatric MAT Evaluation - Behavioral Health     Identification Data:Ivette Roach 25 y.o. female MRN: [id number]  Unit/Bed#:  Encounter: [id number]    Chief Complaint: depression    History of Present Illness     Renée Hamlin is a 25 y.o. female with a history of depression versus bipolar disorder     Symptoms included depression. Onset of symptoms was gradual starting several months ago with unchanged course since that time. Stressors included alcohol use problems. The patient stated she had been depressed for many years and had history of suicidal attempts, recent history felt like her mood and better control of recent inpatient treatment with combination of Abilify and Zoloft were started. She still feels like he will mood significantly fluctuates and at times she feels angry and depressed and at times feels unrealistically happy. Last week she felt upbeat at mood but starting today she woke up depressed. She stated that about 2 months of not drinking after taking naltrexone oral and recently receiving Vivitrol shot. On initial evaluation  the patient felt depresed.      Psychiatric Review Of Systems:    sleep changes: decreased  appetite changes: no change  energy/anergy: decreased  anxiety/panic: yes  talya: history of periods of irritable mood  self injurious behavior/risky behavior: not recently  Suicidal ideation: no  Homicidal ideation: no  Auditory hallucinations: no  Visual hallucinations: no  Delusional thinking: no      Historical Information     Past Psychiatric History:     Past Inpatient Psychiatric Treatment:   Several past inpatient psychiatric admissions  Past Outpatient Psychiatric Treatment:    Was in outpatient psychiatric treatment in the past with a psychiatrist  Past Suicide Attempts:    yes  Past Psychiatric Medication Trials:    patient does not remember     Substance Abuse History:  Social History     Tobacco History     Smoking Status  Every Day Smoking Start Date  2006 Smoking Frequency  1 pack/day for 10.00 years (10.00 ttl pk-yrs) Smoking Tobacco Type  Cigarettes since 2006    Passive Exposure  Past    Smokeless Tobacco Use  Never          Alcohol History     Alcohol Use Status  Not Currently Drinks/Week  0 Glasses of wine, 0 Cans of beer, 0 Shots of liquor, 0 Standard drinks or equivalent per week Comment  drinks liquor every day. excessive consumption up to 1 fifth a day          Drug Use     Drug Use Status  Yes Types  Marijuana Comment  THC edibles          Sexual Activity     Sexually Active  Yes Partners  Male Birth Control/Protection  Abstinence, None          Activities of Daily Living    Not Asked               Additional Substance Use Detail     Questions Responses    Problems Due to Past Use of Alcohol? Yes    Problems Due to Past Use of Substances?  No    Substance Use Assessment Substance use within the past 12 months    Alcohol Use Frequency Daily    Cannabis frequency Past occasional use    Comment:  Never used on 8/5/2023 Past occasional use on 8/5/2023     Heroin Frequency Denies use in past 12 months    Alcohol Drink of Choice Rum    1st Use of Alcohol 12    Last Use of Alcohol & Amount 1/5 of rum, 8/2/2023    Longest Abstinence from Alcohol 1 1/2 years    Cocaine frequency Never used    Comment:  Never used on 8/5/2023     Crack Cocaine Frequency Denies use in past 12 months    Methamphetamine Frequency Denies use in past 12 months    Narcotic Frequency Denies use in past 12 months    Benzodiazepine Frequency Denies use in past 12 months    Amphetamine frequency Denies use in past 12 months    Barbituate Frequency Denies use use in past 12 months    Inhalant frequency Never used    Comment:  Never used on 8/5/2023     Hallucinogen frequency Never used    Comment:  Never used on 8/5/2023     Ecstasy frequency Never used    Comment:  Never used on 8/5/2023     Other drug frequency Never used    Comment:  Never used on 8/5/2023     Opiate frequency Denies use in past 12 months    Not reviewed. 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           Traumatic History:     Abuse: not willing to provide details    Past Medical History:    History of Seizures: no    Past Medical History:   Diagnosis Date   • Abnormal Pap smear of cervix     ASCUS + HPV 16 & other   • Alcohol abuse    • Alcoholism ([address])    • Anxiety    • Atypical squamous cells cannot exclude high grade squamous intraepithelial lesion on cytologic smear of cervix (ASC-H) 10/4/2023    9/8/23 ASCUS w/ HPV 16 and "other" type 10/5- colposcopy   • Bipolar disorder ([address])    • Depression    • Hypertension    • Kidney stones    • PTSD (post-traumatic stress disorder)    • Self-injurious behavior    • Suicide attempt ([address])    • Withdrawal symptoms, drug or narcotic ([address])      No past surgical history on file. Medical Review Of Systems:    EFO Review Of Systems: Pertinent items are noted in HPI. Allergies: Allergies   Allergen Reactions   • Biaxin [Clarithromycin] GI Intolerance   • Other GI Intolerance     cefcil             Objective     Vitals: There were no vitals filed for this visit.       Mental Status Evaluation:      Appearance:  dressed appropriately, casually dressed   Behavior:  normal, cooperative, calm   Mood:  anxious   Affect: reactive, labile    Speech:  normal rate and volume   Language: appropriate   Thought Process:  concrete   Associations: intact associations   Thought Content:  normal   Perceptual Disturbances: no auditory hallucinations, no visual hallucinations, denies auditory hallucinations when asked, does not appear responding to internal stimuli Risk Potential: Suicidal ideation - None  Homicidal ideation - None  Potential for aggression - No   Sensorium:  oriented to person, place and time   Memory:  recent and remote memory grossly intact   Consciousness:  alert and awake   Attention: attention span and concentration are normal   Fund of Knowledge: awareness of current events appropriate   Insight:  fair   Judgment: limited   Muscle Tone: normal   Gait/Station: normal gait/station and normal balance   Motor Activity: no abnormal movements               Laboratory Results:   I have personally reviewed all pertinent laboratory/tests results. Most Recent Labs:   Lab Results   Component Value Date    WBC 3.52 (L) 08/05/2023    RBC 5.11 08/05/2023    HGB 14.4 08/05/2023    HCT 43.6 08/05/2023     08/05/2023    RDW 16.2 (H) 08/05/2023    NEUTROABS 6.75 08/03/2023    SODIUM 136 08/07/2023    K 3.7 08/07/2023     08/07/2023    CO2 24 08/07/2023    BUN 10 08/07/2023    CREATININE 0.59 (L) 08/07/2023    GLUC 82 08/07/2023    GLUF 82 08/07/2023    CALCIUM 9.1 08/07/2023    AST 71 (H) 08/07/2023    ALT 68 (H) 08/07/2023    ALKPHOS 102 08/07/2023    TP 6.9 08/07/2023    ALB 3.8 08/07/2023    TBILI 0.47 08/07/2023    CHOLESTEROL 175 06/20/2023    HDL 80 06/20/2023    TRIG 79 06/20/2023    LDLCALC 79 06/20/2023    [address] 06/20/2023    AMMONIA 40 08/03/2023    HBA6NYXTVJYY 1.145 08/03/2023    PREGUR negative 03/21/2022    PREGSERUM Negative 08/03/2023         Assessment/Plan       Treatment Plan:     Planned Treatment and Medication Changes:    Diagnoses and all orders for this visit:    Unspecified mood (affective) disorder (HCC)  -     ARIPiprazole (ABILIFY) 10 mg tablet; Take 1 tablet (10 mg total) by mouth daily  -     gabapentin (NEURONTIN) 100 mg capsule; Take 2 capsules (200 mg total) by mouth 2 (two) times a day  -     sertraline (ZOLOFT) 50 mg tablet;  Take 1 tablet (50 mg total) by mouth daily at bedtime  -     topiramate (Topamax) 25 mg tablet; Take 1 tablet (25 mg total) by mouth daily at bedtime    PTSD (post-traumatic stress disorder)  -     gabapentin (NEURONTIN) 100 mg capsule; Take 2 capsules (200 mg total) by mouth 2 (two) times a day    Anxiety disorder, unspecified type  -     gabapentin (NEURONTIN) 400 mg capsule; Take 1 capsule (400 mg total) by mouth daily at bedtime    Restlessness  -     gabapentin (NEURONTIN) 400 mg capsule; Take 1 capsule (400 mg total) by mouth daily at bedtime    Abnormal LFTs  -     Hepatic function panel; Future    Abnormal CBC  -     CBC and differential; Future       Decision was made to increase the Abilify to address mood instability with mixed angry and depressed feelings, increased nocturnal Neurontin to decrease restlessness at night. The patient denied feeling restless in the daytime and her restlessness is unlikely associated with Abilify. The same time to adjust patient's mood instability which might be associated with alcohol use disorder, as the writer decided to start low-dose of topiramate at night. Because of the patient decreased white blood cells writer ordered CBC with differential, and also LFT's for mild LFT's elevation found during her inpatient treatment. We discussed the treatment plan with the patient as well as the need for lab work and she was in agreement. Supportive therapy was also provided. Risks / Benefits of Treatment:    Risks, benefits, and possible side effects of medications explained to patient and patient verbalizes understanding and agreement for treatment. This note was not shared with the patient due to privacy exception: note includes other individuals     Visit Time    Visit Start Time: 10:30AM  Visit Stop Time: 11:30AM  Total Visit Duration: 60 minutes    ** Please Note: This note has been constructed using a voice recognition system.  **

## 2023-10-09 NOTE — PSYCH
Behavioral Health Psychotherapy Progress Note    Psychotherapy Provided: Individual Psychotherapy     1. Mixed anxiety depressive disorder        2. Alcohol use disorder        3. PTSD (post-traumatic stress disorder)            Goals addressed in session: Goal 1 and Goal 2     DATA: Ivette arrived on time for her session. Ivette reports she had seen the psychiatrist prior to the session with therapist and discussed how this appointment had gone. Ivette discussed her feelings of anxiety and depression and how this was affecting her this morning prior to her appointments. Ivette and therapist reviewed these feelings and discussed ways to address these feelings using cognitive processing. Ivette reports ongoing abstinence but reports continued struggles with developing a support system to prevent relapse. Ivette and therapist composed and signed a treatment plan. During this session, this clinician used the following therapeutic modalities: Engagement Strategies, Cognitive Processing Therapy and Supportive Psychotherapy    Substance Abuse was addressed during this session. If the client is diagnosed with a co-occurring substance use disorder, please indicate any changes in the frequency or amount of use: Ivette reports continued abstinence for 67 days at this time. Stage of change for addressing substance use diagnoses: Action    ASSESSMENT:  Lio Corrales presents with a Euthymic/ normal and Depressed mood. Ivette's mood improved through session and Ivette presented as engaged and willing to participate in treatment. Adriane Lee continues to be aware of her needs but struggles to know how to address her needs. her affect is Normal range and intensity, which is congruent, with her mood and the content of the session. The client has made progress on their goals. Lio Corrales presents with a none risk of suicide, none risk of self-harm, and none risk of harm to others.     For any risk assessment that surpasses a "low" rating, a safety plan must be developed. A safety plan was indicated: no  If yes, describe in detail n/a    PLAN: Between sessions, Bella Valdovinos will begin to find a recovery support system and be mindful of her moods and how these can be addressed by changing behaviors. At the next session, the therapist will use Cognitive Behavioral Therapy and Mindfulness-based Strategies to address trauma processing and relapse prevention. Behavioral Health Treatment Plan and Discharge Planning: Bella Valdovinos is aware of and agrees to continue to work on their treatment plan. They have identified and are working toward their discharge goals.  yes    Visit start and stop times:    10/09/23  Start Time: 1200  Stop Time: 1300  Total Visit Time: 60 minutes

## 2023-10-10 PROCEDURE — 88305 TISSUE EXAM BY PATHOLOGIST: CPT | Performed by: PATHOLOGY

## 2023-10-11 ENCOUNTER — TELEPHONE (OUTPATIENT)
Dept: OBGYN CLINIC | Facility: CLINIC | Age: 28
End: 2023-10-11

## 2023-10-11 NOTE — RESULT ENCOUNTER NOTE
Please notify pt of bx showing SOM II/III. High grade changes  Recommend physician consult to discuss LEEP procedure to remove diseased portion of tissue.

## 2023-10-11 NOTE — TELEPHONE ENCOUNTER
[facility]  Please notify pt of bx showing SOM II/III. High grade changes  Recommend physician consult to discuss LEEP procedure to remove diseased portion of tissue.    Patient aware of results- [facility] for consult of leep and discussion of results scheduled with NP on 10/20

## 2023-10-13 ENCOUNTER — APPOINTMENT (OUTPATIENT)
Dept: LAB | Facility: MEDICAL CENTER | Age: 28
End: 2023-10-13
Payer: COMMERCIAL

## 2023-10-13 DIAGNOSIS — R79.89 ABNORMAL CBC: ICD-10-CM

## 2023-10-13 DIAGNOSIS — R79.89 ABNORMAL LFTS: ICD-10-CM

## 2023-10-13 LAB
ALBUMIN SERPL BCP-MCNC: 4.2 G/DL (ref 3.5–5)
ALP SERPL-CCNC: 74 U/L (ref 34–104)
ALT SERPL W P-5'-P-CCNC: 20 U/L (ref 7–52)
AST SERPL W P-5'-P-CCNC: 21 U/L (ref 13–39)
BASOPHILS # BLD AUTO: 0.03 THOUSANDS/ÂΜL (ref 0–0.1)
BASOPHILS NFR BLD AUTO: 0 % (ref 0–1)
BILIRUB DIRECT SERPL-MCNC: 0.13 MG/DL (ref 0–0.2)
BILIRUB SERPL-MCNC: 0.65 MG/DL (ref 0.2–1)
EOSINOPHIL # BLD AUTO: 0.09 THOUSAND/ÂΜL (ref 0–0.61)
EOSINOPHIL NFR BLD AUTO: 1 % (ref 0–6)
ERYTHROCYTE [DISTWIDTH] IN BLOOD BY AUTOMATED COUNT: 13.2 % (ref 11.6–15.1)
HCT VFR BLD AUTO: 43.1 % (ref 34.8–46.1)
HGB BLD-MCNC: 13.4 G/DL (ref 11.5–15.4)
IMM GRANULOCYTES # BLD AUTO: 0.02 THOUSAND/UL (ref 0–0.2)
IMM GRANULOCYTES NFR BLD AUTO: 0 % (ref 0–2)
LYMPHOCYTES # BLD AUTO: 1.41 THOUSANDS/ÂΜL (ref 0.6–4.47)
LYMPHOCYTES NFR BLD AUTO: 21 % (ref 14–44)
MCH RBC QN AUTO: 28.3 PG (ref 26.8–34.3)
MCHC RBC AUTO-ENTMCNC: 31.1 G/DL (ref 31.4–37.4)
MCV RBC AUTO: 91 FL (ref 82–98)
MONOCYTES # BLD AUTO: 0.63 THOUSAND/ÂΜL (ref 0.17–1.22)
MONOCYTES NFR BLD AUTO: 9 % (ref 4–12)
NEUTROPHILS # BLD AUTO: 4.56 THOUSANDS/ÂΜL (ref 1.85–7.62)
NEUTS SEG NFR BLD AUTO: 69 % (ref 43–75)
NRBC BLD AUTO-RTO: 0 /100 WBCS
PLATELET # BLD AUTO: 243 THOUSANDS/UL (ref 149–390)
PMV BLD AUTO: 10.2 FL (ref 8.9–12.7)
PROT SERPL-MCNC: 7.1 G/DL (ref 6.4–8.4)
RBC # BLD AUTO: 4.73 MILLION/UL (ref 3.81–5.12)
WBC # BLD AUTO: 6.74 THOUSAND/UL (ref 4.31–10.16)

## 2023-10-13 PROCEDURE — 85025 COMPLETE CBC W/AUTO DIFF WBC: CPT

## 2023-10-13 PROCEDURE — 36415 COLL VENOUS BLD VENIPUNCTURE: CPT

## 2023-10-13 PROCEDURE — 80076 HEPATIC FUNCTION PANEL: CPT

## 2023-10-16 DIAGNOSIS — I10 PRIMARY HYPERTENSION: ICD-10-CM

## 2023-10-16 DIAGNOSIS — K21.9 GASTROESOPHAGEAL REFLUX DISEASE, UNSPECIFIED WHETHER ESOPHAGITIS PRESENT: ICD-10-CM

## 2023-10-16 RX ORDER — LISINOPRIL 20 MG/1
20 TABLET ORAL DAILY
Qty: 30 TABLET | Refills: 0 | Status: SHIPPED | OUTPATIENT
Start: 2023-10-16

## 2023-10-16 RX ORDER — OMEPRAZOLE 40 MG/1
40 CAPSULE, DELAYED RELEASE ORAL
Qty: 90 CAPSULE | Refills: 1 | Status: SHIPPED | OUTPATIENT
Start: 2023-10-16

## 2023-10-18 ENCOUNTER — SOCIAL WORK (OUTPATIENT)
Dept: PSYCHIATRY | Facility: CLINIC | Age: 28
End: 2023-10-18
Payer: COMMERCIAL

## 2023-10-18 DIAGNOSIS — F43.10 PTSD (POST-TRAUMATIC STRESS DISORDER): ICD-10-CM

## 2023-10-18 DIAGNOSIS — F41.8 MIXED ANXIETY DEPRESSIVE DISORDER: Primary | ICD-10-CM

## 2023-10-18 DIAGNOSIS — F10.90 ALCOHOL USE DISORDER: ICD-10-CM

## 2023-10-18 PROCEDURE — 90837 PSYTX W PT 60 MINUTES: CPT | Performed by: COUNSELOR

## 2023-10-18 NOTE — PSYCH
Behavioral Health Psychotherapy Progress Note    Psychotherapy Provided: Individual Psychotherapy     1. Mixed anxiety depressive disorder        2. Alcohol use disorder        3. PTSD (post-traumatic stress disorder)            Goals addressed in session: Goal 1 and Goal 2     DATA: Ivette arrived early for her session. Ivette discussed with therapist an increase in "flashbacks" she was experiencing that were related to times of trauma. Therapist discussed these "flashbacks" with Ivette and how she was addressing these. Ivette reports she often times will go to sleep and awaken and no longer experience these. Therapist encouraged Ivette to re-evaluate the distrss caused by these and the effectiveness of her coping skills. Ivette was able to rename these "flashbacks" a "migraine" and was able to determine that these experiences had minimal affect on her life. Ivette and therapist discussed possibly writing a letter or using the empty chair technique for Ivette to  address those persons who had caused her trauma in the past and are now dead. Ivette stated she would consider this. Ivette and therapist reviewed Ivette's use of 12 step meetings to support her abstinence and discussed ways to improve this support. Ivette was scheduled for a session in 2 weeks. During this session, this clinician used the following therapeutic modalities: Cognitive Processing Therapy, Solution-Focused Therapy, and Supportive Psychotherapy    Substance Abuse was addressed during this session. If the client is diagnosed with a co-occurring substance use disorder, please indicate any changes in the frequency or amount of use: Ivette reports ongoing abstinence and stats she is currently over 70 days abstinent. Ivette reports positive outcome from the use of vivotril IM. Stage of change for addressing substance use diagnoses: Action    ASSESSMENT:  Beverley Crenshaw presents with a Euthymic/ normal mood.  Ivette presented as pleasant and engaged during therapy. Neo Bustos continues to report a desire to address her needs and work through her mood and substance abstinence needs. her affect is Normal range and intensity, which is congruent, with her mood and the content of the session. The client has made progress on their goals. Latasha Gilmore presents with a none risk of suicide, none risk of self-harm, and none risk of harm to others. For any risk assessment that surpasses a "low" rating, a safety plan must be developed. A safety plan was indicated: no  If yes, describe in detail n/a    PLAN: Between sessions, Latasha Gilmore will continue to attend 12 step meetings, meet with MAT provider, and continue to take her medications as prescribed. At the next session, the therapist will use Cognitive Processing Therapy and Gestalt Therapy Techniques to address Ivette's desire to address persons who had caused her trauma in the past and resolve some unresolved feelings about them. Behavioral Health Treatment Plan and Discharge Planning: Latasha Gilmore is aware of and agrees to continue to work on their treatment plan. They have identified and are working toward their discharge goals.  yes    Visit start and stop times:    10/18/23  Start Time: 1216  Stop Time: 1311  Total Visit Time: 55 minutes

## 2023-10-20 ENCOUNTER — OFFICE VISIT (OUTPATIENT)
Dept: OBGYN CLINIC | Facility: CLINIC | Age: 28
End: 2023-10-20

## 2023-10-20 VITALS — WEIGHT: 253 LBS | SYSTOLIC BLOOD PRESSURE: 120 MMHG | BODY MASS INDEX: 44.82 KG/M2 | DIASTOLIC BLOOD PRESSURE: 80 MMHG

## 2023-10-20 DIAGNOSIS — D06.9 HIGH GRADE SQUAMOUS INTRAEPITHELIAL LESION (HGSIL), GRADE 3 CIN, ON BIOPSY OF CERVIX: Primary | ICD-10-CM

## 2023-10-20 NOTE — ASSESSMENT & PLAN NOTE
-9/8/23: PAP ASCUS cannot exclude HSIL, HPV 16+   -10/5/23: Colpo: 6oclock SOM II-III with HPV changes, transformation zone with high grade dysplasia.  -discussed importance of further procedure to ensure no malignancy and to remove abnormal cells   -pt consented for LEEP. Risks, benefits, alternatives discussed.  All questions answered.  -pt to follow up post procedure   -discussed risk of LEEP with future pregnancies and need for cervical surveillance

## 2023-10-20 NOTE — PROGRESS NOTES
Assessment/Plan:    High grade squamous intraepithelial lesion (HGSIL), grade 3 SOM, on biopsy of cervix  -9/8/23: PAP ASCUS cannot exclude HSIL, HPV 16+   -10/5/23: Colpo: 6oclock SOM II-III with HPV changes, transformation zone with high grade dysplasia.  -discussed importance of further procedure to ensure no malignancy and to remove abnormal cells   -pt consented for LEEP. Risks, benefits, alternatives discussed. All questions answered.  -pt to follow up post procedure   -discussed risk of LEEP with future pregnancies and need for cervical surveillance       Diagnoses and all orders for this visit:    High grade squamous intraepithelial lesion (HGSIL), grade 3 SOM, on biopsy of cervix          Subjective:      Patient ID: Shilpi Dawkins is a 25 y.o. female. 28yo G0 LMP 10/19/23 presents for preoperative visit for LEEP. Pt reports occasional intermenstrual spotting and post coital bleeding. Pt denies pelvic pain or cramping. The following portions of the patient's history were reviewed and updated as appropriate: allergies, current medications, past family history, past medical history, past social history, past surgical history, and problem list.    Review of Systems   Constitutional:  Negative for appetite change, chills, fatigue and fever. Respiratory:  Negative for cough, chest tightness, shortness of breath and wheezing. Cardiovascular:  Negative for chest pain, palpitations and leg swelling. Gastrointestinal:  Negative for abdominal distention, abdominal pain, constipation, diarrhea, nausea and vomiting. Endocrine: Negative for cold intolerance, heat intolerance and polyuria. Genitourinary:  Negative for difficulty urinating, dyspareunia, dysuria, genital sores, menstrual problem, vaginal bleeding, vaginal discharge and vaginal pain. Neurological:  Negative for dizziness, weakness, light-headedness and headaches.          Objective:      /80 (BP Location: Left arm, Patient Position: Sitting, Cuff Size: Standard)   Wt 115 kg (253 lb)   LMP 10/19/2023 (Exact Date)   BMI 44.82 kg/m²          Physical Exam  Constitutional:       General: She is not in acute distress. Appearance: Normal appearance. She is normal weight. Cardiovascular:      Rate and Rhythm: Normal rate. Pulmonary:      Effort: Pulmonary effort is normal. No respiratory distress. Abdominal:      General: There is no distension. Palpations: There is no mass. Tenderness: There is no abdominal tenderness. There is no guarding or rebound. Musculoskeletal:      Right lower leg: No edema. Left lower leg: No edema. Neurological:      Mental Status: She is alert and oriented to person, place, and time.    Psychiatric:         Mood and Affect: Mood normal.

## 2023-10-24 ENCOUNTER — PREP FOR PROCEDURE (OUTPATIENT)
Dept: OBGYN CLINIC | Facility: CLINIC | Age: 28
End: 2023-10-24

## 2023-10-24 ENCOUNTER — TELEPHONE (OUTPATIENT)
Dept: OBGYN CLINIC | Facility: CLINIC | Age: 28
End: 2023-10-24

## 2023-10-24 DIAGNOSIS — D06.9 CIN III (CERVICAL INTRAEPITHELIAL NEOPLASIA GRADE III) WITH SEVERE DYSPLASIA: Primary | ICD-10-CM

## 2023-10-24 DIAGNOSIS — D06.9 CIN III (CERVICAL INTRAEPITHELIAL NEOPLASIA III): ICD-10-CM

## 2023-10-24 NOTE — TELEPHONE ENCOUNTER
Talked to patient she is scheduled for her surgical procedure on 12/4/2023 at the [facility], Patient aware of PCP clearance needed and has appt set up.

## 2023-10-25 ENCOUNTER — OFFICE VISIT (OUTPATIENT)
Dept: FAMILY MEDICINE CLINIC | Facility: CLINIC | Age: 28
End: 2023-10-25
Payer: COMMERCIAL

## 2023-10-25 ENCOUNTER — PREP FOR PROCEDURE (OUTPATIENT)
Dept: OBGYN CLINIC | Facility: CLINIC | Age: 28
End: 2023-10-25

## 2023-10-25 VITALS
BODY MASS INDEX: 44.3 KG/M2 | DIASTOLIC BLOOD PRESSURE: 72 MMHG | TEMPERATURE: 97.3 F | SYSTOLIC BLOOD PRESSURE: 116 MMHG | RESPIRATION RATE: 16 BRPM | HEART RATE: 76 BPM | HEIGHT: 63 IN | WEIGHT: 250 LBS

## 2023-10-25 DIAGNOSIS — F10.90 ALCOHOL USE DISORDER: Primary | ICD-10-CM

## 2023-10-25 DIAGNOSIS — I10 PRIMARY HYPERTENSION: ICD-10-CM

## 2023-10-25 DIAGNOSIS — D06.9 HIGH GRADE SQUAMOUS INTRAEPITHELIAL LESION (HGSIL), GRADE 3 CIN, ON BIOPSY OF CERVIX: ICD-10-CM

## 2023-10-25 PROCEDURE — 99213 OFFICE O/P EST LOW 20 MIN: CPT | Performed by: INTERNAL MEDICINE

## 2023-10-25 NOTE — PROGRESS NOTES
Name: Governor Clayton      : 1995      MRN: [id number]  Encounter Provider: Mortimer Dose, MD  Encounter Date: 10/25/2023   Encounter department: [facility]     1. Alcohol use disorder    2. Primary hypertension    3. High grade squamous intraepithelial lesion (HGSIL), grade 3 SOM, on biopsy of cervix           Subjective      Told by GYN that she needs to have for a leak on  . Although she has come back within 30 days however while she is here she is actually doing good her sugars better on the current medications for blood pressure. Fortunately she has not lost any weight      Review of Systems   Constitutional:  Negative for chills and fever. HENT:  Negative for ear pain and sore throat. Eyes:  Negative for pain and visual disturbance. Respiratory:  Negative for cough and shortness of breath. Cardiovascular:  Negative for chest pain and palpitations. Gastrointestinal:  Negative for abdominal pain and vomiting. Genitourinary:  Positive for menstrual problem. Negative for dysuria and hematuria. Musculoskeletal:  Negative for arthralgias and back pain. Skin:  Negative for color change and rash. Neurological:  Negative for seizures and syncope. All other systems reviewed and are negative.       Current Outpatient Medications on File Prior to Visit   Medication Sig    amLODIPine (NORVASC) 10 mg tablet Take 1 tablet (10 mg total) by mouth daily    ARIPiprazole (ABILIFY) 10 mg tablet Take 1 tablet (10 mg total) by mouth daily    gabapentin (NEURONTIN) 100 mg capsule Take 2 capsules (200 mg total) by mouth 2 (two) times a day    gabapentin (NEURONTIN) 400 mg capsule Take 1 capsule (400 mg total) by mouth daily at bedtime    lisinopril (ZESTRIL) 20 mg tablet Take 1 tablet (20 mg total) by mouth daily    omeprazole (PriLOSEC) 40 MG capsule Take 1 capsule (40 mg total) by mouth daily before breakfast    sertraline (ZOLOFT) 50 mg tablet Take 1 tablet (50 mg total) by mouth daily at bedtime    topiramate (Topamax) 25 mg tablet Take 1 tablet (25 mg total) by mouth daily at bedtime       Objective     /72 (BP Location: Left arm, Patient Position: Sitting, Cuff Size: Large)   Pulse 76   Temp (!) 97.3 °F (36.3 °C) (Temporal)   Resp 16   Ht 5' 3" (1.6 m)   Wt 113 kg (250 lb)   LMP 10/19/2023 (Exact Date)   BMI 44.29 kg/m²     Physical Exam  Vitals reviewed. Constitutional:       General: She is not in acute distress. Appearance: Normal appearance. She is well-developed. She is obese. HENT:      Right Ear: External ear normal.      Left Ear: External ear normal.      Nose: Nose normal.      Mouth/Throat:      Pharynx: No oropharyngeal exudate. Eyes:      Pupils: Pupils are equal, round, and reactive to light. Neck:      Thyroid: No thyromegaly. Vascular: No JVD. Cardiovascular:      Rate and Rhythm: Normal rate and regular rhythm. Heart sounds: Normal heart sounds. No murmur heard. No gallop. Pulmonary:      Effort: Pulmonary effort is normal. No respiratory distress. Breath sounds: Normal breath sounds. No wheezing or rales. Abdominal:      General: Bowel sounds are normal. There is no distension. Palpations: Abdomen is soft. There is no mass. Tenderness: There is no abdominal tenderness. Musculoskeletal:         General: No tenderness. Normal range of motion. Cervical back: Normal range of motion and neck supple. Lymphadenopathy:      Cervical: No cervical adenopathy. Skin:     Findings: No rash. Neurological:      Mental Status: She is alert and oriented to person, place, and time. Cranial Nerves: No cranial nerve deficit. Coordination: Coordination normal.   Psychiatric:         Behavior: Behavior normal.         Thought Content:  Thought content normal.         Judgment: Judgment normal.       Gui Ashby MD

## 2023-11-01 ENCOUNTER — HOSPITAL ENCOUNTER (OUTPATIENT)
Dept: INFUSION CENTER | Facility: HOSPITAL | Age: 28
Discharge: HOME | End: 2023-11-01
Attending: EMERGENCY MEDICINE
Payer: COMMERCIAL

## 2023-11-01 ENCOUNTER — SOCIAL WORK (OUTPATIENT)
Dept: PSYCHIATRY | Facility: CLINIC | Age: 28
End: 2023-11-01

## 2023-11-01 DIAGNOSIS — F10.90 ALCOHOL USE DISORDER: Primary | ICD-10-CM

## 2023-11-01 DIAGNOSIS — F41.8 MIXED ANXIETY DEPRESSIVE DISORDER: Primary | ICD-10-CM

## 2023-11-01 PROCEDURE — 96372 THER/PROPH/DIAG INJ SC/IM: CPT

## 2023-11-01 RX ADMIN — NALTREXONE 380 MG: KIT at 13:59

## 2023-11-01 NOTE — PSYCH
No Call. No Show. No Charge    Genesis Null no showed 11/01/23 appointment , staff called and left message to reschedule appointment. Treatment Plan not due at this session.

## 2023-11-01 NOTE — PROGRESS NOTES
Pt tolerated Vivitrol injection to L gluteal muscle without difficulty. Next appt confirmed. AVS declined. Left ambulatory in stable condition.

## 2023-11-08 ENCOUNTER — OFFICE VISIT (OUTPATIENT)
Dept: FAMILY MEDICINE CLINIC | Facility: CLINIC | Age: 28
End: 2023-11-08
Payer: COMMERCIAL

## 2023-11-08 VITALS
HEART RATE: 101 BPM | WEIGHT: 250 LBS | DIASTOLIC BLOOD PRESSURE: 64 MMHG | SYSTOLIC BLOOD PRESSURE: 100 MMHG | BODY MASS INDEX: 44.3 KG/M2 | HEIGHT: 63 IN | RESPIRATION RATE: 16 BRPM | OXYGEN SATURATION: 99 %

## 2023-11-08 DIAGNOSIS — Z01.818 PRE-OP EVALUATION: Primary | ICD-10-CM

## 2023-11-08 DIAGNOSIS — D06.9 HIGH GRADE SQUAMOUS INTRAEPITHELIAL LESION (HGSIL), GRADE 3 CIN, ON BIOPSY OF CERVIX: ICD-10-CM

## 2023-11-08 DIAGNOSIS — F10.10 ETOH ABUSE: ICD-10-CM

## 2023-11-08 PROBLEM — E87.6 HYPOKALEMIA: Status: RESOLVED | Noted: 2023-08-04 | Resolved: 2023-11-08

## 2023-11-08 PROCEDURE — 99214 OFFICE O/P EST MOD 30 MIN: CPT | Performed by: INTERNAL MEDICINE

## 2023-11-08 NOTE — ASSESSMENT & PLAN NOTE
At present patient is medically stable her blood pressure is under control she is not drinking and her anxiety seems to be stable.   She does still smoke

## 2023-11-08 NOTE — PROGRESS NOTES
Name: Clarita Barnhart      : 1995      MRN: [id number]  Encounter Provider: Gilbert Abraham MD  Encounter Date: 2023   Encounter department: [facility]     1. Pre-op evaluation  Assessment & Plan: At present patient is medically stable her blood pressure is under control she is not drinking and her anxiety seems to be stable. She does still smoke      2. High grade squamous intraepithelial lesion (HGSIL), grade 3 SOM, on biopsy of cervix    3. ETOH abuse  Assessment & Plan:  Currently she is not drinking      4. BMI 40.0-44.9, adult [facility])  Assessment & Plan:  She again was counseled on diet and exercise             Subjective      Patient is scheduled for gynecological surgery with what I believe is a LEEP procedure for menstrual pain and bleeding. .  At this moment in time blood pressure is stable and she is not drinking. She is medically cleared for her procedure. Review of Systems   Constitutional:  Negative for chills and fever. HENT:  Negative for ear pain and sore throat. Eyes:  Negative for pain and visual disturbance. Respiratory:  Negative for cough and shortness of breath. Cardiovascular:  Negative for chest pain and palpitations. Gastrointestinal:  Negative for abdominal pain and vomiting. Genitourinary:  Negative for dysuria and hematuria. Musculoskeletal:  Negative for arthralgias and back pain. Skin:  Negative for color change and rash. Allergic/Immunologic: Negative for immunocompromised state. Neurological:  Negative for dizziness, seizures and syncope. Hematological:  Negative for adenopathy. Does not bruise/bleed easily. Psychiatric/Behavioral:  Negative for agitation, decreased concentration and dysphoric mood. The patient is nervous/anxious. All other systems reviewed and are negative.       Current Outpatient Medications on File Prior to Visit   Medication Sig    amLODIPine (NORVASC) 10 mg tablet Take 1 tablet (10 mg total) by mouth daily    ARIPiprazole (ABILIFY) 10 mg tablet Take 1 tablet (10 mg total) by mouth daily    gabapentin (NEURONTIN) 100 mg capsule Take 2 capsules (200 mg total) by mouth 2 (two) times a day    gabapentin (NEURONTIN) 400 mg capsule Take 1 capsule (400 mg total) by mouth daily at bedtime    lisinopril (ZESTRIL) 20 mg tablet Take 1 tablet (20 mg total) by mouth daily    omeprazole (PriLOSEC) 40 MG capsule Take 1 capsule (40 mg total) by mouth daily before breakfast    sertraline (ZOLOFT) 50 mg tablet Take 1 tablet (50 mg total) by mouth daily at bedtime    topiramate (Topamax) 25 mg tablet Take 1 tablet (25 mg total) by mouth daily at bedtime       Objective     /64 (BP Location: Left arm, Patient Position: Sitting, Cuff Size: Large)   Pulse 101   Resp 16   Ht 5' 3" (1.6 m)   Wt 113 kg (250 lb)   LMP 10/19/2023 (Exact Date)   SpO2 99%   BMI 44.29 kg/m²     Physical Exam  Vitals reviewed. Constitutional:       Appearance: She is obese. She is not ill-appearing. HENT:      Head: Normocephalic. Right Ear: Tympanic membrane normal.      Left Ear: Tympanic membrane normal.      Mouth/Throat:      Mouth: Mucous membranes are moist.   Eyes:      Extraocular Movements: Extraocular movements intact. Pupils: Pupils are equal, round, and reactive to light. Neck:      Vascular: No carotid bruit. Cardiovascular:      Rate and Rhythm: Normal rate and regular rhythm. Pulmonary:      Effort: Pulmonary effort is normal.      Breath sounds: Normal breath sounds. Abdominal:      General: Bowel sounds are normal.      Palpations: Abdomen is soft. Musculoskeletal:         General: Normal range of motion. Lymphadenopathy:      Cervical: No cervical adenopathy. Skin:     General: Skin is warm and dry. Findings: No rash. Neurological:      General: No focal deficit present. Mental Status: She is alert and oriented to person, place, and time.       Gait: Gait normal. Psychiatric:         Mood and Affect: Mood normal.         Behavior: Behavior normal.         Thought Content:  Thought content normal.         Judgment: Judgment normal.       Ag Loera MD

## 2023-11-09 ENCOUNTER — SOCIAL WORK (OUTPATIENT)
Dept: PSYCHIATRY | Facility: CLINIC | Age: 28
End: 2023-11-09
Payer: COMMERCIAL

## 2023-11-09 DIAGNOSIS — F41.8 MIXED ANXIETY DEPRESSIVE DISORDER: Primary | ICD-10-CM

## 2023-11-09 DIAGNOSIS — F10.90 ALCOHOL USE DISORDER: ICD-10-CM

## 2023-11-09 PROCEDURE — 90837 PSYTX W PT 60 MINUTES: CPT | Performed by: COUNSELOR

## 2023-11-09 NOTE — PSYCH
Behavioral Health Psychotherapy Progress Note    Psychotherapy Provided: Individual Psychotherapy     1. Mixed anxiety depressive disorder        2. Alcohol use disorder            Goals addressed in session: Goal 1 and Goal 2     DATA: Chandan arrived on time for her appointment. Chandan and this writer discussed what had occurred last week regarding Chandan missing two of her appointments, one with therapist and one with MAT provider. Chandan states she is doing well with her recovery but she feels that she has had too many scheduled appointments over the last few weeks and this was somewhat overwhelming. Therapist discussed with Lidia Abarca decreasing contact with therapist to every other week and/or scheduling telehealth. Chandan felt this was an effective plan and agreed to every other week appointments and telehealth as needed dependent upon Chandan's schedules. Chandan and therapist discussed a recent conflict in her relationship with her significant other and her relationship with his children. Therapist utilized open ended questions and reflective listening techniques to assist Chandan in identifying ways to improve communication with her significant other. Therapist challenged some of the beliefs chandan held regarding her partner's motivations to allow Chandan to explore healthier ways to communicate. Chandan was provided education regarding good/bad stress and relationship development theories. Chandan was receptive to these interventions and open to developing improved skills for her communication issues. Chandan was scheduled for her next session in two weeks. During this session, this clinician used the following therapeutic modalities: Cognitive Behavioral Therapy, Cognitive Processing Therapy, and Motivational Interviewing    Substance Abuse was addressed during this session.  If the client is diagnosed with a co-occurring substance use disorder, please indicate any changes in the frequency or amount of use: Ivette reports continued abstinence and reports no increase in cravings at this time. Therapist utilized action stage interventions to promote continued abstinence. Stage of change for addressing substance use diagnoses: Action    ASSESSMENT:  Feliciano Rangel presents with a Euthymic/ normal mood. Ivette presented as positive and open to discussion today. Ivette demonstrates ongoing stability and indicates improved mood regulation. her affect is Normal range and intensity, which is congruent, with her mood and the content of the session. The client has made progress on their goals. Feliciano Rangel presents with a none risk of suicide, none risk of self-harm, and none risk of harm to others. For any risk assessment that surpasses a "low" rating, a safety plan must be developed. A safety plan was indicated: no  If yes, describe in detail n/a    PLAN: Between sessions, Feliciano Rangel will continue to monitor her relapse potential and begin using improved communication skills. . At the next session, the therapist will use Cognitive Behavioral Therapy, Cognitive Processing Therapy, Dialectical Behavior Therapy, Motivational Interviewing, and Solution-Focused Therapy to address Delfin ongoing desire to maintain abstinence and develop a healthier ability to mange her moods. Behavioral Health Treatment Plan and Discharge Planning: Feliciano Rangel is aware of and agrees to continue to work on their treatment plan. They have identified and are working toward their discharge goals.  yes    Visit start and stop times:    11/09/23  Start Time: 1002  Stop Time: 1104  Total Visit Time: 62 minutes

## 2023-11-16 ENCOUNTER — OFFICE VISIT (OUTPATIENT)
Dept: OTHER | Age: 28
End: 2023-11-16

## 2023-11-16 ENCOUNTER — TELEMEDICINE (OUTPATIENT)
Dept: PSYCHIATRY | Facility: CLINIC | Age: 28
End: 2023-11-16
Payer: COMMERCIAL

## 2023-11-16 VITALS
SYSTOLIC BLOOD PRESSURE: 125 MMHG | BODY MASS INDEX: 44.3 KG/M2 | HEART RATE: 78 BPM | WEIGHT: 250 LBS | HEIGHT: 63 IN | DIASTOLIC BLOOD PRESSURE: 86 MMHG

## 2023-11-16 DIAGNOSIS — F43.10 PTSD (POST-TRAUMATIC STRESS DISORDER): Primary | ICD-10-CM

## 2023-11-16 DIAGNOSIS — F39 UNSPECIFIED MOOD (AFFECTIVE) DISORDER (HCC): ICD-10-CM

## 2023-11-16 DIAGNOSIS — F10.90 ALCOHOL USE DISORDER: Primary | ICD-10-CM

## 2023-11-16 DIAGNOSIS — F10.90 ALCOHOL USE DISORDER: ICD-10-CM

## 2023-11-16 PROCEDURE — 90837 PSYTX W PT 60 MINUTES: CPT | Performed by: COUNSELOR

## 2023-11-16 NOTE — PSYCH
Virtual Regular Visit    Verification of patient location:    Patient is located at Home in the following state in which I hold an active license [place]      Assessment/Plan:    Problem List Items Addressed This Visit       Alcohol use disorder    Unspecified mood (affective) disorder ([facility])    PTSD (post-traumatic stress disorder) - Primary       Goals addressed in session: Goal 1 and Goal 2          Reason for visit is   Chief Complaint   Patient presents with    Virtual Regular Visit          Encounter provider Mabelene Rinne    Provider located at [address] [place] [id number] [phone]      Recent Visits  No visits were found meeting these conditions. Showing recent visits within past 7 days and meeting all other requirements  Today's Visits  Date Type Provider Dept   11/16/23 Telemedicine Mabelene Rinne [facility]   Showing today's visits and meeting all other requirements  Future Appointments  No visits were found meeting these conditions. Showing future appointments within next 150 days and meeting all other requirements       The patient was identified by name and date of birth. Bella Valdovinos was informed that this is a telemedicine visit and that the visit is being conducted throughthe [employer] platform. She agrees to proceed. .  My office door was closed. No one else was in the room. She acknowledged consent and understanding of privacy and security of the video platform. The patient has agreed to participate and understands they can discontinue the visit at any time. Patient is aware this is a billable service. Gina Rush is a 25 y.o. female who was seen for virtual therapy .       HPI     Past Medical History:   Diagnosis Date    Abnormal Pap smear of cervix     ASCUS + HPV 16 & other    Alcohol abuse     Alcoholism ([facility])     Anxiety     Atypical squamous cells cannot exclude high grade squamous intraepithelial lesion on cytologic smear of cervix (ASC-H) 10/4/2023    9/8/23 ASCUS w/ HPV 16 and "other" type 10/5- colposcopy    Bipolar disorder ([address])     Depression     Hypertension     Kidney stones     PTSD (post-traumatic stress disorder)     Self-injurious behavior     Suicide attempt ([address])     Withdrawal symptoms, drug or narcotic ([address])        No past surgical history on file. Current Outpatient Medications   Medication Sig Dispense Refill    amLODIPine (NORVASC) 10 mg tablet Take 1 tablet (10 mg total) by mouth daily 30 tablet 2    ARIPiprazole (ABILIFY) 10 mg tablet Take 1 tablet (10 mg total) by mouth daily 30 tablet 3    gabapentin (NEURONTIN) 100 mg capsule Take 2 capsules (200 mg total) by mouth 2 (two) times a day 60 capsule 3    gabapentin (NEURONTIN) 400 mg capsule Take 1 capsule (400 mg total) by mouth daily at bedtime 30 capsule 3    lisinopril (ZESTRIL) 20 mg tablet Take 1 tablet (20 mg total) by mouth daily 30 tablet 0    omeprazole (PriLOSEC) 40 MG capsule Take 1 capsule (40 mg total) by mouth daily before breakfast 90 capsule 1    sertraline (ZOLOFT) 50 mg tablet Take 1 tablet (50 mg total) by mouth daily at bedtime 30 tablet 3    topiramate (Topamax) 25 mg tablet Take 1 tablet (25 mg total) by mouth daily at bedtime 30 tablet 3     No current facility-administered medications for this visit. Allergies   Allergen Reactions    Biaxin [Clarithromycin] GI Intolerance    Other GI Intolerance     cefcil         Review of Systems    Video Exam    There were no vitals filed for this visit. Physical Exam     Visit Time    Visit Start Time: 8051  Visit Stop Time: 1600  Total Visit Duration:  58 minutes      Behavioral Health Psychotherapy Progress Note    Psychotherapy Provided: Individual Psychotherapy     1. PTSD (post-traumatic stress disorder)        2. Unspecified mood (affective) disorder ([address])        3.  Alcohol use disorder Goals addressed in session: Goal 1 and Goal 2     DATA: Ivette was seen for a virtual session today. Ivette states she is pleased that she is now being scheduled for MAT medical appointments every two months. Ivette reported that she had one episode recently of SI but states she believes the cause of this was her missing her medications one day. Ievtte reports medication compliance outside of this one missed day. Therapist processed this with Ivette and discussed thought stopping techniques. Ivette reports that this episode was improved from previous episodes and it had lasted less time than previous episodes. Ivette denied any current SI or HI. Ivette and therapist processed her feelings regarding her improvement and how she can build on this improvement. Ivette was scheduled for a session in 2 weeks. During this session, this clinician used the following therapeutic modalities: Cognitive Behavioral Therapy, Cognitive Processing Therapy, Mindfulness-based Strategies, and Motivational Interviewing    Substance Abuse was addressed during this session. If the client is diagnosed with a co-occurring substance use disorder, please indicate any changes in the frequency or amount of use: Ivette reports continued abstinence for 105 days. Therapist engaged [address] in a discussion regarding further supports needed to maintain abstinence. Ivette was able to commit to attending an online 12 step meeting tonight. Therapist utilized motivational interviewing skills of develop further plans to support recovery. Stage of change for addressing substance use diagnoses: Action    ASSESSMENT:  Yordy Parish presents with a Euthymic/ normal mood. Ivette displayed improved ability to manage times of distress and presents as having began developing effective coping skills. Ivette's ongoing abstinence has allowed her the ability to respond differently to mood fluctuations and develop improved coping skills.       her affect is Normal range and intensity, which is congruent, with her mood and the content of the session. The client has made progress on their goals. Shilpi Dawkins presents with a none risk of suicide, none risk of self-harm, and none risk of harm to others. For any risk assessment that surpasses a "low" rating, a safety plan must be developed. A safety plan was indicated: no  If yes, describe in detail n/a    PLAN: Between sessions, Shilpi Dawkins will attend a 12 step meeting and continue to develop coping skills. At the next session, the therapist will use Cognitive Behavioral Therapy, Cognitive Processing Therapy, Motivational Interviewing, and Solution-Focused Therapy to address Ivette's relapse prevention needs and development of coping skills. Behavioral Health Treatment Plan and Discharge Planning: Shilpi Dawkins is aware of and agrees to continue to work on their treatment plan. They have identified and are working toward their discharge goals.  yes    Visit start and stop times:    11/16/23  Start Time: 1502  Stop Time: 1600  Total Visit Time: 58 minutes

## 2023-11-16 NOTE — ASSESSMENT & PLAN NOTE
Continues to do well on Vivitrol injections. Denies any re-occurrence use or cravings.   Last injection: 11/1/23- next injection at the end of this month  Will continue current treatment plan  She is established with [facility] and engaged in psychotherapy  Will RTC in 2 months

## 2023-11-16 NOTE — PROGRESS NOTES
[facility]    Progress Note  Shilpi Dawkins 25 y.o. female MRN: [id number]  @ Encounter: [id number]      1. Alcohol use disorder  Assessment & Plan:  Continues to do well on Vivitrol injections. Denies any re-occurrence use or cravings. Last injection: 11/1/23- next injection at the end of this month  Will continue current treatment plan  She is established with [facility] and engaged in psychotherapy  Will RTC in 2 months             Support Services  Case Management and Certified  are following. Patient was referred to the [facility] Certified Addiction Counselors: Yes  The patient is actively engaged with the [facility] Certified Addiction Counselor’s psychotherapy plan: Yes    This patient does not have an active medication from one of the medication groupers. PDMP reviewed:     PDMP Review         Value Time User    PDMP Reviewed  Yes 8/11/2023 10:20 AM Holden Melendez MD            SUBJECTIVE  Patient continues to do well. She is now actively engaged in outpatient therapy through the [facility] program and reports that this has been helping with her anxiety. Patient received her last vivitrol injection on 11/1/23 and denies further cravings.      Drug cravings: none   Motivation to continue treatment: high       Current Outpatient Medications:     amLODIPine (NORVASC) 10 mg tablet, Take 1 tablet (10 mg total) by mouth daily, Disp: 30 tablet, Rfl: 2    ARIPiprazole (ABILIFY) 10 mg tablet, Take 1 tablet (10 mg total) by mouth daily, Disp: 30 tablet, Rfl: 3    gabapentin (NEURONTIN) 100 mg capsule, Take 2 capsules (200 mg total) by mouth 2 (two) times a day, Disp: 60 capsule, Rfl: 3    gabapentin (NEURONTIN) 400 mg capsule, Take 1 capsule (400 mg total) by mouth daily at bedtime, Disp: 30 capsule, Rfl: 3    lisinopril (ZESTRIL) 20 mg tablet, Take 1 tablet (20 mg total) by mouth daily, Disp: 30 tablet, Rfl: 0    omeprazole (PriLOSEC) 40 MG capsule, Take 1 capsule (40 mg total) by mouth daily before breakfast, Disp: 90 capsule, Rfl: 1    sertraline (ZOLOFT) 50 mg tablet, Take 1 tablet (50 mg total) by mouth daily at bedtime, Disp: 30 tablet, Rfl: 3    topiramate (Topamax) 25 mg tablet, Take 1 tablet (25 mg total) by mouth daily at bedtime, Disp: 30 tablet, Rfl: 3    Objective     Vitals:    11/16/23 1312   BP: 125/86   Pulse: 78       Physical Exam  Vitals and nursing note reviewed. Lab Results:   Results from last 6 Months   Lab Units 10/13/23  1016   WBC Thousand/uL 6.74   HEMOGLOBIN g/dL 13.4   HEMATOCRIT % 43.1   PLATELETS Thousands/uL 243      Results from last 6 Months   Lab Units 10/13/23  1016 08/07/23  0445   POTASSIUM mmol/L  --  3.7   CHLORIDE mmol/L  --  104   CO2 mmol/L  --  24   BUN mg/dL  --  10   CREATININE mg/dL  --  0.59*   CALCIUM mg/dL  --  9.1   ALBUMIN g/dL 4.2 3.8   ALK PHOS U/L 74 102   ALT U/L 20 68*   AST U/L 21 71*     Results from last 6 Months   Lab Units 09/08/23  1334   HEP B S AG  Non-reactive   HEP C AB  Non-reactive     Results from last 6 Months   Lab Units 09/08/23  1334   HIV-1 P24 ANTIGEN-BIOPLEX  Non-Reactive                 Counseling / Coordination of Care  Total time spent today 15 minutes. Greater than 50% of total time was spent with the patient and / or family counseling and / or coordination of care.      Rolando Cohen PA-C

## 2023-11-20 ENCOUNTER — ANESTHESIA EVENT (OUTPATIENT)
Dept: ANESTHESIOLOGY | Facility: HOSPITAL | Age: 28
End: 2023-11-20

## 2023-11-20 ENCOUNTER — ANESTHESIA (OUTPATIENT)
Dept: ANESTHESIOLOGY | Facility: HOSPITAL | Age: 28
End: 2023-11-20

## 2023-11-22 RX ORDER — NAPROXEN SODIUM 220 MG
220 TABLET ORAL DAILY PRN
COMMUNITY

## 2023-11-24 DIAGNOSIS — I10 PRIMARY HYPERTENSION: ICD-10-CM

## 2023-11-27 RX ORDER — LISINOPRIL 20 MG/1
20 TABLET ORAL DAILY
Qty: 90 TABLET | Refills: 1 | Status: SHIPPED | OUTPATIENT
Start: 2023-11-27

## 2023-11-30 ENCOUNTER — SOCIAL WORK (OUTPATIENT)
Dept: PSYCHIATRY | Facility: CLINIC | Age: 28
End: 2023-11-30
Payer: COMMERCIAL

## 2023-11-30 ENCOUNTER — HOSPITAL ENCOUNTER (OUTPATIENT)
Dept: INFUSION CENTER | Facility: HOSPITAL | Age: 28
End: 2023-11-30
Attending: EMERGENCY MEDICINE
Payer: COMMERCIAL

## 2023-11-30 DIAGNOSIS — F43.10 PTSD (POST-TRAUMATIC STRESS DISORDER): ICD-10-CM

## 2023-11-30 DIAGNOSIS — F10.939 ALCOHOL WITHDRAWAL SYNDROME WITH COMPLICATION (HCC): ICD-10-CM

## 2023-11-30 DIAGNOSIS — F39 UNSPECIFIED MOOD (AFFECTIVE) DISORDER (HCC): Primary | ICD-10-CM

## 2023-11-30 DIAGNOSIS — F10.90 ALCOHOL USE DISORDER: Primary | ICD-10-CM

## 2023-11-30 PROCEDURE — 96372 THER/PROPH/DIAG INJ SC/IM: CPT

## 2023-11-30 PROCEDURE — 90837 PSYTX W PT 60 MINUTES: CPT | Performed by: COUNSELOR

## 2023-11-30 RX ADMIN — NALTREXONE 380 MG: KIT at 11:03

## 2023-11-30 NOTE — PROGRESS NOTES
Pt tolerated Vivitrol today IM in the right buttocks with no adverse reactions. Declined AVS, next apt confirmed. Left unit ambulatory with a steady gait.

## 2023-11-30 NOTE — PSYCH
Behavioral Health Psychotherapy Progress Note    Psychotherapy Provided: Individual Psychotherapy     1. Unspecified mood (affective) disorder ([address])        2. PTSD (post-traumatic stress disorder)        3. Alcohol withdrawal syndrome with complication ([address])            Goals addressed in session: Goal 1 and Goal 2     DATA: Ivette arrived early for her session. Ivette reported an increase in distress today, reporting she had reached out to her father to attempt to resolve her feelings of resentment towards his alcohol use during her childhood. Ivette reports she had informed her of her intention to have a discussion and his response was not what she had hoped for. Therapist explored these feelings and this expectation with Nia Rodriguez was able to process her feelings and evaluate her expectations. Therapist had [address] focus on future solutions rather than past problems and determine what was working and what wasn't for [address] to maintain emotional balance. Therapist explored with Ivette the evidence for her hurtful beliefs and how this evidence may not be correct. Ivette indicated improvement in distress while in therapy session and was scheduled for a session in 2 weeks. During this session, this clinician used the following therapeutic modalities: Cognitive Behavioral Therapy, Motivational Interviewing, Solution-Focused Therapy, and Supportive Psychotherapy    Substance Abuse was addressed during this session. If the client is diagnosed with a co-occurring substance use disorder, please indicate any changes in the frequency or amount of use: Ivette reports she is 119 days abstinent. Ivette reports she did not attend the 12 step meeting she had planned on attending. Ivette and therapist  reviewed what had caused Ivette not to attend and Ivette was able to determine she forgets. Therapist discussed solutions and Ivette was able to set a phone alarm to attend.  Therapist utilized stage appropriate interventions to address this substance abuse issue. Stage of change for addressing substance use diagnoses: Action    ASSESSMENT:  Kaye Ramirez presents with a Euthymic/ normal mood. Ivette displays overall improvement and expresses less distress from depression and anxiety. her affect is Normal range and intensity, which is congruent, with her mood and the content of the session. The client has made progress on their goals. Kaye Ramirez presents with a none risk of suicide, none risk of self-harm, and none risk of harm to others. For any risk assessment that surpasses a "low" rating, a safety plan must be developed. A safety plan was indicated: no  If yes, describe in detail n/a    PLAN: Between sessions, Kaye Ramirez will attend a 12 step meeting and begin to evaluate her motivation for wanting to re-establish relations with her father. At the next session, the therapist will use Cognitive Behavioral Therapy, Motivational Interviewing, Solution-Focused Therapy, and Supportive Psychotherapy to address Ivette's ongoing relapse prevention needs and development of skills. Behavioral Health Treatment Plan and Discharge Planning: Kaye Ramirez is aware of and agrees to continue to work on their treatment plan. They have identified and are working toward their discharge goals.  yes    Visit start and stop times:    11/30/23  Start Time: 6723  Stop Time: 1042  Total Visit Time: 60 minutes

## 2023-12-03 DIAGNOSIS — I10 HYPERTENSION: ICD-10-CM

## 2023-12-04 ENCOUNTER — ANESTHESIA (OUTPATIENT)
Dept: PERIOP | Facility: AMBULARY SURGERY CENTER | Age: 28
End: 2023-12-04
Payer: COMMERCIAL

## 2023-12-04 ENCOUNTER — ANESTHESIA EVENT (OUTPATIENT)
Dept: PERIOP | Facility: AMBULARY SURGERY CENTER | Age: 28
End: 2023-12-04
Payer: COMMERCIAL

## 2023-12-04 ENCOUNTER — HOSPITAL ENCOUNTER (OUTPATIENT)
Facility: AMBULARY SURGERY CENTER | Age: 28
Setting detail: OUTPATIENT SURGERY
Discharge: HOME | End: 2023-12-04
Attending: STUDENT IN AN ORGANIZED HEALTH CARE EDUCATION/TRAINING PROGRAM | Admitting: STUDENT IN AN ORGANIZED HEALTH CARE EDUCATION/TRAINING PROGRAM
Payer: COMMERCIAL

## 2023-12-04 VITALS
TEMPERATURE: 97 F | BODY MASS INDEX: 44.3 KG/M2 | OXYGEN SATURATION: 100 % | SYSTOLIC BLOOD PRESSURE: 106 MMHG | RESPIRATION RATE: 18 BRPM | HEIGHT: 63 IN | DIASTOLIC BLOOD PRESSURE: 75 MMHG | HEART RATE: 87 BPM | WEIGHT: 250 LBS

## 2023-12-04 DIAGNOSIS — D06.9 CIN III (CERVICAL INTRAEPITHELIAL NEOPLASIA III): ICD-10-CM

## 2023-12-04 PROBLEM — Z98.890 STATUS POST LEEP (LOOP ELECTROSURGICAL EXCISION PROCEDURE) OF CERVIX: Status: ACTIVE | Noted: 2023-12-04

## 2023-12-04 LAB
EXT PREGNANCY TEST URINE: NEGATIVE
EXT. CONTROL: NORMAL

## 2023-12-04 PROCEDURE — 88307 TISSUE EXAM BY PATHOLOGIST: CPT | Performed by: PATHOLOGY

## 2023-12-04 PROCEDURE — NC001 PR NO CHARGE: Performed by: STUDENT IN AN ORGANIZED HEALTH CARE EDUCATION/TRAINING PROGRAM

## 2023-12-04 PROCEDURE — 57522 CONIZATION OF CERVIX: CPT | Performed by: STUDENT IN AN ORGANIZED HEALTH CARE EDUCATION/TRAINING PROGRAM

## 2023-12-04 PROCEDURE — 81025 URINE PREGNANCY TEST: CPT | Performed by: STUDENT IN AN ORGANIZED HEALTH CARE EDUCATION/TRAINING PROGRAM

## 2023-12-04 PROCEDURE — 88344 IMHCHEM/IMCYTCHM EA MLT ANTB: CPT | Performed by: PATHOLOGY

## 2023-12-04 RX ORDER — PROPOFOL 10 MG/ML
INJECTION, EMULSION INTRAVENOUS AS NEEDED
Status: DISCONTINUED | OUTPATIENT
Start: 2023-12-04 | End: 2023-12-04

## 2023-12-04 RX ORDER — AMLODIPINE BESYLATE 10 MG/1
10 TABLET ORAL DAILY
Qty: 30 TABLET | Refills: 0 | Status: SHIPPED | OUTPATIENT
Start: 2023-12-04

## 2023-12-04 RX ORDER — PROPOFOL 10 MG/ML
INJECTION, EMULSION INTRAVENOUS CONTINUOUS PRN
Status: DISCONTINUED | OUTPATIENT
Start: 2023-12-04 | End: 2023-12-04

## 2023-12-04 RX ORDER — ONDANSETRON 2 MG/ML
4 INJECTION INTRAMUSCULAR; INTRAVENOUS ONCE AS NEEDED
Status: DISCONTINUED | OUTPATIENT
Start: 2023-12-04 | End: 2023-12-04 | Stop reason: HOSPADM

## 2023-12-04 RX ORDER — FENTANYL CITRATE 50 UG/ML
INJECTION, SOLUTION INTRAMUSCULAR; INTRAVENOUS AS NEEDED
Status: DISCONTINUED | OUTPATIENT
Start: 2023-12-04 | End: 2023-12-04

## 2023-12-04 RX ORDER — DEXAMETHASONE SODIUM PHOSPHATE 10 MG/ML
INJECTION, SOLUTION INTRAMUSCULAR; INTRAVENOUS AS NEEDED
Status: DISCONTINUED | OUTPATIENT
Start: 2023-12-04 | End: 2023-12-04

## 2023-12-04 RX ORDER — SODIUM CHLORIDE, SODIUM LACTATE, POTASSIUM CHLORIDE, CALCIUM CHLORIDE 600; 310; 30; 20 MG/100ML; MG/100ML; MG/100ML; MG/100ML
INJECTION, SOLUTION INTRAVENOUS CONTINUOUS PRN
Status: DISCONTINUED | OUTPATIENT
Start: 2023-12-04 | End: 2023-12-04

## 2023-12-04 RX ORDER — SODIUM CHLORIDE, SODIUM LACTATE, POTASSIUM CHLORIDE, CALCIUM CHLORIDE 600; 310; 30; 20 MG/100ML; MG/100ML; MG/100ML; MG/100ML
125 INJECTION, SOLUTION INTRAVENOUS CONTINUOUS
Status: DISCONTINUED | OUTPATIENT
Start: 2023-12-04 | End: 2023-12-04 | Stop reason: HOSPADM

## 2023-12-04 RX ORDER — ACETIC ACID 5 %
LIQUID (ML) MISCELLANEOUS AS NEEDED
Status: DISCONTINUED | OUTPATIENT
Start: 2023-12-04 | End: 2023-12-04 | Stop reason: HOSPADM

## 2023-12-04 RX ORDER — KETOROLAC TROMETHAMINE 30 MG/ML
INJECTION, SOLUTION INTRAMUSCULAR; INTRAVENOUS AS NEEDED
Status: DISCONTINUED | OUTPATIENT
Start: 2023-12-04 | End: 2023-12-04

## 2023-12-04 RX ORDER — FENTANYL CITRATE/PF 50 MCG/ML
25 SYRINGE (ML) INJECTION
Status: DISCONTINUED | OUTPATIENT
Start: 2023-12-04 | End: 2023-12-04 | Stop reason: HOSPADM

## 2023-12-04 RX ORDER — MAGNESIUM HYDROXIDE 1200 MG/15ML
LIQUID ORAL AS NEEDED
Status: DISCONTINUED | OUTPATIENT
Start: 2023-12-04 | End: 2023-12-04 | Stop reason: HOSPADM

## 2023-12-04 RX ORDER — ONDANSETRON 2 MG/ML
INJECTION INTRAMUSCULAR; INTRAVENOUS AS NEEDED
Status: DISCONTINUED | OUTPATIENT
Start: 2023-12-04 | End: 2023-12-04

## 2023-12-04 RX ORDER — ONDANSETRON 2 MG/ML
4 INJECTION INTRAMUSCULAR; INTRAVENOUS EVERY 6 HOURS PRN
Status: DISCONTINUED | OUTPATIENT
Start: 2023-12-04 | End: 2023-12-04 | Stop reason: HOSPADM

## 2023-12-04 RX ORDER — ACETAMINOPHEN 325 MG/1
975 TABLET ORAL EVERY 6 HOURS PRN
Status: DISCONTINUED | OUTPATIENT
Start: 2023-12-04 | End: 2023-12-04 | Stop reason: HOSPADM

## 2023-12-04 RX ORDER — MIDAZOLAM HYDROCHLORIDE 2 MG/2ML
INJECTION, SOLUTION INTRAMUSCULAR; INTRAVENOUS AS NEEDED
Status: DISCONTINUED | OUTPATIENT
Start: 2023-12-04 | End: 2023-12-04

## 2023-12-04 RX ORDER — IBUPROFEN 600 MG/1
600 TABLET ORAL EVERY 6 HOURS PRN
Status: DISCONTINUED | OUTPATIENT
Start: 2023-12-04 | End: 2023-12-04 | Stop reason: HOSPADM

## 2023-12-04 RX ADMIN — PROPOFOL 150 MCG/KG/MIN: 10 INJECTION, EMULSION INTRAVENOUS at 07:34

## 2023-12-04 RX ADMIN — PROPOFOL 100 MG: 10 INJECTION, EMULSION INTRAVENOUS at 07:34

## 2023-12-04 RX ADMIN — SODIUM CHLORIDE, SODIUM LACTATE, POTASSIUM CHLORIDE, AND CALCIUM CHLORIDE: .6; .31; .03; .02 INJECTION, SOLUTION INTRAVENOUS at 07:32

## 2023-12-04 RX ADMIN — FENTANYL CITRATE 25 MCG: 50 INJECTION INTRAMUSCULAR; INTRAVENOUS at 07:53

## 2023-12-04 RX ADMIN — FENTANYL CITRATE 25 MCG: 50 INJECTION INTRAMUSCULAR; INTRAVENOUS at 07:51

## 2023-12-04 RX ADMIN — ONDANSETRON 4 MG: 2 INJECTION INTRAMUSCULAR; INTRAVENOUS at 07:34

## 2023-12-04 RX ADMIN — FENTANYL CITRATE 25 MCG: 50 INJECTION INTRAMUSCULAR; INTRAVENOUS at 07:37

## 2023-12-04 RX ADMIN — DEXAMETHASONE SODIUM PHOSPHATE 10 MG: 10 INJECTION, SOLUTION INTRAMUSCULAR; INTRAVENOUS at 07:34

## 2023-12-04 RX ADMIN — KETOROLAC TROMETHAMINE 15 MG: 30 INJECTION, SOLUTION INTRAMUSCULAR; INTRAVENOUS at 07:53

## 2023-12-04 RX ADMIN — FENTANYL CITRATE 25 MCG: 50 INJECTION INTRAMUSCULAR; INTRAVENOUS at 07:47

## 2023-12-04 RX ADMIN — MIDAZOLAM 2 MG: 1 INJECTION INTRAMUSCULAR; INTRAVENOUS at 07:28

## 2023-12-04 NOTE — H&P
H&P Exam - Gynecology   Ivette Diallo 25 y.o. female MRN: [id number]  Unit/Bed#: OR POOL Encounter: [id number]    Assessment/Plan     A/P:  SOM 3/HSIL   -9/8/23: PAP ASCUS cannot exclude HSIL, HPV 16+   -10/5/23: Colpo: 6oclock SOM II-III with HPV changes, transformation zone with high grade dysplasia. -will proceed with LEEP, risks, benefits, alternatives again discussed. All questions answered.   -follow up post procedure     History of Present Illness   HPI:  Omid Khan is a 25 y.o. female who presents for LEEP procedure. Denies vaginal bleeding, abnormal vaginal discharge, pelvic pain. Review of Systems   Constitutional:  Negative for appetite change, chills, fatigue and fever. Respiratory:  Negative for cough, chest tightness, shortness of breath and wheezing. Cardiovascular:  Negative for chest pain, palpitations and leg swelling. Gastrointestinal:  Negative for abdominal distention, abdominal pain, constipation, diarrhea, nausea and vomiting. Endocrine: Negative for cold intolerance, heat intolerance and polyuria. Genitourinary:  Negative for difficulty urinating, dyspareunia, dysuria, genital sores, menstrual problem, vaginal bleeding, vaginal discharge and vaginal pain. Neurological:  Negative for dizziness, weakness, light-headedness and headaches.        Historical Information   Past Medical History:   Diagnosis Date    Abnormal Pap smear of cervix     ASCUS + HPV 16 & other    Alcohol abuse     Alcoholism ([address])     Anxiety     Atypical squamous cells cannot exclude high grade squamous intraepithelial lesion on cytologic smear of cervix (ASC-H) 10/04/2023    9/8/23 ASCUS w/ HPV 16 and "other" type 10/5- colposcopy    Bipolar disorder ([address])     Depression     GERD (gastroesophageal reflux disease)     Hypertension     Kidney stones     PTSD (post-traumatic stress disorder)     Self-injurious behavior     Suicide attempt ([address])     Withdrawal symptoms, drug or narcotic ([address]) History reviewed. No pertinent surgical history. OB/GYN History:   Family History   Problem Relation Age of Onset    Heart disease Mother     Stroke Mother     Diabetes Mother     Hypertension Mother     Asthma Mother     Cancer Paternal Grandmother     Kidney disease Paternal Grandmother     Diabetes Paternal Grandmother     Uterine cancer Neg Hx     Breast cancer Neg Hx     Cervical cancer Neg Hx     Ovarian cancer Neg Hx     Colon cancer Neg Hx      Social History   Social History     Substance and Sexual Activity   Alcohol Use Not Currently    Comment: former alcohol abuse,  Aug 3, 2023     Social History     Substance and Sexual Activity   Drug Use Yes    Types: Marijuana    Comment: THC edibles     Social History     Tobacco Use   Smoking Status Every Day    Packs/day: 1.00    Years: 10.00    Total pack years: 10.00    Types: Cigarettes    Start date: 2006    Passive exposure: Past   Smokeless Tobacco Never     E-Cigarette/Vaping    E-Cigarette Use Current Some Day User      E-Cigarette/Vaping Substances    Nicotine No     THC Yes     CBD No     Flavoring No     Other No     Unknown No        Meds/Allergies   all current active meds have been reviewed  Allergies   Allergen Reactions    Biaxin [Clarithromycin] GI Intolerance    Other GI Intolerance     cefcil         Objective   Vitals: Blood pressure 117/63, pulse 72, temperature (!) 97.3 °F (36.3 °C), temperature source Temporal, resp. rate 18, height 5' 3" (1.6 m), weight 113 kg (250 lb), last menstrual period 11/22/2023, SpO2 99 %. No intake or output data in the 24 hours ending 12/04/23 0719    Invasive Devices: Invasive Devices       Peripheral Intravenous Line  Duration             Peripheral IV 12/04/23 Dorsal (posterior); Left Hand <1 day                    Physical Exam  Constitutional:       General: She is not in acute distress. Appearance: Normal appearance. She is normal weight. Cardiovascular:      Rate and Rhythm: Normal rate. Pulmonary:      Effort: Pulmonary effort is normal. No respiratory distress. Abdominal:      General: Abdomen is flat. There is no distension. Palpations: Abdomen is soft. Tenderness: There is no abdominal tenderness. There is no guarding. Neurological:      Mental Status: She is oriented to person, place, and time. Lab Results: I have personally reviewed pertinent reports. Imaging: I have personally reviewed pertinent reports. EKG, Pathology, and Other Studies: I have personally reviewed pertinent reports.

## 2023-12-04 NOTE — ANESTHESIA POSTPROCEDURE EVALUATION
Post-Op Assessment Note    CV Status:  Stable  Pain Score: 0    Pain management: adequate       Mental Status:  Arousable and sleepy   Hydration Status:  Stable   PONV Controlled:  Controlled   Airway Patency:  Patent     Post Op Vitals Reviewed: Yes    No anethesia notable event occurred.     Staff: CRNA               BP   121/72   Temp (!) 96.8 °F (36 °C) (12/04/23 0800)    Pulse 87 (12/04/23 0800)   Resp 16 (12/04/23 0800)    SpO2   99

## 2023-12-04 NOTE — OP NOTE
OPERATIVE REPORT  PATIENT NAME: Oskar Marquez    :  1995  MRN: [id number]  Pt Location: [facility] OR ROOM 01    SURGERY DATE: 2023    Surgeon(s) and Role:     Rohit Byrd DO - Primary    Preop Diagnosis:  SOM III (cervical intraepithelial neoplasia III) [D06.9]    Post-Op Diagnosis Codes:     * SOM III (cervical intraepithelial neoplasia III) [D06.9]    Procedure(s):  LOOP ELECTRICAL EXCISION PROCEDURE    Specimen(s):  Cervical biopsy    Estimated Blood Loss:   Minimal    Drains:  * No LDAs found *    Anesthesia Type:   Choice    Operative Indications:  SOM III (cervical intraepithelial neoplasia III) [D06.9]      Operative Findings:  -acetic acid solution placed on cervix with HPV changes noted throughout transformation zone   -cervical biopsy performed via loop electrode   -Ball cautery of biopsy area performed   -hemostasis achieved at end of procedure     Complications:   None    Procedure and Technique:  Patient was taken to the operative room and placed under general anesthesia without difficulty. She was placed in the dorsal lithotomy position and prepped and draped in the normal sterile fashion. The bladder as emptied with a sterile catheter. A weighted speculum was placed into the vagina and the cervix was visualized. Acetic acid was applied to the cervix. HPV changes were noted throughout the transformation zone. A medium large loop electrode was used to excise the cervical sample using cut starting at 3 oclock and moving toward 9 oclock. The specimen was removed and sent to pathology. Ball cautery was used to obtain hemostasis. The weighted speculum was removed from the vagina. The patient was cleaned, removed from the dorsal lithotomy position, awoken from general anesthesia, and taken to the recovery room in stable condition. There were no complications from the procedure. Sponge, lap, and needle counts were correct at the end of the procedure.       I was present for the entire procedure.      Patient Disposition:  PACU         SIGNATURE: Jarocho Rivera DO  DATE: December 4, 2023  TIME: 8:00 AM

## 2023-12-04 NOTE — ANESTHESIA PREPROCEDURE EVALUATION
Procedure:  LOOP ELECTRICAL EXCISION PROCEDURE (Cervix)    Relevant Problems   CARDIO   (+) Primary hypertension      GI/HEPATIC   (+) GERD without esophagitis      NEURO/PSYCH   (+) Mixed anxiety depressive disorder   (+) PTSD (post-traumatic stress disorder)      PULMONARY   (+) Smoker      BMI 44    Physical Exam    Airway    Mallampati score: II  TM Distance: >3 FB  Neck ROM: full     Dental   No notable dental hx     Cardiovascular      Pulmonary      Other Findings  post-pubertal.      Anesthesia Plan  ASA Score- 3     Anesthesia Type- IV sedation with anesthesia with ASA Monitors. Additional Monitors:     Airway Plan:     Comment: Plan for IV sedation, GA with LMA back up. Plan Factors-Exercise tolerance (METS): >4 METS. Chart reviewed. Patient summary reviewed. Induction- intravenous. Postoperative Plan-     Informed Consent- Anesthetic plan and risks discussed with patient. I personally reviewed this patient with the CRNA. Discussed and agreed on the Anesthesia Plan with the CRNA. Yazan Craig

## 2023-12-12 PROCEDURE — 88344 IMHCHEM/IMCYTCHM EA MLT ANTB: CPT | Performed by: PATHOLOGY

## 2023-12-12 PROCEDURE — 88307 TISSUE EXAM BY PATHOLOGIST: CPT | Performed by: PATHOLOGY

## 2023-12-18 ENCOUNTER — OFFICE VISIT (OUTPATIENT)
Dept: PSYCHIATRY | Facility: CLINIC | Age: 28
End: 2023-12-18
Payer: COMMERCIAL

## 2023-12-18 ENCOUNTER — SOCIAL WORK (OUTPATIENT)
Dept: PSYCHIATRY | Facility: CLINIC | Age: 28
End: 2023-12-18
Payer: COMMERCIAL

## 2023-12-18 VITALS
HEIGHT: 63 IN | BODY MASS INDEX: 45 KG/M2 | WEIGHT: 254 LBS | SYSTOLIC BLOOD PRESSURE: 122 MMHG | DIASTOLIC BLOOD PRESSURE: 84 MMHG | HEART RATE: 72 BPM

## 2023-12-18 DIAGNOSIS — F10.90 ALCOHOL USE DISORDER: ICD-10-CM

## 2023-12-18 DIAGNOSIS — F43.10 PTSD (POST-TRAUMATIC STRESS DISORDER): ICD-10-CM

## 2023-12-18 DIAGNOSIS — R45.1 RESTLESSNESS: ICD-10-CM

## 2023-12-18 DIAGNOSIS — F39 UNSPECIFIED MOOD (AFFECTIVE) DISORDER (HCC): ICD-10-CM

## 2023-12-18 DIAGNOSIS — F41.9 ANXIETY DISORDER, UNSPECIFIED TYPE: ICD-10-CM

## 2023-12-18 DIAGNOSIS — F31.9 BIPOLAR DEPRESSION (HCC): Primary | ICD-10-CM

## 2023-12-18 DIAGNOSIS — F43.10 PTSD (POST-TRAUMATIC STRESS DISORDER): Primary | ICD-10-CM

## 2023-12-18 PROCEDURE — 90834 PSYTX W PT 45 MINUTES: CPT | Performed by: COUNSELOR

## 2023-12-18 PROCEDURE — 99213 OFFICE O/P EST LOW 20 MIN: CPT | Performed by: PSYCHIATRY & NEUROLOGY

## 2023-12-18 PROCEDURE — 90833 PSYTX W PT W E/M 30 MIN: CPT | Performed by: PSYCHIATRY & NEUROLOGY

## 2023-12-18 RX ORDER — GABAPENTIN 300 MG/1
600 CAPSULE ORAL
Qty: 60 CAPSULE | Refills: 3 | Status: SHIPPED | OUTPATIENT
Start: 2023-12-18

## 2023-12-18 RX ORDER — GABAPENTIN 100 MG/1
100 CAPSULE ORAL 2 TIMES DAILY
Qty: 60 CAPSULE | Refills: 3 | Status: SHIPPED | OUTPATIENT
Start: 2023-12-18

## 2023-12-18 RX ORDER — SERTRALINE HYDROCHLORIDE 100 MG/1
100 TABLET, FILM COATED ORAL
Qty: 30 TABLET | Refills: 4 | Status: SHIPPED | OUTPATIENT
Start: 2023-12-18

## 2023-12-18 RX ORDER — QUETIAPINE 150 MG/1
150 TABLET, FILM COATED, EXTENDED RELEASE ORAL
Qty: 30 TABLET | Refills: 2 | Status: SHIPPED | OUTPATIENT
Start: 2023-12-18

## 2023-12-18 NOTE — PSYCH
"[facility]    Progress Note  Ivette Andrew 28 y.o. female MRN: [id number]   Encounter: [id number]      Visit Time    Visit Start Time: 2:08PM  Visit Stop Time: 2:38 PM  Total Visit Duration:  30 minutes    SUBJECTIVE: \"starting feeling more depressed. Suicidal thoughts coming back but I do not want to kill myself\".       INTERVAL HISTORY: Pt stated that she \"always had SI\", they are getting stronger this time, pt stated that she does not plan and does not want to kill herself and wants to feel better\". Declined inpatient treatment at this time and there are no grounds for involunterely commitment. Supportive BF is a protective factor against suicide, and the patient stated that she would use him as a safety net if depression get worse.  Suicidal risk assessment Alderiso that the patient willingness to participate in treatment, went to psychotherapy, and continuation of working with medication management as a provider on an outpatient basis.  Protective factors against suicide.  The patient stated that she continued to stay sober and taking Vivitrol shots  which is another a protective factor against suicide.   Although the patient risk of suicide is elevated because of depression and suicidal thoughts, combination of protective factors and the patient insight into the need for treatment make her outpatient treatments the least restrictive measure at this point of time.  The patient stated that because of her work schedule she worked so hard to achieve, she also declined partial program.      Patient's sleep especially staying asleep is a problem that we also are going to address today.    Review Of Systems:    sleep changes: decreased  appetite changes: no change  energy/anergy: decreased    Support Services  The patient is actively engaged with the [facility] Certified Addiction Counselor’s psychotherapy plan: Yes        PDMP reviewed:     PDMP Review         Value Time User    PDMP Reviewed  Yes 8/11/2023 " 10:20 AM Justin Sears MD              Drug cravings: none  Motivation to continue treatment: high      Current Outpatient Medications:     gabapentin (NEURONTIN) 100 mg capsule, Take 1 capsule (100 mg total) by mouth 2 (two) times a day, Disp: 60 capsule, Rfl: 3    gabapentin (NEURONTIN) 300 mg capsule, Take 2 capsules (600 mg total) by mouth daily at bedtime, Disp: 60 capsule, Rfl: 3    QUEtiapine (SEROquel XR) 150 mg 24 hr tablet, Take 1 tablet (150 mg total) by mouth daily at bedtime, Disp: 30 tablet, Rfl: 2    sertraline (ZOLOFT) 100 mg tablet, Take 1 tablet (100 mg total) by mouth daily at bedtime, Disp: 30 tablet, Rfl: 4    amLODIPine (NORVASC) 10 mg tablet, Take 1 tablet (10 mg total) by mouth daily, Disp: 30 tablet, Rfl: 0    lisinopril (ZESTRIL) 20 mg tablet, Take 1 tablet (20 mg total) by mouth daily, Disp: 90 tablet, Rfl: 1    Naltrexone (VIVITROL IM), Inject into a muscle, Disp: , Rfl:     naproxen sodium (Aleve) 220 MG tablet, Take 220 mg by mouth daily as needed for mild pain, Disp: , Rfl:     omeprazole (PriLOSEC) 40 MG capsule, Take 1 capsule (40 mg total) by mouth daily before breakfast, Disp: 90 capsule, Rfl: 1    topiramate (Topamax) 25 mg tablet, Take 1 tablet (25 mg total) by mouth daily at bedtime, Disp: 30 tablet, Rfl: 3    Objective     Vitals:    12/18/23 1414   BP: 122/84   Pulse: 72           Mental Status Evaluation:    Appearance:  dressed appropriately, casually dressed   Mood:  depressed, anxious   Affect: constricted    Speech:  decreased rate, slow   Thought Content:  normal, negative thinking   Perceptual Disturbances: does not appear responding to internal stimuli   Risk Potential: Suicidal ideation -no plan to kill or harm herself  Homicidal ideation - None  Potential for aggression - No   Insight:  fair   Judgment: normal   Motor Activity: no abnormal movements       Lab Results:   Results from last 6 Months   Lab Units 10/13/23  1016   WBC Thousand/uL 6.74   HEMOGLOBIN g/dL  13.4   HEMATOCRIT % 43.1   PLATELETS Thousands/uL 243      Results from last 6 Months   Lab Units 10/13/23  1016 08/07/23  0445   POTASSIUM mmol/L  --  3.7   CHLORIDE mmol/L  --  104   CO2 mmol/L  --  24   BUN mg/dL  --  10   CREATININE mg/dL  --  0.59*   CALCIUM mg/dL  --  9.1   ALBUMIN g/dL 4.2 3.8   ALK PHOS U/L 74 102   ALT U/L 20 68*   AST U/L 21 71*     Results from last 6 Months   Lab Units 09/08/23  1334   HEP B S AG  Non-reactive   HEP C AB  Non-reactive     Results from last 6 Months   Lab Units 09/08/23  1334   HIV-1 P24 ANTIGEN-BIOPLEX  Non-Reactive                   Diagnoses and all orders for this visit:    Bipolar depression (HCC)  -     QUEtiapine (SEROquel XR) 150 mg 24 hr tablet; Take 1 tablet (150 mg total) by mouth daily at bedtime    Unspecified mood (affective) disorder (HCC)  -     sertraline (ZOLOFT) 100 mg tablet; Take 1 tablet (100 mg total) by mouth daily at bedtime  -     gabapentin (NEURONTIN) 100 mg capsule; Take 1 capsule (100 mg total) by mouth 2 (two) times a day    PTSD (post-traumatic stress disorder)  -     gabapentin (NEURONTIN) 100 mg capsule; Take 1 capsule (100 mg total) by mouth 2 (two) times a day    Anxiety disorder, unspecified type  -     gabapentin (NEURONTIN) 300 mg capsule; Take 2 capsules (600 mg total) by mouth daily at bedtime    Restlessness  -     gabapentin (NEURONTIN) 300 mg capsule; Take 2 capsules (600 mg total) by mouth daily at bedtime       This writer provided motivational therapy to address the patient's ongoing recovery, and also supportive therapy to engage the patient in treatment with bipolar depression.  The patient states that in addition to feeling sad and depressed, she also had some mood swings with brief irritability.  In order to help the patient with sleep improvement and depression, so writer started extended release of Seroquel, and increased Zoloft to address the time depression and anxiety.  Because of the patient's feeling tired in the  daytime which might be associated with his daytime Neurontin, the writer increase his nighttime dose to help with his nocturnal anxiety and sleep and decrease the daytime dose to 100 mg.  We will continue Topamax that also may be an additional help both to address the patient's mood, because of complex history of mood disorder associated with alcohol use disorder, and also may help to mitigate potential weight gain associated with Seroquel.      We discussed a safety plan consent of our discussion, the patient agreed to go to the emergency room or call 911 if depression getting worse or suicidal thoughts worse intently to intent to end her life.  At the same time the patient felt that inpatient treatment is not what might help the patient, and she was inclined to continue outpatient therapy and medication management to treat her depression and hopefully sleep improvement as a result of increased nighttime gabapentin and starting the extended release Seroquel to address continuation of sleep.      Risks / Benefits of Treatment:    Risks, benefits, and possible side effects of medications explained to patient including risk of parkinsonian symptoms, Tardive Dyskinesia and metabolic syndrome related to treatment with antipsychotic medications. The patient verbalizes understanding and agreement for treatment.      This note was not shared with the patient due to this is a psychotherapy note     Counseling / Coordination of Care  Total time spent today 30 minutes. Greater than 50% of total time was spent with the patient and / or family counseling and / or coordination of care.     Braxton Crook MD

## 2023-12-18 NOTE — PSYCH
Behavioral Health Psychotherapy Progress Note    Psychotherapy Provided: Individual Psychotherapy     1. PTSD (post-traumatic stress disorder)        2. Alcohol use disorder        3. Unspecified mood (affective) disorder (HCC)            Goals addressed in session: Goal 1 and Goal 2     DATA: Ivette arrived late for her session. Ivette reports an increase in SI but denied any plan and states she is uncertain why she is thinking these thoughts again. Ivette and therapist discussed her current life situations and the way Ivette had been feeling recently. Therapist utilized re-framing and having Ivette be as specific as possible when describing thoughts, feelings, and situations.  Ivette indicates she is able to recognize that these thoughts of SI are only thoughts and reports no desire to act on these thoughts.  Ivette was able to recognize she was not using any of her coping skills recently and this may be the cause for increase in SI. Therapist reviewed with Ivette safety plan and Ivette indicated she would either contact emergency services or go to ED if she feels unsafe. Ivette reports medication compliance. Ivette was scheduled for a session in 2 weeks.  During this session, this clinician used the following therapeutic modalities: Cognitive Behavioral Therapy, Motivational Interviewing, and Supportive Psychotherapy    Substance Abuse was addressed during this session. If the client is diagnosed with a co-occurring substance use disorder, please indicate any changes in the frequency or amount of use: Ivette reports ongoing abstinence for 19 weeks.  Ivette denied attending any meetings but states she recognizes increasing her supports would be helpful. Therapist utilized stage appropriate interventions to address this substance abuse issue. Therapist reviewed with Ivette the importance of support to maintain ongoing abstinence. Stage of change for addressing substance use diagnoses:  "Action    ASSESSMENT:  Ivette Andrew presents with a Anxious and Depressed mood. Ivette presents with increase in depression but presents as being able to recognize this depression and develop techniques to address any increase in SI she may be feelings.     her affect is Normal range and intensity and Tearful, which is congruent, with her mood and the content of the session. The client has made progress on their goals.     Ivette Andrew presents with a minimal risk of suicide, none risk of self-harm, and none risk of harm to others.    For any risk assessment that surpasses a \"low\" rating, a safety plan must be developed.    A safety plan was indicated: yes  If yes, describe in detail: Ivette reviewed safety plan and identified coping skills and contacts to ensure safety    PLAN: Between sessions, Ivette Andrew will continue to use her coping skills and attend a 12 step meeting. At the next session, the therapist will use Cognitive Behavioral Therapy, Cognitive Processing Therapy, Motivational Interviewing, and Supportive Psychotherapy to address Ivette's ongoing desire to maintain abstinence and manage her emotional needs.    Behavioral Health Treatment Plan and Discharge Planning: Ivette Andrew is aware of and agrees to continue to work on their treatment plan. They have identified and are working toward their discharge goals. yes    Visit start and stop times:    12/18/23  Start Time: 1315  Stop Time: 1359  Total Visit Time: 44 minutes  "

## 2023-12-28 ENCOUNTER — HOSPITAL ENCOUNTER (OUTPATIENT)
Dept: INFUSION CENTER | Facility: HOSPITAL | Age: 28
End: 2023-12-28
Attending: EMERGENCY MEDICINE
Payer: COMMERCIAL

## 2023-12-28 DIAGNOSIS — F10.90 ALCOHOL USE DISORDER: Primary | ICD-10-CM

## 2023-12-28 PROCEDURE — 96372 THER/PROPH/DIAG INJ SC/IM: CPT

## 2023-12-28 RX ADMIN — NALTREXONE 380 MG: KIT at 11:41

## 2023-12-28 NOTE — PROGRESS NOTES
Patient tolerated R buttocks naltrexone injection without issue. Next appointment confirmed, AVS declined.

## 2023-12-29 ENCOUNTER — SOCIAL WORK (OUTPATIENT)
Dept: PSYCHIATRY | Facility: CLINIC | Age: 28
End: 2023-12-29
Payer: COMMERCIAL

## 2023-12-29 DIAGNOSIS — F10.90 ALCOHOL USE DISORDER: ICD-10-CM

## 2023-12-29 DIAGNOSIS — F39 UNSPECIFIED MOOD (AFFECTIVE) DISORDER (HCC): ICD-10-CM

## 2023-12-29 DIAGNOSIS — F41.8 MIXED ANXIETY DEPRESSIVE DISORDER: Primary | ICD-10-CM

## 2023-12-29 PROCEDURE — 90837 PSYTX W PT 60 MINUTES: CPT | Performed by: COUNSELOR

## 2023-12-29 NOTE — PSYCH
Behavioral Health Psychotherapy Progress Note    Psychotherapy Provided: Individual Psychotherapy     1. Mixed anxiety depressive disorder        2. Alcohol use disorder        3. Unspecified mood (affective) disorder (HCC)            Goals addressed in session: Goal 1 and Goal 2     DATA: Ivette states she had improved mood after leaving her last session and states she was able to recognize that venting her feelings and SI thoughts provided her with a relief from these. Ivette and therapist discussed other ways Ivette could address her depression and discussed the use of other supports. Ivette maintained a recognition that there had been times in the past that she was more able to address her symptoms with skills and therapist reminded Ivette to be mindful of these things she used in the past. Ivette reports she continues to receive her vivotril IM.  Ivette was scheduled for a session in two week.   During this session, this clinician used the following therapeutic modalities: Client-centered Therapy, Cognitive Behavioral Therapy, Motivational Interviewing, and Supportive Psychotherapy    Substance Abuse was addressed during this session. If the client is diagnosed with a co-occurring substance use disorder, please indicate any changes in the frequency or amount of use: Ivette reports 20 weeks abstinence. Ivette reports mild cravings but states she continues to be able to manage these cravings. Therapist utilized stage appropriate interventions to address this substance abuse issue.  Therapist reviewed with Ivette activities that promote abstinence and thought processes that promote abstinence. Ivette indicated the use of a sober iliana on her phone and was able to review with therapist the reasons she felt she had improved outcomes due to not drinking any longer. Stage of change for addressing substance use diagnoses: Action    ASSESSMENT:  Ivette Andrew presents with a Euthymic/ normal mood. Ivette  "presented with significantly less depression today and denied SI or HI. Ivette indicates an overall improved mood today.     her affect is Normal range and intensity, which is congruent, with her mood and the content of the session. The client has made progress on their goals.     Ivette Andrew presents with a none risk of suicide, none risk of self-harm, and none risk of harm to others.    For any risk assessment that surpasses a \"low\" rating, a safety plan must be developed.    A safety plan was indicated: no  If yes, describe in detail n/a    PLAN: Between sessions, Ivette Andrew will begin finding activities she enjoys and considering activities that made her happy in the past. Ivette will explore [facility] recovery. At the next session, the therapist will use Client-centered Therapy, Cognitive Behavioral Therapy, Motivational Interviewing, and Supportive Psychotherapy to address Ivette's ongoing need to develop skills to address her depression and relapse potential.    Behavioral Health Treatment Plan and Discharge Planning: Ivette Andrew is aware of and agrees to continue to work on their treatment plan. They have identified and are working toward their discharge goals. yes    Visit start and stop times:    12/29/23  Start Time: 0848  Stop Time: 0955  Total Visit Time: 67 minutes  "

## 2024-01-07 DIAGNOSIS — I10 HYPERTENSION: ICD-10-CM

## 2024-01-08 RX ORDER — AMLODIPINE BESYLATE 10 MG/1
10 TABLET ORAL DAILY
Qty: 30 TABLET | Refills: 5 | Status: SHIPPED | OUTPATIENT
Start: 2024-01-08

## 2024-01-11 ENCOUNTER — OFFICE VISIT (OUTPATIENT)
Dept: FAMILY MEDICINE CLINIC | Facility: CLINIC | Age: 29
End: 2024-01-11
Payer: COMMERCIAL

## 2024-01-11 VITALS
OXYGEN SATURATION: 98 % | HEART RATE: 83 BPM | BODY MASS INDEX: 45.82 KG/M2 | DIASTOLIC BLOOD PRESSURE: 70 MMHG | HEIGHT: 63 IN | WEIGHT: 258.6 LBS | TEMPERATURE: 97.7 F | SYSTOLIC BLOOD PRESSURE: 124 MMHG

## 2024-01-11 DIAGNOSIS — K21.9 GERD WITHOUT ESOPHAGITIS: Primary | ICD-10-CM

## 2024-01-11 DIAGNOSIS — F41.8 MIXED ANXIETY DEPRESSIVE DISORDER: ICD-10-CM

## 2024-01-11 DIAGNOSIS — I10 PRIMARY HYPERTENSION: ICD-10-CM

## 2024-01-11 PROBLEM — Z01.818 PRE-OP EVALUATION: Status: RESOLVED | Noted: 2023-11-08 | Resolved: 2024-01-11

## 2024-01-11 PROCEDURE — 99214 OFFICE O/P EST MOD 30 MIN: CPT | Performed by: INTERNAL MEDICINE

## 2024-01-11 RX ORDER — ARIPIPRAZOLE 10 MG/1
10 TABLET ORAL DAILY
COMMUNITY
Start: 2023-12-18

## 2024-01-11 NOTE — PROGRESS NOTES
Name: Ivette Andrew      : 1995      MRN: [id number]  Encounter Provider: Jade Blackwell MD  Encounter Date: 2024   Encounter department: [facility]    Assessment & Plan     1. GERD without esophagitis  Assessment & Plan:  Systemic feels well except first thing in the morning she does have some nausea and occasionally has some vomiting. Will continue her current medications I did again discussed that her continues weight gain will make her symptoms worse and weight loss should make it better      2. Primary hypertension  Assessment & Plan:  Despite her weight gain her blood pressure remains well controlled with lisinopril and Norvasc      3. BMI 45.0-49.9, adult (HCC)  Assessment & Plan:  She continues to slowly gain weight. We again discussed diet and exercise to at least stop that and then possibly take off a few pounds      4. Mixed anxiety depressive disorder  Assessment & Plan:  She continues to follow with psych on a regular basis and is on medication from them             Subjective      Patient actually feels well she is continuing follow-up with psych and is holding down a job      Review of Systems   Constitutional:  Negative for chills and fever.   HENT:  Negative for ear pain and sore throat.    Eyes:  Negative for pain and visual disturbance.   Respiratory:  Negative for cough and shortness of breath.    Cardiovascular:  Negative for chest pain and palpitations.   Gastrointestinal:  Positive for abdominal pain, nausea and vomiting.   Genitourinary:  Negative for dysuria and hematuria.   Musculoskeletal:  Negative for arthralgias and back pain.   Skin:  Negative for color change and rash.   Neurological:  Negative for seizures and syncope.   Psychiatric/Behavioral:  The patient is nervous/anxious.    All other systems reviewed and are negative.      Current Outpatient Medications on File Prior to Visit   Medication Sig    amLODIPine (NORVASC) 10 mg tablet Take 1 tablet (10  "mg total) by mouth daily    ARIPiprazole (ABILIFY) 10 mg tablet Take 10 mg by mouth daily    gabapentin (NEURONTIN) 100 mg capsule Take 1 capsule (100 mg total) by mouth 2 (two) times a day    gabapentin (NEURONTIN) 300 mg capsule Take 2 capsules (600 mg total) by mouth daily at bedtime    lisinopril (ZESTRIL) 20 mg tablet Take 1 tablet (20 mg total) by mouth daily    Naltrexone (VIVITROL IM) Inject into a muscle    naproxen sodium (Aleve) 220 MG tablet Take 220 mg by mouth daily as needed for mild pain    omeprazole (PriLOSEC) 40 MG capsule Take 1 capsule (40 mg total) by mouth daily before breakfast    sertraline (ZOLOFT) 100 mg tablet Take 1 tablet (100 mg total) by mouth daily at bedtime    topiramate (Topamax) 25 mg tablet Take 1 tablet (25 mg total) by mouth daily at bedtime    [DISCONTINUED] QUEtiapine (SEROquel XR) 150 mg 24 hr tablet Take 1 tablet (150 mg total) by mouth daily at bedtime       Objective     /70 (BP Location: Left arm, Patient Position: Sitting, Cuff Size: Large)   Pulse 83   Temp 97.7 °F (36.5 °C) (Temporal)   Ht 5' 3\" (1.6 m)   Wt 117 kg (258 lb 9.6 oz)   LMP 01/07/2024   SpO2 98%   BMI 45.81 kg/m²     Physical Exam  Constitutional:       Appearance: She is obese. She is not ill-appearing.   HENT:      Head: Normocephalic and atraumatic.      Right Ear: Tympanic membrane normal.      Left Ear: Tympanic membrane normal.      Nose: Nose normal.   Eyes:      Extraocular Movements: Extraocular movements intact.      Pupils: Pupils are equal, round, and reactive to light.   Cardiovascular:      Rate and Rhythm: Normal rate and regular rhythm.   Pulmonary:      Effort: Pulmonary effort is normal.      Breath sounds: Normal breath sounds.   Abdominal:      General: Bowel sounds are normal.      Palpations: Abdomen is soft.   Musculoskeletal:      Cervical back: Normal range of motion and neck supple.      Right lower leg: No edema.      Left lower leg: No edema.   Skin:     Findings: " No rash.   Neurological:      General: No focal deficit present.      Mental Status: She is alert and oriented to person, place, and time.   Psychiatric:         Mood and Affect: Mood normal.         Behavior: Behavior normal.         Thought Content: Thought content normal.         Judgment: Judgment normal.       Jade Blackwell MD

## 2024-01-11 NOTE — ASSESSMENT & PLAN NOTE
Systemic feels well except first thing in the morning she does have some nausea and occasionally has some vomiting. Will continue her current medications I did again discussed that her continues weight gain will make her symptoms worse and weight loss should make it better

## 2024-01-11 NOTE — ASSESSMENT & PLAN NOTE
She continues to slowly gain weight. We again discussed diet and exercise to at least stop that and then possibly take off a few pounds

## 2024-01-15 ENCOUNTER — OFFICE VISIT (OUTPATIENT)
Dept: OTHER | Age: 29
End: 2024-01-15
Payer: COMMERCIAL

## 2024-01-15 VITALS
HEIGHT: 63 IN | HEART RATE: 76 BPM | SYSTOLIC BLOOD PRESSURE: 134 MMHG | BODY MASS INDEX: 45.86 KG/M2 | WEIGHT: 258.8 LBS | DIASTOLIC BLOOD PRESSURE: 88 MMHG

## 2024-01-15 DIAGNOSIS — F10.90 ALCOHOL USE DISORDER: Primary | ICD-10-CM

## 2024-01-15 PROCEDURE — 99213 OFFICE O/P EST LOW 20 MIN: CPT

## 2024-01-15 NOTE — ASSESSMENT & PLAN NOTE
Currently doing well on vivitrol injections  Denies any cravings   Due for next injection 1/25/23   Engaged with psychotherapy through [facility]  F/U visit in 3 months

## 2024-01-15 NOTE — PROGRESS NOTES
[facility]    Progress Note  Ivette Andrew 28 y.o. female MRN: [id number]  @ Encounter: [id number]      1. Alcohol use disorder  Assessment & Plan:  Currently doing well on vivitrol injections  Denies any cravings   Due for next injection 1/25/23   Engaged with psychotherapy through [facility]  F/U visit in 3 months             Support Services  Case Management and Certified  are following.   Patient was referred to the [facility] Certified Addiction Counselors: No  The patient is actively engaged with the [facility] Certified Addiction Counselor’s psychotherapy plan: Yes    This patient does not have an active medication from one of the medication groupers.      PDMP reviewed:     PDMP Review         Value Time User    PDMP Reviewed  Yes 8/11/2023 10:20 AM Justin Sears MD            SUBJECTIVE  Patient seen in office for follow up visit. She is still engaged in the [facility] program and is complaint with vivitrol injections. Patient is motivated to continue with injections.     Drug cravings: none   Motivation to continue treatment: yes       Current Outpatient Medications:     amLODIPine (NORVASC) 10 mg tablet, Take 1 tablet (10 mg total) by mouth daily, Disp: 30 tablet, Rfl: 5    ARIPiprazole (ABILIFY) 10 mg tablet, Take 10 mg by mouth daily, Disp: , Rfl:     gabapentin (NEURONTIN) 100 mg capsule, Take 1 capsule (100 mg total) by mouth 2 (two) times a day, Disp: 60 capsule, Rfl: 3    gabapentin (NEURONTIN) 300 mg capsule, Take 2 capsules (600 mg total) by mouth daily at bedtime, Disp: 60 capsule, Rfl: 3    lisinopril (ZESTRIL) 20 mg tablet, Take 1 tablet (20 mg total) by mouth daily, Disp: 90 tablet, Rfl: 1    Naltrexone (VIVITROL IM), Inject into a muscle, Disp: , Rfl:     naproxen sodium (Aleve) 220 MG tablet, Take 220 mg by mouth daily as needed for mild pain, Disp: , Rfl:     omeprazole (PriLOSEC) 40 MG capsule, Take 1 capsule (40 mg total) by mouth daily before breakfast,  Disp: 90 capsule, Rfl: 1    sertraline (ZOLOFT) 100 mg tablet, Take 1 tablet (100 mg total) by mouth daily at bedtime, Disp: 30 tablet, Rfl: 4    topiramate (Topamax) 25 mg tablet, Take 1 tablet (25 mg total) by mouth daily at bedtime, Disp: 30 tablet, Rfl: 3    Objective     Vitals:    01/15/24 1019   BP: 134/88   Pulse: 76       Physical Exam  Vitals and nursing note reviewed.   Constitutional:       General: She is not in acute distress.     Appearance: She is not diaphoretic.   Eyes:      Pupils: Pupils are equal, round, and reactive to light.   Cardiovascular:      Rate and Rhythm: Normal rate.   Pulmonary:      Breath sounds: Normal breath sounds.   Neurological:      Mental Status: She is alert and oriented to person, place, and time.   Psychiatric:         Mood and Affect: Mood normal.         Behavior: Behavior normal.              Lab Results:   Results from last 6 Months   Lab Units 10/13/23  1016   WBC Thousand/uL 6.74   HEMOGLOBIN g/dL 13.4   HEMATOCRIT % 43.1   PLATELETS Thousands/uL 243      Results from last 6 Months   Lab Units 10/13/23  1016 08/07/23  0445   POTASSIUM mmol/L  --  3.7   CHLORIDE mmol/L  --  104   CO2 mmol/L  --  24   BUN mg/dL  --  10   CREATININE mg/dL  --  0.59*   CALCIUM mg/dL  --  9.1   ALBUMIN g/dL 4.2 3.8   ALK PHOS U/L 74 102   ALT U/L 20 68*   AST U/L 21 71*     Results from last 6 Months   Lab Units 09/08/23  1334   HEP B S AG  Non-reactive   HEP C AB  Non-reactive     Results from last 6 Months   Lab Units 09/08/23  1334   HIV-1 P24 ANTIGEN-BIOPLEX  Non-Reactive                 Counseling / Coordination of Care  Total time spent today 15 minutes. Greater than 50% of total time was spent with the patient and / or family counseling and / or coordination of care.     Smitha Nina PA-C

## 2024-01-25 ENCOUNTER — HOSPITAL ENCOUNTER (OUTPATIENT)
Dept: INFUSION CENTER | Facility: HOSPITAL | Age: 29
End: 2024-01-25
Attending: EMERGENCY MEDICINE

## 2024-01-29 ENCOUNTER — HOSPITAL ENCOUNTER (OUTPATIENT)
Dept: INFUSION CENTER | Facility: HOSPITAL | Age: 29
Discharge: HOME | End: 2024-01-29
Attending: EMERGENCY MEDICINE
Payer: COMMERCIAL

## 2024-01-29 DIAGNOSIS — F10.90 ALCOHOL USE DISORDER: Primary | ICD-10-CM

## 2024-01-29 PROCEDURE — 96372 THER/PROPH/DIAG INJ SC/IM: CPT

## 2024-01-29 RX ADMIN — NALTREXONE 380 MG: KIT at 14:37

## 2024-01-29 NOTE — PROGRESS NOTES
Ivette Andrew  tolerated treatment well with no complications.  Injection administered in L Buttock     Ivette Andrew is aware of future appt on 2/26/24 at 1130.     AVS printed and given to Ivette Andrew:  No (Declined by Ivette Andrew)

## 2024-01-31 ENCOUNTER — TELEPHONE (OUTPATIENT)
Dept: PSYCHIATRY | Facility: CLINIC | Age: 29
End: 2024-01-31

## 2024-01-31 NOTE — TELEPHONE ENCOUNTER
This writer attempted to reach Ivette to reschedule missed appointment. A message was left requesting a return call.

## 2024-02-05 DIAGNOSIS — F39 UNSPECIFIED MOOD (AFFECTIVE) DISORDER (HCC): ICD-10-CM

## 2024-02-05 RX ORDER — TOPIRAMATE 25 MG/1
25 TABLET ORAL
Qty: 30 TABLET | Refills: 3 | Status: SHIPPED | OUTPATIENT
Start: 2024-02-05

## 2024-02-05 NOTE — TELEPHONE ENCOUNTER
MR attempted to contact pt again to reschedule appt   No answer.   LVM   Discharge letter sent per Javier Henderson.     For your review.

## 2024-02-12 ENCOUNTER — TELEPHONE (OUTPATIENT)
Dept: BEHAVIORAL/MENTAL HEALTH CLINIC | Facility: CLINIC | Age: 29
End: 2024-02-12

## 2024-02-12 NOTE — TELEPHONE ENCOUNTER
Patient is calling regarding cancelling an appointment.    Date/Time: 2/12 2pm    Reason: pt had to work     Patient was rescheduled: YES [x] NO []  If yes, when was Patient reschedule for: 3/11        Patient contacted the office to schedule a follow up visit with Javier Henderson. Patient is now scheduled for 2/21  at 11 in office.

## 2024-02-21 ENCOUNTER — SOCIAL WORK (OUTPATIENT)
Dept: PSYCHIATRY | Facility: CLINIC | Age: 29
End: 2024-02-21
Payer: COMMERCIAL

## 2024-02-21 DIAGNOSIS — F10.90 ALCOHOL USE DISORDER: ICD-10-CM

## 2024-02-21 DIAGNOSIS — F43.10 PTSD (POST-TRAUMATIC STRESS DISORDER): Primary | ICD-10-CM

## 2024-02-21 DIAGNOSIS — F43.10 PTSD (POST-TRAUMATIC STRESS DISORDER): ICD-10-CM

## 2024-02-21 DIAGNOSIS — F39 UNSPECIFIED MOOD (AFFECTIVE) DISORDER (HCC): Primary | ICD-10-CM

## 2024-02-21 PROCEDURE — 90837 PSYTX W PT 60 MINUTES: CPT | Performed by: COUNSELOR

## 2024-02-21 NOTE — PSYCH
Behavioral Health Psychotherapy Progress Note    Psychotherapy Provided: Individual Psychotherapy     1. Unspecified mood (affective) disorder (HCC)        2. PTSD (post-traumatic stress disorder)        3. Alcohol use disorder            Goals addressed in session: Goal 1 and Goal 2     DATA: Ivette arrived early for her session. Ivette and therapist discussed Ivette's absence from therapy. Ivette indicates she has been over working, up to 70 hours weekly. Ivette and therapist discussed balance and attending to one's needs. Ivette states she recognized her thinking had been getting worse, especially regarding alcohol use, and understood she needed to return to therapy. Ivette denied any SI recently and states the last time was when she reported this to therapist. Ivette indicates she has been utilizing talking with her mother more often when she becomes depressed and has found this helpful. Ivette indicates she continues to take her medications. Ivette was scheduled for her next session in 3 weeks.  During this session, this clinician used the following therapeutic modalities: Client-centered Therapy, Cognitive Behavioral Therapy, Motivational Interviewing, and Supportive Psychotherapy    Substance Abuse was addressed during this session. If the client is diagnosed with a co-occurring substance use disorder, please indicate any changes in the frequency or amount of use: Ivette reported an increase in urges but states she continues to maintain abstinence, over 6 months. Therapist utilized stage appropriate interventions to address this substance abuse issue.   Ivette reports she has to attend a conference next week in [place] and is aware that her collogues will be drinking. Therapist reviewed some supports Ivette could utilize to address any cravings and identified a 12 step meeting Ivette could attend [facility] to obtain phone numbers for supportive people.  Ivette indicates a desire to maintain her  "abstinence and address her triggers.  Stage of change for addressing substance use diagnoses: Action    ASSESSMENT:  Ivette Andrew presents with a Euthymic/ normal mood.  Ivette presented in a positive mood and engaged in therapy.     her affect is Normal range and intensity, which is congruent, with her mood and the content of the session. The client has made progress on their goals.     Ivette Andrew presents with a none risk of suicide, none risk of self-harm, and none risk of harm to others.    For any risk assessment that surpasses a \"low\" rating, a safety plan must be developed.    A safety plan was indicated: no  If yes, describe in detail n/a    PLAN: Between sessions, Ivette Andrew will obtain phone numbers for 12 step supports, bring the AA big book with her to [place], and continue to speak to her mom when depressed. At the next session, the therapist will use Client-centered Therapy, Cognitive Behavioral Therapy, Motivational Interviewing, and Supportive Psychotherapy to address Ivette's desire to manage her moods and prevent any relapse.    Behavioral Health Treatment Plan and Discharge Planning: Ivette Andrew is aware of and agrees to continue to work on their treatment plan. They have identified and are working toward their discharge goals. yes    Visit start and stop times:    02/21/24  Start Time: 1049  Stop Time: 1151  Total Visit Time: 62 minutes  "

## 2024-02-23 RX ORDER — ARIPIPRAZOLE 10 MG/1
10 TABLET ORAL DAILY
Qty: 90 TABLET | Refills: 1 | Status: SHIPPED | OUTPATIENT
Start: 2024-02-23

## 2024-02-26 ENCOUNTER — HOSPITAL ENCOUNTER (OUTPATIENT)
Dept: INFUSION CENTER | Facility: HOSPITAL | Age: 29
End: 2024-02-26
Attending: EMERGENCY MEDICINE

## 2024-02-29 ENCOUNTER — HOSPITAL ENCOUNTER (OUTPATIENT)
Dept: INFUSION CENTER | Facility: HOSPITAL | Age: 29
End: 2024-02-29
Attending: EMERGENCY MEDICINE
Payer: COMMERCIAL

## 2024-02-29 DIAGNOSIS — F10.90 ALCOHOL USE DISORDER: Primary | ICD-10-CM

## 2024-02-29 PROCEDURE — 96372 THER/PROPH/DIAG INJ SC/IM: CPT

## 2024-02-29 RX ADMIN — NALTREXONE 380 MG: KIT at 10:42

## 2024-02-29 NOTE — PROGRESS NOTES
Pt tolerated Vivitrol injection to R gluteal muscle without difficulty.  Pt aware she needs to make [facility] office follow up appt.  Confirmed next injection appt on 3/28.  AVS declined.  Left ambulatory in stable condition.

## 2024-03-11 ENCOUNTER — OFFICE VISIT (OUTPATIENT)
Dept: PSYCHIATRY | Facility: CLINIC | Age: 29
End: 2024-03-11
Payer: COMMERCIAL

## 2024-03-11 VITALS
SYSTOLIC BLOOD PRESSURE: 117 MMHG | BODY MASS INDEX: 45.71 KG/M2 | WEIGHT: 258 LBS | HEART RATE: 74 BPM | HEIGHT: 63 IN | DIASTOLIC BLOOD PRESSURE: 79 MMHG

## 2024-03-11 DIAGNOSIS — F39 UNSPECIFIED MOOD (AFFECTIVE) DISORDER (HCC): ICD-10-CM

## 2024-03-11 DIAGNOSIS — F43.10 PTSD (POST-TRAUMATIC STRESS DISORDER): ICD-10-CM

## 2024-03-11 DIAGNOSIS — R45.1 RESTLESSNESS: ICD-10-CM

## 2024-03-11 DIAGNOSIS — F41.9 ANXIETY DISORDER, UNSPECIFIED TYPE: ICD-10-CM

## 2024-03-11 PROCEDURE — 99214 OFFICE O/P EST MOD 30 MIN: CPT | Performed by: PSYCHIATRY & NEUROLOGY

## 2024-03-11 PROCEDURE — 90833 PSYTX W PT W E/M 30 MIN: CPT | Performed by: PSYCHIATRY & NEUROLOGY

## 2024-03-11 RX ORDER — GABAPENTIN 100 MG/1
100 CAPSULE ORAL 2 TIMES DAILY
Qty: 60 CAPSULE | Refills: 3 | Status: SHIPPED | OUTPATIENT
Start: 2024-03-11

## 2024-03-11 RX ORDER — GABAPENTIN 300 MG/1
600 CAPSULE ORAL
Qty: 60 CAPSULE | Refills: 3 | Status: SHIPPED | OUTPATIENT
Start: 2024-03-11

## 2024-03-11 RX ORDER — TOPIRAMATE 25 MG/1
25 TABLET ORAL
Qty: 30 TABLET | Refills: 3 | Status: SHIPPED | OUTPATIENT
Start: 2024-03-11

## 2024-03-11 RX ORDER — SERTRALINE HYDROCHLORIDE 100 MG/1
150 TABLET, FILM COATED ORAL
Qty: 45 TABLET | Refills: 4 | Status: SHIPPED | OUTPATIENT
Start: 2024-03-11

## 2024-03-11 RX ORDER — ARIPIPRAZOLE 10 MG/1
10 TABLET ORAL DAILY
Qty: 90 TABLET | Refills: 1 | Status: SHIPPED | OUTPATIENT
Start: 2024-03-11

## 2024-03-11 NOTE — PSYCH
[facility]    Progress Note  Ivette Andrew 28 y.o. female MRN: [id number]   Encounter: [id number]      Visit Time    Visit Start Time: 11AM  Visit Stop Time: 11:30AM  Total Visit Duration:  30 minutes    SUBJECTIVE: I had my big panic attack 1 my brother talk with me about my financial depth.  He wanted to help me financially but that talk led to panic attack and I felt suicidal after that.  I did not have any plan to kill myself I want to feel better.         INTERVAL HISTORY: The patient was history of depressive disorder, persistent depression, recurrent suicidal thoughts, and alcohol misuse after show inpatient treatment started having less suicidal thoughts, and less panic attacks but recently her discussion of financial problems that was sold by her brother led to increase of suicidal thoughts.  The patient nevertheless felt that continuation of outpatient treatment is the most acceptable treatment for her because she does not want to lose control.  We discussed partial program but the patient stated that she did not need that level of care at this point of time, was in agreement to call the office if she feels like her depression is not improving.  Positive factor in the patient's life and that she no longer abuses any substances.  The patient does not drink alcohol and she believes that the Vivitrol shots helped him to achieve sobriety.  She wants to continue with Vivitrol shots.  She also has supportive boyfriend which another protective factor against suicide.    Review Of Systems:    sleep changes: decreased  appetite changes: decreased  energy/anergy: decreased    Support Services  The patient is actively engaged with the [facility] Certified Addiction Counselor’s psychotherapy plan: Yes        PDMP reviewed:     PDMP Review         Value Time User    PDMP Reviewed  Yes 3/11/2024 11:00 AM Braxton Crook MD              Drug cravings: no  Motivation to continue treatment: high        Current Outpatient Medications:     ARIPiprazole (ABILIFY) 10 mg tablet, Take 1 tablet (10 mg total) by mouth daily, Disp: 90 tablet, Rfl: 1    gabapentin (NEURONTIN) 100 mg capsule, Take 1 capsule (100 mg total) by mouth 2 (two) times a day, Disp: 60 capsule, Rfl: 3    gabapentin (NEURONTIN) 300 mg capsule, Take 2 capsules (600 mg total) by mouth daily at bedtime, Disp: 60 capsule, Rfl: 3    sertraline (ZOLOFT) 100 mg tablet, Take 1.5 tablets (150 mg total) by mouth daily at bedtime, Disp: 45 tablet, Rfl: 4    topiramate (TOPAMAX) 25 mg tablet, Take 1 tablet (25 mg total) by mouth daily at bedtime, Disp: 30 tablet, Rfl: 3    amLODIPine (NORVASC) 10 mg tablet, Take 1 tablet (10 mg total) by mouth daily, Disp: 30 tablet, Rfl: 5    lisinopril (ZESTRIL) 20 mg tablet, Take 1 tablet (20 mg total) by mouth daily, Disp: 90 tablet, Rfl: 1    Naltrexone (VIVITROL IM), Inject into a muscle, Disp: , Rfl:     naproxen sodium (Aleve) 220 MG tablet, Take 220 mg by mouth daily as needed for mild pain, Disp: , Rfl:     omeprazole (PriLOSEC) 40 MG capsule, Take 1 capsule (40 mg total) by mouth daily before breakfast, Disp: 90 capsule, Rfl: 1    Objective     Vitals:    03/11/24 1042   BP: 117/79   Pulse: 74           Mental Status Evaluation: The patient reported symptoms of depression, she had mildly restricted range of affect, no active suicidal thoughts, no plan to end her life, full alert oriented x 3, appropriate insight and judgment.    Lab Results:   Results from last 6 Months   Lab Units 10/13/23  1016   WBC Thousand/uL 6.74   HEMOGLOBIN g/dL 13.4   HEMATOCRIT % 43.1   PLATELETS Thousands/uL 243      Results from last 6 Months   Lab Units 10/13/23  1016   ALBUMIN g/dL 4.2   ALK PHOS U/L 74   ALT U/L 20   AST U/L 21                           Diagnoses and all orders for this visit:    PTSD (post-traumatic stress disorder)  -     ARIPiprazole (ABILIFY) 10 mg tablet; Take 1 tablet (10 mg total) by mouth daily  -      gabapentin (NEURONTIN) 100 mg capsule; Take 1 capsule (100 mg total) by mouth 2 (two) times a day    Unspecified mood (affective) disorder (HCC)  -     gabapentin (NEURONTIN) 100 mg capsule; Take 1 capsule (100 mg total) by mouth 2 (two) times a day  -     sertraline (ZOLOFT) 100 mg tablet; Take 1.5 tablets (150 mg total) by mouth daily at bedtime  -     topiramate (TOPAMAX) 25 mg tablet; Take 1 tablet (25 mg total) by mouth daily at bedtime    Anxiety disorder, unspecified type  -     gabapentin (NEURONTIN) 300 mg capsule; Take 2 capsules (600 mg total) by mouth daily at bedtime    Restlessness  -     gabapentin (NEURONTIN) 300 mg capsule; Take 2 capsules (600 mg total) by mouth daily at bedtime       Decision was made to further increase Zoloft to address patient's anxiety as well as providing topiramate for alcohol associated with mood disorder now improving.  Continue his Neurontin to address patient's anxiety in addition to Zoloft and Abilify to treat mood disorder together with topiramate.  Assessment of suicidal risk so that the risk is modestly elevated because of the recent suicidal thoughts but patient's interest in receiving treatment, she has supportive family and boyfriend, and the fact that the patient maintains sobriety protective factors against suicide which make outpatient treatment is the least restrictive and most appropriate, which also in line with the patient preferences.        Risks / Benefits of Treatment:    Risks, benefits, and possible side effects of medications explained to patient and patient verbalizes understanding and agreement for treatment.      This note was not shared with the patient due to this is a psychotherapy note     Counseling / Coordination of Care  Total time spent today 30 minutes. Greater than 50% of total time was spent with the patient and / or family counseling and / or coordination of care.  At least 16 minutes was provided to motivational and supportive therapy  in addition to medication management.    Braxton Crook MD

## 2024-03-13 ENCOUNTER — SOCIAL WORK (OUTPATIENT)
Dept: PSYCHIATRY | Facility: CLINIC | Age: 29
End: 2024-03-13
Payer: COMMERCIAL

## 2024-03-13 DIAGNOSIS — F10.90 ALCOHOL USE DISORDER: ICD-10-CM

## 2024-03-13 DIAGNOSIS — F43.10 PTSD (POST-TRAUMATIC STRESS DISORDER): ICD-10-CM

## 2024-03-13 DIAGNOSIS — F41.8 MIXED ANXIETY DEPRESSIVE DISORDER: Primary | ICD-10-CM

## 2024-03-13 PROCEDURE — 90837 PSYTX W PT 60 MINUTES: CPT | Performed by: COUNSELOR

## 2024-03-13 NOTE — PSYCH
Behavioral Health Psychotherapy Progress Note    Psychotherapy Provided: Individual Psychotherapy     1. Mixed anxiety depressive disorder        2. PTSD (post-traumatic stress disorder)        3. Alcohol use disorder            Goals addressed in session: Goal 1 and Goal 2     DATA: Ivette arrived early for her session. Ivette reports a recent relapse on alcohol. Ivette and therapist processed her feelings over this relapse and discussed the importance of perspective. Ivette was able to process her feelings of guilt and shame and a discussion was had on how others in her life, specifically her mother, contribute to some of these feelings.  Ivette was able to process some of these feelings and reactions to others she was having and developing improve cognitive processes to ensure that she continues to make progress rather than allow these feelings to make her feel hopeless. Ivette states she is determined to continue making progress. Ivette was introduced to [facility] CRS. Ivette was scheduled for a session in 2 weeks.  During this session, this clinician used the following therapeutic modalities: Client-centered Therapy, Cognitive Behavioral Therapy, Motivational Interviewing, and Supportive Psychotherapy    Substance Abuse was addressed during this session. If the client is diagnosed with a co-occurring substance use disorder, please indicate any changes in the frequency or amount of use: Ivette reports she relapsed while away on a business trip and continued drinking upon returning home for about 6 days. Ivette reports she has stopped her alcohol use again and has currently 7 days abstinent.  Therapist utilized stage appropriate interventions to address this substance abuse issue.   Therapist discussed with Ivette what had occurred during this relapse and specifically discussed both the mental and external triggers Ivette was facing when she relapse. Ivette and therapist discussed coping skills  "that could have been utilized and a discussion regarding the importance of Ivette increasing her sober support network. Ivette and therapist discussed the process of Ivette stopping her alcohol use again and the drive Ivette has to maintain her abstinence. Ivette reports she will begin attending 12 step meetings and a specific 12 step meeting for [facility] was identified for Ivette to attend along with Ivette receiving a 12 step meeting list. Ivette and therapist discussed Ivette's attendance at this 12 step meeting [facility] and any concerns Ivette had with attending. Stage of change for addressing substance use diagnoses: Action    ASSESSMENT:  Ivette Andrew presents with a Euthymic/ normal mood. Even though Ivette reported a recent relapse, Ivette presented as determined and engaged in moving forward. Ivette presented as hopeful and displayed limited distress from this relapse.      her affect is Normal range and intensity, which is congruent, with her mood and the content of the session. The client has made progress on their goals.     Ivette Andrew presents with a none risk of suicide, none risk of self-harm, and none risk of harm to others.    For any risk assessment that surpasses a \"low\" rating, a safety plan must be developed.    A safety plan was indicated: no  If yes, describe in detail n/a    PLAN: Between sessions, [facility] [place] and begin to develop a sober support network. At the next session, the therapist will use Client-centered Therapy, Cognitive Behavioral Therapy, Motivational Interviewing, and Supportive Psychotherapy to address Ivette's need to develop relapse prevention skills and address her need to understand and manage her emotions.  CRS was appointment was scheduled for Ivette to increase Ivette's recovery support    Behavioral Health Treatment Plan and Discharge Planning: Ivette Andrew is aware of and agrees to " continue to work on their treatment plan. They have identified and are working toward their discharge goals. yes    Visit start and stop times:    03/13/24  Start Time: 1056  Stop Time: 1200  Total Visit Time: 64 minutes

## 2024-03-27 DIAGNOSIS — Z00.6 ENCOUNTER FOR EXAMINATION FOR NORMAL COMPARISON OR CONTROL IN CLINICAL RESEARCH PROGRAM: ICD-10-CM

## 2024-03-28 ENCOUNTER — HOSPITAL ENCOUNTER (OUTPATIENT)
Dept: INFUSION CENTER | Facility: HOSPITAL | Age: 29
End: 2024-03-28
Attending: EMERGENCY MEDICINE

## 2024-03-28 ENCOUNTER — SOCIAL WORK (OUTPATIENT)
Dept: PSYCHIATRY | Facility: CLINIC | Age: 29
End: 2024-03-28

## 2024-03-28 DIAGNOSIS — F10.90 ALCOHOL USE DISORDER: ICD-10-CM

## 2024-03-28 DIAGNOSIS — F41.8 MIXED ANXIETY DEPRESSIVE DISORDER: Primary | ICD-10-CM

## 2024-03-28 NOTE — PSYCH
No Call. No Show. No Charge    Ivette Andrew no showed 03/28/24 appointment , staff called and left message to reschedule appointment     Treatment Plan not due at this session.

## 2024-04-03 ENCOUNTER — HOSPITAL ENCOUNTER (OUTPATIENT)
Dept: INFUSION CENTER | Facility: HOSPITAL | Age: 29
Discharge: HOME | End: 2024-04-03
Attending: EMERGENCY MEDICINE
Payer: COMMERCIAL

## 2024-04-03 DIAGNOSIS — F10.90 ALCOHOL USE DISORDER: Primary | ICD-10-CM

## 2024-04-03 PROCEDURE — 96372 THER/PROPH/DIAG INJ SC/IM: CPT

## 2024-04-03 RX ADMIN — NALTREXONE 380 MG: KIT at 14:50

## 2024-04-03 NOTE — PROGRESS NOTES
Pt tolerated Vivitrol today IM in the Left buttocks with no adverse reactions. Declined AVS, next apt confirmed.5/1/24 @ 11:30. Left unit ambulatory with a steady gait.

## 2024-04-08 ENCOUNTER — TELEPHONE (OUTPATIENT)
Dept: PSYCHIATRY | Facility: CLINIC | Age: 29
End: 2024-04-08

## 2024-04-08 NOTE — TELEPHONE ENCOUNTER
Attempt to contact pt to reschedule missed appt per Javier Henderson 3/28    No answer  VM Box full  Unable to contact pt     Writer will call back at a later time.

## 2024-04-10 ENCOUNTER — TELEPHONE (OUTPATIENT)
Dept: PSYCHIATRY | Facility: CLINIC | Age: 29
End: 2024-04-10

## 2024-04-10 NOTE — TELEPHONE ENCOUNTER
Therapist attempted to reach Crystal to reschedule missed appointment. There was no response and VM was full.

## 2024-04-11 ENCOUNTER — TELEPHONE (OUTPATIENT)
Dept: PSYCHIATRY | Facility: CLINIC | Age: 29
End: 2024-04-11

## 2024-04-11 NOTE — TELEPHONE ENCOUNTER
Attempt to contact pt to reschedule missed appt      No answer  VM Box full  Unable to contact pt

## 2024-04-15 ENCOUNTER — TELEPHONE (OUTPATIENT)
Dept: PSYCHIATRY | Facility: CLINIC | Age: 29
End: 2024-04-15

## 2024-04-18 ENCOUNTER — OFFICE VISIT (OUTPATIENT)
Dept: OTHER | Age: 29
End: 2024-04-18
Payer: COMMERCIAL

## 2024-04-18 VITALS
BODY MASS INDEX: 46.39 KG/M2 | DIASTOLIC BLOOD PRESSURE: 74 MMHG | HEART RATE: 66 BPM | HEIGHT: 63 IN | SYSTOLIC BLOOD PRESSURE: 106 MMHG | WEIGHT: 261.8 LBS

## 2024-04-18 DIAGNOSIS — F10.90 ALCOHOL USE DISORDER: Primary | ICD-10-CM

## 2024-04-18 DIAGNOSIS — I10 PRIMARY HYPERTENSION: ICD-10-CM

## 2024-04-18 PROCEDURE — 99213 OFFICE O/P EST LOW 20 MIN: CPT

## 2024-04-18 NOTE — ASSESSMENT & PLAN NOTE
Patient reports she relapsed in February. Resumed drinking for 1 week. Now reports she has maintained sobriety for 2 months  She is compliant with vivitrol injections  She will re-engage with psychotherapy through the [facility]   F/U in 1 month

## 2024-04-18 NOTE — ASSESSMENT & PLAN NOTE
Patient reports that she has been feeling dizzy and notices that her blood pressure has been on the lower side.  Current /80  At home medication regimen of lisinopril 20 mg and amlodipine 10 mg.   Discussed with patient that she will need to re-establish with PCP as her blood pressure medication may need to be adjusted  Patient stated she will make a follow up appointment

## 2024-04-18 NOTE — ASSESSMENT & PLAN NOTE
Patient reports sobriety for the last 2 months. Will continue on Vivitrol injections  She will continue with psychotherapy through the [facility]   F/U in 1 months

## 2024-04-18 NOTE — PROGRESS NOTES
[facility]    Progress Note  Ivette Andrew 28 y.o. female MRN: [id number]  @ Encounter: [id number]      1. Alcohol use disorder  Assessment & Plan:  Patient reports she relapsed in February. Resumed drinking for 1 week. Now reports she has maintained sobriety for 2 months  She is compliant with vivitrol injections  She will re-engage with psychotherapy through the [facility]   F/U in 1 month      2. Primary hypertension  Assessment & Plan:  Patient reports that she has been feeling dizzy and notices that her blood pressure has been on the lower side.  Current /80  At home medication regimen of lisinopril 20 mg and amlodipine 10 mg.   Discussed with patient that she will need to re-establish with PCP as her blood pressure medication may need to be adjusted  Patient stated she will make a follow up appointment             Support Services  Case Management and Certified  are following.   Patient was referred to the [facility] Certified Addiction Counselors: No  The patient is actively engaged with the [facility] Certified Addiction Counselor’s psychotherapy plan: Yes    This patient does not have an active medication from one of the medication groupers.      PDMP reviewed:     PDMP Review         Value Time User    PDMP Reviewed  Yes 3/11/2024 11:00 AM Braxton Crook MD            SUBJECTIVE  Patient seen in office. Reports that she did experience a relapse in February during a work conference. Stated she drank for 1 week and then was able to stop without having any withdrawal symptoms. She is still compliant with vivitrol and wants to continue with the injections. She reports she missed multiple therapy sessions through the [facility] due to work stressors. Patient plans on rescheduling therapy sessions as they have been beneficial to her during her recovery,     Drug cravings: none  Motivation to continue treatment: high       Current Outpatient Medications:      amLODIPine (NORVASC) 10 mg tablet, Take 1 tablet (10 mg total) by mouth daily, Disp: 30 tablet, Rfl: 5    ARIPiprazole (ABILIFY) 10 mg tablet, Take 1 tablet (10 mg total) by mouth daily, Disp: 90 tablet, Rfl: 1    gabapentin (NEURONTIN) 100 mg capsule, Take 1 capsule (100 mg total) by mouth 2 (two) times a day, Disp: 60 capsule, Rfl: 3    gabapentin (NEURONTIN) 300 mg capsule, Take 2 capsules (600 mg total) by mouth daily at bedtime, Disp: 60 capsule, Rfl: 3    lisinopril (ZESTRIL) 20 mg tablet, Take 1 tablet (20 mg total) by mouth daily, Disp: 90 tablet, Rfl: 1    Naltrexone (VIVITROL IM), Inject into a muscle, Disp: , Rfl:     naproxen sodium (Aleve) 220 MG tablet, Take 220 mg by mouth daily as needed for mild pain, Disp: , Rfl:     omeprazole (PriLOSEC) 40 MG capsule, Take 1 capsule (40 mg total) by mouth daily before breakfast, Disp: 90 capsule, Rfl: 1    sertraline (ZOLOFT) 100 mg tablet, Take 1.5 tablets (150 mg total) by mouth daily at bedtime, Disp: 45 tablet, Rfl: 4    topiramate (TOPAMAX) 25 mg tablet, Take 1 tablet (25 mg total) by mouth daily at bedtime, Disp: 30 tablet, Rfl: 3    Objective     Vitals:    04/18/24 1050   BP: 106/74   Pulse: 66       Physical Exam  Vitals and nursing note reviewed.   Constitutional:       General: She is not in acute distress.     Appearance: She is not diaphoretic.   Neurological:      Mental Status: She is alert.      Motor: No tremor.   Psychiatric:         Mood and Affect: Mood is anxious. Mood is not depressed.              Lab Results:                              Counseling / Coordination of Care  Total time spent today 30 minutes. Greater than 50% of total time was spent with the patient and / or family counseling and / or coordination of care.     Smitha Nina PA-C

## 2024-04-26 DIAGNOSIS — K21.9 GASTROESOPHAGEAL REFLUX DISEASE, UNSPECIFIED WHETHER ESOPHAGITIS PRESENT: ICD-10-CM

## 2024-04-26 RX ORDER — OMEPRAZOLE 40 MG/1
40 CAPSULE, DELAYED RELEASE ORAL
Qty: 90 CAPSULE | Refills: 0 | Status: SHIPPED | OUTPATIENT
Start: 2024-04-26

## 2024-05-18 RX ORDER — ARIPIPRAZOLE 10 MG/1
10 TABLET ORAL DAILY
Qty: 30 TABLET | Refills: 3 | OUTPATIENT
Start: 2024-05-18

## 2024-05-20 ENCOUNTER — TELEPHONE (OUTPATIENT)
Dept: OTHER | Age: 29
End: 2024-05-20

## 2024-05-20 DIAGNOSIS — I10 PRIMARY HYPERTENSION: ICD-10-CM

## 2024-05-20 NOTE — TELEPHONE ENCOUNTER
Called Ivette to notify her that Smitha is out of the office today and we need to reschedule her appt, but the VM is full.  Will try back at a later date.

## 2024-05-21 ENCOUNTER — TELEPHONE (OUTPATIENT)
Dept: PSYCHIATRY | Facility: CLINIC | Age: 29
End: 2024-05-21

## 2024-05-21 RX ORDER — LISINOPRIL 20 MG/1
20 TABLET ORAL DAILY
Qty: 90 TABLET | Refills: 1 | Status: SHIPPED | OUTPATIENT
Start: 2024-05-21

## 2024-05-21 NOTE — TELEPHONE ENCOUNTER
Writer sent discharge letter to pt via [employer] and physical mail. Per provider  Documentation purposes

## 2024-05-22 NOTE — TELEPHONE ENCOUNTER
2nd attempt made to contact [place] to reschedule 5/20/24 appt with Smitha, but no answer and VM is full.    For your review.

## 2024-06-04 ENCOUNTER — DOCUMENTATION (OUTPATIENT)
Dept: PSYCHIATRY | Facility: CLINIC | Age: 29
End: 2024-06-04

## 2024-06-04 DIAGNOSIS — F10.90 ALCOHOL USE DISORDER: Primary | ICD-10-CM

## 2024-06-04 DIAGNOSIS — F39 UNSPECIFIED MOOD (AFFECTIVE) DISORDER (HCC): ICD-10-CM

## 2024-06-04 DIAGNOSIS — F43.10 PTSD (POST-TRAUMATIC STRESS DISORDER): ICD-10-CM

## 2024-06-04 NOTE — PROGRESS NOTES
Psychotherapy Discharge Summary    Preferred Name: Ivette Andrew  YOB: 1995    Admission date to psychotherapy: 10/4/2023    Referred by: detox    Presenting Problem: Alcohol use disorder, mood disorder, increasing depression, recent inpatient mental health treatment due to SI    Course of treatment included : individual therapy     Progress/Outcome of Treatment Goals (brief summary of course of treatment) Ivette began making progress towards her goal and reported improved depression and increased ability to manage daily stressors. Ivette was reporting abstinence but reported a relapse on alcohol. Ivette struggled with returning to therapy and therapy was ended due to lack of engagement. Letter sent requesting contact with no contact after 2 weeks.    Treatment Complications (if any): Relapse on alcohol    Treatment Progress: fair    Current [facility] Provider: Dr. Crook    Discharge Medications include: amLODIPine (NORVASC) 10 mg tablet    ARIPiprazole (ABILIFY) 10 mg tablet    gabapentin (NEURONTIN) 100 mg capsule    gabapentin (NEURONTIN) 300 mg capsule    lisinopril (ZESTRIL) 20 mg tablet  Naltrexone (VIVITROL IM)  naproxen sodium (Aleve) 220 MG tablet    omeprazole (PriLOSEC) 40 MG capsule    sertraline (ZOLOFT) 100 mg tablet    topiramate (TOPAMAX) 25 mg tablet    Discharge Date: 6/4/2024    Discharge Diagnosis:   1. Alcohol use disorder        2. Unspecified mood (affective) disorder (HCC)        3. PTSD (post-traumatic stress disorder)            Criteria for Discharge: two or more unexcused absences for services    Aftercare recommendations include (include specific referral names and phone numbers, if appropriate): Provider Listing  Outpatient Mental Health - Adult  [facility], [address] [place] [address] Archie PICKARD                             [phone]  Edy Shannon MD, [address] [facility] [place]                          [phone]  [place] [address]  Santhosh Munguia, Johanna PICKARD                                       [phone]  Ben Rice MD, [address], [place]anthosh PICKARD                               [phone]  Rasheed Solis MD, [address], Johanna PICKARD                                   [phone]  Outpatient Mental Health- [facility], [address] [place] [phone]  Drug and Alcohol Services  [facility]                                      [phone] or [phone]  [facility]                           [phone] or [phone]  [facility]                                                                     [phone]  [facility]                                                                                  [phone]  County Crisis Numbers  [facility]                               [phone]  [facility]                     [phone] or [phone]  [phone] or [phone]  [facility]                                [phone]  [facility]                                [phone]  [facility]               [phone] (877-[facility])  [facility])                      [phone]  National Suicide Prevention Hotline  9 (237) 300-2750 (TALK)  Text 'HOME' to 783519  Call 9-8-8    Prognosis: fair

## 2024-06-27 ENCOUNTER — TELEPHONE (OUTPATIENT)
Dept: PSYCHIATRY | Facility: CLINIC | Age: 29
End: 2024-06-27

## 2024-07-10 DIAGNOSIS — K21.9 GASTROESOPHAGEAL REFLUX DISEASE, UNSPECIFIED WHETHER ESOPHAGITIS PRESENT: ICD-10-CM

## 2024-07-11 RX ORDER — OMEPRAZOLE 40 MG/1
40 CAPSULE, DELAYED RELEASE ORAL
Qty: 90 CAPSULE | Refills: 0 | Status: SHIPPED | OUTPATIENT
Start: 2024-07-11

## 2024-08-05 DIAGNOSIS — F43.10 PTSD (POST-TRAUMATIC STRESS DISORDER): ICD-10-CM

## 2024-08-05 DIAGNOSIS — F39 UNSPECIFIED MOOD (AFFECTIVE) DISORDER (HCC): ICD-10-CM

## 2024-08-05 RX ORDER — GABAPENTIN 100 MG/1
100 CAPSULE ORAL 2 TIMES DAILY
Qty: 60 CAPSULE | Refills: 3 | Status: SHIPPED | OUTPATIENT
Start: 2024-08-05

## 2024-08-22 ENCOUNTER — OFFICE VISIT (OUTPATIENT)
Dept: OTHER | Age: 29
End: 2024-08-22
Payer: COMMERCIAL

## 2024-08-22 VITALS
SYSTOLIC BLOOD PRESSURE: 142 MMHG | WEIGHT: 258 LBS | DIASTOLIC BLOOD PRESSURE: 100 MMHG | BODY MASS INDEX: 45.71 KG/M2 | HEIGHT: 63 IN | HEART RATE: 100 BPM

## 2024-08-22 DIAGNOSIS — F10.939 ALCOHOL WITHDRAWAL SYNDROME WITH COMPLICATION (HCC): Primary | ICD-10-CM

## 2024-08-22 DIAGNOSIS — F10.90 ALCOHOL USE DISORDER: ICD-10-CM

## 2024-08-22 PROCEDURE — 99213 OFFICE O/P EST LOW 20 MIN: CPT

## 2024-08-22 NOTE — ASSESSMENT & PLAN NOTE
Patient currently reports that she is drinking a handle of blue curacao daily. Reports that when she stops drinking he develops withdrawal symptoms  Discussed with patient inpatient medical detox unit. Due to fear of losing her job patient states that she would not be able to complete a medical detox until the first week of September  Family Discussion with mother   Patient is agreeable to inpatient medical detox, she will coordinate with family and work and report to ED for detox evaluation

## 2024-08-22 NOTE — ASSESSMENT & PLAN NOTE
Has been drinking for the last three months   Stopped attending appointments, missed vivitrol injections  Plan is to undergo medical detox and then continue with vivitrol injections and psychotherapy

## 2024-08-22 NOTE — PROGRESS NOTES
[facility]    Progress Note  Ivette Andrew 29 y.o. female MRN: [id number]  @ Encounter: [id number]      1. Alcohol withdrawal syndrome with complication (HCC)  Assessment & Plan:  Patient currently reports that she is drinking a handle of blue curacao daily. Reports that when she stops drinking he develops withdrawal symptoms  Discussed with patient inpatient medical detox unit. Due to fear of losing her job patient states that she would not be able to complete a medical detox until the first week of September  Family Discussion with mother   Patient is agreeable to inpatient medical detox, she will coordinate with family and work and report to ED for detox evaluation     2. Alcohol use disorder  Assessment & Plan:  Has been drinking for the last three months   Stopped attending appointments, missed vivitrol injections  Plan is to undergo medical detox and then continue with vivitrol injections and psychotherapy         Support Services  Case Management and Certified  are following.   Patient was referred to the [facility] Certified Addiction Counselors: No  The patient is actively engaged with the [facility] Certified Addiction Counselor’s psychotherapy plan: No    This patient does not have an active medication from one of the medication groupers.      PDMP reviewed:     PDMP Review         Value Time User    PDMP Reviewed  Yes 3/11/2024 11:00 AM Braxton Crook MD            SUBJECTIVE  Patient presents for follow up appointment after missing multiple appointments over the last two months. She admits to drinking alcohol daily. Currently reports withdrawal symptoms of nausea, anxiety, and tremors. She is hypertensive and tachycardic based on her vital signs.    She is agreeable to inpatient medical detox     Drug cravings: yes  Motivation to continue treatment: high       Current Outpatient Medications:     ARIPiprazole (ABILIFY) 10 mg tablet, Take 1 tablet (10 mg total)  by mouth daily, Disp: 90 tablet, Rfl: 1    gabapentin (NEURONTIN) 100 mg capsule, TAKE ONE CAPSULE BY MOUTH TWICE A DAY, Disp: 60 capsule, Rfl: 3    gabapentin (NEURONTIN) 300 mg capsule, Take 2 capsules (600 mg total) by mouth daily at bedtime, Disp: 60 capsule, Rfl: 3    lisinopril (ZESTRIL) 20 mg tablet, TAKE ONE TABLET BY MOUTH DAILY, Disp: 90 tablet, Rfl: 1    naproxen sodium (Aleve) 220 MG tablet, Take 220 mg by mouth daily as needed for mild pain, Disp: , Rfl:     omeprazole (PriLOSEC) 40 MG capsule, TAKE ONE CAPSULE BY MOUTH EVERY DAY BEFORE BREAKFAST, Disp: 90 capsule, Rfl: 0    sertraline (ZOLOFT) 100 mg tablet, Take 1.5 tablets (150 mg total) by mouth daily at bedtime, Disp: 45 tablet, Rfl: 4    topiramate (TOPAMAX) 25 mg tablet, Take 1 tablet (25 mg total) by mouth daily at bedtime, Disp: 30 tablet, Rfl: 3    amLODIPine (NORVASC) 10 mg tablet, Take 1 tablet (10 mg total) by mouth daily (Patient not taking: Reported on 8/22/2024), Disp: 30 tablet, Rfl: 5    Naltrexone (VIVITROL IM), Inject into a muscle (Patient not taking: Reported on 8/22/2024), Disp: , Rfl:     Objective     Vitals:    08/22/24 1438   BP: 142/100   Pulse: 100       Physical Exam  Vitals reviewed.   Constitutional:       General: She is not in acute distress.     Appearance: She is diaphoretic.   Cardiovascular:      Rate and Rhythm: Tachycardia present.   Neurological:      Mental Status: She is alert and oriented to person, place, and time.      Motor: Tremor present.   Psychiatric:         Mood and Affect: Mood is anxious.              Lab Results:                              Counseling / Coordination of Care  Total time spent today 20 minutes. Greater than 50% of total time was spent with the patient and / or family counseling and / or coordination of care.     Smitha Nina PA-C

## 2024-09-04 ENCOUNTER — HOSPITAL ENCOUNTER (INPATIENT)
Facility: HOSPITAL | Age: 29
LOS: 2 days | Discharge: HOME | DRG: 897 | End: 2024-09-06
Attending: EMERGENCY MEDICINE | Admitting: INTERNAL MEDICINE
Payer: COMMERCIAL

## 2024-09-04 DIAGNOSIS — F10.20 ALCOHOL USE DISORDER, SEVERE, DEPENDENCE (HCC): ICD-10-CM

## 2024-09-04 DIAGNOSIS — F10.939 ALCOHOL WITHDRAWAL (HCC): Primary | ICD-10-CM

## 2024-09-04 DIAGNOSIS — F41.9 ANXIETY DISORDER, UNSPECIFIED TYPE: ICD-10-CM

## 2024-09-04 DIAGNOSIS — F39 UNSPECIFIED MOOD (AFFECTIVE) DISORDER (HCC): ICD-10-CM

## 2024-09-04 DIAGNOSIS — R45.1 RESTLESSNESS: ICD-10-CM

## 2024-09-04 DIAGNOSIS — F43.10 PTSD (POST-TRAUMATIC STRESS DISORDER): ICD-10-CM

## 2024-09-04 PROBLEM — E66.01 MORBID OBESITY (HCC): Status: ACTIVE | Noted: 2022-03-25

## 2024-09-04 LAB
ALBUMIN SERPL BCG-MCNC: 4.3 G/DL (ref 3.5–5)
ALP SERPL-CCNC: 95 U/L (ref 34–104)
ALT SERPL W P-5'-P-CCNC: 21 U/L (ref 7–52)
AMPHETAMINES SERPL QL SCN: NEGATIVE
ANION GAP SERPL CALCULATED.3IONS-SCNC: 9 MMOL/L (ref 4–13)
AST SERPL W P-5'-P-CCNC: 25 U/L (ref 13–39)
ATRIAL RATE: 82 BPM
BARBITURATES UR QL: NEGATIVE
BASOPHILS # BLD AUTO: 0.02 THOUSANDS/ΜL (ref 0–0.1)
BASOPHILS NFR BLD AUTO: 0 % (ref 0–1)
BENZODIAZ UR QL: NEGATIVE
BILIRUB SERPL-MCNC: 0.39 MG/DL (ref 0.2–1)
BUN SERPL-MCNC: 11 MG/DL (ref 5–25)
CALCIUM SERPL-MCNC: 9.4 MG/DL (ref 8.4–10.2)
CHLORIDE SERPL-SCNC: 105 MMOL/L (ref 96–108)
CO2 SERPL-SCNC: 24 MMOL/L (ref 21–32)
COCAINE UR QL: NEGATIVE
CREAT SERPL-MCNC: 0.79 MG/DL (ref 0.6–1.3)
EOSINOPHIL # BLD AUTO: 0.1 THOUSAND/ΜL (ref 0–0.61)
EOSINOPHIL NFR BLD AUTO: 1 % (ref 0–6)
ERYTHROCYTE [DISTWIDTH] IN BLOOD BY AUTOMATED COUNT: 14.9 % (ref 11.6–15.1)
ETHANOL SERPL-MCNC: <10 MG/DL
EXT PREGNANCY TEST URINE: NEGATIVE
EXT. CONTROL: NORMAL
FENTANYL UR QL SCN: NEGATIVE
GFR SERPL CREATININE-BSD FRML MDRD: 100 ML/MIN/1.73SQ M
GLUCOSE SERPL-MCNC: 96 MG/DL (ref 65–140)
HCT VFR BLD AUTO: 41.7 % (ref 34.8–46.1)
HGB BLD-MCNC: 13.1 G/DL (ref 11.5–15.4)
HYDROCODONE UR QL SCN: NEGATIVE
IMM GRANULOCYTES # BLD AUTO: 0.03 THOUSAND/UL (ref 0–0.2)
IMM GRANULOCYTES NFR BLD AUTO: 0 % (ref 0–2)
LYMPHOCYTES # BLD AUTO: 1.81 THOUSANDS/ΜL (ref 0.6–4.47)
LYMPHOCYTES NFR BLD AUTO: 22 % (ref 14–44)
MAGNESIUM SERPL-MCNC: 2.2 MG/DL (ref 1.9–2.7)
MCH RBC QN AUTO: 26.8 PG (ref 26.8–34.3)
MCHC RBC AUTO-ENTMCNC: 31.4 G/DL (ref 31.4–37.4)
MCV RBC AUTO: 86 FL (ref 82–98)
METHADONE UR QL: NEGATIVE
MONOCYTES # BLD AUTO: 0.64 THOUSAND/ΜL (ref 0.17–1.22)
MONOCYTES NFR BLD AUTO: 8 % (ref 4–12)
NEUTROPHILS # BLD AUTO: 5.75 THOUSANDS/ΜL (ref 1.85–7.62)
NEUTS SEG NFR BLD AUTO: 69 % (ref 43–75)
NRBC BLD AUTO-RTO: 0 /100 WBCS
OPIATES UR QL SCN: NEGATIVE
OXYCODONE+OXYMORPHONE UR QL SCN: NEGATIVE
P AXIS: 8 DEGREES
PCP UR QL: NEGATIVE
PLATELET # BLD AUTO: 242 THOUSANDS/UL (ref 149–390)
PMV BLD AUTO: 9.2 FL (ref 8.9–12.7)
POTASSIUM SERPL-SCNC: 3.7 MMOL/L (ref 3.5–5.3)
PR INTERVAL: 136 MS
PROT SERPL-MCNC: 8 G/DL (ref 6.4–8.4)
QRS AXIS: 32 DEGREES
QRSD INTERVAL: 84 MS
QT INTERVAL: 384 MS
QTC INTERVAL: 448 MS
RBC # BLD AUTO: 4.88 MILLION/UL (ref 3.81–5.12)
SODIUM SERPL-SCNC: 138 MMOL/L (ref 135–147)
T WAVE AXIS: -2 DEGREES
THC UR QL: POSITIVE
VENTRICULAR RATE: 82 BPM
WBC # BLD AUTO: 8.35 THOUSAND/UL (ref 4.31–10.16)

## 2024-09-04 PROCEDURE — 99284 EMERGENCY DEPT VISIT MOD MDM: CPT

## 2024-09-04 PROCEDURE — 80307 DRUG TEST PRSMV CHEM ANLYZR: CPT | Performed by: EMERGENCY MEDICINE

## 2024-09-04 PROCEDURE — 83735 ASSAY OF MAGNESIUM: CPT | Performed by: EMERGENCY MEDICINE

## 2024-09-04 PROCEDURE — 85025 COMPLETE CBC W/AUTO DIFF WBC: CPT | Performed by: EMERGENCY MEDICINE

## 2024-09-04 PROCEDURE — 80053 COMPREHEN METABOLIC PANEL: CPT | Performed by: EMERGENCY MEDICINE

## 2024-09-04 PROCEDURE — 99223 1ST HOSP IP/OBS HIGH 75: CPT | Performed by: INTERNAL MEDICINE

## 2024-09-04 PROCEDURE — 93005 ELECTROCARDIOGRAM TRACING: CPT

## 2024-09-04 PROCEDURE — 96374 THER/PROPH/DIAG INJ IV PUSH: CPT

## 2024-09-04 PROCEDURE — RECHECK: Performed by: INTERNAL MEDICINE

## 2024-09-04 PROCEDURE — 36415 COLL VENOUS BLD VENIPUNCTURE: CPT | Performed by: EMERGENCY MEDICINE

## 2024-09-04 PROCEDURE — 82077 ASSAY SPEC XCP UR&BREATH IA: CPT | Performed by: EMERGENCY MEDICINE

## 2024-09-04 PROCEDURE — 99291 CRITICAL CARE FIRST HOUR: CPT | Performed by: EMERGENCY MEDICINE

## 2024-09-04 PROCEDURE — 93010 ELECTROCARDIOGRAM REPORT: CPT | Performed by: INTERNAL MEDICINE

## 2024-09-04 RX ORDER — GABAPENTIN 300 MG/1
600 CAPSULE ORAL
Status: DISCONTINUED | OUTPATIENT
Start: 2024-09-04 | End: 2024-09-06 | Stop reason: HOSPADM

## 2024-09-04 RX ORDER — ARIPIPRAZOLE 10 MG/1
10 TABLET ORAL DAILY
Status: DISCONTINUED | OUTPATIENT
Start: 2024-09-04 | End: 2024-09-06 | Stop reason: HOSPADM

## 2024-09-04 RX ORDER — SODIUM CHLORIDE 9 MG/ML
125 INJECTION, SOLUTION INTRAVENOUS CONTINUOUS
Status: DISCONTINUED | OUTPATIENT
Start: 2024-09-04 | End: 2024-09-04

## 2024-09-04 RX ORDER — GABAPENTIN 100 MG/1
100 CAPSULE ORAL
Status: DISCONTINUED | OUTPATIENT
Start: 2024-09-04 | End: 2024-09-06 | Stop reason: HOSPADM

## 2024-09-04 RX ORDER — ACETAMINOPHEN 325 MG/1
650 TABLET ORAL EVERY 4 HOURS PRN
Status: DISCONTINUED | OUTPATIENT
Start: 2024-09-04 | End: 2024-09-06 | Stop reason: HOSPADM

## 2024-09-04 RX ORDER — LORAZEPAM 2 MG/ML
2 INJECTION INTRAMUSCULAR ONCE
Status: COMPLETED | OUTPATIENT
Start: 2024-09-04 | End: 2024-09-04

## 2024-09-04 RX ORDER — TOPIRAMATE 25 MG/1
25 TABLET, FILM COATED ORAL
Status: DISCONTINUED | OUTPATIENT
Start: 2024-09-04 | End: 2024-09-06 | Stop reason: HOSPADM

## 2024-09-04 RX ORDER — ONDANSETRON 2 MG/ML
4 INJECTION INTRAMUSCULAR; INTRAVENOUS EVERY 4 HOURS PRN
Status: DISCONTINUED | OUTPATIENT
Start: 2024-09-04 | End: 2024-09-06 | Stop reason: HOSPADM

## 2024-09-04 RX ORDER — PHENOBARBITAL 64.8 MG/1
64.8 TABLET ORAL ONCE
Status: DISCONTINUED | OUTPATIENT
Start: 2024-09-04 | End: 2024-09-04

## 2024-09-04 RX ORDER — LORAZEPAM 2 MG/ML
1 INJECTION INTRAMUSCULAR EVERY 4 HOURS PRN
Status: DISCONTINUED | OUTPATIENT
Start: 2024-09-04 | End: 2024-09-06 | Stop reason: HOSPADM

## 2024-09-04 RX ORDER — HEPARIN SODIUM 5000 [USP'U]/ML
5000 INJECTION, SOLUTION INTRAVENOUS; SUBCUTANEOUS EVERY 8 HOURS SCHEDULED
Status: DISCONTINUED | OUTPATIENT
Start: 2024-09-04 | End: 2024-09-06 | Stop reason: HOSPADM

## 2024-09-04 RX ORDER — PANTOPRAZOLE SODIUM 40 MG/1
40 TABLET, DELAYED RELEASE ORAL
Status: DISCONTINUED | OUTPATIENT
Start: 2024-09-05 | End: 2024-09-06 | Stop reason: HOSPADM

## 2024-09-04 RX ORDER — FOLIC ACID 1 MG/1
1 TABLET ORAL DAILY
Status: DISCONTINUED | OUTPATIENT
Start: 2024-09-04 | End: 2024-09-06 | Stop reason: HOSPADM

## 2024-09-04 RX ORDER — DIAZEPAM 5 MG
10 TABLET ORAL ONCE
Status: DISCONTINUED | OUTPATIENT
Start: 2024-09-04 | End: 2024-09-04

## 2024-09-04 RX ORDER — LISINOPRIL 20 MG/1
20 TABLET ORAL DAILY
Status: DISCONTINUED | OUTPATIENT
Start: 2024-09-04 | End: 2024-09-06 | Stop reason: HOSPADM

## 2024-09-04 RX ORDER — LANOLIN ALCOHOL/MO/W.PET/CERES
100 CREAM (GRAM) TOPICAL DAILY
Status: DISCONTINUED | OUTPATIENT
Start: 2024-09-04 | End: 2024-09-06 | Stop reason: HOSPADM

## 2024-09-04 RX ORDER — NICOTINE 21 MG/24HR
1 PATCH, TRANSDERMAL 24 HOURS TRANSDERMAL DAILY
Status: DISCONTINUED | OUTPATIENT
Start: 2024-09-04 | End: 2024-09-06 | Stop reason: HOSPADM

## 2024-09-04 RX ADMIN — NICOTINE 1 PATCH: 21 PATCH, EXTENDED RELEASE TRANSDERMAL at 17:29

## 2024-09-04 RX ADMIN — HEPARIN SODIUM 5000 UNITS: 5000 INJECTION INTRAVENOUS; SUBCUTANEOUS at 17:31

## 2024-09-04 RX ADMIN — FOLIC ACID 1 MG: 1 TABLET ORAL at 17:30

## 2024-09-04 RX ADMIN — ARIPIPRAZOLE 10 MG: 10 TABLET ORAL at 17:30

## 2024-09-04 RX ADMIN — GABAPENTIN 100 MG: 100 CAPSULE ORAL at 17:30

## 2024-09-04 RX ADMIN — GABAPENTIN 600 MG: 300 CAPSULE ORAL at 21:09

## 2024-09-04 RX ADMIN — LORAZEPAM 1 MG: 2 INJECTION INTRAMUSCULAR; INTRAVENOUS at 17:31

## 2024-09-04 RX ADMIN — TOPIRAMATE 25 MG: 25 TABLET, FILM COATED ORAL at 21:09

## 2024-09-04 RX ADMIN — MULTIPLE VITAMINS W/ MINERALS TAB 1 TABLET: TAB ORAL at 17:30

## 2024-09-04 RX ADMIN — LORAZEPAM 2 MG: 2 INJECTION INTRAMUSCULAR; INTRAVENOUS at 13:30

## 2024-09-04 RX ADMIN — THIAMINE HCL TAB 100 MG 100 MG: 100 TAB at 17:31

## 2024-09-04 RX ADMIN — HEPARIN SODIUM 5000 UNITS: 5000 INJECTION INTRAVENOUS; SUBCUTANEOUS at 21:07

## 2024-09-04 RX ADMIN — ONDANSETRON 4 MG: 2 INJECTION INTRAMUSCULAR; INTRAVENOUS at 17:30

## 2024-09-04 RX ADMIN — LISINOPRIL 20 MG: 20 TABLET ORAL at 17:31

## 2024-09-04 RX ADMIN — SERTRALINE HYDROCHLORIDE 150 MG: 100 TABLET ORAL at 21:09

## 2024-09-04 NOTE — ED PROVIDER NOTES
History  Chief Complaint   Patient presents with    Detox Evaluation     Reports jug of rum /day - denies hx of seizures - last drink 0200 - reports mild GI upset, headache and some shakiness at this time     29-year-old female here for evaluation of alcohol abuse/withdrawal.  She admits that she is an alcoholic and drinks every day.  Currently drinking about 750 mL to 1 L of rum daily.  She did do an inpatient detox program about a year ago and was sober for a few months before she relapsed back to drinking.  She has not gone more than a day without drinking for at least 6 months now.  She works as a  at [employer].  She typically starts drinking when she gets home from work and will start to have withdrawal symptoms about 8 hours after her last drink.  States she feels shaky and anxious.  Review of the EMR shows that she has seen Smitha Nina through the detox service as recently as a few weeks ago and was recommended to undergo inpatient detox.  Patient was hesitant at the time because she was worried about losing her job.  She says that she now has a few days off work and would like to undergo medical detox during that time.  Denies other symptoms such as chest pain, shortness of breath, fevers, URI symptoms, nausea, vomiting, diarrhea, urinary symptoms.  She has struggled with depression at times but denies any thoughts of suicide or self-harm.          Prior to Admission Medications   Prescriptions Last Dose Informant Patient Reported? Taking?   ARIPiprazole (ABILIFY) 10 mg tablet   No No   Sig: Take 1 tablet (10 mg total) by mouth daily   Naltrexone (VIVITROL IM)   Yes No   Sig: Inject into a muscle   Patient not taking: Reported on 8/22/2024   gabapentin (NEURONTIN) 100 mg capsule   No No   Sig: TAKE ONE CAPSULE BY MOUTH TWICE A DAY   gabapentin (NEURONTIN) 300 mg capsule   No No   Sig: Take 2 capsules (600 mg total) by mouth daily at bedtime   lisinopril (ZESTRIL) 20 mg tablet   No No  "  Sig: TAKE ONE TABLET BY MOUTH DAILY   naproxen sodium (Aleve) 220 MG tablet   Yes No   Sig: Take 220 mg by mouth daily as needed for mild pain   omeprazole (PriLOSEC) 40 MG capsule   No No   Sig: TAKE ONE CAPSULE BY MOUTH EVERY DAY BEFORE BREAKFAST   sertraline (ZOLOFT) 100 mg tablet   No No   Sig: Take 1.5 tablets (150 mg total) by mouth daily at bedtime   topiramate (TOPAMAX) 25 mg tablet   No No   Sig: Take 1 tablet (25 mg total) by mouth daily at bedtime      Facility-Administered Medications: None       Past Medical History:   Diagnosis Date    Abnormal Pap smear of cervix     ASCUS + HPV 16 & other    Alcohol abuse     Alcoholism (HCC)     Anxiety     Atypical squamous cells cannot exclude high grade squamous intraepithelial lesion on cytologic smear of cervix (ASC-H) 10/04/2023    9/8/23 ASCUS w/ HPV 16 and \"other\" type 10/5- colposcopy    Bipolar disorder (HCC)     Depression     GERD (gastroesophageal reflux disease)     Hypertension     Kidney stones     PTSD (post-traumatic stress disorder)     Self-injurious behavior     Suicide attempt ([facility])     Withdrawal symptoms, drug or narcotic ([facility])        Past Surgical History:   Procedure Laterality Date    KS CONIZATION CERVIX W/WO D&C RPR ELTRD EXC N/A 12/4/2023    Procedure: LOOP ELECTRICAL EXCISION PROCEDURE;  Surgeon: Uzma Quezada DO;  Location: [facility] MAIN OR;  Service: Gynecology       Family History   Problem Relation Age of Onset    Heart disease Mother     Stroke Mother     Diabetes Mother     Hypertension Mother     Asthma Mother     Cancer Paternal Grandmother     Kidney disease Paternal Grandmother     Diabetes Paternal Grandmother      I have reviewed and agree with the history as documented.    E-Cigarette/Vaping    E-Cigarette Use Current Some Day User      E-Cigarette/Vaping Substances    Nicotine No     THC Yes     CBD No     Flavoring No     Other No     Unknown No      Social History     Tobacco Use    Smoking status: Every Day     Current " packs/day: 1.00     Average packs/day: 1 pack/day for 18.7 years (18.7 ttl pk-yrs)     Types: Cigarettes     Start date: 2006     Passive exposure: Past    Smokeless tobacco: Never   Vaping Use    Vaping status: Some Days    Substances: THC   Substance Use Topics    Alcohol use: Yes     Comment: jug rum/day    Drug use: Yes     Types: Marijuana     Comment: THC edibles       Review of Systems   Constitutional:  Negative for chills and fever.   HENT:  Negative for sore throat.    Eyes:  Negative for pain and visual disturbance.   Respiratory:  Negative for cough and shortness of breath.    Cardiovascular:  Negative for chest pain and palpitations.   Gastrointestinal:  Negative for abdominal pain, nausea and vomiting.   Genitourinary:  Negative for dysuria and hematuria.   Musculoskeletal:  Negative for arthralgias and back pain.   Skin:  Negative for color change and rash.   Neurological:  Positive for tremors. Negative for syncope.   Psychiatric/Behavioral:  Negative for self-injury and suicidal ideas. The patient is nervous/anxious.    All other systems reviewed and are negative.      Physical Exam  Physical Exam  Vitals and nursing note reviewed.   Constitutional:       General: She is not in acute distress.     Appearance: She is well-developed.   HENT:      Head: Normocephalic and atraumatic.   Eyes:      Conjunctiva/sclera: Conjunctivae normal.   Cardiovascular:      Rate and Rhythm: Normal rate and regular rhythm.      Heart sounds: No murmur heard.  Pulmonary:      Effort: Pulmonary effort is normal. No respiratory distress.      Breath sounds: Normal breath sounds.   Abdominal:      Palpations: Abdomen is soft.      Tenderness: There is no abdominal tenderness.   Musculoskeletal:         General: No swelling.      Cervical back: Neck supple.   Skin:     General: Skin is warm and dry.      Capillary Refill: Capillary refill takes less than 2 seconds.   Neurological:      General: No focal deficit present.       Mental Status: She is alert and oriented to person, place, and time.      Comments: GCS 15 nonfocal, mild resting tremor otherwise unremarkable neuroexam.   Psychiatric:         Mood and Affect: Mood normal.         Vital Signs  ED Triage Vitals   Temperature Pulse Respirations Blood Pressure SpO2   09/04/24 1300 09/04/24 1300 09/04/24 1300 09/04/24 1300 09/04/24 1300   99.1 °F (37.3 °C) 95 20 (!) 198/121 100 %      Temp Source Heart Rate Source Patient Position - Orthostatic VS BP Location FiO2 (%)   09/04/24 1300 09/04/24 1300 09/04/24 1300 09/04/24 1457 --   Oral Monitor Sitting Left arm       Pain Score       --                  Vitals:    09/04/24 1300 09/04/24 1457   BP: (!) 198/121 (!) 150/110   Pulse: 95 89   Patient Position - Orthostatic VS: Sitting Lying         Visual Acuity      ED Medications  Medications   ARIPiprazole (ABILIFY) tablet 10 mg (has no administration in time range)   gabapentin (NEURONTIN) capsule 100 mg (has no administration in time range)   gabapentin (NEURONTIN) capsule 600 mg (has no administration in time range)   lisinopril (ZESTRIL) tablet 20 mg (has no administration in time range)   pantoprazole (PROTONIX) EC tablet 40 mg (has no administration in time range)   topiramate (TOPAMAX) tablet 25 mg (has no administration in time range)   sertraline (ZOLOFT) tablet 150 mg (has no administration in time range)   thiamine tablet 100 mg (has no administration in time range)   folic acid (FOLVITE) tablet 1 mg (has no administration in time range)   multivitamin-minerals (CENTRUM) tablet 1 tablet (has no administration in time range)   sodium chloride 0.9 % infusion (has no administration in time range)   nicotine (NICODERM CQ) 21 mg/24 hr TD 24 hr patch 1 patch (has no administration in time range)   heparin (porcine) subcutaneous injection 5,000 Units (has no administration in time range)   acetaminophen (TYLENOL) tablet 650 mg (has no administration in time range)   ondansetron  (ZOFRAN) injection 4 mg (has no administration in time range)   LORazepam (ATIVAN) injection 2 mg (2 mg Intravenous Given 9/4/24 1330)       Diagnostic Studies  Results Reviewed       Procedure Component Value Units Date/Time    Rapid drug screen, urine [[id number]]  (Abnormal) Collected: 09/04/24 1356    Lab Status: Final result Specimen: Urine, Clean Catch Updated: 09/04/24 1420     Amph/Meth UR Negative     Barbiturate Ur Negative     Benzodiazepine Urine Negative     Cocaine Urine Negative     Methadone Urine Negative     Opiate Urine Negative     PCP Ur Negative     THC Urine Positive     Oxycodone Urine Negative     Fentanyl Urine Negative     HYDROCODONE URINE Negative    Narrative:      Presumptive report. If requested, specimen will be sent to reference lab for confirmation.  FOR MEDICAL PURPOSES ONLY.   IF CONFIRMATION NEEDED PLEASE CONTACT THE LAB WITHIN 5 DAYS.    Drug Screen Cutoff Levels:  AMPHETAMINE/METHAMPHETAMINES  1000 ng/mL  BARBITURATES     200 ng/mL  BENZODIAZEPINES     200 ng/mL  COCAINE      300 ng/mL  METHADONE      300 ng/mL  OPIATES      300 ng/mL  PHENCYCLIDINE     25 ng/mL  THC       50 ng/mL  OXYCODONE      100 ng/mL  FENTANYL      5 ng/mL  HYDROCODONE     300 ng/mL    Comprehensive metabolic panel [[id number]] Collected: 09/04/24 1329    Lab Status: Final result Specimen: Blood from Line, Venous Updated: 09/04/24 1401     Sodium 138 mmol/L      Potassium 3.7 mmol/L      Chloride 105 mmol/L      CO2 24 mmol/L      ANION GAP 9 mmol/L      BUN 11 mg/dL      Creatinine 0.79 mg/dL      Glucose 96 mg/dL      Calcium 9.4 mg/dL      AST 25 U/L      ALT 21 U/L      Alkaline Phosphatase 95 U/L      Total Protein 8.0 g/dL      Albumin 4.3 g/dL      Total Bilirubin 0.39 mg/dL      eGFR 100 ml/min/1.73sq m     Narrative:      National Kidney Disease Foundation guidelines for Chronic Kidney Disease (CKD):     Stage 1 with normal or high GFR (GFR > 90 mL/min/1.73 square meters)    Stage 2 Mild CKD (GFR  = 60-89 mL/min/1.73 square meters)    Stage 3A Moderate CKD (GFR = 45-59 mL/min/1.73 square meters)    Stage 3B Moderate CKD (GFR = 30-44 mL/min/1.73 square meters)    Stage 4 Severe CKD (GFR = 15-29 mL/min/1.73 square meters)    Stage 5 End Stage CKD (GFR <15 mL/min/1.73 square meters)  Note: GFR calculation is accurate only with a steady state creatinine    Magnesium [[id number]]  (Normal) Collected: 09/04/24 1329    Lab Status: Final result Specimen: Blood from Line, Venous Updated: 09/04/24 1401     Magnesium 2.2 mg/dL     Ethanol [[id number]]  (Normal) Collected: 09/04/24 1329    Lab Status: Final result Specimen: Blood from Line, Venous Updated: 09/04/24 1359     Ethanol Lvl <10 mg/dL     POCT pregnancy, urine [[id number]] Collected: 09/04/24 1356    Lab Status: In process Updated: 09/04/24 1356     EXT Preg Test, Ur Negative     Control Valid    CBC and differential [[id number]] Collected: 09/04/24 1329    Lab Status: Final result Specimen: Blood from Line, Venous Updated: 09/04/24 1339     WBC 8.35 Thousand/uL      RBC 4.88 Million/uL      Hemoglobin 13.1 g/dL      Hematocrit 41.7 %      MCV 86 fL      MCH 26.8 pg      MCHC 31.4 g/dL      RDW 14.9 %      MPV 9.2 fL      Platelets 242 Thousands/uL      nRBC 0 /100 WBCs      Segmented % 69 %      Immature Grans % 0 %      Lymphocytes % 22 %      Monocytes % 8 %      Eosinophils Relative 1 %      Basophils Relative 0 %      Absolute Neutrophils 5.75 Thousands/µL      Absolute Immature Grans 0.03 Thousand/uL      Absolute Lymphocytes 1.81 Thousands/µL      Absolute Monocytes 0.64 Thousand/µL      Eosinophils Absolute 0.10 Thousand/µL      Basophils Absolute 0.02 Thousands/µL                    No orders to display              Procedures  CriticalCare Time    Date/Time: 9/4/2024 4:31 PM    Performed by: Dat Morse MD  Authorized by: Dat Morse MD    Critical care provider statement:     Critical care time (minutes):  37    Critical care time was  exclusive of:  Separately billable procedures and treating other patients and teaching time    Critical care was necessary to treat or prevent imminent or life-threatening deterioration of the following conditions:  Toxidrome    Critical care was time spent personally by me on the following activities:  Blood draw for specimens, obtaining history from patient or surrogate, development of treatment plan with patient or surrogate, discussions with consultants, evaluation of patient's response to treatment, examination of patient, ordering and performing treatments and interventions, ordering and review of laboratory studies, ordering and review of radiographic studies, re-evaluation of patient's condition and review of old charts    I assumed direction of critical care for this patient from another provider in my specialty: no    Comments:      Alcohol withdrawal requiring IV benzodiazepines.           ED Course  ED Course as of 09/04/24 1631   Wed Sep 04, 2024   1410 EKG independently interpreted by me: Normal sinus rhythm at rate of 82, normal axis, normal intervals, no significant ST elevations or depressions to suggest cardiac ischemia.            CIWA-Ar Score       Row Name 09/04/24 1305             CIWA-Ar    Nausea and Vomiting 1      Tactile Disturbances 0      Tremor 1      Auditory Disturbances 0      Paroxysmal Sweats 0      Visual Disturbances 0      Anxiety 1      Headache, Fullness in Head 2      Agitation 0      Orientation and Clouding of Sensorium 0      CIWA-Ar Total 5                                        SBIRT 20yo+      Flowsheet Row Most Recent Value   Initial Alcohol Screen: US AUDIT-C     1. How often do you have a drink containing alcohol? 6 Filed at: 09/04/2024 1303   2. How many drinks containing alcohol do you have on a typical day you are drinking?  4 Filed at: 09/04/2024 1303   3b. FEMALE Any Age, or MALE 65+: How often do you have 4 or more drinks on one occassion? 6 Filed at:  09/04/2024 1303   Audit-C Score 16 Filed at: 09/04/2024 1303   Full Alcohol Screen: US AUDIT    4. How often during the last year have you found that you were not able to stop drinking once you had started? 4 Filed at: 09/04/2024 1303   5. How often during past year have you failed to do what was normally expected of you because of drinking?  4 Filed at: 09/04/2024 1303   6. How often in past year have you needed a first drink in the morning to get yourself going after a heavy drinking session?  4 Filed at: 09/04/2024 1303   7. How often in past year have you had feeling of guilt or remorse after drinking?  4 Filed at: 09/04/2024 1303   8. How often in past year have you been unable to remember what happened night before because you had been drinking?  4 Filed at: 09/04/2024 1303   9. Have you or someone else been injured as a result of your drinking?  2 Filed at: 09/04/2024 1303   10. Has a relative, friend, doctor or other health worker been concerned about your drinking and suggested you cut down?  4 Filed at: 09/04/2024 1303   AUDIT Total Score 42 Filed at: 09/04/2024 1303   SIDDHARTH: How many times in the past year have you...    Used an illegal drug or used a prescription medication for non-medical reasons? Never Filed at: 09/04/2024 1303                      Medical Decision Making  29-year-old female with history of heavy alcohol use and history of withdrawal symptoms.  Looking for medical detox.  Currently with mild withdrawal symptoms (mild resting tremor, hypertension.) we will plan to admit to the hospital for medical detoxification.    Amount and/or Complexity of Data Reviewed  Labs: ordered. Decision-making details documented in ED Course.  ECG/medicine tests: ordered and independent interpretation performed. Decision-making details documented in ED Course.     Details: Independently interpreted by me: Normal sinus rhythm at rate of 82, normal axis, normal intervals, no significant ST elevations or  depressions to suggest cardiac ischemia.    Risk  Prescription drug management.  Decision regarding hospitalization.                 Disposition  Final diagnoses:   Alcohol withdrawal (HCC)     Time reflects when diagnosis was documented in both MDM as applicable and the Disposition within this note       Time User Action Codes Description Comment    9/4/2024  2:27 PM Dat Morse Add [F10.939] Alcohol withdrawal (HCC)           ED Disposition       ED Disposition   Admit    Condition   Stable    Date/Time   Wed Sep 4, 2024 1156    Comment   Case was discussed with DETOX and the patient's admission status was agreed to be Admission Status: inpatient status to the service of  .               Follow-up Information    None         Current Discharge Medication List        CONTINUE these medications which have NOT CHANGED    Details   ARIPiprazole (ABILIFY) 10 mg tablet Take 1 tablet (10 mg total) by mouth daily  Qty: 90 tablet, Refills: 1    Associated Diagnoses: PTSD (post-traumatic stress disorder)      !! gabapentin (NEURONTIN) 100 mg capsule TAKE ONE CAPSULE BY MOUTH TWICE A DAY  Qty: 60 capsule, Refills: 3    Associated Diagnoses: PTSD (post-traumatic stress disorder); Unspecified mood (affective) disorder (HCC)      !! gabapentin (NEURONTIN) 300 mg capsule Take 2 capsules (600 mg total) by mouth daily at bedtime  Qty: 60 capsule, Refills: 3    Associated Diagnoses: Anxiety disorder, unspecified type; Restlessness      lisinopril (ZESTRIL) 20 mg tablet TAKE ONE TABLET BY MOUTH DAILY  Qty: 90 tablet, Refills: 1    Associated Diagnoses: Primary hypertension      Naltrexone (VIVITROL IM) Inject into a muscle      naproxen sodium (Aleve) 220 MG tablet Take 220 mg by mouth daily as needed for mild pain      omeprazole (PriLOSEC) 40 MG capsule TAKE ONE CAPSULE BY MOUTH EVERY DAY BEFORE BREAKFAST  Qty: 90 capsule, Refills: 0    Associated Diagnoses: Gastroesophageal reflux disease, unspecified whether esophagitis  present      sertraline (ZOLOFT) 100 mg tablet Take 1.5 tablets (150 mg total) by mouth daily at bedtime  Qty: 45 tablet, Refills: 4    Associated Diagnoses: Unspecified mood (affective) disorder (HCC)      topiramate (TOPAMAX) 25 mg tablet Take 1 tablet (25 mg total) by mouth daily at bedtime  Qty: 30 tablet, Refills: 3    Associated Diagnoses: Unspecified mood (affective) disorder (HCC)       !! - Potential duplicate medications found. Please discuss with provider.          No discharge procedures on file.    PDMP Review         Value Time User    PDMP Reviewed  Yes 3/11/2024 11:00 AM Braxton Crook MD            ED Provider  Electronically Signed by             Dat Morse MD  09/04/24 0367

## 2024-09-04 NOTE — LETTER
[facility]  [address] [facility] [facility] [place] [address]  [phone]  Dept: [phone]    September 6, 2024     Patient: Ivette Andrew   YOB: 1995   Date of Visit: 9/4/2024       To Whom it May Concern:    Ivette Andrew is under my professional care. She was seen in the hospital from 9/4/2024 to 09/06/24. She may return to work on 09/07/2024 without limitations.    If you have any questions or concerns, please don't hesitate to call.         Sincerely,          Smitha Nina PA-C

## 2024-09-04 NOTE — ASSESSMENT & PLAN NOTE
Patient with a history of HTN  Home medication regimen: Lisinopril 20 mg   BP hypertensive upon arrival to the unit in the setting of acute alcohol withdrawal and chronic HTN  Most Recent : 113/63   Will continue   Plan to resume anti-hypertensive therapy if BP remains high after resolution of acute withdrawal  Continue monitoring BP

## 2024-09-04 NOTE — PROGRESS NOTES
Treatment plan    Patient currently not having much withdrawal symptoms after 2 mg IV lorazepam. SEWs will be discontinued at this time unless clinically changed at later time    Sabino Allen, DO FACP

## 2024-09-04 NOTE — H&P
[facility]  H&P  Name: Ivette Andrew 29 y.o. female I MRN: [id number]  Unit/Bed#: Z3H2 I Date of Admission: 9/4/2024   Date of Service: 9/4/2024 I Hospital Day: 0      Assessment & Plan   * Alcohol withdrawal syndrome with complication (HCC)  Assessment & Plan  History of alcohol use/withdrawal, anxiety/pression, obesity, hypertension, GERD who presents with withdrawal symptoms  Previously sober for the last 6 months has been consuming ROM  Last consumption 2 AM.  Admit to medication withdrawal unit with [facility] protocol.  Consult toxicology.  Thiamine and folic acid.    GERD without esophagitis  Assessment & Plan  Continue PPI    Mixed anxiety depressive disorder  Assessment & Plan  Continue abilify 10 mg in the morning sertraline and topiramate at bedtime    Morbid obesity (HCC)  Assessment & Plan  Body mass index is 45.97 kg/m².    Primary hypertension  Assessment & Plan  Continue lisinopril    Smoker  Assessment & Plan  Patient agreeable to NRT.  Will order nicotine patch    VTE Pharmacologic Prophylaxis: VTE Score: 4 Moderate Risk (Score 3-4) - Pharmacological DVT Prophylaxis Ordered: heparin.  Code Status: Level 1 - Full Code  Discussion with family:     Anticipated Length of Stay: Patient will be admitted on an inpatient basis with an anticipated length of stay of greater than 2 midnights secondary to withdrawal symptoms.    Total Time Spent on Date of Encounter in care of patient: This time was spent on one or more of the following: performing physical exam; counseling and coordination of care; obtaining or reviewing history; documenting in the medical record; reviewing/ordering tests, medications or procedures; communicating with other healthcare professionals and discussing with patient's family/caregivers.    Chief Complaint:     Detox Evaluation (Reports jug of rum /day - denies hx of seizures - last drink 0200 - reports mild GI upset, headache and some shakiness at this  "time)    History of Present Illness:    Ivette Andrew is a 29 y.o. female with a past medical history of hypertension alcohol use obesity and anxiety/depression who presents with alcohol withdrawal symptoms.  The patient was previously on Vivitrol but over the last several months started drinking rum again.  She is having withdrawal symptoms; last used 2 AM.  She has ongoing diarrhea but denies any chest pain shortness of breath.  She is being admitted to medication withdrawal unit and started on [facility] protocol    Review of Systems:  Review of Systems   Constitutional:  Negative for fever.   HENT:  Negative for facial swelling.    Eyes:  Negative for visual disturbance.   Respiratory:  Negative for shortness of breath.    Cardiovascular:  Negative for chest pain and palpitations.   Gastrointestinal:  Positive for diarrhea (Chronic). Negative for abdominal distention, abdominal pain, nausea and vomiting.   Genitourinary:  Negative for hematuria.   Musculoskeletal:  Negative for myalgias.   Skin:  Negative for rash.   Neurological:  Negative for seizures and speech difficulty.   Psychiatric/Behavioral:  The patient is not nervous/anxious.    All other systems reviewed and are negative.        Past Medical and Surgical History:   Past Medical History:   Diagnosis Date    Abnormal Pap smear of cervix     ASCUS + HPV 16 & other    Alcohol abuse     Alcoholism (HCC)     Anxiety     Atypical squamous cells cannot exclude high grade squamous intraepithelial lesion on cytologic smear of cervix (ASC-H) 10/04/2023    9/8/23 ASCUS w/ HPV 16 and \"other\" type 10/5- colposcopy    Bipolar disorder (HCC)     Depression     GERD (gastroesophageal reflux disease)     Hypertension     Kidney stones     PTSD (post-traumatic stress disorder)     Self-injurious behavior     Suicide attempt (HCC)     Withdrawal symptoms, drug or narcotic (HCC)      Past Surgical History:   Procedure Laterality Date    NV CONIZATION CERVIX W/WO D&C RPR " ELTRD EXC N/A 12/4/2023    Procedure: LOOP ELECTRICAL EXCISION PROCEDURE;  Surgeon: Uzma Quezada DO;  [facility] MA[facility];  Service: Gynecology     Meds/Allergies:  Allergies:   Allergies   Allergen Reactions    Biaxin [Clarithromycin] GI Intolerance    Other GI Intolerance     cefcil       Prior to Admission Medications   Prescriptions Last Dose Informant Patient Reported? Taking?   ARIPiprazole (ABILIFY) 10 mg tablet   No No   Sig: Take 1 tablet (10 mg total) by mouth daily   Naltrexone (VIVITROL IM)   Yes No   Sig: Inject into a muscle   Patient not taking: Reported on 8/22/2024   gabapentin (NEURONTIN) 100 mg capsule   No No   Sig: TAKE ONE CAPSULE BY MOUTH TWICE A DAY   gabapentin (NEURONTIN) 300 mg capsule   No No   Sig: Take 2 capsules (600 mg total) by mouth daily at bedtime   lisinopril (ZESTRIL) 20 mg tablet   No No   Sig: TAKE ONE TABLET BY MOUTH DAILY   naproxen sodium (Aleve) 220 MG tablet   Yes No   Sig: Take 220 mg by mouth daily as needed for mild pain   omeprazole (PriLOSEC) 40 MG capsule   No No   Sig: TAKE ONE CAPSULE BY MOUTH EVERY DAY BEFORE BREAKFAST   sertraline (ZOLOFT) 100 mg tablet   No No   Sig: Take 1.5 tablets (150 mg total) by mouth daily at bedtime   topiramate (TOPAMAX) 25 mg tablet   No No   Sig: Take 1 tablet (25 mg total) by mouth daily at bedtime      Facility-Administered Medications: None     Social History:     Social History     Socioeconomic History    Marital status: Single     Spouse name: Not on file    Number of children: Not on file    Years of education: Not on file    Highest education level: Not on file   Occupational History    Not on file   Tobacco Use    Smoking status: Every Day     Current packs/day: 1.00     Average packs/day: 1 pack/day for 18.7 years (18.7 ttl pk-yrs)     Types: Cigarettes     Start date: 2006     Passive exposure: Past    Smokeless tobacco: Never   Vaping Use    Vaping status: Some Days    Substances: THC   Substance and Sexual Activity     Alcohol use: Yes     Comment: jug rum/day    Drug use: Yes     Types: Marijuana     Comment: THC edibles    Sexual activity: Yes     Partners: Male     Birth control/protection: Abstinence, None   Other Topics Concern    Not on file   Social History Narrative    Not on file     Social Determinants of Health     Financial Resource Strain: Not on file   Food Insecurity: Not on file   Transportation Needs: Not on file   Physical Activity: Not on file   Stress: Not on file   Social Connections: Not on file   Intimate Partner Violence: Not on file   Housing Stability: Not on file     Patient Pre-hospital Living Situation:   Patient Pre-hospital Level of Mobility:   Patient Pre-hospital Diet Restrictions:     Family History:  Family History   Problem Relation Age of Onset    Heart disease Mother     Stroke Mother     Diabetes Mother     Hypertension Mother     Asthma Mother     Cancer Paternal Grandmother     Kidney disease Paternal Grandmother     Diabetes Paternal Grandmother      Physical Exam:   Vitals:   Blood Pressure: (!) 150/110 (09/04/24 1457)  Pulse: 89 (09/04/24 1457)  Temperature: 99.1 °F (37.3 °C) (09/04/24 1300)  Temp Source: Oral (09/04/24 1300)  Respirations: 18 (09/04/24 1457)  Weight - Scale: 118 kg (259 lb 7.7 oz) (09/04/24 1300)  SpO2: 98 % (09/04/24 1457)    Physical Exam  Vitals reviewed.   Constitutional:       General: She is not in acute distress.     Appearance: Normal appearance. She is obese.   HENT:      Head: Atraumatic.   Eyes:      General: No scleral icterus.     Extraocular Movements: Extraocular movements intact.   Cardiovascular:      Rate and Rhythm: Regular rhythm.      Heart sounds: Normal heart sounds.   Pulmonary:      Breath sounds: Normal breath sounds. No wheezing.   Abdominal:      General: Bowel sounds are normal.      Palpations: Abdomen is soft.      Tenderness: There is no abdominal tenderness. There is no guarding.   Musculoskeletal:         General: No swelling or  tenderness.      Cervical back: Normal range of motion.   Skin:     General: Skin is warm.   Neurological:      General: No focal deficit present.      Mental Status: She is alert and oriented to person, place, and time.      Motor: No weakness.   Psychiatric:         Mood and Affect: Mood normal.       Lab Results: I have personally reviewed pertinent reports.    Results from last 7 days   Lab Units 09/04/24  1329   WBC Thousand/uL 8.35   HEMOGLOBIN g/dL 13.1   HEMATOCRIT % 41.7   PLATELETS Thousands/uL 242   SEGS PCT % 69   LYMPHO PCT % 22   MONO PCT % 8   EOS PCT % 1     Results from last 7 days   Lab Units 09/04/24  1329   SODIUM mmol/L 138   POTASSIUM mmol/L 3.7   CHLORIDE mmol/L 105   CO2 mmol/L 24   ANION GAP mmol/L 9   BUN mg/dL 11   CREATININE mg/dL 0.79   CALCIUM mg/dL 9.4   ALBUMIN g/dL 4.3   TOTAL BILIRUBIN mg/dL 0.39   ALK PHOS U/L 95   ALT U/L 21   AST U/L 25   EGFR ml/min/1.73sq m 100   GLUCOSE RANDOM mg/dL 96           Lines/Drains  Invasive Devices       Peripheral Intravenous Line  Duration             Peripheral IV 09/04/24 Left Antecubital <1 day                    Imaging:   No results found.    EKG, Pathology, and Other Studies Reviewed on Admission:   Reviewed prior and outpatient PCP records.    ** Please Note: This note has been constructed using a voice recognition system. **

## 2024-09-04 NOTE — ASSESSMENT & PLAN NOTE
History of alcohol use/withdrawal, anxiety/pression, obesity, hypertension, GERD who presents with withdrawal symptoms  Previously sober for the last 6 months has been consuming ROM  Last consumption 2 AM.  Admit to medication withdrawal unit with [facility] protocol.  Consult toxicology.  Thiamine and folic acid.

## 2024-09-04 NOTE — ASSESSMENT & PLAN NOTE
History of alcohol use/withdrawal, anxiety/pression, obesity, hypertension, GERD who presents with withdrawal symptoms  Previously sober for the last 6 months has been consuming ROM  Last consumption 2 AM.  Current withdrawal symptoms: anxiety, tremors, nausea, vomiting   Total phenobarbital: 195 mg of phenobarbital   Consult Toxicology: appreciate recommendations

## 2024-09-04 NOTE — PROGRESS NOTES
08/10/23 0839   Team Meeting   Meeting Type Daily Rounds   Team Members Present   Team Members Present Physician;Nurse;   Physician Team Member [id number] Team Member [facility] Management Team Member Yumiko   Patient/Family Present   Patient Present No   Patient's Family Present No   More stable blood pressures yesterday. Pleasant, visible, social. Med/meal compliant. DC tomorrow. 2023

## 2024-09-05 PROBLEM — F10.20 ALCOHOL USE DISORDER, SEVERE, DEPENDENCE (HCC): Status: ACTIVE | Noted: 2024-09-05

## 2024-09-05 LAB
ALBUMIN SERPL BCG-MCNC: 3.7 G/DL (ref 3.5–5)
ALP SERPL-CCNC: 78 U/L (ref 34–104)
ALT SERPL W P-5'-P-CCNC: 15 U/L (ref 7–52)
ANION GAP SERPL CALCULATED.3IONS-SCNC: 7 MMOL/L (ref 4–13)
AST SERPL W P-5'-P-CCNC: 23 U/L (ref 13–39)
BILIRUB SERPL-MCNC: 0.56 MG/DL (ref 0.2–1)
BUN SERPL-MCNC: 10 MG/DL (ref 5–25)
CALCIUM SERPL-MCNC: 9.3 MG/DL (ref 8.4–10.2)
CHLORIDE SERPL-SCNC: 107 MMOL/L (ref 96–108)
CO2 SERPL-SCNC: 23 MMOL/L (ref 21–32)
CREAT SERPL-MCNC: 0.73 MG/DL (ref 0.6–1.3)
ERYTHROCYTE [DISTWIDTH] IN BLOOD BY AUTOMATED COUNT: 15 % (ref 11.6–15.1)
GFR SERPL CREATININE-BSD FRML MDRD: 110 ML/MIN/1.73SQ M
GLUCOSE SERPL-MCNC: 91 MG/DL (ref 65–140)
HCT VFR BLD AUTO: 41.3 % (ref 34.8–46.1)
HGB BLD-MCNC: 12.8 G/DL (ref 11.5–15.4)
MCH RBC QN AUTO: 26.8 PG (ref 26.8–34.3)
MCHC RBC AUTO-ENTMCNC: 31 G/DL (ref 31.4–37.4)
MCV RBC AUTO: 86 FL (ref 82–98)
PLATELET # BLD AUTO: 212 THOUSANDS/UL (ref 149–390)
PMV BLD AUTO: 9.8 FL (ref 8.9–12.7)
POTASSIUM SERPL-SCNC: 3.8 MMOL/L (ref 3.5–5.3)
PROT SERPL-MCNC: 6.7 G/DL (ref 6.4–8.4)
RBC # BLD AUTO: 4.78 MILLION/UL (ref 3.81–5.12)
SODIUM SERPL-SCNC: 137 MMOL/L (ref 135–147)
WBC # BLD AUTO: 5.25 THOUSAND/UL (ref 4.31–10.16)

## 2024-09-05 PROCEDURE — 99233 SBSQ HOSP IP/OBS HIGH 50: CPT | Performed by: EMERGENCY MEDICINE

## 2024-09-05 PROCEDURE — 99232 SBSQ HOSP IP/OBS MODERATE 35: CPT

## 2024-09-05 PROCEDURE — 80053 COMPREHEN METABOLIC PANEL: CPT | Performed by: INTERNAL MEDICINE

## 2024-09-05 PROCEDURE — 85027 COMPLETE CBC AUTOMATED: CPT | Performed by: INTERNAL MEDICINE

## 2024-09-05 RX ORDER — PHENOBARBITAL SODIUM 130 MG/ML
130 INJECTION, SOLUTION INTRAMUSCULAR; INTRAVENOUS ONCE
Status: COMPLETED | OUTPATIENT
Start: 2024-09-05 | End: 2024-09-05

## 2024-09-05 RX ORDER — NALTREXONE HYDROCHLORIDE 50 MG/1
50 TABLET, FILM COATED ORAL DAILY
Status: DISCONTINUED | OUTPATIENT
Start: 2024-09-05 | End: 2024-09-06

## 2024-09-05 RX ORDER — PHENOBARBITAL 64.8 MG/1
64.8 TABLET ORAL ONCE
Status: COMPLETED | OUTPATIENT
Start: 2024-09-05 | End: 2024-09-05

## 2024-09-05 RX ADMIN — MULTIPLE VITAMINS W/ MINERALS TAB 1 TABLET: TAB ORAL at 08:54

## 2024-09-05 RX ADMIN — PHENOBARBITAL 64.8 MG: 64.8 TABLET ORAL at 14:24

## 2024-09-05 RX ADMIN — HEPARIN SODIUM 5000 UNITS: 5000 INJECTION INTRAVENOUS; SUBCUTANEOUS at 21:27

## 2024-09-05 RX ADMIN — ACETAMINOPHEN 650 MG: 325 TABLET ORAL at 20:13

## 2024-09-05 RX ADMIN — ONDANSETRON 4 MG: 2 INJECTION INTRAMUSCULAR; INTRAVENOUS at 20:13

## 2024-09-05 RX ADMIN — LISINOPRIL 20 MG: 20 TABLET ORAL at 08:54

## 2024-09-05 RX ADMIN — THIAMINE HCL TAB 100 MG 100 MG: 100 TAB at 08:54

## 2024-09-05 RX ADMIN — HEPARIN SODIUM 5000 UNITS: 5000 INJECTION INTRAVENOUS; SUBCUTANEOUS at 13:20

## 2024-09-05 RX ADMIN — PHENOBARBITAL SODIUM 130 MG: 130 INJECTION INTRAMUSCULAR; INTRAVENOUS at 20:13

## 2024-09-05 RX ADMIN — HEPARIN SODIUM 5000 UNITS: 5000 INJECTION INTRAVENOUS; SUBCUTANEOUS at 05:36

## 2024-09-05 RX ADMIN — PHENOBARBITAL SODIUM 130 MG: 130 INJECTION INTRAMUSCULAR; INTRAVENOUS at 09:43

## 2024-09-05 RX ADMIN — NICOTINE 1 PATCH: 21 PATCH, EXTENDED RELEASE TRANSDERMAL at 08:54

## 2024-09-05 RX ADMIN — GABAPENTIN 100 MG: 100 CAPSULE ORAL at 13:20

## 2024-09-05 RX ADMIN — NALTREXONE HYDROCHLORIDE 50 MG: 50 TABLET, FILM COATED ORAL at 15:32

## 2024-09-05 RX ADMIN — LORAZEPAM 1 MG: 2 INJECTION INTRAMUSCULAR; INTRAVENOUS at 10:40

## 2024-09-05 RX ADMIN — ARIPIPRAZOLE 10 MG: 10 TABLET ORAL at 08:54

## 2024-09-05 RX ADMIN — SERTRALINE HYDROCHLORIDE 150 MG: 100 TABLET ORAL at 21:27

## 2024-09-05 RX ADMIN — GABAPENTIN 100 MG: 100 CAPSULE ORAL at 08:54

## 2024-09-05 RX ADMIN — FOLIC ACID 1 MG: 1 TABLET ORAL at 08:54

## 2024-09-05 RX ADMIN — TOPIRAMATE 25 MG: 25 TABLET, FILM COATED ORAL at 21:27

## 2024-09-05 RX ADMIN — GABAPENTIN 600 MG: 300 CAPSULE ORAL at 21:26

## 2024-09-05 RX ADMIN — PANTOPRAZOLE SODIUM 40 MG: 40 TABLET, DELAYED RELEASE ORAL at 05:36

## 2024-09-05 NOTE — DISCHARGE INSTR - OTHER ORDERS
" Kristina Major   Certified     [facility] and [facility]  [phone]    SHARE (Medication)  [facility]  [address]  [place] [address]  [phone]    SHARE (Therapy, CM and CRS)  [facility]  [address]  [place] [address]  [phone]    AA meeting guide   https://www.aa.org/find-aa    AA Phone apps:   Meeting Guide  Everything AA  In the Rooms    AA/24 hour hotline   [phone]    [facility] [place] [facility] ([facility])    \"Treating Addiction...Improving Our Community...\"  [facility] utilizes a multidisciplinary approach to assessment & treatment for men, women and adolescents (over the age of 12) who are struggling with problems of substance abuse, addiction and associated issues. Treatment is individualized to meet client's needs and goals. Ivorian-speaking services are available (both individual and groups).  Services are provided by Certified Recovery Specialists for adults who desire support to maintain recovery from addiction.  [facility] provides drug & alcohol assessments (in Ivorian and English) to determine the level of care that is needed.  Walk-in assessment hours are Monday-Friday, 8:30 a.m. - 2:00 p.m. This is available for both adolescents and adults.  Groups include:  Psycho-dynamic/Process Therapy Group (Men's & Women's)  Relapse Prevention and Building Recovery  Motivational Enhancement  Ivorian Groups  Seeking Safety (Trauma/Substance Abuse)  Addiction Awareness  Conflict Resolution  Criminality  Intervention Group  Criminal thinking & behavior are proactively addressed. Fathering & Restorative Parenting Groups are offered periodically. Basic life skills training such as budgeting, resume preparation, employment interviewing, & vocational referrals are also part of the [facility] program. Daily yoga is available.    [address] [place] [address], " "Phone: [phone]   Fax: [phone]    [facility]    Walk-In [facility]  [address] 1st [place] [address]  Fridays 10am to 12pm  [phone]     Our [place]  Abstinence from mind/mood altering chemicals is only one part of recovery. We recognize the need for a holistic approach, which promotes both physical and mental wellness. We utilize a collaborative process which allows each individual to have a voice in their client-centered recovery plan. We understand that recovery is not a \"one size fits all\" concept. Therefore, each recovery plan is customized to the specific needs of the individual. Our goal is to assist in removing obstacles which may prevent a lifestyle of recovery. By traveling your path of recovery with you, we will help motivate engagement in the treatment process, assist in developing and enhancing life skills, and facilitate independence through positive change.       What is RSS (Recovery Support Services)?   Recovery Support Services is a peer to peer service offered to individuals seeking recovery from addiction. Certified Recovery Specialists (CRS) help guide individuals in establishing recovery supports, accessing treatment, and getting their basic needs met. Certified Recovery Specialists draw on their personal experience in collaborating with individuals in the office, in treatment, and in the community to removed obstacles to getting and staying clean and sober. The collaborative journey between the individual and their CRS will address basic needs, as well as enhance the individual's efforts to get and stay clean. They will provide you with support and guidance, and advocate for you when needed. They can introduce you to recovery supports within the local community (12-step groups, yoga, martial arts, Evangelical groups, art classes, etc.), and even participate in these activities with you. There are many pathways to recovery and a CRS " will explore them with you.       What is a Certified  (CRS)?   These are individuals who have a shared recovery experience, either by being in recovery themselves, or by having a loved one in recovery. They are certified through [place] Certification Board after numerous trainings and an exam. Their goal is to collaborate with you in identifying the supports you need to achieve recovery for yourself.     Areas in which they may be able to assist you:  Accessing support groups  Getting linked to recovery supportive activities  Basic needs (food, clothing, etc.)  Applying for health insurance  Employment  Literacy classes  Bus passes  Housing assistance  Etc.    Why should I work with a ?   If you have tried to get engaged in recovery before and haven't been able to, or feel you could really use some extra support and guidance with the journey, then working with a CRS is for you.     Am I eligible for RSS?   Ask your current counselor and any staff at [facility] Drug [facility] Alcohol Intake [facility] or the  [facility].     How much does the service cost?   Currently, this service is at no cost to those who have [facility] or [facility] or if you are a [facility] resident.     Change on [address] [place] [address]  [address] [address]  [phone]  Hours  9:00 am to 5:00 pm Monday thru Friday      Abstinence from addiction is only the beginning.  [facility] residents are encouraged to pursue meaningful lives with purpose at the Change on [address] [place] [address]. We provide a safe and sober environment which is staffed by people in recovery, who share similar experiences, and are willing to listen and assist people in early recovery. It takes a coÃmunity to support the recovery process. This [facility] initiative recognizes and supports the efforts and investment of those personally in recovery as well as those supporting their  efforts.    Our [place]  The Change on [address] [place] [address] offers a safe environment to those seeking recovery and/or who are part of the treatment continuum.    Although we are not a treatment facility, we are able to assist those in need, refer members of the center to community resources as needed. We encourage and guide members to pursue meaningful lives with purpose at the Change on [address] [place] [address] .    Our hope is that they will find inspiration, encouragement, support through the examples of our volunteers and staff at the center. It takes effort and a dedicated community to support the recovery process.     [facility] Peerline  9-593-BE-PEERS ([phone])  A 24/7 toll-free number for individuals in [facility] who are seeking a listening ear for additional support in their recovery from mental illness.    CRISIS INFORMATION  If you are experiencing a mental health emergency, you may call the [facility] Crisis Intervention Office 24 hours a day, 7 days per week at [phone].    In [facility], call [phone].    Warmline is a confidential 24/7 telephone support service manned by trained mental health consumers.  Warmline provides support, a listening ear and can provide information about available services.Warmline specializes in the concerns of mental health consumers, their families and friends.  However, we are also here for anyone who has a mental health concern, is confused about or just doesn't know anything about mental health or where to get information.  To reach [facility], call [phone].    HOW TO GET SUBSTANCE ABUSE HELP:  If you or someone you know has a drug or alcohol problem, there is help:  [facility] Drug & Alcohol Abuse Services: [phone]  [facility] Drug & Alcohol Abuse Services: [phone]  An assessment is the first step.   In addition to those listed there are other programs available in the area but assessment is best to determine an  appropriate level of care.  If you DO NOT have Medical Assistance (MA) or Private Insurance, an assessment can be scheduled at one of these providers:  [facility]  [address] [place] [address]  [phone]   [address] [place] [address]  [phone]   [address] [place] [address]  [phone]   [facility]  [address] [place] [address]  [phone]   [facility]  [address] [place] [address]  [phone]   [facility]  [address] [place] [address]  [phone]   [facility]  [address], [place] [address]  [phone]     If you HAVE Medical Assistance, an assessment can be scheduled at one of these providers:  [place] [facility]  [address] [place] [address]  [phone]   [facility]  [address] [place] [address]  [phone]   [facility]  [address], [place] [address]  [phone]  [address] [place] [address], [address] [address] [phone]   [address] [place] [address]  [phone]   [address] [place] [address]  [phone]   NET ([facility])  [address] [facility], [place] [address]  [phone]   [facility]  [address] [place] [address]  [phone]   [facility]  [address] [place] [address] [place] [address]  [phone]   [facility]  [address] [place] [address]  [phone]   [facility].  [address] [place] [address], [address], [place] [address]  [phone]     If you HAVE Private Insurance, an assessment can be scheduled at one of these providers.  Please contact these Providers to determine if they are in your network plan:  [facility]  [address]  [place], [place] [address]  [phone]   [facility]  [address] [place] [address]  [phone]   [facility]  [address] [place] [address]  [phone]   NET ([facility])  [address], [place] [address]  [phone]   [facility]  [address] [place] [address]  [phone]   [facility]  [address], [place] [address]  [phone]     From the [facility] website [place]/guides/opioid-epidemic/#GetNaloxone    How do I get naloxone?  Family members and friends can access naloxone by:    Obtaining a prescription from their family doctor  Using the standing order issued by Acting Physician General Jane Vera. A standing order is a prescription written for the general public, rather than specifically for an individual.  To use the standing order, print it and take it with you to the pharmacy or have the digital version on your phone. Download the standing order from the Department of [facility] (PDF).    If you are unable to print it or use the digital version, the standing order is kept on file at many pharmacies. If a pharmacy does not have it on file, they may have the ability to look it up.    Naloxone prescriptions can be filled at most pharmacies. Although the medication might not be available for same-day pickup, it often can be ordered and available within a day or two.      SYNC Recovery Events    [facility] organizes meaningful events for people in recovery and their families. Our main goal is to have fun by connecting with nature and other people. We can then realize that there is a world of possibilities open to us, now that we are no longer in the bondage of addiction. These events are designed to provide :  Hope for those in early recovery  Tangible proof that life gets better when we’re sober  Service opportunities for  people with recovery experience  Social connectedness for everyone involved    [address]  [place] [address]  Phone: [phone]  Email: [email or web address]   Website: [email or web address]      SMART Recovery Meetings    Self Management and Recovery Training (SMART)  SMART Recovery is an evidenced-informed recovery method grounded in Rational Emotive Behavioral Therapy (REBT) and Cognitive Behavioral Therapy (CBT), that supports people with substance dependencies or problem behaviors to:  Build and maintain motivation  [place] with urges and cravings  Manage thoughts, feelings and behaviors  Live a balanced life    Find meetings and tools: https://smartrecSimpleGeoy.org/      AA meeting guide   https://www.aa.org/find-aa    AA Phone apps:   Meeting Guide  Everything AA  In the Rooms    AA/24 hour hotline   [phone]      [employer]- offers individual counseling, psychiatric med management, MAT, CRS services, group therapy, etc         [address], [place], [place] [address] [phone]  [email or web address]    [facility]- offers group and individual therapy, CRS, case management  [address] [place] [address] [phone]  COMING SOON!  [address]  [address]  [place] [address]    [email or web address]    [facility]- offers psychiatric med management, individual therapy, MAT, etc     [facility] [place] [address] [phone]           [email or web address]

## 2024-09-05 NOTE — PLAN OF CARE
Problem: SUBSTANCE USE/ABUSE  Goal: By discharge, will develop insight into their chemical dependency and sustain motivation to continue in recovery  Description: INTERVENTIONS:  - Attends all daily group sessions and scheduled AA groups  - Actively practices coping skills through participation in the therapeutic community and adherence to program rules  - Reviews and completes assignments from individual treatment plan  - Assist patient development of understanding of their personal cycle of addiction and relapse triggers  Outcome: Progressing     Problem: SAFETY ADULT  Goal: Patient will remain free of falls  Description: INTERVENTIONS:  - Educate patient/family on patient safety including physical limitations  - Instruct patient to call for assistance with activity   - Consult OT/PT to assist with strengthening/mobility   - Keep Call bell within reach  - Keep bed low and locked with side rails adjusted as appropriate  - Keep care items and personal belongings within reach  - Initiate and maintain comfort rounds  - Make Fall Risk Sign visible to staff  - Apply yellow socks and bracelet for high fall risk patients  - Consider moving patient to room near nurses station  Outcome: Progressing

## 2024-09-05 NOTE — CONSULTS
Consultation - Medical Toxicology  Ivette Andrew 29 y.o. female MRN: [id number]  Unit/Bed#: 5T DETOX 509-01 Encounter: [id number]      Reason for Consult / Principal Problem: Alcohol withdrawal with complication, alcohol use disorder    Inpatient consult to Toxicology  Consult performed by: Chantell Kaufman MD  Consult ordered by: Sabino Allen DO        09/05/24    ASSESSMENT:  Alcohol withdrawal with complication  Alcohol use disorder, severe dependence  Tobacco use disorder    RECOMMENDATIONS:  Continue supportive care measures, including airway monitoring, head of bed elevation, aspiration precautions, cardiac telemetry, and continuous pulse oximetry.    Alcohol withdrawal with complication: Continue SEWS protocol with symptom-triggered, as needed phenobarbital with maximum dose of 2 grams.  Alcohol use disorder, severe dependence: Continue daily vitamin supplementation. Patient previously on IM naltrexone and is interested in restarting. Recommend giving naltrexone 50 mg PO today with IM naltrexone 380 mg prior to discharge. Patient desires to follow up with [facility], as she was doing previously. CRS and case management consults recommended for recovery supportive and disposition planning, respectively.  Tobacco use disorder: Patient states nicotine patch is controlling cravings. Continue nicotine replacement therapy.    For further questions, please call [facility]  Service or Patient Access Center to reach the medical  on call.     Please see additional teaching note below (if available)    Hx and PE limited by: N/A    HPI: Ivette Andrew is a 29 y.o. year old female who presents with alcohol withdrawal. Patient endorses to drinking 1 pint of rum daily since February with last drink prior to ED evaluation. Patient denies history of withdrawal seizures. Denies other substance use. Patient previously on IM naltrexone but missed three appointments with [facility]. She is interested in  "restarting IM naltrexone. Patient is currently endorsing anxiety and nausea. Tolerating PO intake.    Review of Systems   Constitutional:  Negative for diaphoresis.   Gastrointestinal:  Positive for nausea. Negative for abdominal pain and vomiting.   Neurological:  Negative for headaches.   Psychiatric/Behavioral:  The patient is nervous/anxious.        Historical Information   Past Medical History:   Diagnosis Date    Abnormal Pap smear of cervix     ASCUS + HPV 16 & other    Alcohol abuse     Alcoholism (HCC)     Anxiety     Atypical squamous cells cannot exclude high grade squamous intraepithelial lesion on cytologic smear of cervix (ASC-H) 10/04/2023    9/8/23 ASCUS w/ HPV 16 and \"other\" type 10/5- colposcopy    Bipolar disorder (HCC)     Depression     GERD (gastroesophageal reflux disease)     Hypertension     Kidney stones     PTSD (post-traumatic stress disorder)     Self-injurious behavior     Suicide attempt ([facility])     Withdrawal symptoms, drug or narcotic (HCC)      Past Surgical History:   Procedure Laterality Date    UT CONIZATION CERVIX W/WO D&C RPR ELTRD EXC N/A 12/4/2023    Procedure: LOOP ELECTRICAL EXCISION PROCEDURE;  Surgeon: Uzma Quezada DO;  Location: [facility];  Service: Gynecology     Social History   Social History     Substance and Sexual Activity   Alcohol Use Yes    Comment: jug rum/day     Social History     Substance and Sexual Activity   Drug Use Yes    Types: Marijuana    Comment: THC edibles     Social History     Tobacco Use   Smoking Status Every Day    Current packs/day: 1.00    Average packs/day: 1 pack/day for 18.7 years (18.7 ttl pk-yrs)    Types: Cigarettes    Start date: 2006    Passive exposure: Past   Smokeless Tobacco Never     Family History   Problem Relation Age of Onset    Heart disease Mother     Stroke Mother     Diabetes Mother     Hypertension Mother     Asthma Mother     Cancer Paternal Grandmother     Kidney disease Paternal Grandmother     Diabetes Paternal " Grandmother         Prior to Admission medications    Medication Sig Start Date End Date Taking? Authorizing Provider   ARIPiprazole (ABILIFY) 10 mg tablet Take 1 tablet (10 mg total) by mouth daily 3/11/24  Yes Braxton Crook MD   gabapentin (NEURONTIN) 100 mg capsule TAKE ONE CAPSULE BY MOUTH TWICE A DAY 8/5/24  Yes Jorge Stout MD   gabapentin (NEURONTIN) 300 mg capsule Take 2 capsules (600 mg total) by mouth daily at bedtime 3/11/24  Yes Braxton Crook MD   lisinopril (ZESTRIL) 20 mg tablet TAKE ONE TABLET BY MOUTH DAILY 5/21/24  Yes Jade Blackwell MD   naproxen sodium (Aleve) 220 MG tablet Take 220 mg by mouth daily as needed for mild pain   Yes Historical Provider, MD   omeprazole (PriLOSEC) 40 MG capsule TAKE ONE CAPSULE BY MOUTH EVERY DAY BEFORE BREAKFAST 7/11/24  Yes Jade Blackwell MD   sertraline (ZOLOFT) 100 mg tablet Take 1.5 tablets (150 mg total) by mouth daily at bedtime 3/11/24  Yes Braxton Crook MD   topiramate (TOPAMAX) 25 mg tablet Take 1 tablet (25 mg total) by mouth daily at bedtime 3/11/24  Yes Braxton Crook MD   Naltrexone (VIVITROL IM) Inject into a muscle  Patient not taking: Reported on 8/22/2024    Historical Provider, MD       Current Facility-Administered Medications   Medication Dose Route Frequency    acetaminophen (TYLENOL) tablet 650 mg  650 mg Oral Q4H PRN    ARIPiprazole (ABILIFY) tablet 10 mg  10 mg Oral Daily    folic acid (FOLVITE) tablet 1 mg  1 mg Oral Daily    gabapentin (NEURONTIN) capsule 100 mg  100 mg Oral BID (AM & Afternoon)    gabapentin (NEURONTIN) capsule 600 mg  600 mg Oral HS    heparin (porcine) subcutaneous injection 5,000 Units  5,000 Units Subcutaneous Q8H ELYSE    lisinopril (ZESTRIL) tablet 20 mg  20 mg Oral Daily    LORazepam (ATIVAN) injection 1 mg  1 mg Intravenous Q4H PRN    multivitamin-minerals (CENTRUM) tablet 1 tablet  1 tablet Oral Daily    naltrexone (REVIA) tablet 50 mg  50 mg Oral Daily     nicotine (NICODERM CQ) 21 mg/24 hr TD 24 hr patch 1 patch  1 patch Transdermal Daily    ondansetron (ZOFRAN) injection 4 mg  4 mg Intravenous Q4H PRN    pantoprazole (PROTONIX) EC tablet 40 mg  40 mg Oral Early Morning    sertraline (ZOLOFT) tablet 150 mg  150 mg Oral HS    thiamine tablet 100 mg  100 mg Oral Daily    topiramate (TOPAMAX) tablet 25 mg  25 mg Oral HS       Allergies   Allergen Reactions    Biaxin [Clarithromycin] GI Intolerance    Other GI Intolerance     cefcil         Objective       Intake/Output Summary (Last 24 hours) at 9/5/2024 1558  Last data filed at 9/5/2024 0859  Gross per 24 hour   Intake 240 ml   Output --   Net 240 ml       Invasive Devices:   Peripheral IV 09/04/24 Left Antecubital (Active)   Site Assessment WDL;Clean;Dry 09/04/24 2000   Dressing Type Transparent 09/04/24 2000   Line Status Saline locked 09/04/24 2000   Dressing Status Clean;Dry;Intact 09/04/24 2000       Vitals   Vitals:    09/05/24 1117 09/05/24 1400 09/05/24 1429 09/05/24 1522   BP: 113/63 147/84 157/92 125/92   TempSrc:    Temporal   Pulse: 80   73   Resp: 18   18   Patient Position - Orthostatic VS: Lying Sitting Sitting Lying   Temp:    98.1 °F (36.7 °C)       Physical Exam  Vitals and nursing note reviewed.   Constitutional:       General: She is not in acute distress.     Appearance: She is not ill-appearing or diaphoretic.   HENT:      Head: Normocephalic and atraumatic.      Nose: Nose normal.   Eyes:      Conjunctiva/sclera: Conjunctivae normal.   Cardiovascular:      Rate and Rhythm: Normal rate and regular rhythm.   Pulmonary:      Effort: Pulmonary effort is normal. No respiratory distress.   Abdominal:      General: There is no distension.   Musculoskeletal:         General: No deformity.      Cervical back: Neck supple.   Skin:     General: Skin is warm and dry.   Neurological:      Mental Status: She is alert and oriented to person, place, and time.      Comments: No tongue fasciculations or intention  "tremor   Psychiatric:         Mood and Affect: Mood normal.         Behavior: Behavior normal.         EKG, Pathology, and Other Studies: I have personally reviewed pertinent reports.      Lab Results: I have personally reviewed pertinent reports.      Labs:  Results from last 7 days   Lab Units 09/05/24  0515 09/04/24  1329   WBC Thousand/uL 5.25 8.35   HEMOGLOBIN g/dL 12.8 13.1   HEMATOCRIT % 41.3 41.7   PLATELETS Thousands/uL 212 242   SEGS PCT %  --  69   LYMPHO PCT %  --  22   MONO PCT %  --  8   EOS PCT %  --  1      Results from last 7 days   Lab Units 09/05/24  0515 09/04/24  1329   POTASSIUM mmol/L 3.8 3.7   CHLORIDE mmol/L 107 105   CO2 mmol/L 23 24   BUN mg/dL 10 11   CREATININE mg/dL 0.73 0.79   CALCIUM mg/dL 9.3 9.4   ALK PHOS U/L 78 95   ALT U/L 15 21   AST U/L 23 25   MAGNESIUM mg/dL  --  2.2              No results found for: \"TROPONINI\"      Results from last 7 days   Lab Units 09/04/24  1329   ETHANOL LVL mg/dL <10     Invalid input(s): \"EXTPREGUR\"    Imaging Studies:  na    Counseling / Coordination of Care  Total floor / unit time spent today 40 minutes. Greater than 50% of total time was spent with the patient and / or family counseling and / or coordination of care.     Minutes of critical care time 32  -Critical care time was exclusive of separately billable procedures and teaching time.   -Critical care was necessary to treat or prevent imminent or life-threatening deterioration of the following condition: withdrawal  -Critical care time was spent personally by me on the following activities as well as the above as per the course and rest of chart: obtaining history from patient/surrogate, review of old charts, development of a treatment plan, discussions with referring provider(s) and/or consultants, examination of the patient, performing treatments and interventions, evaluation of patient's response to the treatment, re-evaluation of the patient's condition, ordering/interpreting laboratory " studies, ordering/interpreting of radiographic studies.

## 2024-09-05 NOTE — ASSESSMENT & PLAN NOTE
Pt with a h/o chronic heavy alcohol use for the last six months   Drinks pint of rum  daily  H/o withdrawal seizures  Interested in resuming vivitrol   Withdrawal management as above  Initiate IVFs, IV thiamine, folic acid, and MVI  Consult case management/CRS for assistance with aftercare resources - pt interested in outpatient resources at this time

## 2024-09-05 NOTE — PLAN OF CARE
Problem: SUBSTANCE USE/ABUSE  Goal: By discharge, will develop insight into their chemical dependency and sustain motivation to continue in recovery  Description: INTERVENTIONS:  - Attends all daily group sessions and scheduled AA groups  - Actively practices coping skills through participation in the therapeutic community and adherence to program rules  - Reviews and completes assignments from individual treatment plan  - Assist patient development of understanding of their personal cycle of addiction and relapse triggers  Outcome: Progressing  Goal: By discharge, patient will have ongoing treatment plan addressing chemical dependency  Description: INTERVENTIONS:  - Assist patient with resources and/or appointments for ongoing recovery based living  Outcome: Progressing     Problem: PAIN - ADULT  Goal: Verbalizes/displays adequate comfort level or baseline comfort level  Description: Interventions:  - Encourage patient to monitor pain and request assistance  - Assess pain using appropriate pain scale  - Administer analgesics based on type and severity of pain and evaluate response  - Implement non-pharmacological measures as appropriate and evaluate response  - Consider cultural and social influences on pain and pain management  - Notify physician/advanced practitioner if interventions unsuccessful or patient reports new pain  Outcome: Progressing     Problem: DISCHARGE PLANNING  Goal: Discharge to home or other facility with appropriate resources  Description: INTERVENTIONS:  - Identify barriers to discharge w/patient and caregiver  - Arrange for needed discharge resources and transportation as appropriate  - Identify discharge learning needs (meds, wound care, etc.)  - Arrange for interpretive services to assist at discharge as needed  - Refer to Case Management Department for coordinating discharge planning if the patient needs post-hospital services based on physician/advanced practitioner order or complex  needs related to functional status, cognitive ability, or social support system  Outcome: Progressing     Problem: Knowledge Deficit  Goal: Patient/family/caregiver demonstrates understanding of disease process, treatment plan, medications, and discharge instructions  Description: Complete learning assessment and assess knowledge base.  Interventions:  - Provide teaching at level of understanding  - Provide teaching via preferred learning methods  Outcome: Progressing

## 2024-09-05 NOTE — UTILIZATION REVIEW
Initial Clinical Review    Pt presented to [facility] for its Level IV medically managed intensive inpatient detox unit.    Admission: Date/Time/Statement:   Admission Orders (From admission, onward)       Ordered        09/04/24 1459  INPATIENT ADMISSION  Once                          Orders Placed This Encounter   Procedures    INPATIENT ADMISSION     Standing Status:   Standing     Number of Occurrences:   1     Order Specific Question:   Level of Care     Answer:   Med Surg [16]     Order Specific Question:   Estimated length of stay     Answer:   More than 2 Midnights     Order Specific Question:   Certification     Answer:   I certify that inpatient services are medically necessary for this patient for a duration of greater than two midnights. See H&P and MD Progress Notes for additional information about the patient's course of treatment.     ED Arrival Information       Expected   -    Arrival   9/4/2024 12:52    Acuity   Emergent              Means of arrival   Walk-In    Escorted by   Self    Service   Hospitalist    Admission type   Emergency              Arrival complaint   Detox             Chief Complaint   Patient presents with    Detox Evaluation     Reports jug of rum /day - denies hx of seizures - last drink 0200 - reports mild GI upset, headache and some shakiness at this time       Initial Presentation: 29 y.o. female who presented to medical detox. Inpatient admission for evaluation and treatment of alcohol withdrawal syndrome. Presented w/ need for detox from alcohol. She did do an inpatient detox program about a year ago and was sober for a few months before she relapsed back to drinking. She has not gone more than a day without drinking for at least 6 months now Serum ETOH: <10. Reports 750 mL to 1 L of rum  daily, last drink on 9/4 @ 0200. Has prior rehab treatment for withdrawal. Reports no hx of withdrawal seizures. On exam, nervous, anxious, tremors, GI upset, GCS 15. SEWS 6.  Plan: SEWS monitoring w/ phenobarbital management, PO thiamine/folic acid supplement, IVF, telemetry, continuous pulse ox, continue PTA meds, trend labs, replete electrolytes as needed; I&O, fall & seizure precautions.     Anticipated Length of Stay:  Patient will be admitted on an Inpatient basis with an anticipated length of stay of  >2  midnights.   This patient qualifies for Level IV medically managed intensive inpatient services under the criteria set by the American Society of Addiction Medicine, including dimensions 1-3. The patient is in withdrawal (or is intoxicated with high risk of withdrawal), with severe and unstable medical and/or psychiatric (dual diagnosis) problems, requiring requires 24-hour medical and nursing care and the full resources of a licensed hospital.      Date: 9/5/24    Day 2: Pt reports .  On exam, anxiety, tremors, nausea, vomiting . SEWS 3-8. Plan: continue SEWS monitoring w/ phenobarbital management, PO thiamine/folic acid supplement, telemetry, continuous pulse ox, continue current meds, trend labs, replete electrolytes as needed. Received 195mg phenobarbital since admission. Toxicology consulted.    ED Triage Vitals   Temperature Pulse Respirations Blood Pressure SpO2 Pain Score   09/04/24 1300 09/04/24 1300 09/04/24 1300 09/04/24 1300 09/04/24 1300 09/04/24 1833   99.1 °F (37.3 °C) 95 20 (!) 198/121 100 % No Pain     Weight (last 2 days)       Date/Time Weight    09/04/24 1641 117 (259)    09/04/24 1300 118 (259.48)            Vital Signs (last 3 days)       Date/Time Temp Pulse Resp BP MAP (mmHg) SpO2 O2 Device Patient Position - Orthostatic VS Matt Coma Scale Score CIWA-Ar Total SEWS Total Score Pain    09/05/24 0937 -- -- -- -- -- -- -- -- -- -- 8 --    09/05/24 0900 -- -- -- 134/84 100 -- -- Lying -- -- -- --    09/05/24 0729 -- -- -- -- -- -- -- -- -- -- -- No Pain    09/05/24 0724 97.1 °F (36.2 °C) 71 18 115/70 85 98 % None (Room air) Lying -- -- -- --    09/05/24 0712  -- -- -- -- -- -- -- -- -- -- 0 --    09/05/24 0500 97.7 °F (36.5 °C) 65 18 118/62 -- 95 % None (Room air) Lying -- -- -- No Pain    09/04/24 2225 97 °F (36.1 °C) 80 18 105/72 83 98 % None (Room air) Lying -- -- -- --    09/04/24 2000 -- -- -- -- -- -- -- -- 15 -- -- No Pain    09/04/24 1838 97.6 °F (36.4 °C) 89 18 121/71 87 97 % None (Room air) Lying -- -- -- --    09/04/24 1833 -- -- -- -- -- -- -- -- -- -- -- No Pain    09/04/24 1654 -- -- -- -- -- -- -- -- -- -- 6 --    09/04/24 1641 97.2 °F (36.2 °C) 84 18 148/95 112 98 % None (Room air) Lying -- -- -- --    09/04/24 1457 -- 89 18 150/110 -- 98 % None (Room air) Lying -- -- -- --    09/04/24 1305 -- -- -- -- -- -- -- -- -- 5 -- --    09/04/24 1304 -- -- -- -- -- -- -- -- 15 -- -- --    09/04/24 1300 99.1 °F (37.3 °C) 95 20 198/121 -- 100 % None (Room air) Sitting -- -- -- --             CIWA-Ar Score       Row Name 09/04/24 1305             CIWA-Ar    Nausea and Vomiting 1      Tactile Disturbances 0      Tremor 1      Auditory Disturbances 0      Paroxysmal Sweats 0      Visual Disturbances 0      Anxiety 1      Headache, Fullness in Head 2      Agitation 0      Orientation and Clouding of Sensorium 0      CIWA-Ar Total 5                      Severity of Ethanol Withdrawal Scale (SEWS):   SEWS    Row Name 09/05/24 1400 09/05/24 1130 09/05/24 1000 09/05/24 0937 09/05/24 0712   Severity of Ethanol Withdrawal Scale (SEWS)   ANXIETY: Do you feel that something bad is about to happen to you right now? 3  -YS 0  -YS 3  -YS 3  -YS 0  -YS   NAUSEA and DRY HEAVES or VOMITING? 0  -YS 0  -YS 0  -YS 3  -YS 0  -YS   SWEATING: (includes moist palms, sweating now)? Score 0 or 2 0  -YS 0  -YS 0  -YS 0  -YS 0  -YS   TREMOR: with arms extended eyes closed? 2  -YS 0  -YS 0  -YS 2  -YS 0  -YS   AGITATION: Fidgety, restless, pacing? 0  -YS 0  -YS 0  -YS 0  -YS 0  -YS   DISORIENTATION: 0  -YS 0  -YS 0  -YS 0  -YS 0  -YS   HALLUCINATIONS: 0  -YS 0  -YS 0  -YS 0  -YS 0  -YS   VITAL  SIGNS: ANY (Pulse >110, Diastolic BP >90, Temp >99.6) 0  -YS 0  -YS 0  -YS 0  -YS 0  -YS   SEWS Total Score 5  -YS 0  -YS 3  -YS 8  -YS 0  -YS   Dewitt Agitation Sedation Scale (RASS)   Dewitt Agitation Sedation Scale (RASS) 0  -YS 0  -YS 0  -YS 0  -YS 0  -YS     Row Name 09/04/24 1654       Severity of Ethanol Withdrawal Scale (SEWS)   ANXIETY: Do you feel that something bad is about to happen to you right now? 3  -AP       NAUSEA and DRY HEAVES or VOMITING? 3  -AP       SWEATING: (includes moist palms, sweating now)? Score 0 or 2 0  -AP       TREMOR: with arms extended eyes closed? 0  -AP       AGITATION: Fidgety, restless, pacing? 0  -AP       DISORIENTATION: 0  -AP       HALLUCINATIONS: 0  -AP       VITAL SIGNS: ANY (Pulse >110, Diastolic BP >90, Temp >99.6) 0  -AP       SEWS Total Score 6  -AP           Pertinent Labs/Diagnostic Test Results:   Radiology:  No orders to display     Cardiology:  ECG 12 lead   Final Result by Darian Hedrick MD (09/04 1705)   Normal sinus rhythm   Normal ECG   When compared with ECG of 05-AUG-2023 10:43,   No significant change was found   Confirmed by Darian Hedrick ([id number]) on 9/4/2024 5:05:08 PM        GI:  No orders to display         Results from last 7 days   Lab Units 09/05/24  0515 09/04/24  1329   WBC Thousand/uL 5.25 8.35   HEMOGLOBIN g/dL 12.8 13.1   HEMATOCRIT % 41.3 41.7   PLATELETS Thousands/uL 212 242   TOTAL NEUT ABS Thousands/µL  --  5.75         Results from last 7 days   Lab Units 09/05/24  0515 09/04/24  1329   SODIUM mmol/L 137 138   POTASSIUM mmol/L 3.8 3.7   CHLORIDE mmol/L 107 105   CO2 mmol/L 23 24   ANION GAP mmol/L 7 9   BUN mg/dL 10 11   CREATININE mg/dL 0.73 0.79   EGFR ml/min/1.73sq m 110 100   CALCIUM mg/dL 9.3 9.4   MAGNESIUM mg/dL  --  2.2     Results from last 7 days   Lab Units 09/05/24  0515 09/04/24  1329   AST U/L 23 25   ALT U/L 15 21   ALK PHOS U/L 78 95   TOTAL PROTEIN g/dL 6.7 8.0   ALBUMIN g/dL 3.7 4.3   TOTAL BILIRUBIN mg/dL 0.56 0.39  "        Results from last 7 days   Lab Units 09/05/24  0515 09/04/24  1329   GLUCOSE RANDOM mg/dL 91 96             BETA-HYDROXYBUTYRATE   Date Value Ref Range Status   08/04/2023 0.5 <0.6 mmol/L Final                                                                                                      Results from last 7 days   Lab Units 09/04/24  1356   AMPH/METH  Negative   BARBITURATE UR  Negative   BENZODIAZEPINE UR  Negative   COCAINE UR  Negative   METHADONE URINE  Negative   OPIATE UR  Negative   PCP UR  Negative   THC UR  Positive*     Results from last 7 days   Lab Units 09/04/24  1329   ETHANOL LVL mg/dL <10                                   ED Treatment-Medication Administration from 09/04/2024 1252 to 09/04/2024 1627         Date/Time Order Dose Route Action     09/04/2024 1330 LORazepam (ATIVAN) injection 2 mg 2 mg Intravenous Given          Past Medical History:   Diagnosis Date    Abnormal Pap smear of cervix     ASCUS + HPV 16 & other    Alcohol abuse     Alcoholism (HCC)     Anxiety     Atypical squamous cells cannot exclude high grade squamous intraepithelial lesion on cytologic smear of cervix (ASC-H) 10/04/2023    9/8/23 ASCUS w/ HPV 16 and \"other\" type 10/5- colposcopy    Bipolar disorder (HCC)     Depression     GERD (gastroesophageal reflux disease)     Hypertension     Kidney stones     PTSD (post-traumatic stress disorder)     Self-injurious behavior     Suicide attempt (HCC)     Withdrawal symptoms, drug or narcotic (HCC)      Present on Admission:   Alcohol withdrawal syndrome with complication (HCC)   Primary hypertension   Smoker   GERD without esophagitis   Mixed anxiety depressive disorder      Admitting Diagnosis: Alcohol withdrawal (HCC) [F10.939]  Admitted to substance misuse detoxification center [Z78.9]  Age/Sex: 29 y.o. female  Admission Orders:  Telemetry  [facility] protocol  Consult toxicology      Scheduled Medications:  Medications 08/27 08/28 08/29 08/30 08/31 09/01 09/02 09/03 " 09/04 09/05   ARIPiprazole (ABILIFY) tablet 10 mg  Dose: 10 mg  Freq: Daily Route: PO  Start: 09/04/24 1530            1730      0854        diazepam (VALIUM) tablet 10 mg  Dose: 10 mg  Freq: Once Route: PO  Start: 09/04/24 1700 End: 09/04/24 1713   Admin Instructions:               1713-D/C'd  (1714)         folic acid (FOLVITE) tablet 1 mg  Dose: 1 mg  Freq: Daily Route: PO  Start: 09/04/24 1530            1730      0854        gabapentin (NEURONTIN) capsule 100 mg  Dose: 100 mg  Freq: 2 times daily (morning and afternoon) Route: PO  Start: 09/04/24 1530   Admin Instructions:               1730      0854     1320        gabapentin (NEURONTIN) capsule 600 mg  Dose: 600 mg  Freq: Daily at bedtime Route: PO  Start: 09/04/24 2200   Admin Instructions:               2109 2200        heparin (porcine) subcutaneous injection 5,000 Units  Dose: 5,000 Units  Freq: Every 8 hours scheduled Route: SC  Start: 09/04/24 1530   Admin Instructions:               1731     2107      0536     1320     2200        lisinopril (ZESTRIL) tablet 20 mg  Dose: 20 mg  Freq: Daily Route: PO  Start: 09/04/24 1530   Admin Instructions:      Order specific questions:               1731      0854        LORazepam (ATIVAN) injection 2 mg  Dose: 2 mg  Freq: Once Route: IV  Start: 09/04/24 1330 End: 09/04/24 1330   Admin Instructions:               1330         multivitamin-minerals (CENTRUM) tablet 1 tablet  Dose: 1 tablet  Freq: Daily Route: PO  Start: 09/04/24 1530   Admin Instructions:               1730      0854        naltrexone (REVIA) tablet 50 mg  Dose: 50 mg  Freq: Daily Route: PO  Start: 09/05/24 1500             1500        nicotine (NICODERM CQ) 21 mg/24 hr TD 24 hr patch 1 patch  Dose: 1 patch  Freq: Daily Route: TD  Start: 09/04/24 1530   Admin Instructions:               1729      0854        pantoprazole (PROTONIX) EC tablet 40 mg  Dose: 40 mg  Freq: Daily (early morning) Route: PO  Start: 09/05/24 0600   Admin Instructions:                 0536        PHENobarbital injection 130 mg  Dose: 130 mg  Freq: Once Route: IV  Start: 09/05/24 0945 End: 09/05/24 0943   Admin Instructions:                0943        PHENobarbital tablet 64.8 mg  Dose: 64.8 mg  Freq: Once Route: PO  Start: 09/05/24 1430 End: 09/05/24 1424   Admin Instructions:                1424        PHENobarbital tablet 64.8 mg  Dose: 64.8 mg  Freq: Once Route: PO  Start: 09/04/24 1700 End: 09/04/24 1712   Admin Instructions:               1712-D/C'd  (1714)         sertraline (ZOLOFT) tablet 150 mg  Dose: 150 mg  Freq: Daily at bedtime Route: PO  Start: 09/04/24 2200   Admin Instructions:               2109 2200        thiamine tablet 100 mg  Dose: 100 mg  Freq: Daily Route: PO  Start: 09/04/24 1530            1731      0854        topiramate (TOPAMAX) tablet 25 mg  Dose: 25 mg  Freq: Daily at bedtime Route: PO  Start: 09/04/24 2200   Admin Instructions:               2109 2200        Legend:       [id number]/2908/3008/3109/0109/0209/0309/0409/05        Continuous Meds Sorted by Name  for Ivette Andrew as of 08/27/24 through 9/5/24  Legend:       Medications 08/27 08/28 08/29 08/30 08/31 09/01 09/02 09/03 09/04 09/05   sodium chloride 0.9 % infusion  Rate: 125 mL/hr Dose: 125 mL/hr  Freq: Continuous Route: IV  Indications of Use: IV Hydration  Start: 09/04/24 1530 End: 09/04/24 1712            1712-D/C'd  (1715)                     PRN Meds Sorted by Name  for Ivette Andrew as of 08/27/24 through 9/5/24  Legend:       Medications 08/27 08/28 08/29 08/30 08/31 09/01 09/02 09/03 09/04 09/05   acetaminophen (TYLENOL) tablet 650 mg  Dose: 650 mg  Freq: Every 4 hours PRN Route: PO  PRN Reasons: mild pain,headaches,fever  Start: 09/04/24 1520                LORazepam (ATIVAN) injection 1 mg  Dose: 1 mg  Freq: Every 4 hours PRN Route: IV  PRN Reason: anxiety  Start: 09/04/24 1712   Admin Instructions:               1731      1040        ondansetron  (ZOFRAN) injection 4 mg  Dose: 4 mg  Freq: Every 4 hours PRN Route: IV  PRN Reasons: nausea,vomiting  Start: 09/04/24 1520   Admin Instructions:               1730                       Network Utilization Review Department  ATTENTION: Please call with any questions or concerns to [phone] and carefully listen to the prompts so that you are directed to the right person. All voicemails are confidential.   For Discharge needs, contact Care Management DC Support Team at [phone] opt. 2  Send all requests for admission clinical reviews, approved or denied determinations and any other requests to dedicated fax number below belonging to the campus where the patient is receiving treatment. List of dedicated fax numbers for the Facilities:  FACILITY NAME UR FAX NUMBER   ADMISSION DENIALS (Administrative/Medical Necessity) [phone]   DISCHARGE SUPPORT TEAM (NETWORK) [phone]   PARENT CHILD HEALTH (Maternity/NICU/Pediatrics) [phone]   [facility] [phone]   [facility] [phone]   [facility] [phone]   [facility] [phone]   [facility] [phone]   [facility] [phone]   [facility] [phone]   [facility] [phone]   [facility] [phone]   [facility] [phone]   [facility] [phone]   [facility] [phone]

## 2024-09-05 NOTE — CERTIFIED RECOVERY SPECIALIST
Certified  Note    Patient name: Ivette Andrew  Location: 5T DETOX 509/5T DETOX 50*  [place]: [facility]  Attending:  Sabino Allen DO MRN [id number]  : 1995  Age: 29 y.o.    Sex: female Date 2024         Substance Use History:     Social History     Substance and Sexual Activity   Alcohol Use Yes    Comment: jug rum/day        Social History     Substance and Sexual Activity   Drug Use Yes    Types: Marijuana    Comment: THC edibles        Time spent: 30 mins   Consult rcvd: Y  Patient seen in: [facility]   Stage of change: action/recurrence of use    Substance used:alcohol  Frequency/quantity : daily/pt reports 1 pint rum can fluctuate and be more  Last use: 2024    History of withdrawal seizures: Yes   Currently on MOUD: No   Interested in MOUD:  Yes     CRS provided introductions and explanation of service. CRS and patient are familiar from prior encounters. CRS shared  her own experiences with patient and encouraged continuation of treating AUD. CRS shared other treatment options which include but not limited to AA, IOP, OP therapy.      Patient interested in going to back to [facility] for OP treatment and open to other options as discussed. Patient  reports that she needs to continue to work on her MH issues as well.     Patient present as tearful yet optimistic.     CRS will continue to follow until discharge    Resources provided     -        Kristina Major

## 2024-09-05 NOTE — PROGRESS NOTES
[facility]  Progress Note  Name: Ivette Andrew I  MRN: [id number]  Unit/Bed#: 5T DETOX 509-01 I Date of Admission: 9/4/2024   Date of Service: 9/5/2024 I Hospital Day: 1    Assessment & Plan   * Alcohol withdrawal syndrome with complication (HCC)  Assessment & Plan  History of alcohol use/withdrawal, anxiety/pression, obesity, hypertension, GERD who presents with withdrawal symptoms  Previously sober for the last 6 months has been consuming ROM  Last consumption 2 AM.  Current withdrawal symptoms: anxiety, tremors, nausea, vomiting   Total phenobarbital: 195 mg of phenobarbital   Consult Toxicology: appreciate recommendations     Alcohol use disorder, severe, dependence (HCC)  Assessment & Plan  Pt with a h/o chronic heavy alcohol use for the last six months   Drinks pint of rum  daily  H/o withdrawal seizures  Interested in resuming vivitrol   Withdrawal management as above  Initiate IVFs, IV thiamine, folic acid, and MVI  Consult case management/CRS for assistance with aftercare resources - pt interested in outpatient resources at this time     GERD without esophagitis  Assessment & Plan  Continue PPI    Mixed anxiety depressive disorder  Assessment & Plan  Continue abilify 10 mg in the morning sertraline and topiramate at bedtime  Denies SI/HI   Re-establish with outpatient psychiatry upon discharge     Morbid obesity (HCC)  Assessment & Plan  Body mass index is 45.88 kg/m².    Primary hypertension  Assessment & Plan  Patient with a history of HTN  Home medication regimen: Lisinopril 20 mg   BP hypertensive upon arrival to the unit in the setting of acute alcohol withdrawal and chronic HTN  Most Recent : 113/63   Will continue   Plan to resume anti-hypertensive therapy if BP remains high after resolution of acute withdrawal  Continue monitoring BP     Smoker  Assessment & Plan  Patient agreeable to NRT.  Will order nicotine patch             VTE Pharmacologic Prophylaxis: VTE  Score: 4 Moderate Risk (Score 3-4) - Pharmacological DVT Prophylaxis Ordered: enoxaparin (Lovenox).    Mobility:   Basic Mobility Inpatient Raw Score: 24  JH-HLM Goal: 8: Walk 250 feet or more  JH-HLM Achieved: 8: Walk 250 feet ot more  JH-HLM Goal achieved. Continue to encourage appropriate mobility.    Patient Centered Rounds: I performed bedside rounds with nursing staff today.   Discussions with Specialists or Other Care Team Provider: Case Management     Education and Discussions with Family / Patient: Patient declined call to .     Total Time Spent on Date of Encounter in care of patient:  This time was spent on one or more of the following: performing physical exam; counseling and coordination of care; obtaining or reviewing history; documenting in the medical record; reviewing/ordering tests, medications or procedures; communicating with other healthcare professionals and discussing with patient's family/caregivers.    Current Length of Stay: 1 day(s)  Current Patient Status: Inpatient   Certification Statement: The patient will continue to require additional inpatient hospital stay due to on SEWS protocol for alcohol withdrawal   Discharge Plan: Anticipate discharge in 24-48 hrs to home.    Code Status: Level 1 - Full Code    Subjective:   Patient reports withdrawal symptoms of anxiety, shakes, and nausea. Denies any chest pain, shortness of breath, or abdominal pain. Requesting outpatient services upon discharge     Objective:     Vitals:   Temp (24hrs), Av.6 °F (36.4 °C), Min:97 °F (36.1 °C), Max:99.1 °F (37.3 °C)    Temp:  [97 °F (36.1 °C)-99.1 °F (37.3 °C)] 97.1 °F (36.2 °C)  HR:  [65-95] 80  Resp:  [18-20] 18  BP: (105-198)/() 113/63  SpO2:  [95 %-100 %] 100 %  Body mass index is 45.88 kg/m².     Input and Output Summary (last 24 hours):     Intake/Output Summary (Last 24 hours) at 2024 1142  Last data filed at 2024 0859  Gross per 24 hour   Intake 240 ml   Output --    Net 240 ml       Physical Exam:   Physical Exam  Vitals reviewed.   Constitutional:       General: She is not in acute distress.     Appearance: She is not diaphoretic.   Cardiovascular:      Rate and Rhythm: Normal rate and regular rhythm.      Heart sounds: No murmur heard.  Pulmonary:      Breath sounds: Normal breath sounds.   Abdominal:      Palpations: Abdomen is soft.   Neurological:      Mental Status: She is alert and oriented to person, place, and time.      Motor: Tremor present.   Psychiatric:         Mood and Affect: Mood is anxious.          Additional Data:     Labs:  Results from last 7 days   Lab Units 09/05/24  0515 09/04/24  1329   WBC Thousand/uL 5.25 8.35   HEMOGLOBIN g/dL 12.8 13.1   HEMATOCRIT % 41.3 41.7   PLATELETS Thousands/uL 212 242   SEGS PCT %  --  69   LYMPHO PCT %  --  22   MONO PCT %  --  8   EOS PCT %  --  1     Results from last 7 days   Lab Units 09/05/24  0515   SODIUM mmol/L 137   POTASSIUM mmol/L 3.8   CHLORIDE mmol/L 107   CO2 mmol/L 23   BUN mg/dL 10   CREATININE mg/dL 0.73   ANION GAP mmol/L 7   CALCIUM mg/dL 9.3   ALBUMIN g/dL 3.7   TOTAL BILIRUBIN mg/dL 0.56   ALK PHOS U/L 78   ALT U/L 15   AST U/L 23   GLUCOSE RANDOM mg/dL 91                       Lines/Drains:  Invasive Devices       Peripheral Intravenous Line  Duration             Peripheral IV 09/04/24 Left Antecubital <1 day                          Imaging: No pertinent imaging reviewed.    Recent Cultures (last 7 days):         Last 24 Hours Medication List:   Current Facility-Administered Medications   Medication Dose Route Frequency Provider Last Rate    acetaminophen  650 mg Oral Q4H PRN Sabino Allen, DO      ARIPiprazole  10 mg Oral Daily Sabino Allen, DO      folic acid  1 mg Oral Daily Sabino Allen, DO      gabapentin  100 mg Oral BID (AM & Afternoon) Sabino Allen, DO      gabapentin  600 mg Oral HS Sabino Allen DO      heparin (porcine)  5,000 Units Subcutaneous Q8H [facility] Sabino Allen, DO       lisinopril  20 mg Oral Daily Sabino Allen, DO      LORazepam  1 mg Intravenous Q4H PRN Sabino Allen, DO      multivitamin-minerals  1 tablet Oral Daily Sabino Allen, DO      nicotine  1 patch Transdermal Daily Sabino Allen, DO      ondansetron  4 mg Intravenous Q4H PRN Sabino Allen, DO      pantoprazole  40 mg Oral Early Morning Sabino Allen, DO      sertraline  150 mg Oral HS Sabino Allen, DO      thiamine  100 mg Oral Daily Sabino Allen, DO      topiramate  25 mg Oral HS Sabino Allen, DO          Today, Patient Was Seen By: Smitha Nina PA-C    **Please Note: This note may have been constructed using a voice recognition system.**

## 2024-09-05 NOTE — ASSESSMENT & PLAN NOTE
Continue abilify 10 mg in the morning sertraline and topiramate at bedtime  Denies SI/HI   Re-establish with outpatient psychiatry upon discharge

## 2024-09-05 NOTE — SOCIAL WORK
09/05/24 1352   Patient Intake   Special Needs n/a   Living Arrangement House;Lives alone   Can patient return home? 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   Admission Status   County of Residence [place]   Patient History   Presenting Problems Alcohol withdrawal syndrome with complication,  Smoker, Primary hypertension, Morbid obesity (HCC),  Mixed anxiety depressive disorder, GERD without esophagitis,   Alcohol use disorder, severe, dependence   Treatment History Pt had prior IP LOU tx in 2023, as well as OP at [facility] (psychiatry, MAT f/u, therapy) in the past, but discontinued. She was hospitalized for MH before in 2014 at [facility]. She had a suicide attempt at age 16   Currently in Treatment No   Name of ICM: n/a   Community Agency Supports [employer] office   Medical Problems Alcohol withdrawal syndrome with complication, Smoker, Primary hypertension, Morbid obesity (HCC), Mixed anxiety depressive disorder, GERD without esophagitis, Alcohol use disorder, severe, dependence   Legal Issues pt denies current or hx   Probation/ Name (if applicable) n/a   Substance Abuse Yes (See BH History section for detail)   Crisis Info   Release of Information Signed Yes  (Pt signed VANCE for mother/emergency contact, PCP in chart, [employer] (insurance))        09/05/24 1352   Patient Intake   Special Needs n/a   Living Arrangement House;Lives alone   Can patient return home? 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   Admission Status   County of Residence [place]   Patient History   Presenting Problems Alcohol withdrawal syndrome with complication,  Smoker, Primary hypertension, Morbid obesity (HCC),  Mixed anxiety depressive disorder, GERD without esophagitis,   Alcohol use disorder, severe, dependence   Treatment History Pt had prior IP LOU tx in 2023, as well as OP at [facility] (psychiatry, MAT f/u, therapy) in the past, but discontinued. She was hospitalized for MH before in 2014 at [facility]. She had a suicide attempt at age 16   Currently in Treatment No   Name of ICM: n/a   Community Agency Supports [facility] office   Medical Problems Alcohol withdrawal syndrome with complication, Smoker, Primary hypertension, Morbid obesity (HCC), Mixed anxiety depressive disorder, GERD without esophagitis, Alcohol use disorder, severe, dependence   Legal Issues pt denies current or hx   Probation/ Name (if applicable) n/a   Substance Abuse Yes (See BH History section for detail)   Crisis Info   Release of Information Signed Yes  (Pt signed VANCE for mother/emergency contact, PCP in chart, [employer] (insurance))   Additional Substance Use Detail    Questions Responses   Problems Due to Past Use of Alcohol? Yes   Problems Due to Past Use of Substances? No   Substance Use Assessment Substance use within the past 12 months   Alcohol Use Frequency Daily   Cannabis frequency Past occasional use   Comment:  Never used on 8/5/2023 Past occasional use on 8/5/2023    Heroin Frequency Denies use in past 12 months   Alcohol Drink of Choice Rum   Cannabis method Smoke   Comment:  Smoke on 9/5/2024    1st Use of Alcohol 12   Cannabis 1st Use 11   Last Use of Alcohol & Amount 1L of rum on 9/4/24   Cannabis last use 9/3/24   Comment:  9/3/2024 on 9/5/2024    Longest Abstinence from Alcohol 2  "yrs   Cannabis Longest Abstinence pt unsure   Cocaine frequency Never used   Comment:  Never used on 8/5/2023    Crack Cocaine Frequency Denies use in past 12 months   Methamphetamine Frequency Denies use in past 12 months   Narcotic Frequency Denies use in past 12 months   Benzodiazepine Frequency Denies use in past 12 months   Amphetamine frequency Denies use in past 12 months   Barbituate Frequency Denies use use in past 12 months   Inhalant frequency Never used   Comment:  Never used on 8/5/2023    Hallucinogen frequency Never used   Comment:  Never used on 8/5/2023    Ecstasy frequency Never used   Comment:  Never used on 8/5/2023    Other drug frequency Never used   Comment:  Never used on 8/5/2023    Opiate frequency Denies use in past 12 months        09/05/24 1408   Substance Abuse Addendum Details   History of Withdrawal Symptoms Other withdrawal symptoms (specify in comment)  (GI upset, headache, shakiness, anxiety, tremor, hx of blackouts)   Medical Complications Alcohol withdrawal syndrome with complication (HCC)  Smoker  Primary hypertension  Morbid obesity (HCC)  Mixed anxiety depressive disorder  GERD without esophagitis  Alcohol use disorder, severe, dependence (HCC)   Sober Supports mother   Present Treatment none currently; seen at [facility] this year   Substance Abuse Treatment Hx Past Tx, Inpatient;Past Tx, Outpatient;Past detox;Attends AA/NA  (attended all in past, not currently)   ASAM Level & Criteria 4.0; pt has experienced blackouts with loss of consciousness. She is currently experiencing w/d sxs of GI upset, headache, shakiness, anxiety, tremor, has a lack of professional and natural supports. She states she has cravings \"all the time\" and thinks about drinking constantly. Pt has a MH dx and experiences mood swings. She states her current meds are not effective. 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 She initially presented to [facility] ED requesting detox. Pt's name, date of birth, home address and telephone number were verified. Pt was informed of case management role and the purpose of the completion of intake with case management.   Pt was cooperative. CM and pt completed relapse prevention plan. Both signed. Pt will receive a copy. She was unsure of her triggers as she states she has cravings all the time and drinking is all she thinks about. Other than that she stated, when she is angry, it may be a trigger. Pt named her mother as a support. She was attending [facility] in the past for therapy and MAT as well as AA mtgs but discontinued. Currently she has a lack of professional and natural supports to manage in the community and feels as though her psychiatric medications do not manage her mood well. She names her coping skills as drawing, reading and writing. In the past, honest self-reflection and using her supports have been helpful in sobriety. Pt signed a full VANCE for mother (emergency contact, PCP listed in [employer] and [employer] (insurance). Pt's goals for detox are to successfully complete medical withdrawal and to develop a discharge plan that includes relapse prevention education. Pt is in the contemplation stage of change.     Discussed with patient: AUDIT score of 40 UDS/Identified Substance(s) used: THC, alcohol  Risks discussed included: physical health, mental health, social relationships, pt stated possible employment and financial risks and this was discussed  Recommendations discussed: CM recommends 3.5 LOC  Patient's response: Pt is worried about her employment if she goes to [facility]. CM did review [facility] guidelines regarding this and pt stated she is worried because she has already missed significant work d/t drinking prior to admission. However, she is internally motivated for treatment.              SUBSTANCE ABUSE    Stressor/Trigger    Alcohol withdrawal   UDS: THC (+), alcohol (+)  Audit: 40  PAWSS: 3 Nicotine: 1 ppd  Ethanol: <10   Prior D&A treatment   Prior IP in 2023, prior in OP  including [facility]) and detox   AA/NA Meetings   Pt has attended AA in the past, not currently.   Withdrawal  Symptoms   GI upset, headache, shakiness, anxiety, tremor   History of OD/blackouts or Withdrawal Seizures   Pt denies hx of withdrawal seizures, but has blacked out and lost consciousness in the past.   MENTAL HEALTH INFORMATION    Hx of Mental Health Dx   Bipolar, type 2   Outpatient Mental Health Tx   Pt was seeing Dr. Crook, but has not recently. She would like psych f/u as she feels current meds are not helpful. She also was getting MAT and therapy in the past, but did not continue.   Inpatient Hospitalizations (Mental Health)   Pt was hospitalized at [facility] (2014). She had a suicide attempt at age 16.   Family History of Mental Health    Pt stated her father had a MH diagnosis, but she wasn't sure of specifics.    Trauma History    Pt endorsed but did not give specifics.    Current MH Symptoms   Pt denied SI/HI/AVH and other symptoms.   Access to Firearms    Pt denied any access to firearms.   PHYSICAL HEALTH INFORMATION    Physical Health Conditions (Chronic)    Alcohol withdrawal syndrome with complication (HCC)   Smoker   Primary hypertension   Morbid obesity (HCC)   Mixed anxiety depressive disorder   GERD without esophagitis   Alcohol use disorder, severe, dependence (HCC)      PCP   Jade Blackwell MD  [phone]    Insurance   [facility]   [phone]     Preferred Pharmacy   [facility] - [address]   [address] [address]   Phone: [phone] Fax: [phone]      LEGAL ISSUES    Past or present legal issues   Pt denies   Probation/[place]   Pt denies   EMPLOYMENT/INCOME/NEEDS    Education   10th grade   Employment FT ay [employer]    "   History   Pt denies   Spiritual/Tenriism Beliefs   Pt denies   Transportation/Transport Home   Has a vehicle, can drive to appts   DEMOGRAPHICS    Discharge Address   [address] [address]    Living Arrangement   Lives on here own, has a cat   Can pt return home yes   External Supports     mother   Phone Number   [phone]   Email   [email or web address]   Marital Status/Children   Single, no children     Substance First use Last Use Frequency Amount Used How long Longest period of sobriety and when Method of use   THC   13 9/3/24 daily \"A lot\"; pt stated she wasn't sure how to quantify Pt unsure Pt unsure smoke   Heroin            Cocaine            ETOH   14 9/4/24 Daily  1L of rum 6 months 2 years drink   Meth            Benzos            Other:                 09/05/24 1423   Financial Resource Strain   How hard is it for you to pay for the very basics like food, housing, medical care, and heating? Somewhat  (pt states she is having a hard time paying her cable bill)   Housing Stability   In the last 12 months, was there a time when you were not able to pay the mortgage or rent on time? N   In the past 12 months, how many times have you moved where you were living? 0   At any time in the past 12 months, were you homeless or living in a shelter (including now)? N   Transportation Needs   In the past 12 months, has lack of transportation kept you from medical appointments or from getting medications? no   In the past 12 months, has lack of transportation kept you from meetings, work, or from getting things needed for daily living? No   Food Insecurity   Within the past 12 months, you worried that your food would run out before you got the money to buy more. Never true   Within the past 12 months, the food you bought just didn't last and you didn't have money to get more. Never true   Social Connections   In a typical week, how many times do you talk on the phone with family, friends, " or neighbors? Never   How often do you get together with friends or relatives? Never   How often do you attend Jehovah's witness or Amish services? Never   Do you belong to any clubs or organizations such as Jehovah's witness groups, unions, fraternal or athletic groups, or school groups? No   How often do you attend meetings of the clubs or organizations you belong to? Never   Are you , , , , never , or living with a partner? Never marrie   Intimate Partner Violence   Within the last year, have you been afraid of your partner or ex-partner? No   Within the last year, have you been humiliated or emotionally abused in other ways by your partner or ex-partner? No   Within the last year, have you been kicked, hit, slapped, or otherwise physically hurt by your partner or ex-partner? No   Within the last year, have you been raped or forced to have any kind of sexual activity by your partner or ex-partner? No   Alcohol Use   Q1: How often do you have a drink containing alcohol? 4 or more ti   Q2: How many drinks containing alcohol do you have on a typical day when you are drinking? 10 or more   Q3: How often do you have six or more drinks on one occasion? Daily   Utilities   In the past 12 months has the electric, gas, oil, or water company threatened to shut off services in your home? No     Tobacco Use    Every Day; Cigarettes: Started 2006; 1 pack/day; Smoked an average of 1 pack/day for 18.7 years; Total pack years: 18.7   Passive Exposure: Past   Smokeless Tobacco: Never used smokeless tobacco.   Tobacco Cessation: Ready to quit: Not Asked; Counseling given: Not Answered     Vaping Use    Some days; Substances: THC    Alcohol Use    Yes.   Comments: jug rum/day     Drug Use    Yes; Marijuana.   Comments: THC edibles     Sexual Activity    Sexually active; Partners: Male; Birth Control/Protection: Abstinence, None.

## 2024-09-06 ENCOUNTER — TRANSITIONAL CARE MANAGEMENT (OUTPATIENT)
Dept: FAMILY MEDICINE CLINIC | Facility: CLINIC | Age: 29
End: 2024-09-06

## 2024-09-06 VITALS
OXYGEN SATURATION: 99 % | HEART RATE: 70 BPM | DIASTOLIC BLOOD PRESSURE: 88 MMHG | HEIGHT: 63 IN | WEIGHT: 259 LBS | BODY MASS INDEX: 45.89 KG/M2 | RESPIRATION RATE: 18 BRPM | SYSTOLIC BLOOD PRESSURE: 140 MMHG | TEMPERATURE: 97.5 F

## 2024-09-06 PROBLEM — E53.9 VITAMIN B DEFICIENCY: Status: ACTIVE | Noted: 2024-09-06

## 2024-09-06 LAB
ALBUMIN SERPL BCG-MCNC: 3.7 G/DL (ref 3.5–5)
ALP SERPL-CCNC: 75 U/L (ref 34–104)
ALT SERPL W P-5'-P-CCNC: 15 U/L (ref 7–52)
ANION GAP SERPL CALCULATED.3IONS-SCNC: 9 MMOL/L (ref 4–13)
AST SERPL W P-5'-P-CCNC: 21 U/L (ref 13–39)
BILIRUB SERPL-MCNC: 0.41 MG/DL (ref 0.2–1)
BUN SERPL-MCNC: 10 MG/DL (ref 5–25)
CALCIUM SERPL-MCNC: 9.3 MG/DL (ref 8.4–10.2)
CHLORIDE SERPL-SCNC: 107 MMOL/L (ref 96–108)
CO2 SERPL-SCNC: 22 MMOL/L (ref 21–32)
CREAT SERPL-MCNC: 0.85 MG/DL (ref 0.6–1.3)
ERYTHROCYTE [DISTWIDTH] IN BLOOD BY AUTOMATED COUNT: 15 % (ref 11.6–15.1)
GFR SERPL CREATININE-BSD FRML MDRD: 91 ML/MIN/1.73SQ M
GLUCOSE SERPL-MCNC: 76 MG/DL (ref 65–140)
HCT VFR BLD AUTO: 40.6 % (ref 34.8–46.1)
HGB BLD-MCNC: 12.5 G/DL (ref 11.5–15.4)
MAGNESIUM SERPL-MCNC: 2.3 MG/DL (ref 1.9–2.7)
MCH RBC QN AUTO: 26.9 PG (ref 26.8–34.3)
MCHC RBC AUTO-ENTMCNC: 30.8 G/DL (ref 31.4–37.4)
MCV RBC AUTO: 87 FL (ref 82–98)
PLATELET # BLD AUTO: 212 THOUSANDS/UL (ref 149–390)
PMV BLD AUTO: 10.2 FL (ref 8.9–12.7)
POTASSIUM SERPL-SCNC: 4.1 MMOL/L (ref 3.5–5.3)
PROT SERPL-MCNC: 6.8 G/DL (ref 6.4–8.4)
RBC # BLD AUTO: 4.65 MILLION/UL (ref 3.81–5.12)
SODIUM SERPL-SCNC: 138 MMOL/L (ref 135–147)
WBC # BLD AUTO: 5.39 THOUSAND/UL (ref 4.31–10.16)

## 2024-09-06 PROCEDURE — 80053 COMPREHEN METABOLIC PANEL: CPT | Performed by: PHYSICIAN ASSISTANT

## 2024-09-06 PROCEDURE — 83735 ASSAY OF MAGNESIUM: CPT | Performed by: PHYSICIAN ASSISTANT

## 2024-09-06 PROCEDURE — 99239 HOSP IP/OBS DSCHRG MGMT >30: CPT

## 2024-09-06 PROCEDURE — NC001 PR NO CHARGE: Performed by: EMERGENCY MEDICINE

## 2024-09-06 PROCEDURE — 85027 COMPLETE CBC AUTOMATED: CPT | Performed by: PHYSICIAN ASSISTANT

## 2024-09-06 RX ORDER — GABAPENTIN 300 MG/1
600 CAPSULE ORAL
Qty: 60 CAPSULE | Refills: 0 | Status: SHIPPED | OUTPATIENT
Start: 2024-09-06 | End: 2024-10-06

## 2024-09-06 RX ORDER — GABAPENTIN 100 MG/1
100 CAPSULE ORAL 2 TIMES DAILY
Qty: 60 CAPSULE | Refills: 0 | Status: SHIPPED | OUTPATIENT
Start: 2024-09-06 | End: 2024-10-06

## 2024-09-06 RX ORDER — ARIPIPRAZOLE 10 MG/1
10 TABLET ORAL DAILY
Qty: 30 TABLET | Refills: 0 | Status: SHIPPED | OUTPATIENT
Start: 2024-09-06 | End: 2024-10-06

## 2024-09-06 RX ORDER — SERTRALINE HYDROCHLORIDE 100 MG/1
150 TABLET, FILM COATED ORAL
Qty: 45 TABLET | Refills: 0 | Status: SHIPPED | OUTPATIENT
Start: 2024-09-06 | End: 2024-10-06

## 2024-09-06 RX ADMIN — ARIPIPRAZOLE 10 MG: 10 TABLET ORAL at 08:20

## 2024-09-06 RX ADMIN — THIAMINE HCL TAB 100 MG 100 MG: 100 TAB at 08:21

## 2024-09-06 RX ADMIN — HEPARIN SODIUM 5000 UNITS: 5000 INJECTION INTRAVENOUS; SUBCUTANEOUS at 05:42

## 2024-09-06 RX ADMIN — PANTOPRAZOLE SODIUM 40 MG: 40 TABLET, DELAYED RELEASE ORAL at 05:42

## 2024-09-06 RX ADMIN — NICOTINE 1 PATCH: 21 PATCH, EXTENDED RELEASE TRANSDERMAL at 08:20

## 2024-09-06 RX ADMIN — NALTREXONE 380 MG: KIT at 10:50

## 2024-09-06 RX ADMIN — LISINOPRIL 20 MG: 20 TABLET ORAL at 08:21

## 2024-09-06 RX ADMIN — NALTREXONE HYDROCHLORIDE 50 MG: 50 TABLET, FILM COATED ORAL at 08:21

## 2024-09-06 RX ADMIN — GABAPENTIN 100 MG: 100 CAPSULE ORAL at 08:20

## 2024-09-06 RX ADMIN — FOLIC ACID 1 MG: 1 TABLET ORAL at 08:20

## 2024-09-06 RX ADMIN — MULTIPLE VITAMINS W/ MINERALS TAB 1 TABLET: TAB ORAL at 08:20

## 2024-09-06 NOTE — PROGRESS NOTES
"Progress Note - Medical Toxicology  Ivette Andrew 29 y.o. female MRN: [id number]  Unit/Bed#: 5T DETOX 509-01 Encounter: [id number]      Reason for current consult: Alcohol withdrawal, alcohol use disorder    ASSESSMENT:  Alcohol withdrawal with complication  Alcohol use disorder, severe dependence  Tobacco use disorder    RECOMMENDATIONS:  Patient is off [facility]WS protocol and is no longer exhibiting signs or symptoms of alcohol withdrawal. Received 325 mg total of phenobarbital.    Patient to receive IM naltrexone prior to discharge and follow up with [facility].    Patient is appropriate for disposition from a toxicology perspective.    Thank you for the consult. Please contact the medical  on call with questions or concerns.    Subjective:   Patient states she feels \"10/10\" today.    Objective:     Physical Exam  Vitals and nursing note reviewed.   Constitutional:       General: She is not in acute distress.  HENT:      Head: Normocephalic and atraumatic.   Pulmonary:      Effort: Pulmonary effort is normal. No respiratory distress.   Musculoskeletal:         General: No deformity.   Skin:     General: Skin is warm and dry.      Findings: No rash.   Neurological:      General: No focal deficit present.      Mental Status: She is alert and oriented to person, place, and time.   Psychiatric:         Mood and Affect: Mood normal.         Behavior: Behavior normal.         Vitals: Blood pressure 133/71, pulse 81, temperature 97.5 °F (36.4 °C), temperature source Temporal, resp. rate 18, height 5' 3\" (1.6 m), weight 117 kg (259 lb), last menstrual period 08/26/2024, SpO2 99%.    No intake or output data in the 24 hours ending 09/06/24 1112    Invasive Devices       Peripheral Intravenous Line  Duration             Peripheral IV 09/04/24 Left Antecubital 1 day                    Lab, Imaging and other studies: I have personally reviewed pertinent reports.    "

## 2024-09-06 NOTE — ASSESSMENT & PLAN NOTE
Patient with a history of HTN  Home medication regimen: Lisinopril 20 mg   BP hypertensive upon arrival to the unit in the setting of acute alcohol withdrawal and chronic HTN  Will continue   Plan to resume anti-hypertensive therapy if BP remains high after resolution of acute withdrawal  Continue monitoring BP

## 2024-09-06 NOTE — CERTIFIED RECOVERY SPECIALIST
Certified  Note    Patient name: Ivette Andrew  Location: 5T DETOX 509/5T DETOX 50*  [place]: [facility]  Attending:  Sabino Allen DO MRN [id number]  : 1995  Age: 29 y.o.    Sex: female Date 2024         Substance Use History:     Social History     Substance and Sexual Activity   Alcohol Use Yes    Comment: jug rum/day        Social History     Substance and Sexual Activity   Drug Use Yes    Types: Marijuana    Comment: THC edibles        Time spent with Patient 30 mins     CRS follow up with patient.     CRS and patient had conversation that supports and encourages recovery. Discussed plans for aftercare and continued sobriety.     Patient presents as pleasant and optimistic about future. Patient is open to recovery support and attending AA meetings. Patient and CRS had open and honest discussion about AUD and recovery. Patient very happy and looking forward to healthy lifestyle and going back to therapy at [facility] (with Julian)       Resources provided     CRS will follow up with patient in a few weeks     Kristina Major

## 2024-09-06 NOTE — ASSESSMENT & PLAN NOTE
Denies further withdrawal symptoms   Total phenobarbital: 325 mg of phenobarbital   Discussed with toxicology, SEWS protocol discontinued- no further evidence of withdrawal. Toxicology is signing off as patient is medically stable from withdrawal perspective

## 2024-09-06 NOTE — DISCHARGE SUMMARY
[facility]  Discharge- Ivette Andrew 1995, 29 y.o. female MRN: [id number]  Unit/Bed#: 5T DETOX 509-01 Encounter: [id number]  Primary Care Provider: Jade Blackwell MD   Date and time admitted to hospital: 9/4/2024 12:56 PM    * Alcohol withdrawal syndrome with complication (HCC)  Assessment & Plan  Denies further withdrawal symptoms   Total phenobarbital: 325 mg of phenobarbital   Discussed with toxicology, SEWS protocol discontinued- no further evidence of withdrawal. Toxicology is signing off as patient is medically stable from withdrawal perspective       Alcohol use disorder, severe, dependence (HCC)  Assessment & Plan  Pt with a h/o chronic heavy alcohol use for the last six months   Drinks pint of rum  daily  H/o withdrawal seizures  Interested in resuming vivitrol   Withdrawal management as above  Received vivitrol on 9/6/24   Consult case management/CRS for assistance with aftercare resources - continue with outpatient services through the [facility] program     GERD without esophagitis  Assessment & Plan  Continue PPI    Mixed anxiety depressive disorder  Assessment & Plan  Continue abilify 10 mg in the morning sertraline and topiramate at bedtime  Denies SI/HI   Re-establish with outpatient psychiatry upon discharge     Morbid obesity (HCC)  Assessment & Plan  Body mass index is 45.88 kg/m².    Primary hypertension  Assessment & Plan  Patient with a history of HTN  Home medication regimen: Lisinopril 20 mg   BP hypertensive upon arrival to the unit in the setting of acute alcohol withdrawal and chronic HTN  Will continue   Plan to resume anti-hypertensive therapy if BP remains high after resolution of acute withdrawal  Continue monitoring BP     Smoker  Assessment & Plan  Patient agreeable to NRT.  Will order nicotine patch        Medical Problems       Resolved Problems  Date Reviewed: 9/5/2024   None       Discharging Physician / Practitioner: Smitha Nina,  "PA-C  PCP: Jade Blackwell MD  Admission Date:   Admission Orders (From admission, onward)       Ordered        09/04/24 1459  INPATIENT ADMISSION  Once                          Discharge Date: 09/06/24    Consultations During Hospital Stay:  None    Procedures Performed:   None    Significant Findings / Test Results:   none    Incidental Findings:   None       Test Results Pending at Discharge (will require follow up):   None     Outpatient Tests/ Follow Up Requested:  Follow up with PCP   Follow up with psychiatry    Complications:  none    Reason for Admission: alcohol withdrawal     Hospital Course:   Ivette Andrew is a 29 y.o. female patient who originally presented to the hospital on 9/4/2024 due to alcohol withdrawal. Patient initially presented to the [facility] ED requesting detoxification from alcohol. Patient was admitted to the [facility] medical detox unit under [facility] protocol for medically assisted alcohol withdrawal and received a total of 325 mg phenobarbital without complication, with last dose of phenobarbital administered. Patient's alcohol withdrawal symptoms subsequently resolved, and she has remained without objective evidence of alcohol withdrawal at this time. Vivitrol injection was administered on 9/6/24. Case management and CRS were consulted for assistance with aftercare resources, and patient will be discharged with outpatient services.     Hospital Course: No notes on file    Please see above list of diagnoses and related plan for additional information.     Condition at Discharge: stable    Discharge Day Visit / Exam:   Subjective:  No further acute complaints.   Vitals: Blood Pressure: 140/88 (09/06/24 1121)  Pulse: 70 (09/06/24 1121)  Temperature: 97.5 °F (36.4 °C) (09/06/24 1121)  Temp Source: Temporal (09/06/24 1121)  Respirations: 18 (09/06/24 1121)  Height: 5' 3\" (160 cm) (09/04/24 1641)  Weight - Scale: 117 kg (259 lb) (09/04/24 1641)  SpO2: 99 % (09/06/24 1121)  Exam:   Physical Exam  Vitals " and nursing note reviewed.   Constitutional:       General: She is not in acute distress.     Appearance: She is not diaphoretic.   Eyes:      General: No scleral icterus.     Pupils: Pupils are equal, round, and reactive to light.   Cardiovascular:      Rate and Rhythm: Normal rate and regular rhythm.      Heart sounds: No murmur heard.  Pulmonary:      Effort: No respiratory distress.      Breath sounds: Normal breath sounds.   Abdominal:      General: Bowel sounds are normal.      Palpations: Abdomen is soft.   Neurological:      Mental Status: She is alert and oriented to person, place, and time.          Discussion with Family: Patient declined call to .     Discharge instructions/Information to patient and family:   See after visit summary for information provided to patient and family.      Provisions for Follow-Up Care:  See after visit summary for information related to follow-up care and any pertinent home health orders.      Mobility at time of Discharge:   Basic Mobility Inpatient Raw Score: 24  JH-HLM Goal: 8: Walk 250 feet or more  JH-HLM Achieved: 8: Walk 250 feet ot more  HLM Goal achieved. Continue to encourage appropriate mobility.     Disposition:   Home    Planned Readmission: none      Discharge Statement:  I spent 35 minutes discharging the patient. This time was spent on the day of discharge. I had direct contact with the patient on the day of discharge. Greater than 50% of the total time was spent examining patient, answering all patient questions, arranging and discussing plan of care with patient as well as directly providing post-discharge instructions.  Additional time then spent on discharge activities.    Discharge Medications:  See after visit summary for reconciled discharge medications provided to patient and/or family.      **Please Note: This note may have been constructed using a voice recognition system**       Yojana Pepper, CM

## 2024-09-06 NOTE — ASSESSMENT & PLAN NOTE
Pt with a h/o chronic heavy alcohol use for the last six months   Drinks pint of rum  daily  H/o withdrawal seizures  Interested in resuming vivitrol   Withdrawal management as above  Received vivitrol on 9/6/24   Consult case management/CRS for assistance with aftercare resources - continue with outpatient services through the [facility] program

## 2024-09-06 NOTE — PLAN OF CARE
Problem: SUBSTANCE USE/ABUSE  Goal: By discharge, will develop insight into their chemical dependency and sustain motivation to continue in recovery  Description: INTERVENTIONS:  - Attends all daily group sessions and scheduled AA groups  - Actively practices coping skills through participation in the therapeutic community and adherence to program rules  - Reviews and completes assignments from individual treatment plan  - Assist patient development of understanding of their personal cycle of addiction and relapse triggers  Outcome: Progressing     Problem: DISCHARGE PLANNING  Goal: Discharge to home or other facility with appropriate resources  Description: INTERVENTIONS:  - Identify barriers to discharge w/patient and caregiver  - Arrange for needed discharge resources and transportation as appropriate  - Identify discharge learning needs (meds, wound care, etc.)  - Arrange for interpretive services to assist at discharge as needed  - Refer to Case Management Department for coordinating discharge planning if the patient needs post-hospital services based on physician/advanced practitioner order or complex needs related to functional status, cognitive ability, or social support system  Outcome: Progressing

## 2024-09-06 NOTE — PLAN OF CARE
Problem: SUBSTANCE USE/ABUSE  Goal: By discharge, will develop insight into their chemical dependency and sustain motivation to continue in recovery  Description: INTERVENTIONS:  - Attends all daily group sessions and scheduled AA groups  - Actively practices coping skills through participation in the therapeutic community and adherence to program rules  - Reviews and completes assignments from individual treatment plan  - Assist patient development of understanding of their personal cycle of addiction and relapse triggers  Outcome: Progressing  Goal: By discharge, patient will have ongoing treatment plan addressing chemical dependency  Description: INTERVENTIONS:  - Assist patient with resources and/or appointments for ongoing recovery based living  Outcome: Progressing     Problem: PAIN - ADULT  Goal: Verbalizes/displays adequate comfort level or baseline comfort level  Description: Interventions:  - Encourage patient to monitor pain and request assistance  - Assess pain using appropriate pain scale  - Administer analgesics based on type and severity of pain and evaluate response  - Implement non-pharmacological measures as appropriate and evaluate response  - Consider cultural and social influences on pain and pain management  - Notify physician/advanced practitioner if interventions unsuccessful or patient reports new pain  Outcome: Progressing     Problem: INFECTION - ADULT  Goal: Absence or prevention of progression during hospitalization  Description: INTERVENTIONS:  - Assess and monitor for signs and symptoms of infection  - Monitor lab/diagnostic results  - Monitor all insertion sites, i.e. indwelling lines, tubes, and drains  - Monitor endotracheal if appropriate and nasal secretions for changes in amount and color  - [place] appropriate cooling/warming therapies per order  - Administer medications as ordered  - Instruct and encourage patient and family to use good hand hygiene technique  - Identify  and instruct in appropriate isolation precautions for identified infection/condition  Outcome: Progressing  Goal: Absence of fever/infection during neutropenic period  Description: INTERVENTIONS:  - Monitor WBC    Outcome: Progressing     Problem: SAFETY ADULT  Goal: Patient will remain free of falls  Description: INTERVENTIONS:  - Educate patient/family on patient safety including physical limitations  - Instruct patient to call for assistance with activity   - Consult OT/PT to assist with strengthening/mobility   - Keep Call bell within reach  - Keep bed low and locked with side rails adjusted as appropriate  - Keep care items and personal belongings within reach  - Initiate and maintain comfort rounds  - Make Fall Risk Sign visible to staff  - Apply yellow socks and bracelet for high fall risk patients  - Consider moving patient to room near nurses station  Outcome: Progressing  Goal: Maintain or return to baseline ADL function  Description: INTERVENTIONS:  -  Assess patient's ability to carry out ADLs; assess patient's baseline for ADL function and identify physical deficits which impact ability to perform ADLs (bathing, care of mouth/teeth, toileting, grooming, dressing, etc.)  - Assess/evaluate cause of self-care deficits   - Assess range of motion  - Assess patient's mobility; develop plan if impaired  - Assess patient's need for assistive devices and provide as appropriate  - Encourage maximum independence but intervene and supervise when necessary  - Involve family in performance of ADLs  - Assess for home care needs following discharge   - Consider OT consult to assist with ADL evaluation and planning for discharge  - Provide patient education as appropriate  Outcome: Progressing  Goal: Maintains/Returns to pre admission functional level  Description: INTERVENTIONS:  - Perform AM-PAC 6 Click Basic Mobility/ Daily Activity assessment daily.  - Set and communicate daily mobility goal to care team and  patient/family/caregiver.   - Collaborate with rehabilitation services on mobility goals if consulted  - Out of bed for toileting  - Record patient progress and toleration of activity level   Outcome: Progressing     Problem: DISCHARGE PLANNING  Goal: Discharge to home or other facility with appropriate resources  Description: INTERVENTIONS:  - Identify barriers to discharge w/patient and caregiver  - Arrange for needed discharge resources and transportation as appropriate  - Identify discharge learning needs (meds, wound care, etc.)  - Arrange for interpretive services to assist at discharge as needed  - Refer to Case Management Department for coordinating discharge planning if the patient needs post-hospital services based on physician/advanced practitioner order or complex needs related to functional status, cognitive ability, or social support system  Outcome: Progressing     Problem: Knowledge Deficit  Goal: Patient/family/caregiver demonstrates understanding of disease process, treatment plan, medications, and discharge instructions  Description: Complete learning assessment and assess knowledge base.  Interventions:  - Provide teaching at level of understanding  - Provide teaching via preferred learning methods  Outcome: Progressing

## 2024-09-09 NOTE — PROGRESS NOTES
09/09/24 1250   Other Referral/Resources/Interventions Provided:   Referrals Provided: Crisis Hotline;Other (Specify);Psychiatrist;Support Group;Therapist  (IP and OP LOU tx resources including CRS and case managent)   Discharge Communications   Discharge planning discussed with: pt   Freedom of Choice Yes   Transportation at Discharge? Yes   Transport at Discharge  Auto with designated   (pts boyfriend provided transportation at discharge)   Transported by (Company and Unit #) pts boyfriend provided transportation at discharge   ETA of Transport 1 pm   Accompanied by Significant other   CM Handoff Comments CM scheduled PCP appt on 9/10/24 at 3:20 pm as well as appt with Meliza VILLEDA at [facility] for intake on 9/11/24 at 10 am and appt with Smitha FISHER at [facility] for MAT f/u on 9/23/24 at 10:45am   Contacts   Patient Contacts [facility]   Relationship to Patient: Treatment Provider   Contact Method Phone   Phone Number [phone]   Reason/Outcome Continuity of Care;Referral;Discharge Planning     CM was notified by medical provider that pt is medically clear for discharge. Pt to discharge 9/6/24. Pt denies any withdrawal symptoms at this time. Pt to discharge to home and boyfriend will  upon discharge. Pt to follow up with PCP on 9/10/24 as well as [facility] on 9/11/24 and 9/23/24 as listed above. Pt's medications were sent to preferred pharmacy.      Discharge Address: [facility] [place] [address]   Phone Number: [phone] (Mobile)   [phone] (Home Phone)

## 2024-09-09 NOTE — UTILIZATION REVIEW
NOTIFICATION OF ADMISSION DISCHARGE   This is a Notification of Discharge from [facility]. Please be advised that this patient has been discharge from our facility. Below you will find the admission and discharge date and time including the patient’s disposition.   UTILIZATION REVIEW CONTACT:  Alejandra Lopez  Utilization   Network Utilization Review Department  Phone: [phone] x carefully listen to the prompts. All voicemails are confidential.  Email: [email or web address]     ADMISSION INFORMATION  PRESENTATION DATE: 9/4/2024 12:56 PM  OBERVATION ADMISSION DATE: N/A  INPATIENT ADMISSION DATE: 9/4/24  2:59 PM   DISCHARGE DATE: 9/6/2024  1:04 PM   DISPOSITION:Home/Self Care    Network Utilization Review Department  ATTENTION: Please call with any questions or concerns to [phone] and carefully listen to the prompts so that you are directed to the right person. All voicemails are confidential.   For Discharge needs, contact Care Management DC Support Team at [phone] opt. 2  Send all requests for admission clinical reviews, approved or denied determinations and any other requests to dedicated fax number below belonging to the campus where the patient is receiving treatment. List of dedicated fax numbers for the Facilities:  FACILITY NAME UR FAX NUMBER   ADMISSION DENIALS (Administrative/Medical Necessity) [phone]   DISCHARGE SUPPORT TEAM ([facility]) [phone]   PARENT CHILD HEALTH (Maternity/NICU/Pediatrics) [phone]   [facility] [phone]   [facility] [phone]   [facility] [phone]   [facility] [phone]   [facility] [phone]   [facility] [phone]   [facility] [phone]   [facility]  [phone]   [facility] [phone]   [facility] [phone]   [facility] [phone]   [facility] [phone]

## 2024-09-10 ENCOUNTER — OFFICE VISIT (OUTPATIENT)
Dept: FAMILY MEDICINE CLINIC | Facility: CLINIC | Age: 29
End: 2024-09-10
Payer: COMMERCIAL

## 2024-09-10 VITALS
WEIGHT: 259.4 LBS | HEIGHT: 63 IN | OXYGEN SATURATION: 99 % | BODY MASS INDEX: 45.96 KG/M2 | HEART RATE: 100 BPM | SYSTOLIC BLOOD PRESSURE: 124 MMHG | TEMPERATURE: 98 F | DIASTOLIC BLOOD PRESSURE: 94 MMHG

## 2024-09-10 DIAGNOSIS — F17.200 SMOKER: ICD-10-CM

## 2024-09-10 DIAGNOSIS — K21.9 GERD WITHOUT ESOPHAGITIS: ICD-10-CM

## 2024-09-10 DIAGNOSIS — F41.8 MIXED ANXIETY DEPRESSIVE DISORDER: ICD-10-CM

## 2024-09-10 DIAGNOSIS — I10 PRIMARY HYPERTENSION: ICD-10-CM

## 2024-09-10 DIAGNOSIS — F10.939 ALCOHOL WITHDRAWAL SYNDROME WITH COMPLICATION (HCC): Primary | ICD-10-CM

## 2024-09-10 DIAGNOSIS — F10.20 ALCOHOL USE DISORDER, SEVERE, DEPENDENCE (HCC): ICD-10-CM

## 2024-09-10 PROCEDURE — 99496 TRANSJ CARE MGMT HIGH F2F 7D: CPT | Performed by: INTERNAL MEDICINE

## 2024-09-10 NOTE — ASSESSMENT & PLAN NOTE
She has been fighting with alcohol abuse has had several admissions for same. She feels like this time she feels more confident in staying away from it she has set up standing appointment with her counselor and psychiatrist

## 2024-09-10 NOTE — PROGRESS NOTES
Transition of Care Visit  Name: Ivette Andrew      : 1995      MRN: [id number]  Encounter Provider: Jade Blackwell MD  Encounter Date: 9/10/2024   Encounter department: [facility]    Assessment & Plan  Alcohol withdrawal syndrome with complication (HCC)  She has been fighting with alcohol abuse has had several admissions for same. She feels like this time she feels more confident in staying away from it she has set up standing appointment with her counselor and psychiatrist         Mixed anxiety depressive disorder  On top of her alcohol abuse she also has anxiety and depression for which she sees someone         Smoker  She also is a smoker and plans to try and quit when she feels stable         Alcohol use disorder, severe, dependence (HCC)         GERD without esophagitis  She does have chronic gerd and remains stable on a PPI         Primary hypertension  Despite being admitted for alcohol withdrawal blood pressure is doing great on lisinopril           Depression Screening and Follow-up Plan: Patient's depression screening was positive with a PHQ-9 score of 17. Continue regular follow-up with their mental health provider who is managing their mental health condition(s).     Tobacco Cessation Counseling: The patient is sincerely urged to quit consumption of tobacco. She is not ready to quit tobacco.         History of Present Illness     Transitional Care Management Review:   Ivette Andrew is a 29 y.o. female here for TCM follow up.     During the TCM phone call patient stated:  TCM Call       Date and time call was made  2024  1:43 PM    Hospital care reviewed  Records reviewed    Patient was hospitialized at  [facility]    Date of Admission  24    Date of discharge  24    Diagnosis  Alcohol withdrawal syndrome with complication (HCC)    Disposition  Home          TCM Call       Should patient be enrolled in anticoag monitoring?  No    Scheduled for  "follow up?  Yes    Did you obtain your prescribed medications  Yes    Do you need help managing your prescriptions or medications  No    Is transportation to your appointment needed  No    I have advised the patient to call PCP with any new or worsening symptoms  Ivette Car MA    Living Arrangements  parents    Are you recieving any outpatient services  No    Are you recieving home care services  No    Are you using any community resources  No    Current waiver services  No    Have you fallen in the last 12 months  No    Interperter language line needed  No    Counseling  Patient    Counseling topics  Importance of RX compliance          Patient was admitted to [facility] for treatment of her alcohol abuse. She was sent home after two days has follow-up appointments even this week for same. She is a positive attitude I know severe problems at present      Review of Systems   Constitutional:  Negative for chills and fever.   HENT:  Negative for ear pain and sore throat.    Eyes:  Negative for pain and visual disturbance.   Respiratory:  Negative for cough and shortness of breath.    Cardiovascular:  Negative for chest pain and palpitations.   Gastrointestinal:  Positive for nausea. Negative for abdominal pain and vomiting.        Gerd   Genitourinary:  Negative for dysuria and hematuria.   Musculoskeletal:  Negative for arthralgias and back pain.   Skin:  Negative for color change and rash.   Neurological:  Negative for seizures and syncope.   Psychiatric/Behavioral:  Positive for dysphoric mood. The patient is nervous/anxious.    All other systems reviewed and are negative.    Objective     /94 (BP Location: Right arm, Patient Position: Sitting, Cuff Size: Large)   Pulse 100   Temp 98 °F (36.7 °C) (Temporal)   Ht 5' 3\" (1.6 m)   Wt 118 kg (259 lb 6.4 oz)   LMP 08/26/2024 (Approximate)   SpO2 99%   Breastfeeding No   BMI 45.95 kg/m²     Physical Exam  Constitutional:       General: She is not in " acute distress.     Appearance: She is obese. She is not toxic-appearing.   HENT:      Head: Normocephalic and atraumatic.      Right Ear: External ear normal.      Left Ear: External ear normal.      Nose: Nose normal.      Mouth/Throat:      Mouth: Mucous membranes are moist.   Eyes:      Extraocular Movements: Extraocular movements intact.      Conjunctiva/sclera: Conjunctivae normal.      Pupils: Pupils are equal, round, and reactive to light.   Cardiovascular:      Rate and Rhythm: Normal rate and regular rhythm.      Pulses: Normal pulses.      Heart sounds: Normal heart sounds.   Pulmonary:      Effort: Pulmonary effort is normal.      Breath sounds: Normal breath sounds.   Abdominal:      General: Bowel sounds are normal. There is no distension.      Palpations: Abdomen is soft.      Tenderness: There is no abdominal tenderness.   Musculoskeletal:      Cervical back: No rigidity or tenderness.      Right lower leg: No edema.      Left lower leg: No edema.   Lymphadenopathy:      Cervical: No cervical adenopathy.   Skin:     General: Skin is warm and dry.      Coloration: Skin is not jaundiced or pale.   Neurological:      General: No focal deficit present.      Mental Status: She is alert and oriented to person, place, and time. Mental status is at baseline.      Cranial Nerves: No cranial nerve deficit.   Psychiatric:         Mood and Affect: Mood normal.         Behavior: Behavior normal.         Thought Content: Thought content normal.         Judgment: Judgment normal.       Medications have been reviewed by provider in current encounter    Administrative Statements       .  TCM Call       Date and time call was made  9/9/2024  1:43 PM    Hospital care reviewed  Records reviewed    Patient was hospitialized at  [facility]    Date of Admission  09/04/24    Date of discharge  09/06/24    Diagnosis  Alcohol withdrawal syndrome with complication (HCC)    Disposition  Home          TCM Call        Should patient be enrolled in anticoag monitoring?  No    Scheduled for follow up?  Yes    Did you obtain your prescribed medications  Yes    Do you need help managing your prescriptions or medications  No    Is transportation to your appointment needed  No    I have advised the patient to call PCP with any new or worsening symptoms  Ivette Car MA    Living Arrangements  parents    Are you recieving any outpatient services  No    Are you recieving home care services  No    Are you using any community resources  No    Current waiver services  No    Have you fallen in the last 12 months  No    Interperter language line needed  No    Counseling  Patient    Counseling topics  Importance of RX compliance

## 2024-09-11 ENCOUNTER — LAB (OUTPATIENT)
Dept: LAB | Facility: MEDICAL CENTER | Age: 29
End: 2024-09-11
Payer: COMMERCIAL

## 2024-09-11 ENCOUNTER — OFFICE VISIT (OUTPATIENT)
Dept: PSYCHIATRY | Facility: CLINIC | Age: 29
End: 2024-09-11

## 2024-09-11 ENCOUNTER — ANNUAL EXAM (OUTPATIENT)
Dept: OBGYN CLINIC | Facility: MEDICAL CENTER | Age: 29
End: 2024-09-11
Payer: COMMERCIAL

## 2024-09-11 VITALS
DIASTOLIC BLOOD PRESSURE: 86 MMHG | SYSTOLIC BLOOD PRESSURE: 124 MMHG | BODY MASS INDEX: 45.71 KG/M2 | HEIGHT: 63 IN | WEIGHT: 258 LBS

## 2024-09-11 DIAGNOSIS — F10.20 ALCOHOL USE DISORDER, SEVERE, DEPENDENCE (HCC): Primary | ICD-10-CM

## 2024-09-11 DIAGNOSIS — Z00.6 ENCOUNTER FOR EXAMINATION FOR NORMAL COMPARISON OR CONTROL IN CLINICAL RESEARCH PROGRAM: ICD-10-CM

## 2024-09-11 DIAGNOSIS — Z12.4 SCREENING FOR CERVICAL CANCER: Primary | ICD-10-CM

## 2024-09-11 DIAGNOSIS — Z11.3 SCREEN FOR STD (SEXUALLY TRANSMITTED DISEASE): ICD-10-CM

## 2024-09-11 DIAGNOSIS — F39 UNSPECIFIED MOOD (AFFECTIVE) DISORDER (HCC): ICD-10-CM

## 2024-09-11 PROCEDURE — 87389 HIV-1 AG W/HIV-1&-2 AB AG IA: CPT

## 2024-09-11 PROCEDURE — 36415 COLL VENOUS BLD VENIPUNCTURE: CPT

## 2024-09-11 PROCEDURE — G0145 SCR C/V CYTO,THINLAYER,RESCR: HCPCS | Performed by: CLINICAL NURSE SPECIALIST

## 2024-09-11 PROCEDURE — 87340 HEPATITIS B SURFACE AG IA: CPT

## 2024-09-11 PROCEDURE — 87591 N.GONORRHOEAE DNA AMP PROB: CPT | Performed by: CLINICAL NURSE SPECIALIST

## 2024-09-11 PROCEDURE — 86803 HEPATITIS C AB TEST: CPT

## 2024-09-11 PROCEDURE — G0476 HPV COMBO ASSAY CA SCREEN: HCPCS | Performed by: CLINICAL NURSE SPECIALIST

## 2024-09-11 PROCEDURE — 99395 PREV VISIT EST AGE 18-39: CPT | Performed by: CLINICAL NURSE SPECIALIST

## 2024-09-11 PROCEDURE — 87491 CHLMYD TRACH DNA AMP PROBE: CPT | Performed by: CLINICAL NURSE SPECIALIST

## 2024-09-11 PROCEDURE — 86780 TREPONEMA PALLIDUM: CPT

## 2024-09-11 PROCEDURE — NC001 PR NO CHARGE

## 2024-09-11 NOTE — PROGRESS NOTES
Subjective:      Ivette Andrew is a 29 y.o. female. Here for Gynecologic Exam (Pap 9/8/23 abnormal /Birth control interested /Std testing/Gardasil - pt is unsure )      GYN HPI  Menstrual cycle:  Patient denies menstrual complaints. Regular monthly menses, not excessive  Vaginal c/o: denies  Urinary c/o: denies  Breast complaints:denies    Sexually active: yes with male partner- new partner  Contraception: none- considering starting   She reports she feels safe at home.     Dietary calcium/vit D intake: moderate  Lifestyle: active/demanding job    HEALTH MAINTENANCE SCREENINGS:    Last Papanicolaou test:  09/08/2023   History of abnormal pap: Yes    IMMUNIZATIONS  Gardasil HPV vaccine Was Not completed    Hereditary Cancer Screening  Cancer-related family history includes Cancer in her paternal grandmother.    Substance Abuse Screening Completed. See hx and flowsheet.    The following portions of the patient's history were reviewed and updated as appropriate: allergies, current medications, past family history, past medical history, past social history, past surgical history, and problem list.    Review of Systems   Constitutional:  Negative for appetite change, chills, fatigue, fever and unexpected weight change.   HENT: Negative.     Eyes: Negative.    Respiratory:  Negative for chest tightness and shortness of breath.    Cardiovascular:  Negative for chest pain and palpitations.   Gastrointestinal:  Negative for abdominal pain, constipation and vomiting.   Endocrine: Negative for cold intolerance and heat intolerance.   Genitourinary:         As per HPI   Musculoskeletal:  Negative for back pain, joint swelling and neck pain.   Skin:  Negative for color change and rash.   Neurological:  Negative for dizziness, weakness and numbness.   Hematological:  Does not bruise/bleed easily.   Psychiatric/Behavioral: Negative.               Objective:  /86 (BP Location: Left arm, Patient Position: Sitting, Cuff  "Size: Large)   Ht 5' 3\" (1.6 m)   Wt 117 kg (258 lb)   LMP 08/26/2024 (Approximate)   BMI 45.70 kg/m²        Physical Exam  Constitutional:       General: She is not in acute distress.     Appearance: Normal appearance. She is obese.   Genitourinary:      Vulva and rectum normal.      No lesions in the vagina.      Right Labia: No rash or lesions.     Left Labia: No lesions or rash.     No vaginal discharge, erythema, tenderness or bleeding.        Right Adnexa: not tender and no mass present.     Left Adnexa: not tender and no mass present.     No cervical motion tenderness, discharge or friability.      Uterus is not enlarged or tender.      No urethral prolapse present.      Pelvic exam was performed with patient in the lithotomy position.   Breasts:     Breasts are symmetrical.      Right: No inverted nipple, mass, nipple discharge, skin change or tenderness.      Left: No inverted nipple, mass, nipple discharge, skin change or tenderness.   HENT:      Head: Normocephalic and atraumatic.   Cardiovascular:      Rate and Rhythm: Normal rate.      Heart sounds: No murmur heard.  Pulmonary:      Effort: Pulmonary effort is normal.      Breath sounds: Normal breath sounds.   Abdominal:      General: There is no distension.      Palpations: Abdomen is soft.      Tenderness: There is no abdominal tenderness.   Musculoskeletal:         General: Normal range of motion.      Cervical back: Normal range of motion.   Lymphadenopathy:      Cervical: No cervical adenopathy.   Neurological:      Mental Status: She is alert and oriented to person, place, and time.   Skin:     General: Skin is warm and dry.   Psychiatric:         Mood and Affect: Mood normal.         Behavior: Behavior normal.   Vitals reviewed.             Assessment/Plan:           ANNUAL GYN EXAM- Primary  Annual GYN examination completed today.   Health maintenance reviewed/updated as appropriate  Cervical cancer screen: Previous pap smears and ASCCP " screening guidelines have been reviewed. Pap collected.  Breast Health: Encouraged regular self breast exams.   Risk prevention and anticipatory guidance provided including:  Age related Calcium and vitamin D intake  Dietary and lifestyle recommendations based on her age and weight. body mass index is 45.7 kg/m²..    Tobacco and alcohol use, intervention ordered if applicable.   Condom use for prevention of STI's. Would like STI Screening- see orders  Contraception:  Contraceptive options were reviewed, including hormonal methods, both combination (pill, patch, vaginal ring) and progesterone-only (pill, Depo Provera and Nexplanon), intrauterine devices (Mirena/Liletta, Kaylen and Paragard), barrier methods (condoms, diaphragm) and male/female sterilization. The mechanisms, risks, benefits and side effects of all methods were discussed.  All questions have been answered to her satisfaction.  Patient chooses Kyleena IUD.  Discussed insertion timing and premedicate with ibuprofen.    Problem List Items Addressed This Visit    None  Visit Diagnoses       Screening for cervical cancer    -  Primary    Relevant Orders    Liquid-based pap, screening    Screen for STD (sexually transmitted disease)        Relevant Orders    Chlamydia/GC amplified DNA by PCR    Hepatitis B surface antigen    Hepatitis C antibody    HIV 1/2 AG/AB w Reflex [facility] for 2 yr old and above    RPR-Syphilis Screening (Total Syphilis IGG/IGM)            Orders Placed This Encounter   Procedures    Chlamydia/GC amplified DNA by PCR    Hepatitis B surface antigen    Hepatitis C antibody    HIV 1/2 AG/AB w Reflex [facility] for 2 yr old and above    RPR-Syphilis Screening (Total Syphilis IGG/IGM)

## 2024-09-11 NOTE — PSYCH
Ivette Andrew  09/11/24  Start Time: 1000  Stop Time: 1025  Total Visit Time: 25 minutes    Visit type: In person    Recovery needs addressed during session: Basic Needs, Emotional/Mental Health, and Substance Use Disorder Recovery    Notes (DAP Format)  DATA: Patient presented to in-person, scheduled visit with this CM.  Patient recently completed IP medical detox with [facility] due to recurrence of use with alcohol that occurred in February during a work trip. Patient states alcohol use continued to increase when relationship ended to which patient needed to medically detox in order to re-engage in services.   Patient and this CM discussed patient beginning services once again with Psychotherapist to which patient was agreeable. Patient does have upcoming appt to transition care to Dr. Reddy. Does have medication in order to maintain until then    ASSESSMENT: Patient presented well groomed, alert and oriented.   Patient did not present as a concern to this CM. Low risk SI/HI.  Patient denies any current alcohol use.   Patient feels current mental health medication does not seem to be working, but does feel that to be from prior alcohol use, does want to consider waiting to see new provider.     PLAN: Patient is scheduled to re-engage with Psychotherapy , Medical Toxicology.   This CM did request patient to see CRS, insurance does not allow. Will provide additional services.   No future appointments scheduled with this CM     Referrals made during this visit: Psychotherapy, CRS services - patient has private services     Recovery connections: Medical toxicology     Next appointment: No future appts with this CM

## 2024-09-12 LAB
HPV HR 12 DNA CVX QL NAA+PROBE: NEGATIVE
HPV16 DNA CVX QL NAA+PROBE: NEGATIVE
HPV18 DNA CVX QL NAA+PROBE: NEGATIVE

## 2024-09-12 RX ORDER — TOPIRAMATE 25 MG/1
25 TABLET, FILM COATED ORAL
Qty: 30 TABLET | Refills: 0 | Status: SHIPPED | OUTPATIENT
Start: 2024-09-12

## 2024-09-12 NOTE — TELEPHONE ENCOUNTER
Chart reviewed. Refill was sent to the patient's preferred pharmacy, covering patient's primary psychiatrist, Dr. Crook.

## 2024-09-13 LAB
C TRACH DNA SPEC QL NAA+PROBE: NEGATIVE
N GONORRHOEA DNA SPEC QL NAA+PROBE: NEGATIVE

## 2024-09-16 ENCOUNTER — SOCIAL WORK (OUTPATIENT)
Dept: PSYCHIATRY | Facility: CLINIC | Age: 29
End: 2024-09-16
Payer: COMMERCIAL

## 2024-09-16 DIAGNOSIS — F39 UNSPECIFIED MOOD (AFFECTIVE) DISORDER (HCC): Primary | ICD-10-CM

## 2024-09-16 DIAGNOSIS — F41.9 ANXIETY DISORDER, UNSPECIFIED TYPE: ICD-10-CM

## 2024-09-16 DIAGNOSIS — F43.10 PTSD (POST-TRAUMATIC STRESS DISORDER): ICD-10-CM

## 2024-09-16 DIAGNOSIS — F10.20 ALCOHOL USE DISORDER, SEVERE, DEPENDENCE (HCC): ICD-10-CM

## 2024-09-16 LAB
LAB AP GYN PRIMARY INTERPRETATION: NORMAL
Lab: NORMAL
PATH INTERP SPEC-IMP: NORMAL

## 2024-09-16 PROCEDURE — 90837 PSYTX W PT 60 MINUTES: CPT | Performed by: COUNSELOR

## 2024-09-16 NOTE — PSYCH
Behavioral Health Psychotherapy Progress Note    Psychotherapy Provided: Individual Psychotherapy     1. Unspecified mood (affective) disorder (HCC)        2. Alcohol use disorder, severe, dependence (HCC)        3. Anxiety disorder, unspecified type        4. PTSD (post-traumatic stress disorder)            Goals addressed in session: Goal 1 and Goal 2     DATA: Ivette was seen for a return to therapy today. Therapist reviewed with Ivette updates and Ivette indicated a significant relapse on alcohol. Ivette and therapist reviewed this relapse and discussed other changes in Ivette's life. Ivette indicated a change in employment. Ivette and therapist reviewed and revised her treatment plan and updated Safety plan. These  were both signed. Ivette states she is aware that she will need to take her treatment slower and the goal to address her PTSD was paused and Ivette indicated a desire to focus more so on her abstinence and current emotional needs. Ivette was scheduled for a session in 1 week.  During this session, this clinician used the following therapeutic modalities: Client-centered Therapy, Cognitive Behavioral Therapy, Motivational Interviewing, and Supportive Psychotherapy    Substance Abuse was addressed during this session. If the client is diagnosed with a co-occurring substance use disorder, please indicate any changes in the frequency or amount of use: Ivette reports 12 days of abstinence from alcohol.  Therapist utilized stage appropriate interventions to address this substance abuse issue.  Ivette maintained she wanted to utilize 12 step program but indicated she has yet to attend a 12 step meeting. Therapist reviewed the 12 step program and discussed meetings Ivette could attend. Ivette received an AA meeting list for this area and identified a meeting she would attend [facility].   Stage of change for addressing substance use diagnoses: Action    ASSESSMENT:  Ivette Andrew presents with  "a Euthymic/ normal mood. Ivette was engaged in therapy and expressed she was excited to return to therapy.     her affect is Normal range and intensity, which is congruent, with her mood and the content of the session. The client has made progress on their goals.     Ivette Andrew presents with a none risk of suicide, none risk of self-harm, and none risk of harm to others.    For any risk assessment that surpasses a \"low\" rating, a safety plan must be developed.    A safety plan was indicated: no  If yes, describe in detail n/a    PLAN: Between sessions, Ivette Andrew will attend 12 step meetings. At the next session, the therapist will use Client-centered Therapy, Cognitive Behavioral Therapy, Motivational Interviewing, and Supportive Psychotherapy to address Ivette's return to therapy to address her difficulties managing her feelings and relapse potential.    Behavioral Health Treatment Plan and Discharge Planning: Ivette Andrew is aware of and agrees to continue to work on their treatment plan. They have identified and are working toward their discharge goals. yes    Visit start and stop times:    09/16/24  Start Time: 0857  Stop Time: 0956  Total Visit Time: 59 minutes  "

## 2024-09-16 NOTE — BH CRISIS PLAN
Client Name: Ivette Andrew       Client YOB: 1995    [facility] Safety Plan      Creation Date: 3/11/24 Update Date: 9/16/24   Created By: Braxton Crook MD Last Updated By: Javier Henderson      Step 1: Warning Signs:   Warning Signs   strong panic attack   allowing stress to build   feeling jealous that I cant be normal            Step 2: Internal Coping Strategies:   Internal Coping Strategies   trying reading romance novels   going for walk   coloring            Step 3: People and social settings that provide distraction:   Name Contact Information   mother    grandmother    Boyfriend     Places   home   AA meeting   Unified trail           Step 4: People whom I can ask for help during a crisis:      Name Contact Information    mother       Step 5: Professionals or agencies I can contact during a crisis:      Clinican/Agency Name Phone Emergency Contact    [facility] [phone]       [facility] Emergency Department Emergency Department Phone Emergency Department Address    [facility]          Crisis Phone Numbers:   Suicide Prevention Lifeline: Call or Text  271 Crisis Text Line: Text HOME to 590-566   Please note: Some [facility] do not have a separate number for Child/Adolescent specific crisis. If your county is not listed under Child/Adolescent, please call the adult number for your county      Adult Crisis Numbers: Child/Adolescent Crisis Numbers   [facility]: [phone] [facility]: [phone]   [facility]: [phone] [facility]: [phone]   [facility]: [phone] [place]: [phone]   [facility]: [phone] [place]: [phone]   [facility]: [phone]   [facility]: [phone]   [facility]: [phone]   [facility]: [phone] (daytime) [phone] (after hours, weekends, holidays)      Step 6: Making the environment safer (plan for lethal means  safety):   Patient did not identify any lethal methods: Yes     Optional: What is most important to me and worth living for?      [place] Safety Plan. Faith Trejo and Sami Bonilla. Used with permission of the authors.

## 2024-09-16 NOTE — BH TREATMENT PLAN
Outpatient Behavioral Health Psychotherapy Treatment Plan    Ivette Andrew  1995     Date of Initial Psychotherapy Assessment: 10/4/2023   Date of Current Treatment Plan: 09/16/24  Treatment Plan Target Date: 3/16/2025  Treatment Plan Expiration Date: 3/16/2025    Diagnosis:   1. Unspecified mood (affective) disorder (HCC)        2. Alcohol use disorder, severe, dependence (HCC)        3. Anxiety disorder, unspecified type        4. PTSD (post-traumatic stress disorder)            Area(s) of Need: Abstinence support, Emotional support, Trauma processing    Long Term Goal 1 (in the client's own words): Be more supportive of myself mentally and physically    Stage of Change: Action    Target Date for completion: 3/16/2025     Anticipated therapeutic modalities: CBT, relapse prevention therapy, Cognitive processing, weekly therapy     People identified to complete this goal: Ivette,mother, boyfriend, therapist      Objective 1: (identify the means of measuring success in meeting the objective): Be more aware of what I'm feeling and how it is influencing my life      Objective 2: (identify the means of measuring success in meeting the objective): Have at 5 people in my support network      Long Term Goal 2 (in the client's own words): Stay Sober    Stage of Change: Action    Target Date for completion: 3/16/2025     Anticipated therapeutic modalities: motivational interviewing, CBT, weekly therapy session     People identified to complete this goal: janna Steele      Objective 1: (identify the means of measuring success in meeting the objective): Attend at least 5 meetings a week, get a home group, get a sponsor      Objective 2: (identify the means of measuring success in meeting the objective): Learning how my triggers are activated and develop at least 5 healthy coping skills.           I am currently under the care of a [facility] psychiatric provider: yes    My . [facility] psychiatric provider is:   Ambreen    I am currently taking psychiatric medications: Yes, as prescribed    I feel that I will be ready for discharge from mental health care when I reach the following (measurable goal/objective): Able to better communicate and process emotions.  Know how to handle my feelings and situations. Stay sober at least 1 year.    For children and adults who have a legal guardian:   Has there been any change to custody orders and/or guardianship status? NA. If yes, attach updated documentation.    I have created my Crisis Plan and have been offered a copy of this plan    Behavioral Health Treatment Plan [facility]: Diagnosis and Treatment Plan explained to Ivette Andrew acknowledges an understanding of their diagnosis. Ivette Andrew agrees to this treatment plan.    I have been offered a copy of this Treatment Plan. yes

## 2024-09-23 ENCOUNTER — SOCIAL WORK (OUTPATIENT)
Dept: PSYCHIATRY | Facility: CLINIC | Age: 29
End: 2024-09-23
Payer: COMMERCIAL

## 2024-09-23 ENCOUNTER — OFFICE VISIT (OUTPATIENT)
Dept: OTHER | Age: 29
End: 2024-09-23
Payer: COMMERCIAL

## 2024-09-23 VITALS
HEIGHT: 63 IN | WEIGHT: 261 LBS | BODY MASS INDEX: 46.25 KG/M2 | DIASTOLIC BLOOD PRESSURE: 74 MMHG | SYSTOLIC BLOOD PRESSURE: 134 MMHG | HEART RATE: 60 BPM

## 2024-09-23 DIAGNOSIS — F41.9 ANXIETY DISORDER, UNSPECIFIED TYPE: ICD-10-CM

## 2024-09-23 DIAGNOSIS — I10 PRIMARY HYPERTENSION: ICD-10-CM

## 2024-09-23 DIAGNOSIS — F10.20 ALCOHOL USE DISORDER, SEVERE, DEPENDENCE (HCC): ICD-10-CM

## 2024-09-23 DIAGNOSIS — F10.90 ALCOHOL USE DISORDER: Primary | ICD-10-CM

## 2024-09-23 DIAGNOSIS — F43.10 PTSD (POST-TRAUMATIC STRESS DISORDER): ICD-10-CM

## 2024-09-23 DIAGNOSIS — F39 UNSPECIFIED MOOD (AFFECTIVE) DISORDER (HCC): Primary | ICD-10-CM

## 2024-09-23 PROCEDURE — 90837 PSYTX W PT 60 MINUTES: CPT | Performed by: COUNSELOR

## 2024-09-23 PROCEDURE — 99213 OFFICE O/P EST LOW 20 MIN: CPT

## 2024-09-23 NOTE — ASSESSMENT & PLAN NOTE
Patient seen in office for follow appointment after recent medical detox  Currently is 19 days sober and has been utilizing AA meeting and psychotherapy through the [facility]  Received vivitrol injection on 9/6/24- wishes to continue with Vivitrol Injection  Therapy plan updated. Patient is due for injection on October 7th   Will follow up in 1 month

## 2024-09-23 NOTE — PSYCH
"Behavioral Health Psychotherapy Progress Note    Psychotherapy Provided: Individual Psychotherapy     1. Unspecified mood (affective) disorder (HCC)        2. Alcohol use disorder, severe, dependence (HCC)        3. Anxiety disorder, unspecified type        4. PTSD (post-traumatic stress disorder)            Goals addressed in session: Goal 1 and Goal 2     DATA: Ivette was seen for therapy today. Ivette reports overall improvement due to her stopping her alcohol use but reports continued struggles with maintaining a stable mood. Ivette was asked to explain specific times and situations when this occurred and Ivette was able to identify this occurs \"out of the blue\" with no specific thoughts to influence this mood disruption. Ivette identified outburst of anger as the most distressing. Ivette and therapist discussed the coping skills Ivette utilizes at work to address these mood issues, ie not talking and expressing her moods, but Ivette maintained this did not work in her interpersonal relationships. Therapist discussed with Ivette developing a clearer understanding of the dynamics of her interpersonal relationships and her perception of self in these relationships.  Ivette agreed to discuss this mood issue at her next psychiatrist appointment in 2 weeks. Ivette was scheduled for a session next week.   During this session, this clinician used the following therapeutic modalities: Client-centered Therapy, Cognitive Behavioral Therapy, Motivational Interviewing, and Supportive Psychotherapy    Substance Abuse was addressed during this session. If the client is diagnosed with a co-occurring substance use disorder, please indicate any changes in the frequency or amount of use: Ivette reports continued abstinence from alcohol, almost 3 weeks. Therapist utilized stage appropriate interventions to address this substance abuse issue.  Ivette reports she has begun attending in person 12 step meetings and feels " "these are very helpful. Ivette has identified specific meetings and people she states are of support and has been utilizing people to talk to outside of the meetings for abstinence support. Ivette and therapist discussed helpful and unhelpful thinking patterns Ivette has had to develop improved self talk to  maintain abstinence. Stage of change for addressing substance use diagnoses: Action    ASSESSMENT:  Ivette Andrew presents with a Euthymic/ normal mood. Ivette was engaged in therapy and receptive to making changes to improve outcomes.      her affect is Normal range and intensity, which is congruent, with her mood and the content of the session. The client has made progress on their goals.     Ivette Andrew presents with a none risk of suicide, none risk of self-harm, and none risk of harm to others.    For any risk assessment that surpasses a \"low\" rating, a safety plan must be developed.    A safety plan was indicated: no  If yes, describe in detail n/a    PLAN: Between sessions, Ivette Andrew will continue to attend 12 step meetings and ask AA friend to be her sponsor. Ivette will be more assertive in personal relationships.  At the next session, the therapist will use Client-centered Therapy, Cognitive Behavioral Therapy, Motivational Interviewing, and Supportive Psychotherapy to address Ivette's development of mood regulation skills and relapse prevention skills.    Behavioral Health Treatment Plan and Discharge Planning: Ivette Andrew is aware of and agrees to continue to work on their treatment plan. They have identified and are working toward their discharge goals. yes    Visit start and stop times:    09/23/24  Start Time: 0920  Stop Time: 1033  Total Visit Time: 73 minutes  "

## 2024-09-23 NOTE — PROGRESS NOTES
[facility]    Progress Note  Ivette Andrew 29 y.o. female MRN: [id number]  @ Encounter: [id number]      1. Alcohol use disorder  Assessment & Plan:  Patient seen in office for follow appointment after recent medical detox  Currently is 19 days sober and has been utilizing AA meeting and psychotherapy through the [facility]  Received vivitrol injection on 9/6/24- wishes to continue with Vivitrol Injection  Therapy plan updated. Patient is due for injection on October 7th   Will follow up in 1 month     2. Primary hypertension  Assessment & Plan:  Current /74   Current medication regimen of Lisinopril 20 mg daily         Support Services  Case Management and Certified  are following.   Patient was referred to the [facility] Certified Addiction Counselors: Yes  The patient is actively engaged with the [facility] Certified Addiction Counselor’s psychotherapy plan: Yes    This patient does not have an active medication from one of the medication groupers.      PDMP reviewed:     PDMP Review         Value Time User    PDMP Reviewed  Yes 3/11/2024 11:00 AM Braxton Crook MD            SUBJECTIVE  Patient seen for follow up, is doing well after recent medical detoxification. Is wishing to continue with Vivitrol injections.     Drug cravings: none   Motivation to continue treatment: high       Current Outpatient Medications:     ARIPiprazole (ABILIFY) 10 mg tablet, Take 1 tablet (10 mg total) by mouth daily, Disp: 30 tablet, Rfl: 0    gabapentin (NEURONTIN) 100 mg capsule, Take 1 capsule (100 mg total) by mouth 2 (two) times a day, Disp: 60 capsule, Rfl: 0    gabapentin (NEURONTIN) 300 mg capsule, Take 2 capsules (600 mg total) by mouth daily at bedtime, Disp: 60 capsule, Rfl: 0    lisinopril (ZESTRIL) 20 mg tablet, TAKE ONE TABLET BY MOUTH DAILY, Disp: 90 tablet, Rfl: 1    Naltrexone (VIVITROL IM), Inject into a muscle, Disp: , Rfl:     naproxen sodium (Aleve) 220 MG  tablet, Take 220 mg by mouth daily as needed for mild pain, Disp: , Rfl:     omeprazole (PriLOSEC) 40 MG capsule, TAKE ONE CAPSULE BY MOUTH EVERY DAY BEFORE BREAKFAST, Disp: 90 capsule, Rfl: 0    sertraline (ZOLOFT) 100 mg tablet, Take 1.5 tablets (150 mg total) by mouth daily at bedtime, Disp: 45 tablet, Rfl: 0    topiramate (TOPAMAX) 25 mg tablet, TAKE ONE TABLET BY MOUTH EVERY DAY AT BEDTIME, Disp: 30 tablet, Rfl: 0    Objective     Vitals:    09/23/24 1036   BP: 134/74   Pulse: 60       Physical Exam  Vitals reviewed.   Constitutional:       General: She is not in acute distress.     Appearance: She is not diaphoretic.   Neurological:      Mental Status: She is alert and oriented to person, place, and time.   Psychiatric:         Mood and Affect: Mood is not anxious.              Lab Results:   Results from last 6 Months   Lab Units 09/06/24  0441   WBC Thousand/uL 5.39   HEMOGLOBIN g/dL 12.5   HEMATOCRIT % 40.6   PLATELETS Thousands/uL 212      Results from last 6 Months   Lab Units 09/06/24  0441   POTASSIUM mmol/L 4.1   CHLORIDE mmol/L 107   CO2 mmol/L 22   BUN mg/dL 10   CREATININE mg/dL 0.85   CALCIUM mg/dL 9.3   ALBUMIN g/dL 3.7   ALK PHOS U/L 75   ALT U/L 15   AST U/L 21     Results from last 6 Months   Lab Units 09/11/24  1336   HEP B S AG  Non-reactive   HEP C AB  Non-reactive     Results from last 6 Months   Lab Units 09/11/24  1336   HIV-1 P24 ANTIGEN-BIOPLEX  Non-Reactive                 Counseling / Coordination of Care  Total time spent today 20 minutes. Greater than 50% of total time was spent with the patient and / or family counseling and / or coordination of care.     Smitha Nina PA-C

## 2024-09-24 LAB
APOB+LDLR+PCSK9 GENE MUT ANL BLD/T: NOT DETECTED
BRCA1+BRCA2 DEL+DUP + FULL MUT ANL BLD/T: NOT DETECTED
MLH1+MSH2+MSH6+PMS2 GN DEL+DUP+FUL M: NOT DETECTED

## 2024-10-03 ENCOUNTER — SOCIAL WORK (OUTPATIENT)
Dept: PSYCHIATRY | Facility: CLINIC | Age: 29
End: 2024-10-03
Payer: COMMERCIAL

## 2024-10-03 DIAGNOSIS — F41.9 ANXIETY DISORDER, UNSPECIFIED TYPE: ICD-10-CM

## 2024-10-03 DIAGNOSIS — F43.10 PTSD (POST-TRAUMATIC STRESS DISORDER): ICD-10-CM

## 2024-10-03 DIAGNOSIS — F39 UNSPECIFIED MOOD (AFFECTIVE) DISORDER (HCC): Primary | ICD-10-CM

## 2024-10-03 DIAGNOSIS — F10.20 ALCOHOL USE DISORDER, SEVERE, DEPENDENCE (HCC): ICD-10-CM

## 2024-10-03 PROCEDURE — 90837 PSYTX W PT 60 MINUTES: CPT | Performed by: COUNSELOR

## 2024-10-03 NOTE — PSYCH
Behavioral Health Psychotherapy Progress Note    Psychotherapy Provided: Individual Psychotherapy     1. Unspecified mood (affective) disorder (HCC)        2. Anxiety disorder, unspecified type        3. PTSD (post-traumatic stress disorder)        4. Alcohol use disorder, severe, dependence (HCC)            Goals addressed in session: Goal 1 and Goal 2     DATA: Ivette was seen for therapy today. Ivette recognized her need to begin working on her racing thoughts and was able to utilize focusing on AA program rather than other harmful thoughts Ivette states she feels she needs to begin decreasing anxiety and felt that pursuing healthy tools, such as spirituality, would assist with this goal. Ivette and therapist discussed Ivette's lack of healthy activities to fill her time after she has stopped drinking.  Ivette was provided with information regarding organizations that could assist with her trying new things and exploring her interest. Ivette agreed. Ivette was scheduled for a session in 1 week.  During this session, this clinician used the following therapeutic modalities: Client-centered Therapy, Cognitive Behavioral Therapy, Motivational Interviewing, and Supportive Psychotherapy    Substance Abuse was addressed during this session. If the client is diagnosed with a co-occurring substance use disorder, please indicate any changes in the frequency or amount of use: Ivette reports having almost 30 days abstinent from alcohol.  Therapist utilized stage appropriate interventions to address this substance abuse issue.  Ivette reports she has been engaged in the AA program and has acquired a sponsor and home group. Ivette and therapist discussed how she can further utilize this program to maintain abstinence. Ivette discussed some of her triggers and how she has been addressing these triggers. Stage of change for addressing substance use diagnoses: Action    ASSESSMENT:  Ivette BERNARDA Andrew presents with a  Med called in #90 0RF   "Euthymic/ normal mood. Ivette presented as engaged in therapy and pleasant.     her affect is Normal range and intensity, which is congruent, with her mood and the content of the session. The client has made progress on their goals.     Ivette Andrew presents with a none risk of suicide, none risk of self-harm, and none risk of harm to others.    For any risk assessment that surpasses a \"low\" rating, a safety plan must be developed.    A safety plan was indicated: no  If yes, describe in detail n/a    PLAN: Between sessions, Ivette Andrew will attend 7 12 step meetings and begin trying new hobbies. At the next session, the therapist will use Client-centered Therapy, Cognitive Behavioral Therapy, Motivational Interviewing, and Supportive Psychotherapy to address Ivette's ongoing development of effective skills to address anxiety and depression and maintain relapse prevention.    Behavioral Health Treatment Plan and Discharge Planning: Ivette Andrew is aware of and agrees to continue to work on their treatment plan. They have identified and are working toward their discharge goals. yes    Visit start and stop times:    10/03/24  Start Time: 1124  Stop Time: 1225  Total Visit Time: 61 minutes  "

## 2024-10-07 ENCOUNTER — OFFICE VISIT (OUTPATIENT)
Dept: PSYCHIATRY | Facility: CLINIC | Age: 29
End: 2024-10-07
Payer: COMMERCIAL

## 2024-10-07 ENCOUNTER — HOSPITAL ENCOUNTER (OUTPATIENT)
Dept: INFUSION CENTER | Facility: HOSPITAL | Age: 29
Discharge: HOME | End: 2024-10-07
Attending: EMERGENCY MEDICINE
Payer: COMMERCIAL

## 2024-10-07 DIAGNOSIS — Z72.0 NICOTINE ABUSE: ICD-10-CM

## 2024-10-07 DIAGNOSIS — Z13.228 SCREENING FOR ENDOCRINE, NUTRITIONAL, METABOLIC AND IMMUNITY DISORDER: ICD-10-CM

## 2024-10-07 DIAGNOSIS — F39 UNSPECIFIED MOOD (AFFECTIVE) DISORDER (HCC): Primary | ICD-10-CM

## 2024-10-07 DIAGNOSIS — G47.00 INSOMNIA, UNSPECIFIED TYPE: ICD-10-CM

## 2024-10-07 DIAGNOSIS — Z13.0 SCREENING FOR ENDOCRINE, NUTRITIONAL, METABOLIC AND IMMUNITY DISORDER: ICD-10-CM

## 2024-10-07 DIAGNOSIS — R40.0 DAYTIME SLEEPINESS: ICD-10-CM

## 2024-10-07 DIAGNOSIS — F10.20 ALCOHOL USE DISORDER, SEVERE, DEPENDENCE (HCC): ICD-10-CM

## 2024-10-07 DIAGNOSIS — F41.9 ANXIETY DISORDER, UNSPECIFIED TYPE: ICD-10-CM

## 2024-10-07 DIAGNOSIS — F10.90 ALCOHOL USE DISORDER: Primary | ICD-10-CM

## 2024-10-07 DIAGNOSIS — F43.10 PTSD (POST-TRAUMATIC STRESS DISORDER): ICD-10-CM

## 2024-10-07 DIAGNOSIS — Z13.29 SCREENING FOR ENDOCRINE, NUTRITIONAL, METABOLIC AND IMMUNITY DISORDER: ICD-10-CM

## 2024-10-07 DIAGNOSIS — R45.1 RESTLESSNESS: ICD-10-CM

## 2024-10-07 DIAGNOSIS — Z13.21 SCREENING FOR ENDOCRINE, NUTRITIONAL, METABOLIC AND IMMUNITY DISORDER: ICD-10-CM

## 2024-10-07 PROCEDURE — 90792 PSYCH DIAG EVAL W/MED SRVCS: CPT | Performed by: PSYCHIATRY & NEUROLOGY

## 2024-10-07 PROCEDURE — 96372 THER/PROPH/DIAG INJ SC/IM: CPT

## 2024-10-07 RX ORDER — TRAZODONE HYDROCHLORIDE 50 MG/1
25 TABLET, FILM COATED ORAL
Qty: 15 TABLET | Refills: 0 | Status: SHIPPED | OUTPATIENT
Start: 2024-10-07 | End: 2024-11-06

## 2024-10-07 RX ORDER — SERTRALINE HYDROCHLORIDE 100 MG/1
150 TABLET, FILM COATED ORAL DAILY
Qty: 45 TABLET | Refills: 0 | Status: SHIPPED | OUTPATIENT
Start: 2024-10-07 | End: 2024-11-06

## 2024-10-07 RX ORDER — GABAPENTIN 300 MG/1
600 CAPSULE ORAL
Qty: 60 CAPSULE | Refills: 0 | Status: SHIPPED | OUTPATIENT
Start: 2024-10-07 | End: 2024-11-06

## 2024-10-07 RX ORDER — GABAPENTIN 100 MG/1
100 CAPSULE ORAL 2 TIMES DAILY
Qty: 60 CAPSULE | Refills: 0 | Status: SHIPPED | OUTPATIENT
Start: 2024-10-07 | End: 2024-11-06

## 2024-10-07 RX ORDER — TOPIRAMATE 25 MG/1
25 TABLET, FILM COATED ORAL
Qty: 30 TABLET | Refills: 0 | Status: SHIPPED | OUTPATIENT
Start: 2024-10-07

## 2024-10-07 RX ORDER — ARIPIPRAZOLE 15 MG/1
15 TABLET ORAL DAILY
Qty: 30 TABLET | Refills: 0 | Status: SHIPPED | OUTPATIENT
Start: 2024-10-07 | End: 2024-11-06

## 2024-10-07 RX ADMIN — NALTREXONE 380 MG: KIT at 14:45

## 2024-10-07 NOTE — PSYCH
Psychiatric Evaluation - Behavioral Health   MRN: [id number]    Visit Time  Start: 1300. Stop: 1359. Total: 59 minutes.     Assessment & Plan   Ivette Andrew is a 29 y.o. female, Single and presently living in a house alone (mother and significant other nearby); employed; with prior psychiatric diagnoses of posttraumatic stress disorder, insomnia, depression, anxiety, daytime tiredness, alcohol use disorder unspecified mood affective disorder, and mixed anxiety depressive disorder, recurrent suicidal thoughts, panic attacks; medical history including HTN, GERD, nicotine abuse, morbid obesity; with suicide risk factors including personal history of suicide attempt as recently as 18 yo, history of suicidal gestures, medical problems, chronic pain, anxiety, impulsivity, alcohol abuse, history of trauma, and never ; presenting today (10/07/24) to the [facility] outpatient clinic [facility] for Transfer of Care which includes psychiatric evaluation, medication management and psychoththerapy, with a main concern of mood swings, irritability, anxiety, alcohol use disorder.     Patient was recently admitted to detoxification unit at [address] detox unit on 9/4/2024 for 2 days for alcohol withdrawal in the setting of chronic heavy alcohol use for the prior 6 months drinking a pint of rum daily.  During her admission, she was continued on her regular psychotropic medication regimen of sertraline 150 mg nightly for anxiety and depression, Abilify 10 mg every morning as an adjunct and for mood stability, Topamax 25 mg nightly for mood stability, and gabapentin 100 mg twice daily and 600 mg nightly for anxiety, PTSD related symptoms, mood stability, and restlessness at night.    Regarding diagnoses, patient has clear history of mood swings, irritability episodes, and impulsivity along with risk-taking behaviors that come in waves while not under the influence of illicit  substances or alcohol, and not quite meeting the criteria for hypomanic or manic episodes.  The symptoms resemble the hypervigilance of PTSD, anxiety symptoms, and a mood affective disorder.  In order to target this, she is agreeable with increasing Abilify to 15 mg daily for mood stability.  She describes episodes of suddenly becoming very irritable, without an identifiable trigger, and then several days later can have a period where she becomes hopeless, helpless, and a strong sense of depressed mood.  She is agreeable to with increasing Abilify and understands to watch out for side effects and reach out to provider or go to the ED if they occur.  Additionally, there is a potential diagnosis of sleep apnea that she is experiencing, symptoms including excessive daytime sleepiness, loud snoring, apnea and choking episodes that she experiences in the middle of the night and has been witnessed.  She has had no sleep studies in her life and is agreeable with a diagnostic sleep study referral.  She also has difficulty with sleep maintenance, and was counseled extensively on sleep hygiene techniques, which she states she doing well with, and notes that trazodone has been effective at a low dose in the past for insomnia symptoms and she is agreeable with initiating this medication at a dose of 25 mg nightly.  Zoloft will be moved to daytime dosing due to insomnia symptoms as well.  Finally, she has some depressive symptoms but at today's visit she would like to first target her mood instability.  She understands the importance of getting routine laboratory test completed prior to next visit, following up with appointments and providers, and continuing to avoid alcohol use and attend AA classes which she finds highly valuable and effective.    Drug cravings: denies  Motivation to continue treatment: very motiveted        Assessment & Plan  Unspecified mood (affective) disorder (HCC)  Continued symptoms; increase Abilify to  15 mg daily.    Continue other psychotropic medications at same dosage  Orders:    ARIPiprazole (ABILIFY) 15 mg tablet; Take 1 tablet (15 mg total) by mouth daily    gabapentin (NEURONTIN) 100 mg capsule; Take 1 capsule (100 mg total) by mouth 2 (two) times a day    topiramate (TOPAMAX) 25 mg tablet; Take 1 tablet (25 mg total) by mouth daily at bedtime    sertraline (ZOLOFT) 100 mg tablet; Take 1.5 tablets (150 mg total) by mouth daily    PTSD (post-traumatic stress disorder)  Stable, continue medication  Orders:    ARIPiprazole (ABILIFY) 15 mg tablet; Take 1 tablet (15 mg total) by mouth daily    gabapentin (NEURONTIN) 100 mg capsule; Take 1 capsule (100 mg total) by mouth 2 (two) times a day    sertraline (ZOLOFT) 100 mg tablet; Take 1.5 tablets (150 mg total) by mouth daily    Daytime sleepiness  Diagnostic sleep study recommended; for daytime tiredness, loud snoring, witnessed apnea, nighttime choking / gasping, and additional sleep apnea symptoms.  Orders:    Ambulatory Referral to Sleep Medicine; Future    Alcohol use disorder, severe, dependence (HCC)  Continue Vivitrol IM injections 380 mg regularly and gabapentin off label for cravings  Orders:    gabapentin (NEURONTIN) 100 mg capsule; Take 1 capsule (100 mg total) by mouth 2 (two) times a day    gabapentin (NEURONTIN) 300 mg capsule; Take 2 capsules (600 mg total) by mouth daily at bedtime    Restlessness  Stable, continue medication  Orders:    gabapentin (NEURONTIN) 300 mg capsule; Take 2 capsules (600 mg total) by mouth daily at bedtime    Insomnia, unspecified type  Initiate trazodone 25 mg nightly  Orders:    sertraline (ZOLOFT) 100 mg tablet; Take 1.5 tablets (150 mg total) by mouth daily    traZODone (DESYREL) 50 mg tablet; Take 0.5 tablets (25 mg total) by mouth daily at bedtime    Anxiety disorder, unspecified type  Stable, continue medication  Orders:    gabapentin (NEURONTIN) 300 mg capsule; Take 2 capsules (600 mg total) by mouth daily at  "bedtime    sertraline (ZOLOFT) 100 mg tablet; Take 1.5 tablets (150 mg total) by mouth daily    Nicotine abuse  Continue to , current 1-1.5 PPD since age 15.    Consider tobacco cessation medications or [facility] tobacco cessation programs moving forward.       Screening for endocrine, nutritional, metabolic and immunity disorder    Orders:    Lipid panel; Future    Hemoglobin A1C; Future    TSH + Free T4; Future    Vitamin D 25 hydroxy; Future    Comprehensive metabolic panel; Future    CBC and differential; Future          Therapy: Continue psychotherapy with Meliza Car at [facility].  Medical: Follow up with primary care physician for ongoing medical care.  Follow-up sleep study referral results.  Labs: Continue monitoring CBC/diff, CMP, lipid profile, hemoglobin A1C, TSH and free T4 every 6 months     Counseling:  This writer recommended incorporation of a more whole foods plant-predominant diet in addition to decreasing consumption of red meats and processed food. Per AHA guidelines, this writer recommended patient participation in a moderate-vigorous intensity exercise for 30 minutes a day for 5 days a week or a total of 150 minutes/week.  Patient is receptive to recommendations regarding appropriate dietary and lifestyle modifications.    The importance of regular exercise/physical activity was discussed. Recommend exercise 3-5 times per week for at least 30 minutes.     The patient was educated about the risk of smoking, and its negative effects on health; options regarding smoking cessation (including nicotine replacement therapy and medication management) were offered. The patient stated that she is not interested in quitting at the moment, but will consider.         -----------------------------------    Chief Complaint: \"I still struggle with my mood\"    History of Present Illness     Ivette Andrew is a 29 y.o. female with past psychiatric history of  " posttraumatic stress disorder, insomnia, depression, anxiety, daytime tiredness, alcohol use disorder unspecified mood affective disorder, and mixed anxiety depressive disorder, recurrent suicidal thoughts, panic attacks; medical history including HTN, GERD, nicotine abuse, morbid obesity, referred by previous psychiatrist on transfer of care, presenting to the [facility] outpatient clinic for a full transfer of care  psychiatric intake assessment including evaluation for medication and psychotherapy.     Patient was recently admitted to detoxification unit at [address] detox unit on 9/4/2024 for 2 days for alcohol withdrawal in the setting of chronic heavy alcohol use for the prior 6 months drinking a pint of rum daily.  She had previously been on the detox unit a year prior 8/3/2023 and following this was discharged to inpatient psychiatry on a 201 commitment subsequent to worsening depression and increased alcohol use after being found unresponsive after an alcohol binge at home after discharge from the detox unit.   During her admission, she was continued on her regular psychotropic medication regimen of sertraline 150 mg nightly for anxiety and depression, Abilify 10 mg every morning as an adjunct and for mood stability, Topamax 25 mg nightly for mood stability, and gabapentin 100 mg twice daily and 600 mg nightly for anxiety, PTSD related symptoms, mood stability, and restlessness at night.  These medications have been continued throughout the year following discharge.  The previous diagnosis of bipolar disorder was replaced with during this inpatient admission with unspecified mood affective disorder and PTSD.      PTSD related symptoms: Since the initiation of her psychotropic medication regiment, she has had significant improvement in PTSD related nightmares, stating they no longer occur.  She does have heightened startle response thoughts and some  hypervigilance that are much improved and occur mainly when she is around triggers such as seeing places that remind her of childhood traumas.  Flashbacks are improving in frequency as well, although prior to medications they were severe and frequent.  She explains alcohol use was partially used to treat her hypervigilance and PTSD related symptoms, so continued control and compliance with these medications will help her avoid using alcohol.  Nicotine: While she does continue to smoke nicotine, at a future appointment she is agreeable with consideration of nicotine patch or other approaches to decrease the usage.  Continue counseling.    Previous history of nightmares, fragmented sleep, and exaggerated startle response secondary to trauma.  This is in addition to hypervigilance, hyperarousal, and chronic difficulty with experiencing positive emotion.  These symptoms have improved since the initiation of medications and are relatively stable at this point in time.    Hypomania/talya symptoms: None currently, previous history do not line up with criteria for either hypomanic episode or acute talya.  Does endorse symptoms of irritability and impulsivity along with current depressive periods where she has hopelessness and helplessness.  Most notably are her quick turns to irritability and routine daily activities which have improved with initiation of medications and Abilify seems to have helped these symptoms.    Denies OCD symptoms, or any SI, HI, or AVH.  Denies any suicidal thoughts since 1 month ago when she was in detox.  She is more motivated than ever she states, this is the first time she is in alcoholic's Anonymous and she feels very well supported with her new sponsor along with her mother.  She is fully employed, able to keep up with AA classes at work, and denies any cravings for alcohol or illicit substances.  At a later date, she would like to consider decreasing her nicotine use with tobacco cessation  "approaches.  At this appointment she would like to focus on mood stability symptoms.    Endorses symptoms of sleep apnea including excessive daytime sleepiness, loud snoring witnessed by others, high BMI, appears to be large neck circumference, witnessed apneas and choking, patient's endorsement of gasping and choking during the nighttime, disrupted sleep maintenance.  She is agreeable with the sleep medicine referral and plans to have this treated as soon as possible, with the knowledge that sleep apnea is a major contributor to both cognition, mental, and physical health.  She feels good control over her alcohol use, especially with AA, and denies any cravings at this point in time.  Unlike her previous periods of being clean, when she does experience a trigger she is able to use coping strategies and resist, pushing the thought out of her mind.  She would like to follow up in 2 weeks for further medication management with the knowledge that medications were recently changed and prompt follow-up should be done.    Psychiatric Review Of Systems:  Ivette reports Symptoms as described in HPI.  Ivette denies Current suicidal thoughts, plan, or intent, Current thoughts of self-harm, or Current homicidal thoughts, plan, or intent.    Medical Review Of Systems:  Complete review of systems is negative except as noted above.    --------------------------------------  Past Medical History:   Diagnosis Date    Abnormal Pap smear of cervix     ASCUS + HPV 16 & other    Alcohol abuse     Alcoholism (HCC)     Anxiety     Atypical squamous cells cannot exclude high grade squamous intraepithelial lesion on cytologic smear of cervix (ASC-H) 10/04/2023    9/8/23 ASCUS w/ HPV 16 and \"other\" type 10/5- colposcopy    Bipolar disorder (HCC)     Depression     GERD (gastroesophageal reflux disease)     Hypertension     Kidney stones     PTSD (post-traumatic stress disorder)     Self-injurious behavior     Suicide attempt (HCC)     " "Withdrawal symptoms, drug or narcotic (HCC)       Past Surgical History:   Procedure Laterality Date    MO CONIZATION CERVIX W/WO D&C RPR ELTRD EXC N/A 12/4/2023    Procedure: LOOP ELECTRICAL EXCISION PROCEDURE;  Surgeon: Uzma Quezada DO;  Location: [facility];  Service: Gynecology     Family History   Problem Relation Age of Onset    Heart disease Mother     Stroke Mother     Diabetes Mother     Hypertension Mother     Asthma Mother     Cancer Paternal Grandmother     Kidney disease Paternal Grandmother     Diabetes Paternal Grandmother        The following portions of the patient's history were reviewed and updated as appropriate: allergies, current medications, past family history, past medical history, past social history, past surgical history, and problem list.    Visit Vitals  OB Status Unknown   Smoking Status Every Day      Wt Readings from Last 6 Encounters:   09/23/24 118 kg (261 lb)   09/11/24 117 kg (258 lb)   09/10/24 118 kg (259 lb 6.4 oz)   09/04/24 117 kg (259 lb)   08/22/24 117 kg (258 lb)   04/18/24 119 kg (261 lb 12.8 oz)        Mental Status Exam:  Appearance:  alert, good eye contact, appears stated age, casually dressed, appropriate grooming and hygiene, and overweight   Behavior:  calm and cooperative   Motor: no abnormal movements and normal gait and balance   Speech:  spontaneous, normal rate, soft, and coherent   Mood:  \"nervous\"   Affect:  constricted and anxious   Thought Process:  Organized, logical, goal-directed   Thought Content: no verbalized delusions or overt paranoia   Perceptual disturbances: no reported hallucinations and does not appear to be responding to internal stimuli at this time   Risk Potential: No active or passive suicidal or homicidal ideation was verbalized during interview, Low potential for aggression based on previous behavior   Cognition: oriented to self and situation, appears to be of average intelligence, and cognition not formally tested   Insight:  " Good   Judgment: Fair     Meds/Allergies    Allergies   Allergen Reactions    Biaxin [Clarithromycin] GI Intolerance    Other GI Intolerance     cefcil       Current Outpatient Medications   Medication Instructions    ARIPiprazole (ABILIFY) 15 mg, Oral, Daily    gabapentin (NEURONTIN) 100 mg, Oral, 2 times daily    gabapentin (NEURONTIN) 600 mg, Oral, Daily at bedtime    lisinopril (ZESTRIL) 20 mg, Oral, Daily    Naltrexone (VIVITROL IM) Intramuscular    naproxen sodium (ALEVE) 220 mg, Oral, Daily PRN    omeprazole (PRILOSEC) 40 mg, Oral, Daily before breakfast    sertraline (ZOLOFT) 150 mg, Oral, Daily    topiramate (TOPAMAX) 25 mg, Oral, Daily at bedtime    traZODone (DESYREL) 25 mg, Oral, Daily at bedtime        Labs & Imaging:  I have personally reviewed all pertinent laboratory tests and imaging results.   Lab on 09/11/2024   Component Date Value Ref Range Status    HAHN SYNDROME DNA ANALYSIS 09/11/2024 Not Detected   Final    Pathogenic variant not detected.    HEREDITARY BREAST & OVARIAN CANCER* 09/11/2024 Not Detected   Final    Pathogenic variant not detected.    FAMILIAL HYPERCHOLESTEROLEMIA DNA * 09/11/2024 Not Detected   Final    Comment: Pathogenic variant not detected.  Genes Tested: BRCA1, BRCA2, MLH1, MSH2, MSH6, PMS2, EPCAM, APOB, LDLR, LDLRAP1, PCSK9  Test Description: [employer] Tier One Population Screen is a screening test that analyzes 11 genes related to hereditary breast and ovarian cancer (HBOC) syndrome, Hahn syndrome, and familial hypercholesterolemia. This test only reports clinically significant pathogenic and likely pathogenic variants, unlike diagnostic testing, which also reports variants of uncertain significance (VUS). In addition, analysis of the PMS2 gene excludes exons 11-15, which overlap with a known pseudogene (PMS2CL).  Disclaimer: This test was developed and validated by [facility] This test has not been cleared or approved by the [place] Food and Drug Administration  (FDA). The [facility] laboratory is accredited by the College of American Pathologists (CAP) and certified under the Clinical Laboratory Improvement Amendments (CLIA #: 31A7474708) to perform high-complexity clinical tests. This                            test is used for clinical purposes. It should not be regarded as investigational or for research.  Methods and Limitations: Extracted DNA is enriched for targeted regions and then sequenced using the [facility] Exome+ (R) assay on an Illumina DNA sequencing system. Data is then aligned to a modified version of GRCh38 and all genes are analyzed using the Walk Score transcript and Walk Score Plus Clinical transcript, when available. Small variant calling is completed using a customized version of [employer] software, augmented by a proprietary small variant caller for difficult variants. Copy number variants (CNVs) are then called using a proprietary bioinformatics pipeline based on depth analysis with a comparison to similarly sequenced samples. Analysis of the PMS2 gene is limited to exons 1-10. The interpretation and reporting of variants in APOB, PCSK9, and LDLR is specific to familial hypercholesterolemia; variants associated with hypobetalipoproteinemia are not included. Interpretation is                            based upon guidelines published by the American College of Medical Genetics and Genomics (ACMG) and the Association for Molecular Pathology (AMP) or their modification by [employer] Variant Curation Expert Panels when available. Interpretation is limited to the transcripts indicated on the report and +/- 10 bp into intronic regions, except as noted below. [facility] variant classifications include pathogenic, likely pathogenic, variant of uncertain significance (VUS), likely benign, and benign. Only variants classified as pathogenic and likely pathogenic are included in the report. All reported variants are confirmed through secondary manual inspection of DNA sequence data  or orthogonal testing. Risk estimations and management guidelines included in this report are based on analysis of primary literature and recommendations of applicable professional societies, and should be regarded as approximations.Based on validation studies, this assay delivers > 99% sensitivity and specificity for single                            nucleotide variants and insertions and deletions (indels) up to 20 bp. Larger indels and complex variants are also reported but sensitivity may be reduced. Based on validation studies, this assay delivers > 99% sensitivity to multi-exon CNVs and > 90% sensitivity to single-exon CNVs. This test may not detect variants in challenging regions (such as short tandem repeats, homopolymer runs, and segment duplications), sub-exonic CNVs, chromosomal aneuploidy, or variants in the presence of mosaicism. Phasing will be attempted and reported, when possible. Structural rearrangements such as inversions, translocations, and gene conversions are not tested in this assay unless explicitly indicated. Additionally, deep intronic, promoter, and enhancer regions may not be covered. It is important to note that this is a screening test and cannot detect all disease-causing variants. A negative result does not guarantee the absence of a rare, undetectable variant in the genes analyzed; consider using a                            diagnostic test if there is significant personal and/or family history of one of the conditions analyzed by this test. Any potential incidental findings outside of these genes and conditions will not be identified, nor reported. The results of a genetic test may be influenced by various factors, including bone marrow transplantation, blood transfusions, or in rare cases, hematolymphoid neoplasms.Gene Specific Notes:BRCA1: sequencing analysis extends to CDS +/-20 bp; BRCA2: sequencing analysis extends to CDS +/-20 bp. EPCAM: analysis is limited to CNV of exons  8-9; MLH1: analysis includes CNV of the promoter; PMS2: analysis is limited to exons 1-10.  Sequencing Location: Sequencing done at [facility] [address] [place] [address], [address], [place] [address] ([facility]# [id number])  Designation: Chino Pollard, PhD, [facility]  Email: [email or web address]    Hepatitis B Surface Ag 09/11/2024 Non-reactive  Non-Reactive Final    Hepatitis C Ab 09/11/2024 Non-reactive  Non-Reactive Final    HIV-1 p24 Antigen 09/11/2024 Non-Reactive  Non-Reactive Final    HIV-1 Antibody 09/11/2024 Non-Reactive  Non-Reactive Final    HIV-2 Antibody 09/11/2024 Non-Reactive  Non-Reactive Final    HIV Ag-Ab 5th Gen 09/11/2024 Non-Reactive  Non-Reactive Final    A Non-Reactive test result does not preclude the possibility of exposure or infection with HIV-1 and/or HIV-2.  Non-Reactive results can occur if the quantity of marker present is below the detection limits or is not present during the stage of disease in which a sample is collected. Repeat testing should be considered where there is clinical suspicion of infection.       Syphilis Total Antibody 09/11/2024 Non-reactive  Non-Reactive Final    No serological evidence of infection with T. pallidum.  Early or incubating syphilis infection cannot be excluded.  Consider repeat testing based on clinical suspicion.   Annual Exam on 09/11/2024   Component Date Value Ref Range Status    Case Report 09/11/2024    Final                    Value:Gynecologic Cytology Report                       Case: [id number]                                  Authorizing Provider:  MONTY Moreno      Collected:           09/11/2024 1329              Ordering Location:     [facility]      Received:            09/11/2024 [facility]                                     [facility]                                                                          First Screen:           Marcela Fink                                                            Specimen:    LIQUID-BASED PAP, SCREENING, Cervix                                                        Primary Interpretation 09/11/2024 Negative for intraepithelial lesion or malignancy   Final    Interpretation 09/11/2024 Shift in kamar suggestive of bacterial vaginosis   Final    Specimen Adequacy 09/11/2024 Satisfactory for evaluation. Absence of endocervical/transformation zone component.   Final    Additional Information 09/11/2024    Final                    Value:[employer]'s FDA approved ,  and ThinPrep Imaging Duo System are utilized with strict adherence to the 's instruction manual to prepare gynecologic and non-gynecologic cytology specimens for the production of ThinPrep slides as well as for gynecologic ThinPrep imaging. These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

## 2024-10-07 NOTE — BH TREATMENT PLAN
TREATMENT PLAN        [facility]    Name and Date of Birth:  Ivette Andrew 29 y.o. 1995  Date of Treatment Plan: October 7, 2024  Diagnosis/Diagnoses:    1. Unspecified mood (affective) disorder (HCC)    2. PTSD (post-traumatic stress disorder)    3. Daytime sleepiness    4. Alcohol use disorder, severe, dependence (HCC)    5. Restlessness    6. Anxiety disorder, unspecified type    7. Screening for endocrine, nutritional, metabolic and immunity disorder    8. Insomnia, unspecified type        Strengths/Personal Resources for Self-Care: supportive family, supportive friends, ability to communicate well, ability to listen, ability to reason, independence, being resoureceful, stable employment, work skills    Area/Areas of need: anxiety symptoms, depressive symptoms, mood instability, sleep problems, substance abuse, unstable mood    Long Term Goal: continue improvement in mood stability  Target Date: 6 months - April 7, 2025  Person/Persons responsible for completion of goal: Ivette and Han Reddy MD     Short Term Objective (s) - How will we reach this goal?:   Take medications as prescribed  Attend psychiatry appointments regularly  Get blood work drawn  Continue psychotherapy regularly  Practice coping skills  Try sleep hygiene techniques  Avoid alcohol   Avoid drugs   Take walks regularly  Try breathing exercises  Try relaxation techniques  Target Date: 6 months - April 7, 2025  Person/Persons Responsible for Completion of Goal: Crystal     Progress Towards Goals: Continuing treatment    Treatment Modality: medication management every 1-3 months as needed, continue psychotherapy, and follow up with PCP  Review due 180 days from date of this plan: April 5, 2025   Expected length of service: Ongoing treatment    My physician and I have developed this plan together, and I agree to work on the goals and objectives. I understand the treatment goals that  were developed for my treatment.    The treatment plan was created between Han Reddy MD and Ivette Andrew on 10/07/24 at 2:30 PM but not signed at the time of the visit due to COVID social distancing. The plan was reviewed, and verbal consent was given.

## 2024-10-07 NOTE — PATIENT INSTRUCTIONS
"Please call the office nursing staff for medication issues including refills, problems getting medications, bothersome side effects, etc., at [phone].     Please return for a follow up appointment as discussed and arrive approximately 15 minutes prior to your appointment time. If you are running late or are unable to attend your appointment, please call our [place] office at [phone] (fax: [phone]), or if you were seen in the [facility] office, please call [phone] (fax [phone]).      Recommendations regarding insomnia:  Wake-up at the same time every day  Refrain from \"napping\".  Refrain from going to bed unless you're tired  Utilize your bedroom for sleep only.  Avoid use of electronics including television and/or cellphone/computers.  Refrain from use of electronics including television and/or cellphones/computers prior to bed  Turn your alarm clock away so the light is not visible.  Attempt relaxation using various means like reading if you're restless in bed for approximately 15-20 minutes.  Participate in regular physical activities like exercise, although avoid approximately 3-4 hours prior to bed.  Morning exercise is ideal.  Avoid caffeine use prior to bedtime.  Consider tapering down excessive use of caffeine.  Avoid tobacco use prior to bedtime.  Avoid alcohol use prior to bedtime.  Consider reading \"No More Sleepless nights\" by Vance Tabor, Ph.D.  Consider use of online resources including:  http://[employer]/cbt-online-insomnia-treatment.html  http://www.[employer]  CBT-I  Teri on your Smart Phone.  Go! To Sleep by the [facility].    Look up \"grounding techniques\" and/or \"anchoring demonstration\" online and try a few to see what may work for you. Practice these skills before you need them, when you are not feeling too anxious or triggered. You can also search for free guided meditation videos online to help improve your head space when you are feeling " "very anxious or triggered.        Healthy Diet   The American Heart Association and the American College of Cardiology have long recommended a healthy diet for not only patients who are at risk for atherosclerotic cardiovascular disease (ASCVD) but also the general public. In keeping with this evidence-based recommendation, the \"2018 Guideline on the Management of Blood Cholesterol\" stresses that a healthy diet should include adequate intake of these essentials:   Vegetables, fruits, and whole grains   Legumes and nuts   Low-fat dairy products   Low-fat poultry (without the skin)   Fish and seafood   Nontropical vegetable oils     The recent guidelines do provide room for cultural food preferences in a healthy diet, but in general, all patients should limit their intake of saturated and avoid all trans fats, sweets, sugar-sweetened beverages, and red meats.  Please maintain adequate hydration of at least 2 Liters of water per day, and improve nutrition by decreasing portion sizes, avoiding fried foods, fast foods, inappropriate snacking and overly processed and packaged items with 'added sugars' (whether in drinks or foods), and observing nutritional facts information on any items that are packaged to reduce intake of saturated fats and AVOID trans fats as mentioned above. Again, consume whole foods such as vegetables, higher lean protein intake, and using healthier lifestyle plans such as the Mediterranean diet with healthier fats such as those from seeds, nuts, and using organic extra virgin olive oil or avocado oil in lieu of processed vegetable oils or margarine.    Physical Activity   Recommended DAILY exercise for at least 150 minutes of moderate exercise weekly!  In addition to a healthy diet, all patients should include regular physical activity in their weekly routines, at moderate to vigorous intensity. Any activity is better than nothing, so if your patients can't meet the recommendation of vigorous " activity, moderate-intensity activity can still help them reduce their risk of ASCVD.     Below are the American Heart Association's recommendations for physical activity per week (preferably spread throughout the week):     For Overall Cardiovascular Health and Lowering Cholesterol   At least 150 minutes of moderate-intensity physical activity (for example, 30 minutes, 5 days a week), or   At least 75 minutes of vigorous-intensity physical activity (for example, 25 minutes, 3 days a week); or   A combination of moderate- and vigorous-intensity aerobic activity, and   At least 2 days of moderate- to high-intensity muscle-strengthening activities (such as resistance weight training) for additional health benefits    If you have thoughts of harming yourself or are otherwise in psychological crisis, do not hesitate to contact your [facility] Crisis hotline, or 911 or go to the nearest emergency room.  Adult Crisis Numbers  Suicide Prevention Hotline - Dial 9-8-8  [facility]: [phone] or [phone]  [facility]: [phone]  [facility]: [phone]  [facility]: [phone]  [facility]: [phone] or 1-322.795.9710  [facility]: [phone]  [facility]: [phone] or [facility]: [phone]  [facility]: 1-671.279.6203 (daytime).       [phone] (after hours, weekends, holidays)     Child/Adolescent Crisis Numbers   [facility]: [phone]   [facility]: [phone]   [employer]: [phone]   [facility]: [phone]  Please note: Some [facility] do not have a separate number for Child/Adolescent specific crisis. If your county is not listed under Child/Adolescent, please call the adult number for your county     National Talk to Text Line   All Ages - 469-675    National Suicide Prevention Hotline: 1-850.836.1956 or call 812.

## 2024-10-07 NOTE — PROGRESS NOTES
Ivette Andrew  tolerated treatment well with no complications.  Injection admin in R buttock    Ivette Andrew is aware of future appt on 11/4/24 at 2pm.     AVS printed and given to Ivette Andrew:  No (Declined by Ivette Andrew)

## 2024-10-08 ENCOUNTER — TRANSCRIBE ORDERS (OUTPATIENT)
Dept: SLEEP CENTER | Facility: CLINIC | Age: 29
End: 2024-10-08

## 2024-10-08 DIAGNOSIS — R40.0 DAYTIME SLEEPINESS: Primary | ICD-10-CM

## 2024-10-10 ENCOUNTER — SOCIAL WORK (OUTPATIENT)
Dept: PSYCHIATRY | Facility: CLINIC | Age: 29
End: 2024-10-10
Payer: COMMERCIAL

## 2024-10-10 DIAGNOSIS — F10.20 ALCOHOL USE DISORDER, SEVERE, DEPENDENCE (HCC): ICD-10-CM

## 2024-10-10 DIAGNOSIS — F43.10 PTSD (POST-TRAUMATIC STRESS DISORDER): ICD-10-CM

## 2024-10-10 DIAGNOSIS — F39 UNSPECIFIED MOOD (AFFECTIVE) DISORDER (HCC): Primary | ICD-10-CM

## 2024-10-10 PROCEDURE — 90837 PSYTX W PT 60 MINUTES: CPT | Performed by: COUNSELOR

## 2024-10-10 NOTE — PSYCH
Behavioral Health Psychotherapy Progress Note    Psychotherapy Provided: Individual Psychotherapy     1. Unspecified mood (affective) disorder (HCC)        2. PTSD (post-traumatic stress disorder)        3. Alcohol use disorder, severe, dependence (HCC)            Goals addressed in session: Goal 1 and Goal 2     DATA: Ivette was seen for therapy. Ivette reports having an increase in mood difficulties this week, stating she felt as if she was not connecting with life and allowed life to become disorganized. Ivette reports not making any progress on the goals set from last week's session of trying new hobbies and increasing her 12 step meeting attendance. Ivette and therapist processed this situation and Ivette was encouraged to be more specific. Ivette was able to recognize that she struggles with her mood and this often results in her external environment displaying disorganization, such as a messy counter. Therapist offered some insight, indicating that possibly maintaining her responsibilities externally may maintain more balance internally. Ivette agreed and made a commitment to becoming more organized. Ivette and therapist discussed Ivette's struggles with long term goals and Ivette stated this may be due to her reports from last week that she has not interest or hobbies. Ivette was asked to begin journaling and Ivette stated she has a specific journal book with guided journaling she will use. Ivette reports continued struggles with maintaining her sleep even after receiving a sleep aid from psychiatrist. Ivette and therapist discussed healthy sleep patterns and how Ivette could utilize the medication to meet her specific needs. Ivette was scheduled for a session next week.  During this session, this clinician used the following therapeutic modalities: Client-centered Therapy, Cognitive Behavioral Therapy, Motivational Interviewing, and Supportive Psychotherapy    Substance Abuse was addressed  "during this session. If the client is diagnosed with a co-occurring substance use disorder, please indicate any changes in the frequency or amount of use: Ivette reports she continues to maintain abstinence, a little over a month.  Therapist utilized stage appropriate interventions to address this substance abuse issue. Ivette reports she attended less 12 step meetings this week and was concerned that she was regressing and returning to a pattern of behavior that would return her to alcohol use. Ivette and therapist discussed this concern and discussed the cause of Ivette's change in her 12 step meeting attendance. Ivette made a commitment to return to meetings. Ivette set an alarm on her phone to attend a 12 step meeting tomorrow morning and stated she had a plan to attend a meeting tonight Stage of change for addressing substance use diagnoses: Action    ASSESSMENT:  Ivette Andrew presents with a Euthymic/ normal mood. Ivette presented as engaged in therapy and focused on working through her needs.     her affect is Normal range and intensity, which is congruent, with her mood and the content of the session. The client has made progress on their goals.     Ivette Andrew presents with a none risk of suicide, none risk of self-harm, and none risk of harm to others.    For any risk assessment that surpasses a \"low\" rating, a safety plan must be developed.    A safety plan was indicated: no  If yes, describe in detail n/a    PLAN: Between sessions, Ivette Andrew will begin journaling. At the next session, the therapist will use Client-centered Therapy, Cognitive Behavioral Therapy, Motivational Interviewing, and Supportive Psychotherapy to address Ivette's continued development of mood regulation skills and relapse prevention skills.    Behavioral Health Treatment Plan and Discharge Planning: Ivette Andrew is aware of and agrees to continue to work on their treatment plan. They have identified and " are working toward their discharge goals. yes    Visit start and stop times:    10/11/24  Start Time: 1246  Stop Time: 1354  Total Visit Time: 68 minutes

## 2024-10-17 ENCOUNTER — SOCIAL WORK (OUTPATIENT)
Dept: PSYCHIATRY | Facility: CLINIC | Age: 29
End: 2024-10-17
Payer: COMMERCIAL

## 2024-10-17 DIAGNOSIS — F43.10 PTSD (POST-TRAUMATIC STRESS DISORDER): ICD-10-CM

## 2024-10-17 DIAGNOSIS — F39 UNSPECIFIED MOOD (AFFECTIVE) DISORDER (HCC): Primary | ICD-10-CM

## 2024-10-17 DIAGNOSIS — F10.20 ALCOHOL USE DISORDER, SEVERE, DEPENDENCE (HCC): ICD-10-CM

## 2024-10-17 PROCEDURE — 90837 PSYTX W PT 60 MINUTES: CPT | Performed by: COUNSELOR

## 2024-10-17 NOTE — PSYCH
Behavioral Health Psychotherapy Progress Note    Psychotherapy Provided: Individual Psychotherapy     1. Unspecified mood (affective) disorder (HCC)        2. PTSD (post-traumatic stress disorder)        3. Alcohol use disorder, severe, dependence (HCC)            Goals addressed in session: Goal 1 and Goal 2     DATA: Ivette was seen for therapy today. Ivette reports struggling with her mood over the past week and indicated an increase in depression. Ivette and therapist discussed how Ivette mood is affected by her behaviors and how Ivette can begin to develop improved coping skills. Ivette was introduced to the concept of DBT distress tolerance and was provided with workbook activities for distress tolerance skills.  Ivette was able to reflect on how in the past she allowed her mood to control her behaviors and was able to recognize the detrimental affects of allowing this to occur. Ivette was agreeable to begin completing workbook activities in regards to distress tolerance and agreeable to begin attending [facility] meetings to practice mindfulness. Ivette reports medication adherence but states she continues to struggle with sleeping. Ivette was scheduled for a session in 1.5 weeks.   During this session, this clinician used the following therapeutic modalities: Client-centered Therapy, Cognitive Behavioral Therapy, Motivational Interviewing, and Supportive Psychotherapy    Substance Abuse was addressed during this session. If the client is diagnosed with a co-occurring substance use disorder, please indicate any changes in the frequency or amount of use: Ivette reports she continues to maintain abstinence from alcohol.  Therapist utilized stage appropriate interventions to address this substance abuse issue.   Ivette states she had not been attending in person meetings as she had planned due to her allowing her mood to cause her to isolate. Ivette did state she was speaking with her sponsor  "daily. Crystal and therapist discussed Ivette returning to in person meetings and skills she could utilize to overcome her resistance to not attend meetings when her mood is poor. Crystal and therapist practiced self talking techniques to develop statements Ivette could stay to herself to promote meeting attendance. Stage of change for addressing substance use diagnoses: Action    ASSESSMENT:  Ivette Andrew presents with a Euthymic/ normal and Depressed mood. At the beginning of session Ivette presented as tearful and expressed increase in depression but as the session progressed, Ivette displayed improved mood and was able to laugh, with no further tearfulness.      her affect is Normal range and intensity, which is congruent, with her mood and the content of the session. The client has made progress on their goals.     Ivette Andrew presents with a none risk of suicide, none risk of self-harm, and none risk of harm to others.    For any risk assessment that surpasses a \"low\" rating, a safety plan must be developed.    A safety plan was indicated: no  If yes, describe in detail n/a    PLAN: Between sessions, Ivette Andrew will begin the distress tolerance section of DBT workbook and will attend 1 [facility] meeting. At the next session, the therapist will use Client-centered Therapy, Cognitive Behavioral Therapy, Motivational Interviewing, and Supportive Psychotherapy to address Ivette's continued development of mood regulation skills and relapse prevention skills.    Behavioral Health Treatment Plan and Discharge Planning: Ivette Andrew is aware of and agrees to continue to work on their treatment plan. They have identified and are working toward their discharge goals. yes    Visit start and stop times:    10/17/24  Start Time: 0755  Stop Time: 0905  Total Visit Time: 70 minutes  "

## 2024-10-19 NOTE — PRE-PROCEDURE INSTRUCTIONS
Monitor blood pressure  Lisinopril  Furosemide  Monitor for shortness of breath, weight gain or edema   Pre-Surgery Instructions:   Medication Instructions    amLODIPine (NORVASC) 10 mg tablet Take day of surgery. ARIPiprazole (ABILIFY) 10 mg tablet Take day of surgery. gabapentin (NEURONTIN) 100 mg capsule Take day of surgery. gabapentin (NEURONTIN) 400 mg capsule Take night before surgery    lisinopril (ZESTRIL) 20 mg tablet Hold day of surgery. Naltrexone (VIVITROL IM) Asking anes for advice & will call pt w/ their instructions    naproxen sodium (Aleve) 220 MG tablet Stop taking 3 days prior to surgery. omeprazole (PriLOSEC) 40 MG capsule Take day of surgery. sertraline (ZOLOFT) 50 mg tablet Take night before surgery    topiramate (Topamax) 25 mg tablet Take night before surgery    Medication instructions for day surgery reviewed. Please use only a sip of water to take your instructed medications. Avoid  aspirin and all over the counter vitamins, supplements and NSAIDS for one week prior to surgery per anesthesia guidelines. Tylenol is ok to take as needed. You will receive a call one business day prior to surgery with an arrival time and hospital directions. If your surgery is scheduled on a Monday, the hospital will be calling you on the Friday prior to your surgery. If you have not heard from anyone by 8pm, please call the hospital supervisor through the hospital  at [phone]. Do not eat or drink anything after midnight the night before your surgery, including candy, mints, lifesavers, or chewing gum. Do not drink alcohol 24hrs before your surgery. Try not to smoke at least 24hrs before your surgery. Follow the pre surgery showering instructions as listed in the [facility] Surgical Experience Booklet” or otherwise provided by your surgeon's office. Do not use a blade to shave the surgical area 1 week before surgery. It is okay to use a clean electric clippers up to 24 hours before surgery.  Do not apply any lotions, creams, including makeup, cologne, deodorant, or perfumes after showering on the day of your surgery. Do not use dry shampoo, hair spray, hair gel, or any type of hair products. No contact lenses, eye make-up, or artificial eyelashes. Remove nail polish, including gel polish, and any artificial, gel, or acrylic nails if possible. Remove all jewelry including rings and body piercing jewelry. Wear causal clothing that is easy to take on and off. Consider your type of surgery. Keep any valuables, jewelry, piercings at home. Please bring any specially ordered equipment (sling, braces) if indicated. Arrange for a responsible person to drive you to and from the hospital on the day of your surgery. Visitor Guidelines discussed. Call the surgeon's office with any new illnesses, exposures, or additional questions prior to surgery. Please reference your [facility] Surgical Experience Booklet” for additional information to prepare for your upcoming surgery.

## 2024-10-20 NOTE — PSYCH
Psychiatric Follow Up Visit - Behavioral Health   MRN: [id number]    Visit Time  Start: 1230. Stop: 1255. Total: 25 minutes.           Assessment & Plan  Unspecified mood (affective) disorder (HCC)  Increase gabapentin to 200 mg twice a day  Continue other psychotropic medications as prescribed  Orders:    gabapentin (NEURONTIN) 300 mg capsule; Take 2 capsules (600 mg total) by mouth daily at bedtime    topiramate (TOPAMAX) 25 mg tablet; Take 1 tablet (25 mg total) by mouth daily at bedtime    sertraline (ZOLOFT) 100 mg tablet; Take 1.5 tablets (150 mg total) by mouth daily    gabapentin (NEURONTIN) 100 mg capsule; Take 2 capsules (200 mg total) by mouth 2 (two) times a day    PTSD (post-traumatic stress disorder)  Stable, continue medication  Orders:    sertraline (ZOLOFT) 100 mg tablet; Take 1.5 tablets (150 mg total) by mouth daily    Alcohol use disorder, severe, dependence (HCC)  Crease daytime gabapentin to 200 mg twice a day  Orders:    gabapentin (NEURONTIN) 300 mg capsule; Take 2 capsules (600 mg total) by mouth daily at bedtime    gabapentin (NEURONTIN) 100 mg capsule; Take 2 capsules (200 mg total) by mouth 2 (two) times a day    Daytime sleepiness         Restlessness  Stable  Orders:    gabapentin (NEURONTIN) 300 mg capsule; Take 2 capsules (600 mg total) by mouth daily at bedtime    Insomnia, unspecified type  Increase trazodone to 75 mg nightly  Continue other psychotropic medications as prescribed  Orders:    sertraline (ZOLOFT) 100 mg tablet; Take 1.5 tablets (150 mg total) by mouth daily    traZODone (DESYREL) 50 mg tablet; Take 1.5 tablets (75 mg total) by mouth daily at bedtime    gabapentin (NEURONTIN) 100 mg capsule; Take 2 capsules (200 mg total) by mouth 2 (two) times a day    Anxiety disorder, unspecified type  Increased daytime gabapentin to 200 mg twice a day  Continue other psychotropic medications as prescribed  Orders:    gabapentin (NEURONTIN) 300 mg capsule; Take 2 capsules (600 mg  total) by mouth daily at bedtime    sertraline (ZOLOFT) 100 mg tablet; Take 1.5 tablets (150 mg total) by mouth daily    gabapentin (NEURONTIN) 100 mg capsule; Take 2 capsules (200 mg total) by mouth 2 (two) times a day    Nicotine abuse            Ivette Andrew is a 29 y.o. female, Single Single and presently living in a house alone (mother and significant other nearby); employed; with prior psychiatric diagnoses of posttraumatic stress disorder, insomnia, depression, anxiety, daytime tiredness, alcohol use disorder unspecified mood affective disorder, and mixed anxiety depressive disorder, recurrent suicidal thoughts, panic attacks; medical history including HTN, GERD, nicotine abuse, morbid obesity; with suicide risk factors including personal history of suicide attempt as recently as 18 yo, history of suicidal gestures, medical problems, chronic pain, anxiety, impulsivity, alcohol abuse, history of trauma, and never .     In the setting of patient's report that she has continued difficulties with insomnia and the 50 mg of trazodone were ineffective, she is agreeable with increasing the dosage to 75 mg nightly.  She understands the importance of following up with her sleep study as this may be at the root of her difficulties with sleep and she will make a phone call to [facility] sleep medicine to follow up on the referral and set up an appointment.  Due to heightened psychosocial stressors, specifically finding a dead body of a war vet in a house near where her mom lives, she has had heightened anxiety and depressed mood including shock and anger about not finding the person sooner when they were still alive and getting in proper treatment, she will stay away from alcohol and not turn to this in the setting of heightened mood difficulties.  She is agreeable with increasing gabapentin to target heightened anxiety and adamantly states she has no desires to harm herself, no desires to start drinking  alcohol, and is fully committed to further psychiatric treatment and medication adjustments such as increasing daytime gabapentin to 200 mg twice a day.  She would like to follow up in 4 weeks for further medication management and optimization.      Labs: Most recently obtained 9/11/24, reviewed.   Continue monitoring CBC/diff, CMP, lipid profile, hemoglobin A1C, TSH and free T4 every 6 months (2nd reminder)  Continue monitoring CBC/diff, CMP, lipid profile, hemoglobin A1C, TSH and free T4 every 6 months.  Therapy: Continue psychotherapy with Meliza [facility] at [facility].   Medical:  Follow up with primary care physician and specialist providers for ongoing medical care.   Follow-up sleep study referral result     Further Recommendations / Precautions:  Gabapentin for alcohol cravings and anxiety during the daytime, consider increasing Zoloft to follow up for further anxiety medication optimization     Counseling:  This writer recommended incorporation of a more whole foods plant-predominant diet in addition to decreasing consumption of red meats and processed food. Per AHA guidelines, this writer recommended patient participation in a moderate-vigorous intensity exercise for 30 minutes a day for 5 days a week or a total of 150 minutes/week.  Patient is receptive to recommendations regarding appropriate dietary and lifestyle modifications.     The importance of regular exercise/physical activity was discussed. Recommend exercise 3-5 times per week for at least 30 minutes.      The patient was educated about the risk of smoking, and its negative effects on health; options regarding smoking cessation (including nicotine replacement therapy and medication management) were offered. The patient stated that she is not interested in quitting at the moment, but will consider.      -------------------------------------------------------  Subjective  Ivette Andrew reports that she is  overall doing well in terms of substance use and depression/anxiety, but has recent event finding a dead body of an elderly war vet who she knew to a mild degree but was found in a trailer near where her mother lived.  While this increased her anxiety and depression, she had no desire to turn to alcohol and overall is doing well.  Mood is stable and continued difficulties with sleep, continued difficulties with daytime tiredness for which she is going to get a sleep study done, and continued difficulties with daytime anxiety persists, although they are never overwhelming to a severe degree, they are present and disrupting her day to day.  She would like better control daytime anxiety.  At a future visit can consider increasing Zoloft to further target this, although she did have cravings for alcohol and gabapentin for daytime cravings off label is more appropriate.    Anxiety: 6/10, slight increase due to seeing dead person  Depression: stable, 3/10  Stressors: [place] a man who is a vet  Sleep: poor sleep, slight difference with trazodone med starting, even at 50 mg, would like increase to 75 mg.  Mood: denies changes, slighly sidder but at baseline  Daytime sleepiness: yes, tired in day because poor sleep at night, poor sleep after finding dead vet, guilt anger, shock and guilt and anger.   PTSD : denies  Alcohol: No cravings, after event of finding dead person, strongly resisting.    Denies any symptoms of SI, HI, or AVH.  She is agreeable with making a phone call to sleep medicine to have her sleep study completed and she will follow up in 1 month for further medication management and optimization.  Adamantly denies any thoughts of self-harm and regarding the recent episode of seeing a person who was passed away at her mom's [place] location, he was an elderly vet and this was difficult for her to see, she is slightly higher anxiety and feels in shock about this, states she will not  "turn to alcohol under any circumstances and is aware that this can happen when stressors become worse but in this scenario she understands that it is an absolute of limits thing for her.  She will increasing her daytime gabapentin to 200 mg twice a day to help her through this period of heightened anxiety and understands that she cannot adjust medications without doctor permission, as she stated she was considering increasing her gabapentin dosage by herself.  She is agreeable with targeting insomnia by increasing trazodone to 75 mg nightly as 50 mg was ineffective.    Psychiatric Review Of Systems:  Ivette reports Symptoms as described in HPI.  Ivette denies Current suicidal thoughts, plan, or intent, Current thoughts of self-harm, or Current homicidal thoughts, plan, or intent.    Medical Review Of Systems:  Complete review of systems is negative except as noted above.    ------------------------------------  Past Medical History:   Diagnosis Date    Abnormal Pap smear of cervix     ASCUS + HPV 16 & other    Alcohol abuse     Alcoholism (HCC)     Anxiety     Atypical squamous cells cannot exclude high grade squamous intraepithelial lesion on cytologic smear of cervix (ASC-H) 10/04/2023    9/8/23 ASCUS w/ HPV 16 and \"other\" type 10/5- colposcopy    Bipolar disorder (HCC)     Depression     GERD (gastroesophageal reflux disease)     Hypertension     Kidney stones     PTSD (post-traumatic stress disorder)     Self-injurious behavior     Suicide attempt ([facility])     Withdrawal symptoms, drug or narcotic ([facility])       Past Surgical History:   Procedure Laterality Date    NE CONIZATION CERVIX W/WO D&C RPR ELTRD EXC N/A 12/4/2023    Procedure: LOOP ELECTRICAL EXCISION PROCEDURE;  Surgeon: Uzma Quezada DO;  Location: [facility] MAIN OR;  Service: Gynecology       Visit Vitals  OB Status Unknown   Smoking Status Every Day      Wt Readings from Last 6 Encounters:   09/23/24 118 kg (261 lb)   09/11/24 117 kg (258 lb)   09/10/24 118 " kg (259 lb 6.4 oz)   09/04/24 117 kg (259 lb)   08/22/24 117 kg (258 lb)   04/18/24 119 kg (261 lb 12.8 oz)        Mental Status Exam:  Appearance:  alert, good eye contact, appears stated age, casually dressed, and appropriate grooming and hygiene   Behavior:  calm and cooperative   Motor: no abnormal movements and normal gait and balance   Speech:  spontaneous and coherent   Mood:  anxious   Affect:  constricted   Thought Process:  Organized, logical, goal-directed   Thought Content: no verbalized delusions or overt paranoia   Perceptual disturbances: no reported hallucinations and does not appear to be responding to internal stimuli at this time   Risk Potential: No active or passive suicidal or homicidal ideation was verbalized during interview   Cognition: oriented to self and situation, appears to be of average intelligence, and cognition not formally tested   Insight:  Good   Judgment: Good       Labs & Imaging:  I have personally reviewed all pertinent laboratory tests and imaging results.   Lab on 09/11/2024   Component Date Value Ref Range Status    HAHN SYNDROME DNA ANALYSIS 09/11/2024 Not Detected   Final    Pathogenic variant not detected.    HEREDITARY BREAST & OVARIAN CANCER* 09/11/2024 Not Detected   Final    Pathogenic variant not detected.    FAMILIAL HYPERCHOLESTEROLEMIA DNA * 09/11/2024 Not Detected   Final    Comment: Pathogenic variant not detected.  Genes Tested: BRCA1, BRCA2, MLH1, MSH2, MSH6, PMS2, EPCAM, APOB, LDLR, LDLRAP1, PCSK9  Test Description: [employer] Tier One Population Screen is a screening test that analyzes 11 genes related to hereditary breast and ovarian cancer (HBOC) syndrome, Hahn syndrome, and familial hypercholesterolemia. This test only reports clinically significant pathogenic and likely pathogenic variants, unlike diagnostic testing, which also reports variants of uncertain significance (VUS). In addition, analysis of the PMS2 gene excludes exons 11-15, which overlap with  a known pseudogene (PMS2CL).  Disclaimer: This test was developed and validated by [employer] This test has not been cleared or approved by the [place] Food and Drug Administration (FDA). The [facility] laboratory is accredited by the College of American Pathologists (CAP) and certified under the Clinical Laboratory Improvement Amendments (CLIA #: [id number]) to perform high-complexity clinical tests. This                            test is used for clinical purposes. It should not be regarded as investigational or for research.  Methods and Limitations: Extracted DNA is enriched for targeted regions and then sequenced using the [employer]ix Exome+ (R) assay on an Illumina DNA sequencing system. Data is then aligned to a modified version of GRCh38 and all genes are analyzed using the [employer]/b/[employer] transcript and Danal d/b/a [employer] Plus Clinical transcript, when available. Small variant calling is completed using a customized version of [employer] Hit the Mark software, augmented by a proprietary small variant caller for difficult variants. Copy number variants (CNVs) are then called using a proprietary bioinformatics pipeline based on depth analysis with a comparison to similarly sequenced samples. Analysis of the PMS2 gene is limited to exons 1-10. The interpretation and reporting of variants in APOB, PCSK9, and LDLR is specific to familial hypercholesterolemia; variants associated with hypobetalipoproteinemia are not included. Interpretation is                            based upon guidelines published by the American College of Medical Genetics and Genomics (ACMG) and the Association for Molecular Pathology (AMP) or their modification by [employer] Variant Curation Expert Panels when available. Interpretation is limited to the transcripts indicated on the report and +/- 10 bp into intronic regions, except as noted below. [facility]ix variant classifications include pathogenic, likely pathogenic, variant of uncertain significance (VUS), likely benign, and  benign. Only variants classified as pathogenic and likely pathogenic are included in the report. All reported variants are confirmed through secondary manual inspection of DNA sequence data or orthogonal testing. Risk estimations and management guidelines included in this report are based on analysis of primary literature and recommendations of applicable professional societies, and should be regarded as approximations.Based on validation studies, this assay delivers > 99% sensitivity and specificity for single                            nucleotide variants and insertions and deletions (indels) up to 20 bp. Larger indels and complex variants are also reported but sensitivity may be reduced. Based on validation studies, this assay delivers > 99% sensitivity to multi-exon CNVs and > 90% sensitivity to single-exon CNVs. This test may not detect variants in challenging regions (such as short tandem repeats, homopolymer runs, and segment duplications), sub-exonic CNVs, chromosomal aneuploidy, or variants in the presence of mosaicism. Phasing will be attempted and reported, when possible. Structural rearrangements such as inversions, translocations, and gene conversions are not tested in this assay unless explicitly indicated. Additionally, deep intronic, promoter, and enhancer regions may not be covered. It is important to note that this is a screening test and cannot detect all disease-causing variants. A negative result does not guarantee the absence of a rare, undetectable variant in the genes analyzed; consider using a                            diagnostic test if there is significant personal and/or family history of one of the conditions analyzed by this test. Any potential incidental findings outside of these genes and conditions will not be identified, nor reported. The results of a genetic test may be influenced by various factors, including bone marrow transplantation, blood transfusions, or in rare cases,  hematolymphoid neoplasms.Gene Specific Notes:BRCA1: sequencing analysis extends to CDS +/-20 bp; BRCA2: sequencing analysis extends to CDS +/-20 bp. EPCAM: analysis is limited to CNV of exons 8-9; MLH1: analysis includes CNV of the promoter; PMS2: analysis is limited to exons 1-10.  Sequencing Location: Sequencing done at [facility] [address] [place] [address], [address], [place] [address] (IA# [id number])  Designation: Chino Pollard, PhD, [facility]  Email: [email or web address]    Hepatitis B Surface Ag 09/11/2024 Non-reactive  Non-Reactive Final    Hepatitis C Ab 09/11/2024 Non-reactive  Non-Reactive Final    HIV-1 p24 Antigen 09/11/2024 Non-Reactive  Non-Reactive Final    HIV-1 Antibody 09/11/2024 Non-Reactive  Non-Reactive Final    HIV-2 Antibody 09/11/2024 Non-Reactive  Non-Reactive Final    HIV Ag-Ab 5th Gen 09/11/2024 Non-Reactive  Non-Reactive Final    A Non-Reactive test result does not preclude the possibility of exposure or infection with HIV-1 and/or HIV-2.  Non-Reactive results can occur if the quantity of marker present is below the detection limits or is not present during the stage of disease in which a sample is collected. Repeat testing should be considered where there is clinical suspicion of infection.       Syphilis Total Antibody 09/11/2024 Non-reactive  Non-Reactive Final    No serological evidence of infection with T. pallidum.  Early or incubating syphilis infection cannot be excluded.  Consider repeat testing based on clinical suspicion.   Annual Exam on 09/11/2024   Component Date Value Ref Range Status    Case Report 09/11/2024    Final                    Value:Gynecologic Cytology Report                       Case: [id number]                                  Authorizing Provider:  MONTY Moreno      Collected:           09/11/2024 [id number]              Ordering Location:     [facility]      Received:            09/11/2024 9243                                      [facility]                                                                          First Screen:          Marcelachelsey Fink                                                            Specimen:    LIQUID-BASED PAP, SCREENING, Cervix                                                        Primary Interpretation 09/11/2024 Negative for intraepithelial lesion or malignancy   Final    Interpretation 09/11/2024 Shift in kamar suggestive of bacterial vaginosis   Final    Specimen Adequacy 09/11/2024 Satisfactory for evaluation. Absence of endocervical/transformation zone component.   Final    Additional Information 09/11/2024    Final                    Value:[employer]'s FDA approved ,  and ThinPrep Imaging Duo System are utilized with strict adherence to the 's instruction manual to prepare gynecologic and non-gynecologic cytology specimens for the production of ThinPrep slides as well as for gynecologic ThinPrep imaging. These processes have been validated by our laboratory and/or by the .  The Pap test is not a diagnostic procedure and should not be used as the sole means to detect cervical cancer. It is only a screening procedure to aid in the detection of cervical cancer and its precursors. Both false-negative and false-positive results have been experienced. Your patient's test result should be interpreted in this context together with the history and clinical findings.      Gross Description 09/11/2024    Final                    Value:20 ml , colorless, cloudy received in a ThinPrep vial.      N gonorrhoeae, DNA Probe 09/11/2024 Negative  Negative Final    Chlamydia trachomatis, DNA Probe 09/11/2024 Negative  Negative Final    HPV Other HR 09/11/2024 Negative  Negative Final    HPV types: 31,33,35,39,45,51,52,56,58,59,66 and 68 DNA are undetectable or below the pre-set threshold.    HPV16  09/11/2024 Negative  Negative Final    HPV18 09/11/2024 Negative  Negative Final   Admission on 09/04/2024, Discharged on 09/06/2024   Component Date Value Ref Range Status    WBC 09/04/2024 8.35  4.31 - 10.16 Thousand/uL Final    RBC 09/04/2024 4.88  3.81 - 5.12 Million/uL Final    Hemoglobin 09/04/2024 13.1  11.5 - 15.4 g/dL Final    Hematocrit 09/04/2024 41.7  34.8 - 46.1 % Final    MCV 09/04/2024 86  82 - 98 fL Final    MCH 09/04/2024 26.8  26.8 - 34.3 pg Final    MCHC 09/04/2024 31.4  31.4 - 37.4 g/dL Final    RDW 09/04/2024 14.9  11.6 - 15.1 % Final    MPV 09/04/2024 9.2  8.9 - 12.7 fL Final    Platelets 09/04/2024 242  149 - 390 Thousands/uL Final    nRBC 09/04/2024 0  /100 WBCs Final    Segmented % 09/04/2024 69  43 - 75 % Final    Immature Grans % 09/04/2024 0  0 - 2 % Final    Lymphocytes % 09/04/2024 22  14 - 44 % Final    Monocytes % 09/04/2024 8  4 - 12 % Final    Eosinophils Relative 09/04/2024 1  0 - 6 % Final    Basophils Relative 09/04/2024 0  0 - 1 % Final    Absolute Neutrophils 09/04/2024 5.75  1.85 - 7.62 Thousands/µL Final    Absolute Immature Grans 09/04/2024 0.03  0.00 - 0.20 Thousand/uL Final    Absolute Lymphocytes 09/04/2024 1.81  0.60 - 4.47 Thousands/µL Final    Absolute Monocytes 09/04/2024 0.64  0.17 - 1.22 Thousand/µL Final    Eosinophils Absolute 09/04/2024 0.10  0.00 - 0.61 Thousand/µL Final    Basophils Absolute 09/04/2024 0.02  0.00 - 0.10 Thousands/µL Final    Sodium 09/04/2024 138  135 - 147 mmol/L Final    Potassium 09/04/2024 3.7  3.5 - 5.3 mmol/L Final    Chloride 09/04/2024 105  96 - 108 mmol/L Final    CO2 09/04/2024 24  21 - 32 mmol/L Final    ANION GAP 09/04/2024 9  4 - 13 mmol/L Final    BUN 09/04/2024 11  5 - 25 mg/dL Final    Creatinine 09/04/2024 0.79  0.60 - 1.30 mg/dL Final    Standardized to IDMS reference method    Glucose 09/04/2024 96  65 - 140 mg/dL Final    If the patient is fasting, the ADA then defines impaired fasting glucose as > 100 mg/dL and diabetes as  > or equal to 123 mg/dL.    Calcium 09/04/2024 9.4  8.4 - 10.2 mg/dL Final    AST 09/04/2024 25  13 - 39 U/L Final    ALT 09/04/2024 21  7 - 52 U/L Final    Specimen collection should occur prior to Sulfasalazine administration due to the potential for falsely depressed results.     Alkaline Phosphatase 09/04/2024 95  34 - 104 U/L Final    Total Protein 09/04/2024 8.0  6.4 - 8.4 g/dL Final    Albumin 09/04/2024 4.3  3.5 - 5.0 g/dL Final    Total Bilirubin 09/04/2024 0.39  0.20 - 1.00 mg/dL Final    Use of this assay is not recommended for patients undergoing treatment with eltrombopag due to the potential for falsely elevated results.  N-acetyl-p-benzoquinone imine (metabolite of Acetaminophen) will generate erroneously low results in samples for patients that have taken an overdose of Acetaminophen.    eGFR 09/04/2024 100  ml/min/1.73sq m Final    Magnesium 09/04/2024 2.2  1.9 - 2.7 mg/dL Final    EXT Preg Test, Ur 09/04/2024 Negative  Negative In process    Control 09/04/2024 Valid  Valid In process    Amph/Meth UR 09/04/2024 Negative  Negative Final    Barbiturate Ur 09/04/2024 Negative  Negative Final    Benzodiazepine Urine 09/04/2024 Negative  Negative Final    Cocaine Urine 09/04/2024 Negative  Negative Final    Methadone Urine 09/04/2024 Negative  Negative Final    Opiate Urine 09/04/2024 Negative  Negative Final    PCP Ur 09/04/2024 Negative  Negative Final    THC Urine 09/04/2024 Positive (A)  Negative Final    Oxycodone Urine 09/04/2024 Negative  Negative Final    Fentanyl Urine 09/04/2024 Negative  Negative Final    HYDROCODONE URINE 09/04/2024 Negative  Negative Final    Ethanol Lvl 09/04/2024 <10  <10 mg/dL Final    Ventricular Rate 09/04/2024 82  BPM Final    Atrial Rate 09/04/2024 82  BPM Final    WI Interval 09/04/2024 136  ms Final    QRSD Interval 09/04/2024 84  ms Final    QT Interval 09/04/2024 384  ms Final    QTC Interval 09/04/2024 448  ms Final    P Axis 09/04/2024 8  degrees Final    QRS  [place] 09/04/2024 32  degrees Final    T Wave [place] 09/04/2024 -2  degrees Final    Sodium 09/05/2024 137  135 - 147 mmol/L Final    Potassium 09/05/2024 3.8  3.5 - 5.3 mmol/L Final    Chloride 09/05/2024 107  96 - 108 mmol/L Final    CO2 09/05/2024 23  21 - 32 mmol/L Final    ANION GAP 09/05/2024 7  4 - 13 mmol/L Final    BUN 09/05/2024 10  5 - 25 mg/dL Final    Creatinine 09/05/2024 0.73  0.60 - 1.30 mg/dL Final    Standardized to IDMS reference method    Glucose 09/05/2024 91  65 - 140 mg/dL Final    If the patient is fasting, the ADA then defines impaired fasting glucose as > 100 mg/dL and diabetes as > or equal to 123 mg/dL.    Calcium 09/05/2024 9.3  8.4 - 10.2 mg/dL Final    AST 09/05/2024 23  13 - 39 U/L Final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

## 2024-10-21 ENCOUNTER — OFFICE VISIT (OUTPATIENT)
Dept: PSYCHIATRY | Facility: CLINIC | Age: 29
End: 2024-10-21
Payer: COMMERCIAL

## 2024-10-21 DIAGNOSIS — Z72.0 NICOTINE ABUSE: ICD-10-CM

## 2024-10-21 DIAGNOSIS — R40.0 DAYTIME SLEEPINESS: ICD-10-CM

## 2024-10-21 DIAGNOSIS — F10.20 ALCOHOL USE DISORDER, SEVERE, DEPENDENCE (HCC): ICD-10-CM

## 2024-10-21 DIAGNOSIS — F41.9 ANXIETY DISORDER, UNSPECIFIED TYPE: ICD-10-CM

## 2024-10-21 DIAGNOSIS — F43.10 PTSD (POST-TRAUMATIC STRESS DISORDER): ICD-10-CM

## 2024-10-21 DIAGNOSIS — R45.1 RESTLESSNESS: ICD-10-CM

## 2024-10-21 DIAGNOSIS — G47.00 INSOMNIA, UNSPECIFIED TYPE: ICD-10-CM

## 2024-10-21 DIAGNOSIS — F39 UNSPECIFIED MOOD (AFFECTIVE) DISORDER (HCC): Primary | ICD-10-CM

## 2024-10-21 PROCEDURE — 99214 OFFICE O/P EST MOD 30 MIN: CPT | Performed by: PSYCHIATRY & NEUROLOGY

## 2024-10-21 PROCEDURE — 90833 PSYTX W PT W E/M 30 MIN: CPT | Performed by: PSYCHIATRY & NEUROLOGY

## 2024-10-21 RX ORDER — TRAZODONE HYDROCHLORIDE 50 MG/1
75 TABLET, FILM COATED ORAL
Qty: 45 TABLET | Refills: 0 | Status: SHIPPED | OUTPATIENT
Start: 2024-10-21 | End: 2024-11-20

## 2024-10-21 RX ORDER — TOPIRAMATE 25 MG/1
25 TABLET, FILM COATED ORAL
Qty: 30 TABLET | Refills: 0 | Status: SHIPPED | OUTPATIENT
Start: 2024-10-21

## 2024-10-21 RX ORDER — GABAPENTIN 300 MG/1
600 CAPSULE ORAL
Qty: 60 CAPSULE | Refills: 0 | Status: SHIPPED | OUTPATIENT
Start: 2024-10-21 | End: 2024-11-20

## 2024-10-21 RX ORDER — SERTRALINE HYDROCHLORIDE 100 MG/1
150 TABLET, FILM COATED ORAL DAILY
Qty: 45 TABLET | Refills: 0 | Status: SHIPPED | OUTPATIENT
Start: 2024-10-21 | End: 2024-11-20

## 2024-10-21 RX ORDER — GABAPENTIN 100 MG/1
200 CAPSULE ORAL 2 TIMES DAILY
Qty: 120 CAPSULE | Refills: 0 | Status: SHIPPED | OUTPATIENT
Start: 2024-10-21 | End: 2024-11-20

## 2024-10-21 NOTE — PATIENT INSTRUCTIONS
"Please call the office nursing staff for medication issues including refills, problems getting medications, bothersome side effects, etc., at [phone].     Please return for a follow up appointment as discussed and arrive approximately 15 minutes prior to your appointment time. If you are running late or are unable to attend your appointment, please call our [place] office at [phone] (fax: [phone]), or if you were seen in the [facility] office, please call [phone] (fax [phone]).      Recommendations regarding insomnia:  Wake-up at the same time every day  Refrain from \"napping\".  Refrain from going to bed unless you're tired  Utilize your bedroom for sleep only.  Avoid use of electronics including television and/or cellphone/computers.  Refrain from use of electronics including television and/or cellphones/computers prior to bed  Turn your alarm clock away so the light is not visible.  Attempt relaxation using various means like reading if you're restless in bed for approximately 15-20 minutes.  Participate in regular physical activities like exercise, although avoid approximately 3-4 hours prior to bed.  Morning exercise is ideal.  Avoid caffeine use prior to bedtime.  Consider tapering down excessive use of caffeine.  Avoid tobacco use prior to bedtime.  Avoid alcohol use prior to bedtime.  Consider reading \"No More Sleepless nights\" by Vance Tabor, Ph.D.  Consider use of online resources including:  http://[employer]/cbt-online-insomnia-treatment.html  http://www.[employer]  CBT-I  Teri on your Smart Phone.  Go! To Sleep by the [facility].    Look up \"grounding techniques\" and/or \"anchoring demonstration\" online and try a few to see what may work for you. Practice these skills before you need them, when you are not feeling too anxious or triggered. You can also search for free guided meditation videos online to help improve your head space when you are feeling " "very anxious or triggered.        Healthy Diet   The American Heart Association and the American College of Cardiology have long recommended a healthy diet for not only patients who are at risk for atherosclerotic cardiovascular disease (ASCVD) but also the general public. In keeping with this evidence-based recommendation, the \"2018 Guideline on the Management of Blood Cholesterol\" stresses that a healthy diet should include adequate intake of these essentials:   Vegetables, fruits, and whole grains   Legumes and nuts   Low-fat dairy products   Low-fat poultry (without the skin)   Fish and seafood   Nontropical vegetable oils     The recent guidelines do provide room for cultural food preferences in a healthy diet, but in general, all patients should limit their intake of saturated and avoid all trans fats, sweets, sugar-sweetened beverages, and red meats.  Please maintain adequate hydration of at least 2 Liters of water per day, and improve nutrition by decreasing portion sizes, avoiding fried foods, fast foods, inappropriate snacking and overly processed and packaged items with 'added sugars' (whether in drinks or foods), and observing nutritional facts information on any items that are packaged to reduce intake of saturated fats and AVOID trans fats as mentioned above. Again, consume whole foods such as vegetables, higher lean protein intake, and using healthier lifestyle plans such as the Mediterranean diet with healthier fats such as those from seeds, nuts, and using organic extra virgin olive oil or avocado oil in lieu of processed vegetable oils or margarine.    Physical Activity   Recommended DAILY exercise for at least 150 minutes of moderate exercise weekly!  In addition to a healthy diet, all patients should include regular physical activity in their weekly routines, at moderate to vigorous intensity. Any activity is better than nothing, so if your patients can't meet the recommendation of vigorous " activity, moderate-intensity activity can still help them reduce their risk of ASCVD.     Below are the American Heart Association's recommendations for physical activity per week (preferably spread throughout the week):     For Overall Cardiovascular Health and Lowering Cholesterol   At least 150 minutes of moderate-intensity physical activity (for example, 30 minutes, 5 days a week), or   At least 75 minutes of vigorous-intensity physical activity (for example, 25 minutes, 3 days a week); or   A combination of moderate- and vigorous-intensity aerobic activity, and   At least 2 days of moderate- to high-intensity muscle-strengthening activities (such as resistance weight training) for additional health benefits    If you have thoughts of harming yourself or are otherwise in psychological crisis, do not hesitate to contact your [facility] Crisis hotline, or 911 or go to the nearest emergency room.  Adult Crisis Numbers  Suicide Prevention Hotline - Dial 9-8-8  [facility]: [phone] or [phone]  [facility]: [phone]  [facility]: [phone]  [facility]: [phone]  [facility]: [phone] or [phone]  [facility]: [phone]  [facility]: [phone] or [facility]: [phone]  [facility]: [phone] (daytime).       [phone] (after hours, weekends, holidays)     Child/Adolescent Crisis Numbers   [facility]: [phone]   [facility]: [phone]   [place]: [phone]   [facility]: [phone]  Please note: Some [facility] do not have a separate number for Child/Adolescent specific crisis. If your county is not listed under Child/Adolescent, please call the adult number for your county     National Talk to Text Line   All Ages - 031-733    National Suicide Prevention Hotline: 1-454.862.1972 or call 282.

## 2024-10-22 ENCOUNTER — OFFICE VISIT (OUTPATIENT)
Dept: FAMILY MEDICINE CLINIC | Facility: CLINIC | Age: 29
End: 2024-10-22
Payer: COMMERCIAL

## 2024-10-22 VITALS
HEART RATE: 87 BPM | HEIGHT: 63 IN | WEIGHT: 257.6 LBS | SYSTOLIC BLOOD PRESSURE: 130 MMHG | TEMPERATURE: 97.7 F | BODY MASS INDEX: 45.64 KG/M2 | OXYGEN SATURATION: 98 % | DIASTOLIC BLOOD PRESSURE: 88 MMHG

## 2024-10-22 DIAGNOSIS — R21 RASH: Primary | ICD-10-CM

## 2024-10-22 PROCEDURE — 99213 OFFICE O/P EST LOW 20 MIN: CPT | Performed by: INTERNAL MEDICINE

## 2024-10-22 RX ORDER — ACYCLOVIR 800 MG/1
800 TABLET ORAL 3 TIMES DAILY
Qty: 21 TABLET | Refills: 0 | Status: SHIPPED | OUTPATIENT
Start: 2024-10-22 | End: 2024-10-29

## 2024-10-22 RX ORDER — CEPHALEXIN 500 MG/1
500 CAPSULE ORAL 3 TIMES DAILY
Qty: 30 CAPSULE | Refills: 0 | Status: SHIPPED | OUTPATIENT
Start: 2024-10-22 | End: 2024-11-01

## 2024-10-22 NOTE — ASSESSMENT & PLAN NOTE
Orders:    acyclovir (ZOVIRAX) 800 mg tablet; Take 1 tablet (800 mg total) by mouth 3 (three) times a day for 7 days    cephalexin (KEFLEX) 500 mg capsule; Take 1 capsule (500 mg total) by mouth 3 (three) times a day for 10 days

## 2024-10-22 NOTE — PROGRESS NOTES
"Ambulatory Visit  Name: Ivette Andrew      : 1995      MRN: [id number]  Encounter Provider: Jade Blackwell MD  Encounter Date: 10/22/2024   Encounter department: [facility]    Assessment & Plan  Rash    Orders:    acyclovir (ZOVIRAX) 800 mg tablet; Take 1 tablet (800 mg total) by mouth 3 (three) times a day for 7 days    cephalexin (KEFLEX) 500 mg capsule; Take 1 capsule (500 mg total) by mouth 3 (three) times a day for 10 days       History of Present Illness     Patient woke up this morning and realize she had a rash on her right hip. It follows a white wine but is red and mildly painful to touch. It is not it has no discharge or ecchymosis. She or ecchymosis. She doesn't remember doing anything that would cause some kind of a cody on her leg. Doesn't remember if she is ever had chickenpox and she had no open sores in that area          Review of Systems   Constitutional:  Negative for chills, fatigue and fever.   HENT: Negative.     Eyes: Negative.    Respiratory: Negative.     Cardiovascular: Negative.    Gastrointestinal: Negative.    Endocrine: Negative.    Genitourinary:  Negative for difficulty urinating, flank pain, frequency, menstrual problem and pelvic pain.   Musculoskeletal:  Negative for arthralgias and gait problem.   Skin:  Positive for color change and rash (Of a red line that makes almost a complete [place] on her right hip look similar to but not the same as ringworm. There is a white rash that lies right next to it no ecchymosis). Negative for wound.   Allergic/Immunologic: Negative for immunocompromised state.   Hematological:  Negative for adenopathy. Does not bruise/bleed easily.           Objective     /88 (BP Location: Left arm, Patient Position: Sitting, Cuff Size: Extra-Large)   Pulse 87   Temp 97.7 °F (36.5 °C) (Temporal)   Ht 5' 3\" (1.6 m)   Wt 117 kg (257 lb 9.6 oz)   LMP 10/22/2024   SpO2 98%   BMI 45.63 kg/m²     Physical Exam  Vitals and nursing " note reviewed.   Constitutional:       General: She is not in acute distress.     Appearance: She is well-developed. She is obese.   HENT:      Head: Normocephalic and atraumatic.      Right Ear: Tympanic membrane normal.      Left Ear: Tympanic membrane normal.   Eyes:      Conjunctiva/sclera: Conjunctivae normal.   Cardiovascular:      Rate and Rhythm: Normal rate and regular rhythm.      Heart sounds: No murmur heard.  Pulmonary:      Effort: Pulmonary effort is normal. No respiratory distress.      Breath sounds: Normal breath sounds.   Abdominal:      Palpations: Abdomen is soft.      Tenderness: There is no abdominal tenderness.   Musculoskeletal:         General: No swelling.      Cervical back: Neck supple.   Skin:     General: Skin is warm and dry.      Capillary Refill: Capillary refill takes less than 2 seconds.      Findings: Rash (As described in review of systems red line with a white line next to it no heat no itching no lesions) present.   Neurological:      General: No focal deficit present.      Mental Status: She is alert and oriented to person, place, and time.   Psychiatric:         Mood and Affect: Mood normal.         Behavior: Behavior normal.         Thought Content: Thought content normal.         Judgment: Judgment normal.

## 2024-10-25 ENCOUNTER — PROCEDURE VISIT (OUTPATIENT)
Dept: OBGYN CLINIC | Facility: MEDICAL CENTER | Age: 29
End: 2024-10-25
Payer: COMMERCIAL

## 2024-10-25 VITALS
HEIGHT: 63 IN | DIASTOLIC BLOOD PRESSURE: 84 MMHG | BODY MASS INDEX: 45.89 KG/M2 | WEIGHT: 259 LBS | SYSTOLIC BLOOD PRESSURE: 122 MMHG

## 2024-10-25 DIAGNOSIS — Z30.430 ENCOUNTER FOR IUD INSERTION: Primary | ICD-10-CM

## 2024-10-25 LAB — SL AMB POCT URINE HCG: NORMAL

## 2024-10-25 PROCEDURE — 81025 URINE PREGNANCY TEST: CPT | Performed by: CLINICAL NURSE SPECIALIST

## 2024-10-25 PROCEDURE — 58300 INSERT INTRAUTERINE DEVICE: CPT | Performed by: CLINICAL NURSE SPECIALIST

## 2024-10-25 NOTE — PROGRESS NOTES
IUD Insertion        IUD Procedure    Date/Time: 10/25/2024 8:05 AM    Performed by: MONTY Moreno  Authorized by: MONTY Moreno    Other Assisting Provider: No    Written consent obtained?: Yes    Risks and benefits: Risks, benefits and alternatives were discussed    Consent given by:  Patient (including infection, bleeding, pain, expulsion, and Migration/uterine perforation)  Time Out:     Time out: Immediately prior to the procedure a time out was called      Time out performed at:  10/25/2024 8:00 AM  Patient states understanding of procedure being performed: Yes    Patient's understanding of procedure matches consent: Yes    Procedure consent matches procedure scheduled: Yes    Relevant documents present and verified: Yes    Test results available and properly labeled: Yes    Site marked: No    Required items: Required blood products, implants, devices and special equipment available    Patient identity confirmed:  Verbally with patient  Select procedure: IUD insertion    IUD Insertion:     Pelvic exam performed: yes      Negative GC/chlamydia test: yes      Negative urine pregnancy test: yes      Cervix cleaned and prepped: yes      Speculum placed in vagina: yes      Tenaculum applied to cervix: yes      IUD inserted with no complications: yes      Strings trimmed: yes (approx 3 cm)      Uterus sounded: yes      Uterus sound depth (cm):  7    IUD type:  1 each levonorgestrel 19.5 MG  Post-procedure:     Patient tolerated procedure well: yes      Patient will follow up after next period: yes    Insertion Comments:        Plan:  Post procedure instructions were given.  She was advised nothing per vagina x 3 day.  The patient was advised to call for any fever or for prolonged or severe pain or bleeding. She was advised to use OTC analgesics as needed for mild to moderate pain.

## 2024-10-29 ENCOUNTER — TELEPHONE (OUTPATIENT)
Dept: PSYCHIATRY | Facility: CLINIC | Age: 29
End: 2024-10-29

## 2024-10-29 NOTE — TELEPHONE ENCOUNTER
Contacted Ivette by phone due to Ivette's missed appointment yesterday. Ivette reports she is doing well but had forgotten about appointment. Ivette was scheduled for a session on 10/31/2024.

## 2024-10-30 DIAGNOSIS — F43.10 PTSD (POST-TRAUMATIC STRESS DISORDER): ICD-10-CM

## 2024-10-30 DIAGNOSIS — K21.9 GASTROESOPHAGEAL REFLUX DISEASE, UNSPECIFIED WHETHER ESOPHAGITIS PRESENT: ICD-10-CM

## 2024-10-30 DIAGNOSIS — F39 UNSPECIFIED MOOD (AFFECTIVE) DISORDER (HCC): ICD-10-CM

## 2024-10-30 RX ORDER — OMEPRAZOLE 40 MG/1
40 CAPSULE, DELAYED RELEASE ORAL
Qty: 90 CAPSULE | Refills: 0 | Status: SHIPPED | OUTPATIENT
Start: 2024-10-30

## 2024-10-31 ENCOUNTER — SOCIAL WORK (OUTPATIENT)
Dept: PSYCHIATRY | Facility: CLINIC | Age: 29
End: 2024-10-31
Payer: COMMERCIAL

## 2024-10-31 DIAGNOSIS — F10.20 ALCOHOL USE DISORDER, SEVERE, DEPENDENCE (HCC): ICD-10-CM

## 2024-10-31 DIAGNOSIS — F39 UNSPECIFIED MOOD (AFFECTIVE) DISORDER (HCC): Primary | ICD-10-CM

## 2024-10-31 DIAGNOSIS — F41.9 ANXIETY DISORDER, UNSPECIFIED TYPE: ICD-10-CM

## 2024-10-31 DIAGNOSIS — F43.10 PTSD (POST-TRAUMATIC STRESS DISORDER): ICD-10-CM

## 2024-10-31 PROCEDURE — 90834 PSYTX W PT 45 MINUTES: CPT | Performed by: COUNSELOR

## 2024-10-31 RX ORDER — ARIPIPRAZOLE 15 MG/1
15 TABLET ORAL DAILY
Qty: 30 TABLET | Refills: 1 | Status: SHIPPED | OUTPATIENT
Start: 2024-10-31

## 2024-10-31 NOTE — TELEPHONE ENCOUNTER
Patient requested refill of :  Requested Prescriptions     Pending Prescriptions Disp Refills    ARIPiprazole (ABILIFY) 15 mg tablet [Pharmacy Med Name: ARIPIPRAZOLE 15MG TABS] 30 tablet 1     Sig: TAKE ONE TABLET BY MOUTH EVERY DAY         Refill request approved and sent to pharmacy on file per patient request.     Han Reddy MD

## 2024-10-31 NOTE — PSYCH
Behavioral Health Psychotherapy Progress Note    Psychotherapy Provided: Individual Psychotherapy     1. Unspecified mood (affective) disorder (HCC)        2. PTSD (post-traumatic stress disorder)        3. Anxiety disorder, unspecified type        4. Alcohol use disorder, severe, dependence (HCC)            Goals addressed in session: Goal 1 and Goal 2     DATA: Ivette was seen for therapy. Ivette reports she had recent activities over the last 2 weeks that had prevented her from engaging in her needed supports. Ivette discussed these activities and how they had affected her ability to continue wit her schedule. Ivette reports her mood has improved since her last session and therapist highlighted for Ivette the temporary nature of mood. Ivette indicated an improved insight into how her moods can change and she does not need to react to these moods. Ivette and therapist discussed the importance of mindfulness. Ivette reports she has begun completed the DBT distress tolerance packet but feels she is not able to provide effective answers. Ivette and therapist discussed how insight is developed and how Ivette can effectively utilize this packet. Ivette agreed to attend a [facility] meeting. Ivette reports some sleep disturbances but recognizes that she continues to adjust to abstinence and returning from the changes that had occurred due to her alcohol use. Ivette reports medication adherence. Ivette was scheduled for a session in 1 week.   During this session, this clinician used the following therapeutic modalities: Client-centered Therapy, Cognitive Behavioral Therapy, Motivational Interviewing, and Supportive Psychotherapy    Substance Abuse was addressed during this session. If the client is diagnosed with a co-occurring substance use disorder, please indicate any changes in the frequency or amount of use: Ivette reports continued abstinence, 8 weeks.  Therapist utilized stage appropriate  "interventions to address this substance abuse issue. Ivette reports she has not been attending 12 step meetings but plans on returning to meetings starting tonjohn. Ivette discussed how finding her neighbor passed away were very tough for her to remain abstinent but was able to recognize that if she did use alcohol, it would not improve the situation. Ivette and therapist discussed how attending a 12 step meeting could have provided support in addressing these thoughts and cravings. Ivette was able to identify that she struggles with allowing others to help you when she is vulnerable. Ivette states she was able to utilize her mother and a friend as support.  Ivette stated she continues to speak with her sponsor. Stage of change for addressing substance use diagnoses: Action    ASSESSMENT:  Ivette Andrew presents with a Euthymic/ normal mood. Ivette was engaged in therapy and presented with a positive attitude towards making improvements.      her affect is Normal range and intensity, which is congruent, with her mood and the content of the session. The client has made progress on their goals.     Ivette Andrew presents with a none risk of suicide, none risk of self-harm, and none risk of harm to others.    For any risk assessment that surpasses a \"low\" rating, a safety plan must be developed.    A safety plan was indicated: no  If yes, describe in detail n/a    PLAN: Between sessions, Ivette Andrew will Continue the distress tolerance section of DBT workbook  and return to 12 step meetings. Ivette will attend a [facility] meeting. At the next session, the therapist will use Client-centered Therapy, Cognitive Behavioral Therapy, Motivational Interviewing, and Supportive Psychotherapy to address Ivette's development of mood regulation and relapse prevention skills.    Behavioral Health Treatment Plan and Discharge Planning: Ivette Andrew is aware of and agrees to continue to work on their " treatment plan. They have identified and are working toward their discharge goals. yes    Visit start and stop times:    10/31/24  Start Time: 0904  Stop Time: 0955  Total Visit Time: 51 minutes

## 2024-11-01 ENCOUNTER — NURSE TRIAGE (OUTPATIENT)
Age: 29
End: 2024-11-01

## 2024-11-01 DIAGNOSIS — R10.2 PELVIC PAIN: Primary | ICD-10-CM

## 2024-11-01 DIAGNOSIS — Z97.5 IUD (INTRAUTERINE DEVICE) IN PLACE: ICD-10-CM

## 2024-11-01 NOTE — TELEPHONE ENCOUNTER
"Patient calling in stating that she's having back and and abdominal pain since her Kyleena IUD insertion on 10/25/24. Back pain 6/10 and abdominal pain 6/10 cramping. Pt stating that she's having occasional sharp pains near her umbilicus. Pt reporting vaginal bleeding when wiping, Bright red bleeding.       Epic Secure Chat sent to Dr Radha Major- on call  Epic Secure Chat received: Bleeding can be normal for several weeks after insertion. Any relief with Tylenol or ibuprofen?       Patient was contacted and notifed of Dr Radha Majors recommendations,     Epic Secure Chat sent to Dr Radha Major: pt stating that ibuprofen gives her some relief, pt stating that sitting makes the pain worse     Epic Secure Chat response: Hmm- I'll order an ultrasound to check placement. But nuclear what pain is coming from    Contacted patient and notified her of Dr Radha Major's recommendations, pt verbalized understanding, pt was provided central scheduling phone number of [phone]. Patient was notified to go to the emergency room for excruciating pain, pt verbalized understanding.            Answer Assessment - Initial Assessment Questions  1. TYPE: \"What type of IUD do you have?\"       Kyleena  2. START DATE:  \"When was your IUD inserted?\" (e.g., date; weeks, months, years ago)       10/25/24   3. SYMPTOM: \"What is the main symptom (or question) you're concerned about?\"       Back pain 6/10 and abdominal pain 6/10.   4. ONSET: \"When did the  symptoms  start?\"      Since IUD insertion   5. VAGINAL BLEEDING: \"Are you having any unusual vaginal bleeding?\"       Pt reporting vaginal bleeding when wiping, Bright red bleeding       6. ABDOMEN OR PELVIC PAIN: \"Are you having any pain in your abdomen or pelvic area?\" (Scale: 0, 1-10; none, mild, moderate, severe)      6/10.   7. FEVER: \"Is there a fever?\" If Yes, ask: \"What is the temperature, how was it measured, and when did it start?\"      Pt denies fevers   8. " "PREGNANCY: \"Are you concerned that you might be pregnant?\" \"When was your last menstrual period?\"      Pt denies pregnancy, LMP 10/22/24.    Protocols used: Contraception - IUD Symptoms and Questions-Adult-[place]    "

## 2024-11-01 NOTE — TELEPHONE ENCOUNTER
Reason for Disposition  • IUD, questions about    Protocols used: Contraception - IUD Symptoms and Questions-Adult-[place]

## 2024-11-04 ENCOUNTER — OFFICE VISIT (OUTPATIENT)
Dept: OTHER | Age: 29
End: 2024-11-04
Payer: COMMERCIAL

## 2024-11-04 ENCOUNTER — HOSPITAL ENCOUNTER (OUTPATIENT)
Dept: INFUSION CENTER | Facility: HOSPITAL | Age: 29
Discharge: HOME | End: 2024-11-04
Attending: EMERGENCY MEDICINE
Payer: COMMERCIAL

## 2024-11-04 VITALS
DIASTOLIC BLOOD PRESSURE: 80 MMHG | HEART RATE: 81 BPM | SYSTOLIC BLOOD PRESSURE: 118 MMHG | BODY MASS INDEX: 46.67 KG/M2 | WEIGHT: 263.4 LBS | HEIGHT: 63 IN

## 2024-11-04 DIAGNOSIS — G47.00 INSOMNIA, UNSPECIFIED TYPE: ICD-10-CM

## 2024-11-04 DIAGNOSIS — F10.90 ALCOHOL USE DISORDER: Primary | ICD-10-CM

## 2024-11-04 DIAGNOSIS — I10 PRIMARY HYPERTENSION: ICD-10-CM

## 2024-11-04 PROCEDURE — 96372 THER/PROPH/DIAG INJ SC/IM: CPT

## 2024-11-04 PROCEDURE — 99213 OFFICE O/P EST LOW 20 MIN: CPT

## 2024-11-04 RX ADMIN — NALTREXONE 380 MG: KIT at 14:23

## 2024-11-04 NOTE — ASSESSMENT & PLAN NOTE
Patient presents for follow up appointment   Scheduled to get her Vivitrol Injection today   Reports two month of sobriety  Will continue with medication   Therapy plan updated   Follow up in 2 months

## 2024-11-04 NOTE — PROGRESS NOTES
[facility]    Progress Note  Ivette Andrew 29 y.o. female MRN: [id number]  @ Encounter: [id number]      1. Alcohol use disorder  Assessment & Plan:  Patient presents for follow up appointment   Scheduled to get her Vivitrol Injection today   Reports two month of sobriety  Will continue with medication   Therapy plan updated   Follow up in 2 months   2. Primary hypertension  Assessment & Plan:  /80  BP has significantly improved, continue current medication regimen  3. Insomnia, unspecified type  Assessment & Plan:  Endorsing poor sleep  Currently on trazodone 75mg   She is currently following with psychiatry- recommend to discuss with them if increase in trazodone is appropriate- will defer to psychiatry         Support Services  Case Management and Certified  are following.   Patient was referred to the [facility] Certified Addiction Counselors: Yes  The patient is actively engaged with the [facility] Certified Addiction Counselor’s psychotherapy plan: Yes    This patient does not have an active medication from one of the medication groupers.      PDMP reviewed:     PDMP Review         Value Time User    PDMP Reviewed  Yes 10/21/2024  8:27 PM Han Reddy MD            SUBJECTIVE  Patient reports 2 months of sobriety- attending AA meetings. She is doing well with vivitrol injections. Only endorsing insomnia but is following with psychiatry.     Drug cravings: none   Motivation to continue treatment: high       Current Outpatient Medications:     acyclovir (ZOVIRAX) 800 mg tablet, Take 1 tablet (800 mg total) by mouth 3 (three) times a day for 7 days, Disp: 21 tablet, Rfl: 0    ARIPiprazole (ABILIFY) 15 mg tablet, TAKE ONE TABLET BY MOUTH EVERY DAY, Disp: 30 tablet, Rfl: 1    gabapentin (NEURONTIN) 100 mg capsule, Take 2 capsules (200 mg total) by mouth 2 (two) times a day, Disp: 120 capsule, Rfl: 0    gabapentin (NEURONTIN) 300 mg capsule, Take 2 capsules (600 mg total) by  mouth daily at bedtime, Disp: 60 capsule, Rfl: 0    lisinopril (ZESTRIL) 20 mg tablet, TAKE ONE TABLET BY MOUTH DAILY, Disp: 90 tablet, Rfl: 1    Naltrexone (VIVITROL IM), Inject into a muscle, Disp: , Rfl:     naproxen sodium (Aleve) 220 MG tablet, Take 220 mg by mouth daily as needed for mild pain, Disp: , Rfl:     omeprazole (PriLOSEC) 40 MG capsule, TAKE ONE CAPSULE BY MOUTH EVERY DAY BEFORE BREAKFAST, Disp: 90 capsule, Rfl: 0    sertraline (ZOLOFT) 100 mg tablet, Take 1.5 tablets (150 mg total) by mouth daily, Disp: 45 tablet, Rfl: 0    topiramate (TOPAMAX) 25 mg tablet, Take 1 tablet (25 mg total) by mouth daily at bedtime, Disp: 30 tablet, Rfl: 0    traZODone (DESYREL) 50 mg tablet, Take 1.5 tablets (75 mg total) by mouth daily at bedtime, Disp: 45 tablet, Rfl: 0    Objective     Vitals:    11/04/24 1351   BP: 118/80   Pulse: 81       Physical Exam  Vitals reviewed.   Constitutional:       General: She is not in acute distress.     Appearance: She is not diaphoretic.   Neurological:      Mental Status: She is alert.              Lab Results:   Results from last 6 Months   Lab Units 09/06/24  0441   WBC Thousand/uL 5.39   HEMOGLOBIN g/dL 12.5   HEMATOCRIT % 40.6   PLATELETS Thousands/uL 212      Results from last 6 Months   Lab Units 09/06/24  0441   POTASSIUM mmol/L 4.1   CHLORIDE mmol/L 107   CO2 mmol/L 22   BUN mg/dL 10   CREATININE mg/dL 0.85   CALCIUM mg/dL 9.3   ALBUMIN g/dL 3.7   ALK PHOS U/L 75   ALT U/L 15   AST U/L 21     Results from last 6 Months   Lab Units 09/11/24  1336   HEP B S AG  Non-reactive   HEP C AB  Non-reactive     Results from last 6 Months   Lab Units 09/11/24  1336   HIV-1 P24 ANTIGEN-BIOPLEX  Non-Reactive                 Counseling / Coordination of Care  Total time spent today 15 minutes. Greater than 50% of total time was spent with the patient and / or family counseling and / or coordination of care.     Smitha Nina PA-C

## 2024-11-04 NOTE — ASSESSMENT & PLAN NOTE
Endorsing poor sleep  Currently on trazodone 75mg   She is currently following with psychiatry- recommend to discuss with them if increase in trazodone is appropriate- will defer to psychiatry

## 2024-11-04 NOTE — PROGRESS NOTES
Patient tolerated L buttocks vivitrol injection without issue. Next appointment confirmed, AVS declined.

## 2024-11-07 ENCOUNTER — TELEPHONE (OUTPATIENT)
Dept: PSYCHIATRY | Facility: CLINIC | Age: 29
End: 2024-11-07

## 2024-11-07 ENCOUNTER — SOCIAL WORK (OUTPATIENT)
Dept: PSYCHIATRY | Facility: CLINIC | Age: 29
End: 2024-11-07

## 2024-11-07 DIAGNOSIS — F39 UNSPECIFIED MOOD (AFFECTIVE) DISORDER (HCC): Primary | ICD-10-CM

## 2024-11-07 DIAGNOSIS — F43.10 PTSD (POST-TRAUMATIC STRESS DISORDER): ICD-10-CM

## 2024-11-07 DIAGNOSIS — F10.20 ALCOHOL USE DISORDER, SEVERE, DEPENDENCE (HCC): ICD-10-CM

## 2024-11-07 DIAGNOSIS — F41.9 ANXIETY DISORDER, UNSPECIFIED TYPE: ICD-10-CM

## 2024-11-07 NOTE — PSYCH
No Call. No Show. No Charge    Ivette Andrew no showed 11/07/24 appointment , staff called and attempted to leave a message to reschedule appointment but VM was full.    Treatment Plan not due at this session.

## 2024-11-07 NOTE — TELEPHONE ENCOUNTER
Patient is calling regarding cancelling an appointment.    Date/Time: 11/7 9am     Reason: I was called into work     Patient was rescheduled: YES [] NO [x]    Writer will call to reschedule.

## 2024-11-11 ENCOUNTER — TELEPHONE (OUTPATIENT)
Dept: PSYCHIATRY | Facility: CLINIC | Age: 29
End: 2024-11-11

## 2024-11-12 ENCOUNTER — TELEPHONE (OUTPATIENT)
Dept: PSYCHIATRY | Facility: CLINIC | Age: 29
End: 2024-11-12

## 2024-11-12 NOTE — TELEPHONE ENCOUNTER
Attempted to contact [place] to reschedule missed appointment. There was no response by phone and no ability to leave VM.

## 2024-11-14 DIAGNOSIS — F39 UNSPECIFIED MOOD (AFFECTIVE) DISORDER (HCC): ICD-10-CM

## 2024-11-15 RX ORDER — TOPIRAMATE 25 MG/1
25 TABLET, FILM COATED ORAL
Qty: 30 TABLET | Refills: 0 | Status: SHIPPED | OUTPATIENT
Start: 2024-11-15 | End: 2024-11-18
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
## 2024-11-18 ENCOUNTER — OFFICE VISIT (OUTPATIENT)
Dept: PSYCHIATRY | Facility: CLINIC | Age: 29
End: 2024-11-18
Payer: COMMERCIAL

## 2024-11-18 ENCOUNTER — TELEPHONE (OUTPATIENT)
Dept: PSYCHIATRY | Facility: CLINIC | Age: 29
End: 2024-11-18

## 2024-11-18 DIAGNOSIS — F10.20 ALCOHOL USE DISORDER, SEVERE, DEPENDENCE (HCC): ICD-10-CM

## 2024-11-18 DIAGNOSIS — F12.10 CANNABIS ABUSE, CONTINUOUS: ICD-10-CM

## 2024-11-18 DIAGNOSIS — R40.0 DAYTIME SLEEPINESS: ICD-10-CM

## 2024-11-18 DIAGNOSIS — F41.9 ANXIETY DISORDER, UNSPECIFIED TYPE: ICD-10-CM

## 2024-11-18 DIAGNOSIS — F39 UNSPECIFIED MOOD (AFFECTIVE) DISORDER (HCC): Primary | ICD-10-CM

## 2024-11-18 DIAGNOSIS — F43.10 PTSD (POST-TRAUMATIC STRESS DISORDER): ICD-10-CM

## 2024-11-18 DIAGNOSIS — R45.1 RESTLESSNESS: ICD-10-CM

## 2024-11-18 DIAGNOSIS — G47.00 INSOMNIA, UNSPECIFIED TYPE: ICD-10-CM

## 2024-11-18 DIAGNOSIS — F17.210 CIGARETTE NICOTINE DEPENDENCE WITHOUT COMPLICATION: ICD-10-CM

## 2024-11-18 PROCEDURE — 99214 OFFICE O/P EST MOD 30 MIN: CPT | Performed by: PSYCHIATRY & NEUROLOGY

## 2024-11-18 PROCEDURE — 90833 PSYTX W PT W E/M 30 MIN: CPT | Performed by: PSYCHIATRY & NEUROLOGY

## 2024-11-18 RX ORDER — ARIPIPRAZOLE 15 MG/1
15 TABLET ORAL DAILY
Qty: 30 TABLET | Refills: 1 | Status: SHIPPED | OUTPATIENT
Start: 2024-11-18 | End: 2024-11-18

## 2024-11-18 RX ORDER — ARIPIPRAZOLE 15 MG/1
15 TABLET ORAL DAILY
Qty: 30 TABLET | Refills: 1 | Status: SHIPPED | OUTPATIENT
Start: 2024-11-18

## 2024-11-18 RX ORDER — TRAZODONE HYDROCHLORIDE 50 MG/1
75 TABLET, FILM COATED ORAL
Qty: 45 TABLET | Refills: 0 | Status: SHIPPED | OUTPATIENT
Start: 2024-11-18 | End: 2024-12-18

## 2024-11-18 RX ORDER — GABAPENTIN 300 MG/1
600 CAPSULE ORAL
Qty: 60 CAPSULE | Refills: 0 | Status: SHIPPED | OUTPATIENT
Start: 2024-11-18 | End: 2024-12-18

## 2024-11-18 RX ORDER — NICOTINE 21 MG/24HR
1 PATCH, TRANSDERMAL 24 HOURS TRANSDERMAL EVERY 24 HOURS
Qty: 28 PATCH | Refills: 0 | Status: SHIPPED | OUTPATIENT
Start: 2024-11-18

## 2024-11-18 RX ORDER — SERTRALINE HYDROCHLORIDE 100 MG/1
200 TABLET, FILM COATED ORAL DAILY
Qty: 60 TABLET | Refills: 2 | Status: SHIPPED | OUTPATIENT
Start: 2024-11-18 | End: 2025-02-16

## 2024-11-18 RX ORDER — TOPIRAMATE 25 MG/1
25 TABLET, FILM COATED ORAL
Qty: 30 TABLET | Refills: 0 | Status: SHIPPED | OUTPATIENT
Start: 2024-11-18

## 2024-11-18 NOTE — PATIENT INSTRUCTIONS
"Please call the office nursing staff for medication issues including refills, problems getting medications, bothersome side effects, etc., at [phone].     Please return for a follow up appointment as discussed and arrive approximately 15 minutes prior to your appointment time. If you are running late or are unable to attend your appointment, please call our [place] office at [phone] (fax: [phone]), or if you were seen in the [facility] office, please call [phone] (fax [phone]).      Recommendations regarding insomnia:  Wake-up at the same time every day  Refrain from \"napping\".  Refrain from going to bed unless you're tired  Utilize your bedroom for sleep only.  Avoid use of electronics including television and/or cellphone/computers.  Refrain from use of electronics including television and/or cellphones/computers prior to bed  Turn your alarm clock away so the light is not visible.  Attempt relaxation using various means like reading if you're restless in bed for approximately 15-20 minutes.  Participate in regular physical activities like exercise, although avoid approximately 3-4 hours prior to bed.  Morning exercise is ideal.  Avoid caffeine use prior to bedtime.  Consider tapering down excessive use of caffeine.  Avoid tobacco use prior to bedtime.  Avoid alcohol use prior to bedtime.  Consider reading \"No More Sleepless nights\" by Vance Tabor, Ph.D.  Consider use of online resources including:  http://[place]/cbt-online-insomnia-treatment.html  http://www.[employer]  CBT-I  Teri on your Smart Phone.  Go! To Sleep by the [facility].    Look up \"grounding techniques\" and/or \"anchoring demonstration\" online and try a few to see what may work for you. Practice these skills before you need them, when you are not feeling too anxious or triggered. You can also search for free guided meditation videos online to help improve your head space when you are feeling " "very anxious or triggered.        Healthy Diet   The American Heart Association and the American College of Cardiology have long recommended a healthy diet for not only patients who are at risk for atherosclerotic cardiovascular disease (ASCVD) but also the general public. In keeping with this evidence-based recommendation, the \"2018 Guideline on the Management of Blood Cholesterol\" stresses that a healthy diet should include adequate intake of these essentials:   Vegetables, fruits, and whole grains   Legumes and nuts   Low-fat dairy products   Low-fat poultry (without the skin)   Fish and seafood   Nontropical vegetable oils     The recent guidelines do provide room for cultural food preferences in a healthy diet, but in general, all patients should limit their intake of saturated and avoid all trans fats, sweets, sugar-sweetened beverages, and red meats.  Please maintain adequate hydration of at least 2 Liters of water per day, and improve nutrition by decreasing portion sizes, avoiding fried foods, fast foods, inappropriate snacking and overly processed and packaged items with 'added sugars' (whether in drinks or foods), and observing nutritional facts information on any items that are packaged to reduce intake of saturated fats and AVOID trans fats as mentioned above. Again, consume whole foods such as vegetables, higher lean protein intake, and using healthier lifestyle plans such as the Mediterranean diet with healthier fats such as those from seeds, nuts, and using organic extra virgin olive oil or avocado oil in lieu of processed vegetable oils or margarine.    Physical Activity   Recommended DAILY exercise for at least 150 minutes of moderate exercise weekly!  In addition to a healthy diet, all patients should include regular physical activity in their weekly routines, at moderate to vigorous intensity. Any activity is better than nothing, so if your patients can't meet the recommendation of vigorous " activity, moderate-intensity activity can still help them reduce their risk of ASCVD.     Below are the American Heart Association's recommendations for physical activity per week (preferably spread throughout the week):     For Overall Cardiovascular Health and Lowering Cholesterol   At least 150 minutes of moderate-intensity physical activity (for example, 30 minutes, 5 days a week), or   At least 75 minutes of vigorous-intensity physical activity (for example, 25 minutes, 3 days a week); or   A combination of moderate- and vigorous-intensity aerobic activity, and   At least 2 days of moderate- to high-intensity muscle-strengthening activities (such as resistance weight training) for additional health benefits    If you have thoughts of harming yourself or are otherwise in psychological crisis, do not hesitate to contact your [facility] Crisis hotline, or 911 or go to the nearest emergency room.  Adult Crisis Numbers  Suicide Prevention Hotline - Dial 9-8-8  [facility]: [phone] or [phone]  [facility]: [phone]  [facility]: [phone]  [facility]: [phone]  [facility]: [phone] or [phone]  [facility]: [phone]  [facility]: [phone] or [facility] Crisis: [phone]  [facility]: [phone] (daytime).       [phone] (after hours, weekends, holidays)     Child/Adolescent Crisis Numbers   [facility]: [phone]   [facility]: [phone]   [place]: [phone]   [facility]: [phone]  Please note: Some [facility] do not have a separate number for Child/Adolescent specific crisis. If your county is not listed under Child/Adolescent, please call the adult number for your county     National Talk to Text Line   All Ages - 615-545    National Suicide Prevention Hotline: 1-292.439.6255 or call 272.

## 2024-11-18 NOTE — TELEPHONE ENCOUNTER
Contacted Ivette to reschedule missed therapy appointment. Ivette stated she was doing well and rescheduled for 11/20/2024 at 11am.

## 2024-11-20 ENCOUNTER — SOCIAL WORK (OUTPATIENT)
Dept: PSYCHIATRY | Facility: CLINIC | Age: 29
End: 2024-11-20
Payer: COMMERCIAL

## 2024-11-20 DIAGNOSIS — F39 UNSPECIFIED MOOD (AFFECTIVE) DISORDER (HCC): Primary | ICD-10-CM

## 2024-11-20 DIAGNOSIS — F41.9 ANXIETY DISORDER, UNSPECIFIED TYPE: ICD-10-CM

## 2024-11-20 DIAGNOSIS — F10.20 ALCOHOL USE DISORDER, SEVERE, DEPENDENCE (HCC): ICD-10-CM

## 2024-11-20 DIAGNOSIS — F43.10 PTSD (POST-TRAUMATIC STRESS DISORDER): ICD-10-CM

## 2024-11-20 PROCEDURE — 90837 PSYTX W PT 60 MINUTES: CPT | Performed by: COUNSELOR

## 2024-11-20 NOTE — PSYCH
Behavioral Health Psychotherapy Progress Note    Psychotherapy Provided: Individual Psychotherapy     1. Unspecified mood (affective) disorder (HCC)        2. PTSD (post-traumatic stress disorder)        3. Anxiety disorder, unspecified type        4. Alcohol use disorder, severe, dependence (HCC)            Goals addressed in session: Goal 1 and Goal 2     DATA: Ivette was seen for therapy. Ivette reports no current mood issues but did discuss ongoing mood issues from last week, during which Ivette reports feelings of paranoia and then depression and suicidal thoughts.  Ivette and therapist evaluated this process and cycle of paranoia, depression, and SI and discussed effective strategies to intervene prior to acute issues. Crystal and therapist discussed Ivette accepting a non judgmental stance towards her thoughts and feelings and allowing her self to experience feelings and let them go when ready. Ivette and therapist discussed thought stopping techniques and safety measures that can utilized when thoughts of SI occur. Ivette was in agreement that she could achieve these goals and did not express concern for any ongoing or future SI.  Ivette reports medication adherence and was scheduled for a session next week.  During this session, this clinician used the following therapeutic modalities: Client-centered Therapy, Cognitive Behavioral Therapy, Motivational Interviewing, and Supportive Psychotherapy    Substance Abuse was addressed during this session. If the client is diagnosed with a co-occurring substance use disorder, please indicate any changes in the frequency or amount of use: Ivette reports continued abstinence.  Therapist utilized stage appropriate interventions to address this substance abuse issue.  Ivette discussed her 12 step meeting attendance of 2 meetings per week and discussed how this could be increased. Ivette indicated amazement that she had no cravings to drink even while  "experiencing SI and attributed this to her 12 step meeting attendance. Ivette stated she had reviewed the Step 1 packet this therapist provided and felt she wanted more packets. Ivette received a Step 2 packet and Relapse prevention work book for women. Ivette agreed that increasing her 12 step meeting attendance would be helpful and identified other meetings she could attend.  Stage of change for addressing substance use diagnoses: Action    ASSESSMENT:  Ivette Andrew presents with a Euthymic/ normal mood. Ivette presented in positive mood and presented with laughter when appropriate during session. Ivette indicated a desire to continue to improve her mood.       her affect is Normal range and intensity, which is congruent, with her mood and the content of the session. The client has made progress on their goals.     Ivette Andrew presents with a none risk of suicide, none risk of self-harm, and none risk of harm to others.    For any risk assessment that surpasses a \"low\" rating, a safety plan must be developed.    A safety plan was indicated: no  If yes, describe in detail n/a    PLAN: Between sessions, Ivette Andrew will review and complete Step 2 packet and Relapse prevention workbook for Women. At the next session, the therapist will use Client-centered Therapy, Cognitive Behavioral Therapy, Motivational Interviewing, and Supportive Psychotherapy to address Ivette's development of mood stabilization and relapse prevention skills.    Behavioral Health Treatment Plan and Discharge Planning: Ivette Andrew is aware of and agrees to continue to work on their treatment plan. They have identified and are working toward their discharge goals. yes    Visit start and stop times:    11/20/24  Start Time: 1058  Stop Time: 1159  Total Visit Time: 61 minutes  "

## 2024-12-02 ENCOUNTER — HOSPITAL ENCOUNTER (OUTPATIENT)
Dept: INFUSION CENTER | Facility: HOSPITAL | Age: 29
Discharge: HOME | End: 2024-12-02
Attending: EMERGENCY MEDICINE
Payer: COMMERCIAL

## 2024-12-02 DIAGNOSIS — F10.90 ALCOHOL USE DISORDER: Primary | ICD-10-CM

## 2024-12-02 PROCEDURE — 96372 THER/PROPH/DIAG INJ SC/IM: CPT

## 2024-12-02 RX ADMIN — NALTREXONE 380 MG: KIT at 14:44

## 2024-12-02 NOTE — PROGRESS NOTES
Vivitrol IM inj givren in RIGHT buttock. Tolerated well, offers no complaints. Confirmed next appt, declined AVS

## 2024-12-03 DIAGNOSIS — F41.9 ANXIETY DISORDER, UNSPECIFIED TYPE: ICD-10-CM

## 2024-12-03 DIAGNOSIS — I10 PRIMARY HYPERTENSION: ICD-10-CM

## 2024-12-03 DIAGNOSIS — F10.20 ALCOHOL USE DISORDER, SEVERE, DEPENDENCE (HCC): ICD-10-CM

## 2024-12-03 DIAGNOSIS — F39 UNSPECIFIED MOOD (AFFECTIVE) DISORDER (HCC): ICD-10-CM

## 2024-12-03 DIAGNOSIS — R45.1 RESTLESSNESS: ICD-10-CM

## 2024-12-03 DIAGNOSIS — G47.00 INSOMNIA, UNSPECIFIED TYPE: ICD-10-CM

## 2024-12-04 RX ORDER — GABAPENTIN 300 MG/1
600 CAPSULE ORAL
Qty: 60 CAPSULE | Refills: 0 | Status: SHIPPED | OUTPATIENT
Start: 2024-12-04

## 2024-12-04 RX ORDER — TRAZODONE HYDROCHLORIDE 50 MG/1
50-75 TABLET, FILM COATED ORAL
Qty: 45 TABLET | Refills: 0 | Status: SHIPPED | OUTPATIENT
Start: 2024-12-04

## 2024-12-04 RX ORDER — LISINOPRIL 20 MG/1
20 TABLET ORAL DAILY
Qty: 90 TABLET | Refills: 1 | Status: SHIPPED | OUTPATIENT
Start: 2024-12-04

## 2024-12-04 RX ORDER — GABAPENTIN 100 MG/1
200 CAPSULE ORAL 2 TIMES DAILY
Qty: 120 CAPSULE | Refills: 0 | Status: SHIPPED | OUTPATIENT
Start: 2024-12-04

## 2024-12-06 ENCOUNTER — APPOINTMENT (OUTPATIENT)
Dept: LAB | Facility: MEDICAL CENTER | Age: 29
End: 2024-12-06
Payer: COMMERCIAL

## 2024-12-06 DIAGNOSIS — Z13.21 SCREENING FOR ENDOCRINE, NUTRITIONAL, METABOLIC AND IMMUNITY DISORDER: ICD-10-CM

## 2024-12-06 DIAGNOSIS — Z13.29 SCREENING FOR ENDOCRINE, NUTRITIONAL, METABOLIC AND IMMUNITY DISORDER: ICD-10-CM

## 2024-12-06 DIAGNOSIS — Z13.228 SCREENING FOR ENDOCRINE, NUTRITIONAL, METABOLIC AND IMMUNITY DISORDER: ICD-10-CM

## 2024-12-06 DIAGNOSIS — Z13.0 SCREENING FOR ENDOCRINE, NUTRITIONAL, METABOLIC AND IMMUNITY DISORDER: ICD-10-CM

## 2024-12-06 LAB
25(OH)D3 SERPL-MCNC: 16 NG/ML (ref 30–100)
ALBUMIN SERPL BCG-MCNC: 4 G/DL (ref 3.5–5)
ALP SERPL-CCNC: 64 U/L (ref 34–104)
ALT SERPL W P-5'-P-CCNC: 13 U/L (ref 7–52)
ANION GAP SERPL CALCULATED.3IONS-SCNC: 5 MMOL/L (ref 4–13)
AST SERPL W P-5'-P-CCNC: 17 U/L (ref 13–39)
BASOPHILS # BLD AUTO: 0.03 THOUSANDS/ÂΜL (ref 0–0.1)
BASOPHILS NFR BLD AUTO: 0 % (ref 0–1)
BILIRUB SERPL-MCNC: 0.24 MG/DL (ref 0.2–1)
BUN SERPL-MCNC: 21 MG/DL (ref 5–25)
CALCIUM SERPL-MCNC: 9.1 MG/DL (ref 8.4–10.2)
CHLORIDE SERPL-SCNC: 111 MMOL/L (ref 96–108)
CHOLEST SERPL-MCNC: 172 MG/DL (ref ?–200)
CO2 SERPL-SCNC: 24 MMOL/L (ref 21–32)
CREAT SERPL-MCNC: 0.95 MG/DL (ref 0.6–1.3)
EOSINOPHIL # BLD AUTO: 0.13 THOUSAND/ÂΜL (ref 0–0.61)
EOSINOPHIL NFR BLD AUTO: 2 % (ref 0–6)
ERYTHROCYTE [DISTWIDTH] IN BLOOD BY AUTOMATED COUNT: 14.2 % (ref 11.6–15.1)
EST. AVERAGE GLUCOSE BLD GHB EST-MCNC: 111 MG/DL
GFR SERPL CREATININE-BSD FRML MDRD: 80 ML/MIN/1.73SQ M
GLUCOSE P FAST SERPL-MCNC: 101 MG/DL (ref 65–99)
HBA1C MFR BLD: 5.5 %
HCT VFR BLD AUTO: 41.2 % (ref 34.8–46.1)
HDLC SERPL-MCNC: 51 MG/DL
HGB BLD-MCNC: 12.8 G/DL (ref 11.5–15.4)
IMM GRANULOCYTES # BLD AUTO: 0.03 THOUSAND/UL (ref 0–0.2)
IMM GRANULOCYTES NFR BLD AUTO: 0 % (ref 0–2)
LDLC SERPL CALC-MCNC: 94 MG/DL (ref 0–100)
LYMPHOCYTES # BLD AUTO: 1.54 THOUSANDS/ÂΜL (ref 0.6–4.47)
LYMPHOCYTES NFR BLD AUTO: 23 % (ref 14–44)
MCH RBC QN AUTO: 27.3 PG (ref 26.8–34.3)
MCHC RBC AUTO-ENTMCNC: 31.1 G/DL (ref 31.4–37.4)
MCV RBC AUTO: 88 FL (ref 82–98)
MONOCYTES # BLD AUTO: 0.58 THOUSAND/ÂΜL (ref 0.17–1.22)
MONOCYTES NFR BLD AUTO: 9 % (ref 4–12)
NEUTROPHILS # BLD AUTO: 4.51 THOUSANDS/ÂΜL (ref 1.85–7.62)
NEUTS SEG NFR BLD AUTO: 66 % (ref 43–75)
NONHDLC SERPL-MCNC: 121 MG/DL
NRBC BLD AUTO-RTO: 0 /100 WBCS
PLATELET # BLD AUTO: 198 THOUSANDS/UL (ref 149–390)
PMV BLD AUTO: 11 FL (ref 8.9–12.7)
POTASSIUM SERPL-SCNC: 4.4 MMOL/L (ref 3.5–5.3)
PROT SERPL-MCNC: 6.9 G/DL (ref 6.4–8.4)
RBC # BLD AUTO: 4.69 MILLION/UL (ref 3.81–5.12)
SODIUM SERPL-SCNC: 140 MMOL/L (ref 135–147)
T4 FREE SERPL-MCNC: 0.84 NG/DL (ref 0.61–1.12)
TRIGL SERPL-MCNC: 136 MG/DL (ref ?–150)
WBC # BLD AUTO: 6.82 THOUSAND/UL (ref 4.31–10.16)

## 2024-12-06 PROCEDURE — 83036 HEMOGLOBIN GLYCOSYLATED A1C: CPT

## 2024-12-06 PROCEDURE — 36415 COLL VENOUS BLD VENIPUNCTURE: CPT

## 2024-12-06 PROCEDURE — 80053 COMPREHEN METABOLIC PANEL: CPT

## 2024-12-06 PROCEDURE — 82306 VITAMIN D 25 HYDROXY: CPT

## 2024-12-06 PROCEDURE — 84439 ASSAY OF FREE THYROXINE: CPT

## 2024-12-06 PROCEDURE — 85025 COMPLETE CBC W/AUTO DIFF WBC: CPT

## 2024-12-06 PROCEDURE — 80061 LIPID PANEL: CPT

## 2024-12-12 ENCOUNTER — OFFICE VISIT (OUTPATIENT)
Dept: OBGYN CLINIC | Facility: MEDICAL CENTER | Age: 29
End: 2024-12-12
Payer: COMMERCIAL

## 2024-12-12 VITALS
WEIGHT: 261 LBS | BODY MASS INDEX: 46.25 KG/M2 | HEIGHT: 63 IN | DIASTOLIC BLOOD PRESSURE: 78 MMHG | SYSTOLIC BLOOD PRESSURE: 124 MMHG

## 2024-12-12 DIAGNOSIS — Z30.431 SURVEILLANCE OF INTRAUTERINE CONTRACEPTIVE DEVICE: Primary | ICD-10-CM

## 2024-12-12 DIAGNOSIS — T83.84XA PAIN DUE TO INTRAUTERINE CONTRACEPTIVE DEVICE (IUD), INITIAL ENCOUNTER (HCC): ICD-10-CM

## 2024-12-12 PROBLEM — Z98.890 STATUS POST LEEP (LOOP ELECTROSURGICAL EXCISION PROCEDURE) OF CERVIX: Status: RESOLVED | Noted: 2023-12-04 | Resolved: 2024-12-12

## 2024-12-12 PROBLEM — D06.9 HIGH GRADE SQUAMOUS INTRAEPITHELIAL LESION (HGSIL), GRADE 3 CIN, ON BIOPSY OF CERVIX: Status: ACTIVE | Noted: 2023-10-04

## 2024-12-12 PROCEDURE — 99213 OFFICE O/P EST LOW 20 MIN: CPT | Performed by: CLINICAL NURSE SPECIALIST

## 2024-12-12 NOTE — ASSESSMENT & PLAN NOTE
Kyleena IUD inserted 10/5  Strings seen on Exam  Should be removed by 10/2029   Orders:  •  US pelvis complete w transvaginal; Future

## 2024-12-12 NOTE — PROGRESS NOTES
"Name: Ivette Andrew      : 1995      MRN: [id number]  Encounter Provider: MONTY Lu  Encounter Date: 2024   Encounter department: [facility] [place]    :  Assessment & Plan  Surveillance of intrauterine contraceptive device  Kyleena IUD inserted 10/5  Strings seen on Exam  Should be removed by 10/2029   Orders:  •  US pelvis complete w transvaginal; Future    Pain due to intrauterine contraceptive device (IUD), initial encounter ([facility])  Pt with c/o increased cramping since kyleena insertion  Strings seen on exam as expected. Very mild ttp fundal on exam.  Will check US to verify position. If nml, then is up to her if she wants to continue.  Presently is tolerable.   Orders:  •  US pelvis complete w transvaginal; Future             Subjective:   History of Present Illness     Ivette Andrew is a 29 y.o. female.She is here for Follow-up (Insertion 10/25/24 )    Kyleena IUD inserted 10/25/24  Since IUD inserted she reports  Menses is unchanged  Pelvic Pain: some discomfort for the first 3 wks. And now cramping increased with menses  Abnormal discharge: denies  Has/has not had sexual activity  Dyspareunia: Denies  Partner complaints: denies    Menstrual History:  Patient's last menstrual period was 2024 (exact date).          Review of Systems   Constitutional:  Negative for activity change, chills, fatigue, fever and unexpected weight change.   Respiratory: Negative.     Cardiovascular: Negative.    Gastrointestinal: Negative.    Endocrine: Negative.    Neurological: Negative.      The following portions of the patient's history were reviewed and updated as appropriate: allergies, current medications, past family history, past medical history, past social history, past surgical history, and problem list.          Objective:   Objective   /78 (BP Location: Left arm, Patient Position: Sitting, Cuff Size: Large)   Ht 5' 3\" (1.6 m)   Wt 118 " kg (261 lb)   LMP 12/12/2024 (Exact Date)   BMI 46.23 kg/m²     Physical Exam  Constitutional:       General: She is not in acute distress.     Appearance: Normal appearance.   Genitourinary:      Urethral meatus normal.      No lesions in the vagina.      Right Labia: No rash, lesions or skin changes.     Left Labia: No lesions, skin changes or rash.     No vaginal discharge, erythema, tenderness, bleeding or ulceration.        Right Adnexa: not tender, not full and no mass present.     Left Adnexa: not tender, not full and no mass present.     No cervical motion tenderness, discharge, friability or lesion.      IUD strings visualized (blue strings seen- approx 3 cm).      Uterus is not enlarged or tender.      Bladder is not tender.       Pelvic exam was performed with patient in the lithotomy position.   Rectum:      No external hemorrhoid.   HENT:      Head: Normocephalic.   Cardiovascular:      Rate and Rhythm: Normal rate.   Pulmonary:      Effort: Pulmonary effort is normal.   Neurological:      Mental Status: She is alert and oriented to person, place, and time.   Psychiatric:         Mood and Affect: Mood normal.         Behavior: Behavior normal.   Vitals reviewed.

## 2024-12-13 ENCOUNTER — TELEPHONE (OUTPATIENT)
Dept: PSYCHIATRY | Facility: CLINIC | Age: 29
End: 2024-12-13

## 2024-12-13 NOTE — TELEPHONE ENCOUNTER
Called and left a VM for Ivette to schedule her 2 mo F/U appt with Smitha FISHER, provider, for around 1/4/25. MAT office number provided.

## 2024-12-16 ENCOUNTER — TELEPHONE (OUTPATIENT)
Dept: OTHER | Age: 29
End: 2024-12-16

## 2024-12-21 DIAGNOSIS — F39 UNSPECIFIED MOOD (AFFECTIVE) DISORDER (HCC): ICD-10-CM

## 2024-12-23 ENCOUNTER — HOSPITAL ENCOUNTER (OUTPATIENT)
Dept: ULTRASOUND IMAGING | Facility: HOSPITAL | Age: 29
Discharge: HOME | End: 2024-12-23
Payer: COMMERCIAL

## 2024-12-23 DIAGNOSIS — T83.84XA PAIN DUE TO INTRAUTERINE CONTRACEPTIVE DEVICE (IUD), INITIAL ENCOUNTER (HCC): ICD-10-CM

## 2024-12-23 DIAGNOSIS — Z30.431 SURVEILLANCE OF INTRAUTERINE CONTRACEPTIVE DEVICE: ICD-10-CM

## 2024-12-23 PROCEDURE — 76830 TRANSVAGINAL US NON-OB: CPT

## 2024-12-23 PROCEDURE — 76856 US EXAM PELVIC COMPLETE: CPT

## 2024-12-23 RX ORDER — TOPIRAMATE 25 MG/1
25 TABLET, FILM COATED ORAL
Qty: 30 TABLET | Refills: 2 | Status: SHIPPED | OUTPATIENT
Start: 2024-12-23

## 2024-12-30 ENCOUNTER — HOSPITAL ENCOUNTER (OUTPATIENT)
Dept: INFUSION CENTER | Facility: HOSPITAL | Age: 29
Discharge: HOME | End: 2024-12-30
Attending: EMERGENCY MEDICINE
Payer: COMMERCIAL

## 2024-12-30 DIAGNOSIS — F10.90 ALCOHOL USE DISORDER: Primary | ICD-10-CM

## 2024-12-30 PROCEDURE — 96372 THER/PROPH/DIAG INJ SC/IM: CPT

## 2024-12-30 RX ADMIN — NALTREXONE 380 MG: KIT at 13:57

## 2024-12-30 NOTE — PROGRESS NOTES
Pt tolerated Vivitrol injection to L gluteal muscle without difficulty.  Confirmed next appt on 1/27 at 1400.  Aware to make follow up appt with [facility].  AVS declined.  Left ambulatory in stable condition.

## 2025-01-02 ENCOUNTER — RESULTS FOLLOW-UP (OUTPATIENT)
Dept: OBGYN CLINIC | Facility: MEDICAL CENTER | Age: 30
End: 2025-01-02

## 2025-01-04 DIAGNOSIS — F43.10 PTSD (POST-TRAUMATIC STRESS DISORDER): ICD-10-CM

## 2025-01-04 DIAGNOSIS — F41.9 ANXIETY DISORDER, UNSPECIFIED TYPE: ICD-10-CM

## 2025-01-04 DIAGNOSIS — F10.20 ALCOHOL USE DISORDER, SEVERE, DEPENDENCE (HCC): ICD-10-CM

## 2025-01-04 DIAGNOSIS — R45.1 RESTLESSNESS: ICD-10-CM

## 2025-01-04 DIAGNOSIS — G47.00 INSOMNIA, UNSPECIFIED TYPE: ICD-10-CM

## 2025-01-04 DIAGNOSIS — F39 UNSPECIFIED MOOD (AFFECTIVE) DISORDER (HCC): ICD-10-CM

## 2025-01-07 RX ORDER — GABAPENTIN 300 MG/1
600 CAPSULE ORAL
Qty: 60 CAPSULE | Refills: 2 | Status: SHIPPED | OUTPATIENT
Start: 2025-01-07

## 2025-01-07 RX ORDER — TRAZODONE HYDROCHLORIDE 50 MG/1
50-75 TABLET, FILM COATED ORAL
Qty: 45 TABLET | Refills: 2 | Status: SHIPPED | OUTPATIENT
Start: 2025-01-07

## 2025-01-07 RX ORDER — ARIPIPRAZOLE 15 MG/1
15 TABLET ORAL DAILY
Qty: 30 TABLET | Refills: 2 | Status: SHIPPED | OUTPATIENT
Start: 2025-01-07

## 2025-01-08 ENCOUNTER — TELEPHONE (OUTPATIENT)
Dept: PSYCHIATRY | Facility: CLINIC | Age: 30
End: 2025-01-08

## 2025-01-08 NOTE — TELEPHONE ENCOUNTER
Patient requested refill of :  Requested Prescriptions     Pending Prescriptions Disp Refills    ARIPiprazole (ABILIFY) 15 mg tablet [Pharmacy Med Name: ARIPIPRAZOLE 15MG TABS] 30 tablet 2     Sig: TAKE ONE TABLET BY MOUTH EVERY DAY    gabapentin (NEURONTIN) 300 mg capsule [Pharmacy Med Name: GABAPENTIN 300MG CAPS] 60 capsule 2     Sig: TAKE TWO CAPSULES BY MOUTH EVERY DAY AT BEDTIME    traZODone (DESYREL) 50 mg tablet [Pharmacy Med Name: TRAZODONE HCL 50MG TABS] 45 tablet 2     Sig: TAKE ONE AND ONE-HALF TABLETS BY MOUTH AT BEDTIME         Refill request approved and sent to pharmacy on file per patient request.     Han Reddy MD

## 2025-01-08 NOTE — TELEPHONE ENCOUNTER
Writer called to schedule follow up appointment with Dr Reddy. Patients mailbox is full and could not leave a message.

## 2025-01-09 ENCOUNTER — TELEPHONE (OUTPATIENT)
Age: 30
End: 2025-01-09

## 2025-01-10 DIAGNOSIS — F41.9 ANXIETY DISORDER, UNSPECIFIED TYPE: ICD-10-CM

## 2025-01-10 DIAGNOSIS — F10.20 ALCOHOL USE DISORDER, SEVERE, DEPENDENCE (HCC): ICD-10-CM

## 2025-01-10 DIAGNOSIS — F39 UNSPECIFIED MOOD (AFFECTIVE) DISORDER (HCC): ICD-10-CM

## 2025-01-10 DIAGNOSIS — G47.00 INSOMNIA, UNSPECIFIED TYPE: ICD-10-CM

## 2025-01-10 RX ORDER — GABAPENTIN 100 MG/1
200 CAPSULE ORAL 2 TIMES DAILY
Qty: 120 CAPSULE | Refills: 0 | Status: SHIPPED | OUTPATIENT
Start: 2025-01-10

## 2025-01-27 ENCOUNTER — HOSPITAL ENCOUNTER (OUTPATIENT)
Dept: INFUSION CENTER | Facility: HOSPITAL | Age: 30
Discharge: HOME | End: 2025-01-27
Attending: EMERGENCY MEDICINE
Payer: COMMERCIAL

## 2025-01-27 ENCOUNTER — OFFICE VISIT (OUTPATIENT)
Dept: OTHER | Age: 30
End: 2025-01-27
Payer: COMMERCIAL

## 2025-01-27 VITALS
SYSTOLIC BLOOD PRESSURE: 142 MMHG | HEIGHT: 63 IN | BODY MASS INDEX: 46.6 KG/M2 | HEART RATE: 80 BPM | DIASTOLIC BLOOD PRESSURE: 100 MMHG | WEIGHT: 263 LBS

## 2025-01-27 DIAGNOSIS — K21.9 GASTROESOPHAGEAL REFLUX DISEASE, UNSPECIFIED WHETHER ESOPHAGITIS PRESENT: ICD-10-CM

## 2025-01-27 DIAGNOSIS — F10.90 ALCOHOL USE DISORDER: Primary | ICD-10-CM

## 2025-01-27 DIAGNOSIS — E55.9 VITAMIN D DEFICIENCY: ICD-10-CM

## 2025-01-27 PROCEDURE — 96372 THER/PROPH/DIAG INJ SC/IM: CPT

## 2025-01-27 PROCEDURE — 99213 OFFICE O/P EST LOW 20 MIN: CPT

## 2025-01-27 RX ADMIN — NALTREXONE 380 MG: KIT at 13:53

## 2025-01-27 NOTE — PROGRESS NOTES
[facility]    Progress Note  Ivette Andrew 29 y.o. female MRN: [id number]  @ Encounter: [id number]      1. Alcohol use disorder  Assessment & Plan:  Patient is still compliant with vivitrol injections   Denies any recent relapses - 4 1/2 months of sobriety   Patient is still compliant with psychotherapy appointments- having issues with making in person appointments due to work    2. Vitamin D deficiency  Assessment & Plan:  Review or recent lab work  Patient has been chronically vitamin D deficient   Prescribed supplementation  Recommend rechecking lab in 3 months   Orders:  -     Cholecalciferol (VITAMIN D3) 1,000 units tablet; Take 1 tablet (1,000 Units total) by mouth daily        Support Services  Case Management and Certified  are following.   Patient was referred to the [facility] Certified Addiction Counselors: No  The patient is actively engaged with the [facility] Certified Addiction Counselor’s psychotherapy plan: No    This patient does not have an active medication from one of the medication groupers.      PDMP reviewed:     PDMP Review         Value Time User    PDMP Reviewed  Yes 11/18/2024  9:40 AM Han Reddy MD            SUBJECTIVE  Patient seen in office for follow up visit. She is doing well. Has maintained sobriety for 4 1/2 months. Will continue with vivitrol injections     Drug cravings: none  Motivation to continue treatment: high       Current Outpatient Medications:     ARIPiprazole (ABILIFY) 15 mg tablet, TAKE ONE TABLET BY MOUTH EVERY DAY, Disp: 30 tablet, Rfl: 2    Cholecalciferol (VITAMIN D3) 1,000 units tablet, Take 1 tablet (1,000 Units total) by mouth daily, Disp: 30 tablet, Rfl: 0    gabapentin (NEURONTIN) 100 mg capsule, TAKE TWO CAPSULES BY MOUTH TWICE A DAY, Disp: 120 capsule, Rfl: 0    gabapentin (NEURONTIN) 300 mg capsule, TAKE TWO CAPSULES BY MOUTH EVERY DAY AT BEDTIME, Disp: 60 capsule, Rfl: 2    lisinopril (ZESTRIL) 20 mg tablet, TAKE ONE  TABLET BY MOUTH DAILY, Disp: 90 tablet, Rfl: 1    Naltrexone (VIVITROL IM), Inject into a muscle, Disp: , Rfl:     naproxen sodium (Aleve) 220 MG tablet, Take 220 mg by mouth daily as needed for mild pain, Disp: , Rfl:     omeprazole (PriLOSEC) 40 MG capsule, TAKE ONE CAPSULE BY MOUTH EVERY DAY BEFORE BREAKFAST, Disp: 90 capsule, Rfl: 0    sertraline (ZOLOFT) 100 mg tablet, Take 2 tablets (200 mg total) by mouth daily, Disp: 60 tablet, Rfl: 2    topiramate (TOPAMAX) 25 mg tablet, TAKE ONE TABLET BY MOUTH EVERY DAY AT BEDTIME, Disp: 30 tablet, Rfl: 2    traZODone (DESYREL) 50 mg tablet, TAKE ONE AND ONE-HALF TABLETS BY MOUTH AT BEDTIME, Disp: 45 tablet, Rfl: 2    acyclovir (ZOVIRAX) 800 mg tablet, Take 1 tablet (800 mg total) by mouth 3 (three) times a day for 7 days, Disp: 21 tablet, Rfl: 0    nicotine (NICODERM CQ) 21 mg/24 hr TD 24 hr patch, Place 1 patch on the skin over 24 hours every 24 hours (Patient not taking: Reported on 1/27/2025), Disp: 28 patch, Rfl: 0  No current facility-administered medications for this visit.    Objective     Vitals:    01/27/25 1311   BP: 142/100   Pulse: 80       Physical Exam  Vitals reviewed.   Constitutional:       General: She is not in acute distress.  Neurological:      Mental Status: She is alert.   Psychiatric:         Mood and Affect: Mood is not anxious.              Lab Results:   Results from last 6 Months   Lab Units 12/06/24  0744   WBC Thousand/uL 6.82   HEMOGLOBIN g/dL 12.8   HEMATOCRIT % 41.2   PLATELETS Thousands/uL 198      Results from last 6 Months   Lab Units 12/06/24  0744   POTASSIUM mmol/L 4.4   CHLORIDE mmol/L 111*   CO2 mmol/L 24   BUN mg/dL 21   CREATININE mg/dL 0.95   CALCIUM mg/dL 9.1   ALBUMIN g/dL 4.0   ALK PHOS U/L 64   ALT U/L 13   AST U/L 17     Results from last 6 Months   Lab Units 09/11/24  1336   HEP B S AG  Non-reactive   HEP C AB  Non-reactive     Results from last 6 Months   Lab Units 09/11/24  1336   HIV-1 P24 ANTIGEN-ARDACO  Non-Reactive                  Counseling / Coordination of Care  Total time spent today 20 minutes. Greater than 50% of total time was spent with the patient and / or family counseling and / or coordination of care.     Smitha Nina PA-C

## 2025-01-27 NOTE — ASSESSMENT & PLAN NOTE
Patient is still compliant with vivitrol injections   Denies any recent relapses - 4 1/2 months of sobriety   Patient is still compliant with psychotherapy appointments- having issues with making in person appointments due to work

## 2025-01-27 NOTE — PROGRESS NOTES
Pt tolerated Vivitrol injection to R gluteal muscle without difficulty.  Confirmed next appt on 2/24 at 1330.  AVS declined.  Left ambulatory in stable condition.

## 2025-01-27 NOTE — ASSESSMENT & PLAN NOTE
Review or recent lab work  Patient has been chronically vitamin D deficient   Prescribed supplementation  Recommend rechecking lab in 3 months

## 2025-01-29 RX ORDER — OMEPRAZOLE 40 MG/1
40 CAPSULE, DELAYED RELEASE ORAL
Qty: 90 CAPSULE | Refills: 0 | Status: SHIPPED | OUTPATIENT
Start: 2025-01-29

## 2025-01-31 ENCOUNTER — TELEPHONE (OUTPATIENT)
Age: 30
End: 2025-01-31

## 2025-01-31 NOTE — TELEPHONE ENCOUNTER
Patient is calling regarding cancelling an appointment.    Date/Time: 1/31/2025 2pm    Reason: called into work    Patient was rescheduled: YES [x] NO []  If yes, when was Patient reschedule for: 2/21/2025 1pm    Patient requesting call back to reschedule: YES [] NO [x]

## 2025-02-08 DIAGNOSIS — F39 UNSPECIFIED MOOD (AFFECTIVE) DISORDER (HCC): ICD-10-CM

## 2025-02-08 DIAGNOSIS — F41.9 ANXIETY DISORDER, UNSPECIFIED TYPE: ICD-10-CM

## 2025-02-08 DIAGNOSIS — G47.00 INSOMNIA, UNSPECIFIED TYPE: ICD-10-CM

## 2025-02-08 DIAGNOSIS — F10.20 ALCOHOL USE DISORDER, SEVERE, DEPENDENCE (HCC): ICD-10-CM

## 2025-02-10 RX ORDER — GABAPENTIN 100 MG/1
200 CAPSULE ORAL 2 TIMES DAILY
Qty: 120 CAPSULE | Refills: 0 | Status: SHIPPED | OUTPATIENT
Start: 2025-02-10

## 2025-02-17 DIAGNOSIS — F43.10 PTSD (POST-TRAUMATIC STRESS DISORDER): ICD-10-CM

## 2025-02-17 DIAGNOSIS — F39 UNSPECIFIED MOOD (AFFECTIVE) DISORDER (HCC): ICD-10-CM

## 2025-02-17 DIAGNOSIS — G47.00 INSOMNIA, UNSPECIFIED TYPE: ICD-10-CM

## 2025-02-17 DIAGNOSIS — F41.9 ANXIETY DISORDER, UNSPECIFIED TYPE: ICD-10-CM

## 2025-02-18 RX ORDER — SERTRALINE HYDROCHLORIDE 100 MG/1
200 TABLET, FILM COATED ORAL DAILY
Qty: 60 TABLET | Refills: 2 | Status: SHIPPED | OUTPATIENT
Start: 2025-02-18

## 2025-02-24 ENCOUNTER — TELEPHONE (OUTPATIENT)
Dept: PSYCHIATRY | Facility: CLINIC | Age: 30
End: 2025-02-24

## 2025-02-24 ENCOUNTER — DOCUMENTATION (OUTPATIENT)
Dept: PSYCHIATRY | Facility: CLINIC | Age: 30
End: 2025-02-24

## 2025-02-24 ENCOUNTER — HOSPITAL ENCOUNTER (OUTPATIENT)
Dept: INFUSION CENTER | Facility: HOSPITAL | Age: 30
End: 2025-02-24
Attending: EMERGENCY MEDICINE

## 2025-02-24 DIAGNOSIS — F10.20 ALCOHOL USE DISORDER, SEVERE, DEPENDENCE (HCC): ICD-10-CM

## 2025-02-24 DIAGNOSIS — F41.9 ANXIETY DISORDER, UNSPECIFIED TYPE: ICD-10-CM

## 2025-02-24 DIAGNOSIS — F43.10 PTSD (POST-TRAUMATIC STRESS DISORDER): ICD-10-CM

## 2025-02-24 DIAGNOSIS — F39 UNSPECIFIED MOOD (AFFECTIVE) DISORDER (HCC): Primary | ICD-10-CM

## 2025-02-24 NOTE — TELEPHONE ENCOUNTER
Attempted to contact Ivette to discuss attending therapy. There was no response and no ability to leave . Ivette will be discharged from therapy at this time.

## 2025-02-24 NOTE — PROGRESS NOTES
Psychotherapy Discharge Summary    Preferred Name: Ivette Andrew  YOB: 1995    Admission date to psychotherapy: 10/4/2023    Referred by:  withdrawal management unit    Presenting Problem: Ivette was referred due to increasing depression and alcohol use disorder.    Course of treatment included : individual therapy     Progress/Outcome of Treatment Goals (brief summary of course of treatment) Ivette attended therapy but struggled to maintain consistency. Ivette was able to identify goals and made some progress in her goal to maintain abstinence.    Treatment Complications (if any): n/a    Treatment Progress: fair    Current [facility] Provider: Dr. Reddy    Discharge Medications include: acyclovir (ZOVIRAX) 800 mg tablet ()    ARIPiprazole (ABILIFY) 15 mg tablet    Cholecalciferol (VITAMIN D3) 1,000 units tablet    gabapentin (NEURONTIN) 100 mg capsule    gabapentin (NEURONTIN) 300 mg capsule    lisinopril (ZESTRIL) 20 mg tablet  Naltrexone (VIVITROL IM)  naproxen sodium (Aleve) 220 MG tablet    nicotine (NICODERM CQ) 21 mg/24 hr TD 24 hr patch    omeprazole (PriLOSEC) 40 MG capsule    sertraline (ZOLOFT) 100 mg tablet    topiramate (TOPAMAX) 25 mg tablet    traZODone (DESYREL) 50 mg tablet    Discharge Date: 2025    Discharge Diagnosis:   1. Unspecified mood (affective) disorder (HCC)        2. PTSD (post-traumatic stress disorder)        3. Anxiety disorder, unspecified type        4. Alcohol use disorder, severe, dependence (HCC)            Criteria for Discharge: two or more unexcused absences for services    Aftercare recommendations include (include specific referral names and phone numbers, if appropriate): Ivette can return to therapy at this program as needed    Prognosis: fair

## 2025-02-26 ENCOUNTER — HOSPITAL ENCOUNTER (EMERGENCY)
Facility: HOSPITAL | Age: 30
Discharge: HOME | End: 2025-02-26
Attending: EMERGENCY MEDICINE
Payer: COMMERCIAL

## 2025-02-26 VITALS
RESPIRATION RATE: 18 BRPM | HEART RATE: 77 BPM | WEIGHT: 254 LBS | TEMPERATURE: 98.3 F | SYSTOLIC BLOOD PRESSURE: 130 MMHG | DIASTOLIC BLOOD PRESSURE: 93 MMHG | BODY MASS INDEX: 44.99 KG/M2 | OXYGEN SATURATION: 100 %

## 2025-02-26 DIAGNOSIS — G43.909 MIGRAINE: Primary | ICD-10-CM

## 2025-02-26 PROCEDURE — 96372 THER/PROPH/DIAG INJ SC/IM: CPT

## 2025-02-26 PROCEDURE — 99284 EMERGENCY DEPT VISIT MOD MDM: CPT

## 2025-02-26 PROCEDURE — 99283 EMERGENCY DEPT VISIT LOW MDM: CPT

## 2025-02-26 RX ORDER — KETOROLAC TROMETHAMINE 30 MG/ML
15 INJECTION, SOLUTION INTRAMUSCULAR; INTRAVENOUS ONCE
Status: DISCONTINUED | OUTPATIENT
Start: 2025-02-26 | End: 2025-02-26 | Stop reason: HOSPADM

## 2025-02-26 RX ORDER — NAPROXEN 500 MG/1
500 TABLET ORAL 2 TIMES DAILY WITH MEALS
Qty: 30 TABLET | Refills: 0 | Status: SHIPPED | OUTPATIENT
Start: 2025-02-26

## 2025-02-26 RX ORDER — DROPERIDOL 2.5 MG/ML
2.5 INJECTION, SOLUTION INTRAMUSCULAR; INTRAVENOUS ONCE
Status: COMPLETED | OUTPATIENT
Start: 2025-02-26 | End: 2025-02-26

## 2025-02-26 RX ORDER — ONDANSETRON 4 MG/1
4 TABLET, ORALLY DISINTEGRATING ORAL EVERY 6 HOURS PRN
Qty: 10 TABLET | Refills: 0 | Status: SHIPPED | OUTPATIENT
Start: 2025-02-26 | End: 2025-03-08

## 2025-02-26 RX ADMIN — DROPERIDOL 2.5 MG: 2.5 INJECTION, SOLUTION INTRAMUSCULAR; INTRAVENOUS at 10:47

## 2025-02-26 NOTE — ED PROVIDER NOTES
"Time reflects when diagnosis was documented in both MDM as applicable and the Disposition within this note       Time User Action Codes Description Comment    2/26/2025 12:32 PM AlvinaCely thorpe Add [G43.909] Migraine           ED Disposition       ED Disposition   Discharge    Condition   Stable    Date/Time   Wed Feb 26, 2025 12:32 PM    Comment   Ivette Andrew discharge to home/self care.                   Assessment & Plan       Medical Decision Making  30 y/o female PMH of bipolar disorder, anxiety, depression, alcohol use disorder, GERD, HTN for a 5 day history of a migraine. Reports she gets them a few times a year and this one feels the same. Admits to intermittent throbbing headache, nausea, vomiting, photophobia. No vision changes, gait abnormality, focal deficits, or weakness. No new symptoms different than her previous migraines. Vitals are normal, patient is well-appearing on exam. Gave IM Droperidol, patient felt significantly improved. Discussed supportive care, follow up, risks and return precautions. She was agreeable to plan and was discharged home.         Risk  Prescription drug management.             Medications   ketorolac (TORADOL) injection 15 mg (15 mg Intramuscular Not Given 2/26/25 1226)   droperidol (INAPSINE) injection 2.5 mg (2.5 mg Intramuscular Given 2/26/25 1047)       ED Risk Strat Scores                                                History of Present Illness       Chief Complaint   Patient presents with    Headache     Headache x 5 days Nausea       Past Medical History:   Diagnosis Date    Abnormal Pap smear of cervix     ASCUS + HPV 16 & other    Alcohol abuse     Alcoholism (HCC)     Anxiety     Atypical squamous cells cannot exclude high grade squamous intraepithelial lesion on cytologic smear of cervix (ASC-H) 10/04/2023    9/8/23 ASCUS w/ HPV 16 and \"other\" type 10/5- colposcopy    Bipolar disorder (HCC)     Depression     GERD (gastroesophageal reflux disease)     " Hypertension     Kidney stones     PTSD (post-traumatic stress disorder)     Self-injurious behavior     Suicide attempt (HCC)     Withdrawal symptoms, drug or narcotic (HCC)       Past Surgical History:   Procedure Laterality Date    VA CONIZATION CERVIX W/WO D&C RPR ELTRD EXC N/A 12/4/2023    Procedure: LOOP ELECTRICAL EXCISION PROCEDURE;  Surgeon: Uzma Quezada DO;  Location: [facility] MAIN OR;  Service: Gynecology      Family History   Problem Relation Age of Onset    Heart disease Mother     Stroke Mother     Diabetes Mother     Hypertension Mother     Asthma Mother     Cancer Paternal Grandmother     Kidney disease Paternal Grandmother     Diabetes Paternal Grandmother       Social History     Tobacco Use    Smoking status: Every Day     Current packs/day: 1.00     Average packs/day: 1 pack/day for 19.2 years (19.2 ttl pk-yrs)     Types: Cigarettes     Start date: 2006     Passive exposure: Past    Smokeless tobacco: Never   Vaping Use    Vaping status: Former    Substances: THC   Substance Use Topics    Alcohol use: Not Currently     Comment: jug rum/day    Drug use: Yes     Types: Marijuana     Comment: THC edibles      E-Cigarette/Vaping    E-Cigarette Use Former User       E-Cigarette/Vaping Substances    Nicotine No     THC Yes     CBD No     Flavoring No     Other No     Unknown No       I have reviewed and agree with the history as documented.     Patient is a 28 y/o female PMH of bipolar disorder, anxiety, depression, alcohol use disorder, GERD, HTN here with mom for a 5 day history of a migraine. Reports she gets them a few times a year and this one feels the same, it is just lasting longer than ones in the past. Admits to intermittent throbbing headache, nausea, vomiting, photophobia. No vision changes, gait abnormality, focal deficits, weakness. No new symptoms different than her previous migraines.       Headache  Associated symptoms: nausea and vomiting    Associated symptoms: no abdominal pain, no  back pain, no congestion, no cough, no diarrhea, no dizziness, no ear pain, no eye pain, no fever, no numbness, no seizures, no sore throat and no weakness        Review of Systems   Constitutional:  Negative for chills and fever.   HENT:  Negative for congestion, ear pain, rhinorrhea and sore throat.    Eyes:  Negative for pain and visual disturbance.   Respiratory:  Negative for cough, chest tightness, shortness of breath and wheezing.    Cardiovascular:  Negative for chest pain and palpitations.   Gastrointestinal:  Positive for nausea and vomiting. Negative for abdominal pain and diarrhea.   Genitourinary:  Negative for difficulty urinating, dysuria, flank pain, frequency, hematuria and urgency.   Musculoskeletal:  Negative for arthralgias and back pain.   Skin:  Negative for color change and rash.   Neurological:  Positive for headaches. Negative for dizziness, seizures, syncope, weakness, light-headedness and numbness.   All other systems reviewed and are negative.      Objective       ED Triage Vitals [02/26/25 1023]   Temperature Pulse Blood Pressure Respirations SpO2 Patient Position - Orthostatic VS   98.3 °F (36.8 °C) 77 130/93 18 100 % Sitting      Temp Source Heart Rate Source BP Location FiO2 (%) Pain Score    Oral -- Right arm -- 7      Vitals      Date and Time Temp Pulse SpO2 Resp BP Pain Score FACES Pain Rating User   02/26/25 1023 98.3 °F (36.8 °C) 77 100 % 18 130/93 7 -- SN            Physical Exam  Vitals and nursing note reviewed.   Constitutional:       General: She is not in acute distress.     Appearance: Normal appearance. She is not ill-appearing, toxic-appearing or diaphoretic.   HENT:      Head: Normocephalic.      Nose: Nose normal.      Mouth/Throat:      Mouth: Mucous membranes are moist.      Pharynx: Oropharynx is clear. No oropharyngeal exudate or posterior oropharyngeal erythema.   Eyes:      Extraocular Movements: Extraocular movements intact.      Conjunctiva/sclera:  Conjunctivae normal.      Pupils: Pupils are equal, round, and reactive to light.   Cardiovascular:      Rate and Rhythm: Normal rate and regular rhythm.      Pulses: Normal pulses.      Heart sounds: Normal heart sounds.   Pulmonary:      Effort: Pulmonary effort is normal. No tachypnea, accessory muscle usage or respiratory distress.      Breath sounds: Normal breath sounds. No stridor. No wheezing, rhonchi or rales.   Chest:      Chest wall: No tenderness.   Abdominal:      General: There is no distension.      Palpations: Abdomen is soft.      Tenderness: There is no abdominal tenderness. There is no guarding or rebound.   Musculoskeletal:         General: Normal range of motion.      Cervical back: Full passive range of motion without pain, normal range of motion and neck supple.   Skin:     General: Skin is warm and dry.      Capillary Refill: Capillary refill takes less than 2 seconds.   Neurological:      General: No focal deficit present.      Mental Status: She is alert and oriented to person, place, and time.      GCS: GCS eye subscore is 4. GCS verbal subscore is 5. GCS motor subscore is 6.      Cranial Nerves: No cranial nerve deficit.      Sensory: No sensory deficit.      Motor: No weakness.      Coordination: Coordination normal.      Gait: Gait normal.      Deep Tendon Reflexes: Reflexes normal.   Psychiatric:         Mood and Affect: Mood normal.         Behavior: Behavior normal.         Thought Content: Thought content normal.         Judgment: Judgment normal.         Results Reviewed       None            No orders to display       Procedures    ED Medication and Procedure Management   Prior to Admission Medications   Prescriptions Last Dose Informant Patient Reported? Taking?   ARIPiprazole (ABILIFY) 15 mg tablet   No No   Sig: TAKE ONE TABLET BY MOUTH EVERY DAY   Cholecalciferol (VITAMIN D3) 1,000 units tablet   No No   Sig: Take 1 tablet (1,000 Units total) by mouth daily   Naltrexone  (VIVITROL IM)   Yes No   Sig: Inject into a muscle   acyclovir (ZOVIRAX) 800 mg tablet   No No   Sig: Take 1 tablet (800 mg total) by mouth 3 (three) times a day for 7 days   gabapentin (NEURONTIN) 100 mg capsule   No No   Sig: TAKE TWO CAPSULES BY MOUTH TWICE A DAY   gabapentin (NEURONTIN) 300 mg capsule   No No   Sig: TAKE TWO CAPSULES BY MOUTH EVERY DAY AT BEDTIME   lisinopril (ZESTRIL) 20 mg tablet   No No   Sig: TAKE ONE TABLET BY MOUTH DAILY   naproxen sodium (Aleve) 220 MG tablet   Yes No   Sig: Take 220 mg by mouth daily as needed for mild pain   nicotine (NICODERM CQ) 21 mg/24 hr TD 24 hr patch   No No   Sig: Place 1 patch on the skin over 24 hours every 24 hours   Patient not taking: Reported on 1/27/2025   omeprazole (PriLOSEC) 40 MG capsule   No No   Sig: TAKE ONE CAPSULE BY MOUTH EVERY DAY BEFORE BREAKFAST   sertraline (ZOLOFT) 100 mg tablet   No No   Sig: TAKE TWO TABLETS BY MOUTH EVERY DAY   topiramate (TOPAMAX) 25 mg tablet   No No   Sig: TAKE ONE TABLET BY MOUTH EVERY DAY AT BEDTIME   traZODone (DESYREL) 50 mg tablet   No No   Sig: TAKE ONE AND ONE-HALF TABLETS BY MOUTH AT BEDTIME      Facility-Administered Medications: None     Discharge Medication List as of 2/26/2025 12:34 PM        START taking these medications    Details   naproxen (Naprosyn) 500 mg tablet Take 1 tablet (500 mg total) by mouth 2 (two) times a day with meals, Starting Wed 2/26/2025, Normal      ondansetron (ZOFRAN-ODT) 4 mg disintegrating tablet Take 1 tablet (4 mg total) by mouth every 6 (six) hours as needed for nausea or vomiting for up to 10 days, Starting Wed 2/26/2025, Until Sat 3/8/2025 at 2359, Normal           CONTINUE these medications which have NOT CHANGED    Details   acyclovir (ZOVIRAX) 800 mg tablet Take 1 tablet (800 mg total) by mouth 3 (three) times a day for 7 days, Starting Tue 10/22/2024, Until Tue 10/29/2024, Normal      ARIPiprazole (ABILIFY) 15 mg tablet TAKE ONE TABLET BY MOUTH EVERY DAY, Starting Tue  1/7/2025, Normal      Cholecalciferol (VITAMIN D3) 1,000 units tablet Take 1 tablet (1,000 Units total) by mouth daily, Starting Mon 1/27/2025, Until Wed 2/26/2025, Normal      !! gabapentin (NEURONTIN) 100 mg capsule TAKE TWO CAPSULES BY MOUTH TWICE A DAY, Starting Mon 2/10/2025, Normal      !! gabapentin (NEURONTIN) 300 mg capsule TAKE TWO CAPSULES BY MOUTH EVERY DAY AT BEDTIME, Starting Tue 1/7/2025, Normal      lisinopril (ZESTRIL) 20 mg tablet TAKE ONE TABLET BY MOUTH DAILY, Starting Wed 12/4/2024, Normal      Naltrexone (VIVITROL IM) Inject into a muscle, Historical Med      naproxen sodium (Aleve) 220 MG tablet Take 220 mg by mouth daily as needed for mild pain, Historical Med      nicotine (NICODERM CQ) 21 mg/24 hr TD 24 hr patch Place 1 patch on the skin over 24 hours every 24 hours, Starting Mon 11/18/2024, Normal      omeprazole (PriLOSEC) 40 MG capsule TAKE ONE CAPSULE BY MOUTH EVERY DAY BEFORE BREAKFAST, Starting Wed 1/29/2025, Normal      sertraline (ZOLOFT) 100 mg tablet TAKE TWO TABLETS BY MOUTH EVERY DAY, Starting Tue 2/18/2025, Normal      topiramate (TOPAMAX) 25 mg tablet TAKE ONE TABLET BY MOUTH EVERY DAY AT BEDTIME, Starting Mon 12/23/2024, Normal      traZODone (DESYREL) 50 mg tablet TAKE ONE AND ONE-HALF TABLETS BY MOUTH AT BEDTIME, Starting Tue 1/7/2025, Normal       !! - Potential duplicate medications found. Please discuss with provider.        No discharge procedures on file.  ED SEPSIS DOCUMENTATION   Time reflects when diagnosis was documented in both MDM as applicable and the Disposition within this note       Time User Action Codes Description Comment    2/26/2025 12:32 PM Cely Lynch Add [G43.909] Migraine                  Cely Lynch PA-C  02/26/25 1257

## 2025-02-26 NOTE — DISCHARGE INSTRUCTIONS
Take Naprosyn as needed for headache. Follow up with neurology for management. If symptoms worsen, return to the emergency department.

## 2025-02-27 ENCOUNTER — HOSPITAL ENCOUNTER (OUTPATIENT)
Dept: INFUSION CENTER | Facility: HOSPITAL | Age: 30
End: 2025-02-27
Attending: EMERGENCY MEDICINE
Payer: COMMERCIAL

## 2025-02-27 DIAGNOSIS — F10.90 ALCOHOL USE DISORDER: Primary | ICD-10-CM

## 2025-02-27 PROCEDURE — 96372 THER/PROPH/DIAG INJ SC/IM: CPT

## 2025-02-27 RX ADMIN — NALTREXONE 380 MG: KIT at 13:44

## 2025-02-27 NOTE — PROGRESS NOTES
Patient tolerated left gluteal vivitrol injection without complications. Next appointment scheduled. AVS declined.

## 2025-03-10 DIAGNOSIS — F10.20 ALCOHOL USE DISORDER, SEVERE, DEPENDENCE (HCC): ICD-10-CM

## 2025-03-10 DIAGNOSIS — F39 UNSPECIFIED MOOD (AFFECTIVE) DISORDER (HCC): ICD-10-CM

## 2025-03-10 DIAGNOSIS — G47.00 INSOMNIA, UNSPECIFIED TYPE: ICD-10-CM

## 2025-03-10 DIAGNOSIS — F41.9 ANXIETY DISORDER, UNSPECIFIED TYPE: ICD-10-CM

## 2025-03-11 ENCOUNTER — TELEPHONE (OUTPATIENT)
Dept: PSYCHIATRY | Facility: CLINIC | Age: 30
End: 2025-03-11

## 2025-03-11 NOTE — TELEPHONE ENCOUNTER
PT was contacted in regards to message requesting to schedule appts. Writer unable to transfer to chew side.

## 2025-03-12 RX ORDER — GABAPENTIN 100 MG/1
200 CAPSULE ORAL 2 TIMES DAILY
Qty: 120 CAPSULE | Refills: 0 | Status: SHIPPED | OUTPATIENT
Start: 2025-03-12

## 2025-03-12 NOTE — TELEPHONE ENCOUNTER
LAST OFFICE VISIT W/ ENCOUNTER DEPT  11/18/2024 Han Reddy MD    Please call patient to schedule an appointment.

## 2025-03-12 NOTE — TELEPHONE ENCOUNTER
Patient need to schedule her follow up appointment if she wishes to have further refills.    Patient requested refill of :  Requested Prescriptions     Pending Prescriptions Disp Refills    gabapentin (NEURONTIN) 100 mg capsule [Pharmacy Med Name: GABAPENTIN 100MG CAPS] 120 capsule 0     Sig: TAKE TWO CAPSULES BY MOUTH TWICE A DAY         Refill request approved and sent to pharmacy on file per patient request.     Han Reddy MD

## 2025-03-17 ENCOUNTER — TELEMEDICINE (OUTPATIENT)
Dept: OTHER | Facility: HOSPITAL | Age: 30
End: 2025-03-17
Payer: COMMERCIAL

## 2025-03-17 DIAGNOSIS — J01.90 ACUTE SINUSITIS, RECURRENCE NOT SPECIFIED, UNSPECIFIED LOCATION: Primary | ICD-10-CM

## 2025-03-17 PROCEDURE — 99213 OFFICE O/P EST LOW 20 MIN: CPT | Performed by: NURSE PRACTITIONER

## 2025-03-17 NOTE — PROGRESS NOTES
Virtual Regular Visit  Name: Ivette Andrew      : 1995      MRN: [id number]  Encounter Provider: MONTY Ventura  Encounter Date: 3/17/2025   Encounter department: VIRTUAL CARE       Verification of patient location:  Patient is located at Home in the following state in which I hold an active license [place] :  Assessment & Plan  Acute sinusitis, recurrence not specified, unspecified location    Orders:    amoxicillin-clavulanate (AUGMENTIN) 875-125 mg per tablet; Take 1 tablet by mouth every 12 (twelve) hours for 10 days        Encounter provider MONTY Ventura    The patient was identified by name and date of birth. Ivette Andrew was informed that this is a telemedicine visit and that the visit is being conducted through the Epic Embedded platform. She agrees to proceed..  My office door was closed. No one else was in the room.  She acknowledged consent and understanding of privacy and security of the video platform. The patient has agreed to participate and understands they can discontinue the visit at any time.    Patient is aware this is a billable service.     History obtained from: patient  History of Present Illness     This is a 29 year old female here today for video visit.  She states she is having sinus pressure and congestion.  She states the symptoms started 2 weeks ago.  She states it has not gone away.   She states she is not having anything when she blows her nose.  She has tried OTC dayquil and mucinex which is not helping.  She has had some mild sob from the post drip.  She does have a cough.  She denies any fevers.       Review of Systems   Constitutional:  Positive for activity change, chills, fatigue and fever.   HENT:  Positive for congestion, rhinorrhea, sinus pressure and sinus pain.    Cardiovascular: Negative.    Neurological: Negative.    Psychiatric/Behavioral: Negative.         Objective   There were no vitals taken for this visit.    Physical  Exam  Constitutional:       General: She is not in acute distress.     Appearance: Normal appearance. She is not ill-appearing or toxic-appearing.   HENT:      Head: Normocephalic and atraumatic.      Nose: Congestion and rhinorrhea present.   Pulmonary:      Effort: Pulmonary effort is normal. No respiratory distress.   Neurological:      Mental Status: She is alert and oriented to person, place, and time.   Psychiatric:         Mood and Affect: Mood normal.         Behavior: Behavior normal.         Thought Content: Thought content normal.         Judgment: Judgment normal.         Visit Time  Total Visit Duration: 6 minutes not including the time spent for establishing the audio/video connection.

## 2025-03-17 NOTE — PATIENT INSTRUCTIONS
"Rest and drink extra fluids.  Start antibiotic.  Take probiotic.  OTC cough and cold as needed.  Flonase can also be helpful.  Nasal saline flushes or lluvia pot can help flush the sinuses.  Follow up with PCP if no improvement.  Go to ER with any worsening symptoms.     Patient Education     Sinusitis in adults   The Basics   Written by the doctors and editors at [facility]   What is sinusitis? -- Sinusitis is a condition that can cause a stuffy nose, pain in the face, and discharge or \"mucus\" from the nose.  The sinuses are hollow areas in the bones of the face (figure 1). They have a thin lining that normally makes a small amount of mucus. When this lining gets irritated or infected, it swells and makes extra mucus. This causes symptoms.  Sinusitis usually happens after a person gets sick with a cold. The germs causing the cold can infect the sinuses, too. Sometimes, other germs can be the cause of the infection. Often, a person feels like their cold is getting better. But then, they get sinusitis and begin to feel sick again.  What are the symptoms of sinusitis? -- Common symptoms of sinusitis include:   Stuffy or blocked nose   Thick white, yellow, or green discharge from the nose   Pain in the teeth   Pain or pressure in the face - This often feels worse when a person bends forward.  People with sinusitis can also have other symptoms, such as:   Fever   Cough   Trouble smelling   Ear pressure or fullness   Headache   Bad breath   Feeling tired  Most of the time, symptoms start to improve in 7 to 10 days.  Should I see a doctor or nurse? -- See your doctor or nurse if your symptoms last more than 10 days, or if your symptoms first get better but then get worse.  Rarely, sinusitis can lead to serious problems. See your doctor or nurse right away (do not wait 10 days) if you have:   Fever higher than 102°F (38.9°C)   Sudden and severe pain in the face and head   Trouble seeing, or seeing double   Trouble thinking " clearly   Swelling or redness around 1 or both eyes   Stiff neck  Is there anything I can do on my own to feel better? -- Yes. To help with your symptoms, you can:   Take an over-the-counter pain reliever to reduce the pain.   Rinse your nose and sinuses with salt water a few times a day - Ask your doctor or nurse about the best way to do this.   Drink plenty of fluids - Staying hydrated might help to thin the mucus and make it drain more easily.  Your doctor might also recommend a steroid nose spray to reduce the swelling in your nose, especially if you have allergies. Talk to your doctor if you are thinking of using a steroid spray.  How is sinusitis treated? -- Most of the time, sinusitis does not need to be treated with antibiotic medicines. This is because most sinusitis is caused by viruses, not bacteria, and antibiotics do not kill viruses. In fact, even sinusitis caused by bacteria will usually get better on its own without antibiotics.  Some people with sinusitis do need treatment with antibiotics. If your symptoms have not improved after 10 days, ask your doctor if you should take antibiotics. They might recommend that you wait 1 more week to see if your symptoms improve. But if you have symptoms such as a fever or a lot of pain, they might prescribe antibiotics. If you do get antibiotics, follow all of your doctor's instructions about taking them.  What if my symptoms do not get better? -- If your symptoms do not get better, talk with your doctor or nurse. They might order tests to figure out why you still have symptoms. These can include:   CT scan or other imaging tests - Imaging tests create pictures of the inside of the body.   A test to look inside the sinuses - For this test, a doctor puts a thin tube with a camera on the end into the nose and up into the sinuses.  Some people get a lot of sinus infections or have symptoms that last at least 3 months. These people can have a different type of  "sinusitis called \"chronic sinusitis.\" Chronic sinusitis can be caused by different things. For example, some people have growths inside their nose or sinuses that are called \"polyps.\" Other people have allergies that cause their symptoms.  Chronic sinusitis can be treated in different ways. If you have chronic sinusitis, talk with your doctor about which treatments are right for you.  All topics are updated as new evidence becomes available and our peer review process is complete.  This topic retrieved from [employer] on: Feb 28, 2024.  Topic 76713 Version 21.0  Release: 32.2.4 - C32.58  © 2024 UpToDate, Inc. and/or its affiliates. All rights reserved.  figure 1: Sinuses of the face     The sinuses are hollow areas in the bones of the face. This drawing shows where the sinuses are, from the side and front views. There are 4 pairs of sinuses, named for the bones around them: sphenoid, frontal, ethmoid, and maxillary.  Graphic 453278 Version 3.0  Consumer Information Use and Disclaimer   Disclaimer: This generalized information is a limited summary of diagnosis, treatment, and/or medication information. It is not meant to be comprehensive and should be used as a tool to help the user understand and/or assess potential diagnostic and treatment options. It does NOT include all information about conditions, treatments, medications, side effects, or risks that may apply to a specific patient. It is not intended to be medical advice or a substitute for the medical advice, diagnosis, or treatment of a health care provider based on the health care provider's examination and assessment of a patient's specific and unique circumstances. Patients must speak with a health care provider for complete information about their health, medical questions, and treatment options, including any risks or benefits regarding use of medications. This information does not endorse any treatments or medications as safe, effective, or approved for " treating a specific patient. UpToDate, Inc. and its affiliates disclaim any warranty or liability relating to this information or the use thereof.The use of this information is governed by the Terms of Use, available at https://www.wolTimetricuwer.com/en/know/clinical-effectiveness-terms. 2024© UpToDate, Inc. and its affiliates and/or licensors. All rights reserved.  Copyright   © 2024 UpToDate, Inc. and/or its affiliates. All rights reserved.    no

## 2025-03-20 ENCOUNTER — SOCIAL WORK (OUTPATIENT)
Dept: PSYCHIATRY | Facility: CLINIC | Age: 30
End: 2025-03-20
Payer: COMMERCIAL

## 2025-03-20 DIAGNOSIS — F39 UNSPECIFIED MOOD (AFFECTIVE) DISORDER (HCC): Primary | ICD-10-CM

## 2025-03-20 DIAGNOSIS — F10.20 ALCOHOL USE DISORDER, SEVERE, DEPENDENCE (HCC): ICD-10-CM

## 2025-03-20 DIAGNOSIS — F41.9 ANXIETY DISORDER, UNSPECIFIED TYPE: ICD-10-CM

## 2025-03-20 DIAGNOSIS — F43.10 PTSD (POST-TRAUMATIC STRESS DISORDER): ICD-10-CM

## 2025-03-20 PROCEDURE — 90837 PSYTX W PT 60 MINUTES: CPT | Performed by: COUNSELOR

## 2025-03-20 NOTE — BH TREATMENT PLAN
Outpatient Behavioral Health Psychotherapy Treatment Plan    Ivette BERNARDA Andrew  1995     Date of Initial Psychotherapy Assessment: 10/4/2023   Date of Current Treatment Plan: 03/20/25  Treatment Plan Target Date:9/20/2025  Treatment Plan Expiration Date: 9/20/2025    Diagnosis:   1. Unspecified mood (affective) disorder (HCC)        2. Anxiety disorder, unspecified type        3. Alcohol use disorder, severe, dependence (HCC)        4. PTSD (post-traumatic stress disorder)            Area(s) of Need: Abstinence support, dealing with daily emotional needs,impulse control    Long Term Goal 1 (in the client's own words): Be more supportive of myself mentally and physically    Stage of Change: Action    Target Date for completion: 9/20/2025     Anticipated therapeutic modalities: CBT, relapse prevention therapy, Cognitive processing, monthly therapy     People identified to complete this goal: Ivette,mother, boyfriend,mother, therapist      Objective 1: (identify the means of measuring success in meeting the objective): Be more aware of what I'm feeling and how it is influencing my life. Be able to not dwell on a feelings and get out of my head      Objective 2: (identify the means of measuring success in meeting the objective): Have at 5 people in my support network.       Long Term Goal 2 (in the client's own words): Stay Sober    Stage of Change: Action    Target Date for completion: 3/16/2025     Anticipated therapeutic modalities: motivational interviewing, CBT,monthly therapy session     People identified to complete this goal: Ivette, therapist, MAT provider      Objective 1: (identify the means of measuring success in meeting the objective): re-connecting with AA and attend at least 1 meeting weekly. Start working the 12 steps of AA.      Objective 2: (identify the means of measuring success in meeting the objective): Learning how my triggers are activated and develop at least 5 healthy coping skills.  Taking my MAT IM monthly          I am currently under the care of a [facility] psychiatric provider: yes    My [facility] psychiatric provider is: Dr. Reddy    I am currently taking psychiatric medications: Yes, as prescribed    I feel that I will be ready for discharge from mental health care when I reach the following (measurable goal/objective): Able to better communicate and process emotions.  Know how to handle my feelings and situations. Stay sober at least 1 year.    For children and adults who have a legal guardian:   Has there been any change to custody orders and/or guardianship status? NA. If yes, attach updated documentation.    I have created my Crisis Plan and have been offered a copy of this plan    Behavioral Health Treatment Plan [facility]: Diagnosis and Treatment Plan explained to Ivette Andrew acknowledges an understanding of their diagnosis. Ivette Andrew agrees to this treatment plan.    I have been offered a copy of this Treatment Plan. yes

## 2025-03-20 NOTE — PSYCH
Behavioral Health Psychotherapy Progress Note    Psychotherapy Provided: Individual Psychotherapy     1. Unspecified mood (affective) disorder (HCC)        2. Anxiety disorder, unspecified type        3. Alcohol use disorder, severe, dependence (HCC)        4. PTSD (post-traumatic stress disorder)            Goals addressed in session: Goal 1 and Goal 2     DATA: Ivette was seen for a return to therapy. Ivette indicated she wants to return to therapy because she is fearful she will become depressed again. At this time, Ivette states she feels her life is doing well and that she has no concern with her mood. Ivette and therapist discussed this fear Ivette has regarding her fear of becoming depressed. Ivette was encouraged to consider new perspectives and evaluate how her depression had returned in the past few months and the skills she utilized when she felt depressed. Ivette was able to recognize that she had developed healthy skills, such as distraction and breathing exercises. Ivette and therapist reviewd and revised her treatment plan and Ivette was scheduled for a follow up in one month.   During this session, this clinician used the following therapeutic modalities: Client-centered Therapy, Cognitive Behavioral Therapy, Motivational Interviewing, and Supportive Psychotherapy    Substance Abuse was addressed during this session. If the client is diagnosed with a co-occurring substance use disorder, please indicate any changes in the frequency or amount of use: Ivette reports she has a little over 6 months abstinent from alcohol.  Therapist utilized stage appropriate interventions to address this substance abuse issue. Ivette reports she had limited 12 step meeting attendance but reports she has return to attending these meetings.  Ivette agreed she had to begin working the 12 steps of the AA program.  Stage of change for addressing substance use diagnoses: Action    ASSESSMENT:  Ivette Andrew  "presents with a Euthymic/ normal mood. Ivette reports an improved mood and less mood fluctuation.     her affect is Normal range and intensity, which is congruent, with her mood and the content of the session. The client has made progress on their goals.     Ivette Andrew presents with a none risk of suicide, none risk of self-harm, and none risk of harm to others.    For any risk assessment that surpasses a \"low\" rating, a safety plan must be developed.    A safety plan was indicated: no  If yes, describe in detail n/a    PLAN: Between sessions, Ivette Andrew will attend 12 step meetings and find a sponsor to work the steps. At the next session, the therapist will use Client-centered Therapy, Cognitive Behavioral Therapy, Motivational Interviewing, and Supportive Psychotherapy to address Ivette's continued use of healthy coping skills and relapse prevetion.    Behavioral Health Treatment Plan and Discharge Planning: Ivette Andrew is aware of and agrees to continue to work on their treatment plan. They have identified and are working toward their discharge goals. yes    Depression Follow-up Plan Completed: Not applicable    Visit start and stop times:    03/20/25  Start Time: 1400  Stop Time: 1500  Total Visit Time: 60 minutes  "

## 2025-03-27 ENCOUNTER — HOSPITAL ENCOUNTER (OUTPATIENT)
Dept: INFUSION CENTER | Facility: HOSPITAL | Age: 30
End: 2025-03-27
Attending: EMERGENCY MEDICINE
Payer: COMMERCIAL

## 2025-03-27 DIAGNOSIS — F10.90 ALCOHOL USE DISORDER: Primary | ICD-10-CM

## 2025-03-27 PROCEDURE — 96372 THER/PROPH/DIAG INJ SC/IM: CPT

## 2025-03-27 RX ADMIN — NALTREXONE 380 MG: KIT at 13:48

## 2025-03-27 NOTE — PROGRESS NOTES
Patient tolerated vivitrol injection without complications. Next appointment scheduled 4/23. AVS declined.
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
## 2025-03-31 ENCOUNTER — OFFICE VISIT (OUTPATIENT)
Dept: PSYCHIATRY | Facility: CLINIC | Age: 30
End: 2025-03-31
Payer: COMMERCIAL

## 2025-03-31 DIAGNOSIS — R45.1 RESTLESSNESS: ICD-10-CM

## 2025-03-31 DIAGNOSIS — R40.0 DAYTIME SLEEPINESS: ICD-10-CM

## 2025-03-31 DIAGNOSIS — F41.9 ANXIETY DISORDER, UNSPECIFIED TYPE: ICD-10-CM

## 2025-03-31 DIAGNOSIS — E55.9 VITAMIN D DEFICIENCY: ICD-10-CM

## 2025-03-31 DIAGNOSIS — F43.10 PTSD (POST-TRAUMATIC STRESS DISORDER): ICD-10-CM

## 2025-03-31 DIAGNOSIS — Z72.0 NICOTINE ABUSE: ICD-10-CM

## 2025-03-31 DIAGNOSIS — F10.20 ALCOHOL USE DISORDER, SEVERE, DEPENDENCE (HCC): ICD-10-CM

## 2025-03-31 DIAGNOSIS — F12.10 CANNABIS ABUSE, CONTINUOUS: ICD-10-CM

## 2025-03-31 DIAGNOSIS — F39 UNSPECIFIED MOOD (AFFECTIVE) DISORDER (HCC): Primary | ICD-10-CM

## 2025-03-31 DIAGNOSIS — G47.00 INSOMNIA, UNSPECIFIED TYPE: ICD-10-CM

## 2025-03-31 PROCEDURE — 99214 OFFICE O/P EST MOD 30 MIN: CPT | Performed by: PSYCHIATRY & NEUROLOGY

## 2025-03-31 PROCEDURE — 90833 PSYTX W PT W E/M 30 MIN: CPT | Performed by: PSYCHIATRY & NEUROLOGY

## 2025-03-31 RX ORDER — TRAZODONE HYDROCHLORIDE 50 MG/1
50-75 TABLET ORAL
Qty: 45 TABLET | Refills: 3 | Status: SHIPPED | OUTPATIENT
Start: 2025-03-31

## 2025-03-31 RX ORDER — SERTRALINE HYDROCHLORIDE 100 MG/1
200 TABLET, FILM COATED ORAL DAILY
Qty: 60 TABLET | Refills: 3 | Status: SHIPPED | OUTPATIENT
Start: 2025-03-31

## 2025-03-31 RX ORDER — GABAPENTIN 100 MG/1
200 CAPSULE ORAL 2 TIMES DAILY
Qty: 120 CAPSULE | Refills: 3 | Status: SHIPPED | OUTPATIENT
Start: 2025-03-31

## 2025-03-31 RX ORDER — GABAPENTIN 300 MG/1
600 CAPSULE ORAL
Qty: 60 CAPSULE | Refills: 3 | Status: SHIPPED | OUTPATIENT
Start: 2025-03-31

## 2025-03-31 RX ORDER — TOPIRAMATE 25 MG/1
25 TABLET, FILM COATED ORAL
Qty: 30 TABLET | Refills: 3 | Status: SHIPPED | OUTPATIENT
Start: 2025-03-31

## 2025-03-31 RX ORDER — ARIPIPRAZOLE 10 MG/1
20 TABLET ORAL DAILY
Qty: 60 TABLET | Refills: 3 | Status: SHIPPED | OUTPATIENT
Start: 2025-03-31 | End: 2025-04-07

## 2025-03-31 NOTE — PATIENT INSTRUCTIONS
"    Please call the office nursing staff for medication issues including refills, problems getting medications, bothersome side effects, etc., at [phone].     Please return for a follow up appointment as discussed and arrive approximately 15 minutes prior to your appointment time. If you are running late or are unable to attend your appointment, please call our [place] office at [phone] (fax: [phone]), or if you were seen in the [facility] office, please call [phone] (fax [phone]).    National Suicide Prevention Hotline: 1-925.564.8567 or call 188.    To improve sleep:  - Keep a consistent sleep schedule. Get up at the same time every day, even on weekends or during vacations.  - Set a bedtime that is early enough for you to get at least 7-8 hours of sleep.  - Don’t go to bed unless you are sleepy.  - If you don’t fall asleep after 20 minutes, get out of bed and take a brief \"break\". Go to a quiet activity without a lot of light exposure. It is especially important to not get on electronics. During this \"break,\" you might consider sitting in a chair and reading a boring book or listening to soft music, until your eyelids start to feel heavy.  Then, would go back to sleep and try again.    - Establish a relaxing bedtime routine.  - Make your bedroom quiet and relaxing. Keep the room at a comfortable, cool temperature.  - Limit exposure to bright light in the evenings.  - Turn off electronic devices at least 30 minutes before bedtime.  - Avoid watching stressful or suspenseful TV programs right before bedtime.  - Don’t eat a large meal before bedtime. If you are hungry at night, eat a light, healthy snack.  - Exercise regularly and maintain a healthy diet.  Participate in regular physical activities like exercise, although avoid approximately 3-4 hours prior to bed.  Morning exercise is ideal.  - Avoid consuming caffeine in the afternoon or evening.  - Avoid consuming alcohol " "before bedtime.  - Reduce your fluid intake before bedtime.  - Avoid daytime napping.  - Consider reading \"No More Sleepless nights\" by Vance Tabor, Ph.D.  - Consider use of online resources including:  - http://IMNEXT/cbt-online-insomnia-treatment.html  - http://www.StarsVu  - CBT-I  Teri on your Smart Phone.  - Go! To Sleep by the [facility].    Look up \"grounding techniques\" and/or \"anchoring demonstration\" online and try a few to see what may work for you. Practice these skills before you need them, when you are not feeling too anxious or triggered. You can also search for free guided meditation videos online to help improve your head space when you are feeling very anxious or triggered.      Healthy Diet   The American Heart Association and the American College of Cardiology have long recommended a healthy diet for not only patients who are at risk for atherosclerotic cardiovascular disease (ASCVD) but also the general public. In keeping with this evidence-based recommendation, the \"2018 Guideline on the Management of Blood Cholesterol\" stresses that a healthy diet should include adequate intake of these essentials:   Vegetables, fruits, and whole grains   Legumes and nuts   Low-fat dairy products   Low-fat poultry (without the skin)   Fish and seafood   Nontropical vegetable oils     The recent guidelines do provide room for cultural food preferences in a healthy diet, but in general, all patients should limit their intake of saturated and avoid all trans fats, sweets, sugar-sweetened beverages, and red meats.  Please maintain adequate hydration of at least 2 Liters of water per day, and improve nutrition by decreasing portion sizes, avoiding fried foods, fast foods, inappropriate snacking and overly processed and packaged items with 'added sugars' (whether in drinks or foods), and observing nutritional facts information on any items that are packaged to reduce intake of saturated fats and " AVOID trans fats as mentioned above. Again, consume whole foods such as vegetables, higher lean protein intake, and using healthier lifestyle plans such as the Mediterranean diet with healthier fats such as those from seeds, nuts, and using organic extra virgin olive oil or avocado oil in lieu of processed vegetable oils or margarine.    Physical Activity   Recommended DAILY exercise for at least 150 minutes of moderate exercise weekly!  In addition to a healthy diet, all patients should include regular physical activity in their weekly routines, at moderate to vigorous intensity. Any activity is better than nothing, so if your patients can't meet the recommendation of vigorous activity, moderate-intensity activity can still help them reduce their risk of ASCVD.     Below are the American Heart Association's recommendations for physical activity per week (preferably spread throughout the week):     For Overall Cardiovascular Health and Lowering Cholesterol   At least 150 minutes of moderate-intensity physical activity (for example, 30 minutes, 5 days a week), or   At least 75 minutes of vigorous-intensity physical activity (for example, 25 minutes, 3 days a week); or   A combination of moderate- and vigorous-intensity aerobic activity, and   At least 2 days of moderate- to high-intensity muscle-strengthening activities (such as resistance weight training) for additional health benefits    If you have thoughts of harming yourself or are otherwise in psychological crisis, do not hesitate to contact your County Crisis hotline, or 911 or go to the nearest emergency room.  Adult Crisis Numbers  Suicide Prevention Hotline - Dial 9-8-8  [facility]: [phone] or [phone]  [facility]: [phone]  [facility]: [phone]  [facility]: [phone]  [facility]: [phone] or [phone]  [facility]: [phone]  [facility]: [phone] or [facility] Crisis:  [phone]  [place] [facility]: [phone] (daytime).       [phone] (after hours, weekends, holidays)     Child/Adolescent Crisis Numbers   [facility]: [phone]   [facility]: [phone]   [place], [place]: [phone]   [facility]: [phone]  Please note: Some [facility] do not have a separate number for Child/Adolescent specific crisis. If your county is not listed under Child/Adolescent, please call the adult number for your county     National Talk to Text Line   All Quit - 725-050    National Suicide Prevention Hotline: 1-604.426.3839 or call 712.

## 2025-04-07 DIAGNOSIS — F43.10 PTSD (POST-TRAUMATIC STRESS DISORDER): ICD-10-CM

## 2025-04-07 DIAGNOSIS — F39 UNSPECIFIED MOOD (AFFECTIVE) DISORDER (HCC): ICD-10-CM

## 2025-04-07 RX ORDER — ARIPIPRAZOLE 20 MG/1
20 TABLET ORAL DAILY
Qty: 30 TABLET | Refills: 2 | Status: SHIPPED | OUTPATIENT
Start: 2025-04-07

## 2025-04-07 NOTE — PROGRESS NOTES
Patient came in to ask for medication refill as it was not refilled at last appointment.  She states she went to the pharmacy and the pharmacy did not have her medication, so as she was walking by the office today decided to come in and ask for the refill as she only had 2 days left and did not want to delay time.    Patient requested refill of :  Requested Prescriptions     Signed Prescriptions Disp Refills    ARIPiprazole (ABILIFY) 20 MG tablet 30 tablet 2     Sig: Take 1 tablet (20 mg total) by mouth daily         Refill request approved and sent to pharmacy on file per patient request.     Han Reddy MD

## 2025-04-16 ENCOUNTER — TELEPHONE (OUTPATIENT)
Dept: PSYCHIATRY | Facility: CLINIC | Age: 30
End: 2025-04-16

## 2025-04-16 NOTE — TELEPHONE ENCOUNTER
Attempt made to call Ivette to cancel/reschedule her 4/21/25 appt with Smitha FISHER, provider, due to Smitha being out of the office, but here was no answer and the VM was full. Will try to call her back at a later time. Appt cancelled.    For your review.

## 2025-04-23 ENCOUNTER — PROCEDURE VISIT (OUTPATIENT)
Dept: OBGYN CLINIC | Facility: MEDICAL CENTER | Age: 30
End: 2025-04-23
Payer: COMMERCIAL

## 2025-04-23 ENCOUNTER — HOSPITAL ENCOUNTER (OUTPATIENT)
Dept: INFUSION CENTER | Facility: HOSPITAL | Age: 30
Discharge: HOME | End: 2025-04-23
Attending: EMERGENCY MEDICINE

## 2025-04-23 VITALS
WEIGHT: 253 LBS | DIASTOLIC BLOOD PRESSURE: 78 MMHG | HEIGHT: 63 IN | BODY MASS INDEX: 44.83 KG/M2 | SYSTOLIC BLOOD PRESSURE: 116 MMHG

## 2025-04-23 DIAGNOSIS — Z30.432 ENCOUNTER FOR IUD REMOVAL: Primary | ICD-10-CM

## 2025-04-23 PROCEDURE — 58301 REMOVE INTRAUTERINE DEVICE: CPT | Performed by: NURSE PRACTITIONER

## 2025-04-23 RX ORDER — FLIBANSERIN 100 MG/1
TABLET, FILM COATED ORAL
COMMUNITY
Start: 2025-04-01

## 2025-04-23 NOTE — PATIENT INSTRUCTIONS
Pregnancy desired.   Encouraged to start a daily prenatal vitamin or multivitamin with folic acid to prevent a neural tube defect in pregnancy.   Smoking cessation recommended. Declined referral to smoking cessation.  Discussed healthy lifestyle.   If not desiring pregnancy immediately, use a back up method of contraception such as a condom until pregnancy is desired.   Reviewed each of her current medications and reviewed safety of use in pregnancy. She will follow up with PCP and psychiatry to discuss alternative treatments.

## 2025-04-23 NOTE — PROGRESS NOTES
IUD Procedure    Date/Time: 4/23/2025 11:35 AM    Performed by: MONTY Escobar  Authorized by: MONTY Escobar    Other Assisting Provider: No    Verbal consent obtained?: Yes    Risks and benefits: Risks, benefits and alternatives were discussed    Consent given by:  Patient  Time Out:     Time out: Immediately prior to the procedure a time out was called      Time out performed at:  4/23/2025 11:35 AM  Patient states understanding of procedure being performed: Yes    Patient's understanding of procedure matches consent: Yes    Procedure consent matches procedure scheduled: Yes    Relevant documents present and verified: Yes    Patient identity confirmed:  Verbally with patient  Select procedure: IUD removal    IUD Removal:     Reason for removal: patient request      Removed without complications: yes      Strings visualized: yes      IUD intact: yes    Removal Comments:      Pregnancy desired.   Encouraged to start a daily prenatal vitamin or multivitamin with folic acid to prevent a neural tube defect in pregnancy.   Smoking cessation recommended. Declined referral to smoking cessation.  Discussed healthy lifestyle.   If not desiring pregnancy immediately, use a back up method of contraception such as a condom until pregnancy is desired.   Reviewed each of her current medications and reviewed safety of use in pregnancy. She will follow up with PCP and psychiatry to discuss alternative treatments.

## 2025-04-29 DIAGNOSIS — K21.9 GASTROESOPHAGEAL REFLUX DISEASE, UNSPECIFIED WHETHER ESOPHAGITIS PRESENT: ICD-10-CM

## 2025-04-29 RX ORDER — OMEPRAZOLE 40 MG/1
40 CAPSULE, DELAYED RELEASE ORAL
Qty: 90 CAPSULE | Refills: 1 | Status: SHIPPED | OUTPATIENT
Start: 2025-04-29

## 2025-05-01 ENCOUNTER — HOSPITAL ENCOUNTER (OUTPATIENT)
Dept: INFUSION CENTER | Facility: HOSPITAL | Age: 30
End: 2025-05-01
Attending: EMERGENCY MEDICINE
Payer: COMMERCIAL

## 2025-05-01 DIAGNOSIS — F10.90 ALCOHOL USE DISORDER: Primary | ICD-10-CM

## 2025-05-01 PROCEDURE — 96372 THER/PROPH/DIAG INJ SC/IM: CPT

## 2025-05-01 RX ADMIN — NALTREXONE 380 MG: KIT at 15:13

## 2025-05-01 NOTE — PROGRESS NOTES
Ivette MENCAHCA Andrew  tolerated L gluteal IM Vivitrol injection  well with no complications.      Ivette MENCHACA Andrew is aware of future appt on 5/29 at 3pm. Pt aware that she must see provider prior to next injection.     AVS Declined by Ivette Andrew    Left unit in stable condition.

## 2025-05-19 ENCOUNTER — DOCUMENTATION (OUTPATIENT)
Dept: PSYCHIATRY | Facility: CLINIC | Age: 30
End: 2025-05-19

## 2025-05-19 ENCOUNTER — OFFICE VISIT (OUTPATIENT)
Dept: OTHER | Age: 30
End: 2025-05-19
Payer: COMMERCIAL

## 2025-05-19 VITALS
HEIGHT: 63 IN | DIASTOLIC BLOOD PRESSURE: 88 MMHG | HEART RATE: 94 BPM | WEIGHT: 250 LBS | BODY MASS INDEX: 44.3 KG/M2 | SYSTOLIC BLOOD PRESSURE: 140 MMHG

## 2025-05-19 DIAGNOSIS — F43.10 PTSD (POST-TRAUMATIC STRESS DISORDER): ICD-10-CM

## 2025-05-19 DIAGNOSIS — F39 UNSPECIFIED MOOD (AFFECTIVE) DISORDER (HCC): Primary | ICD-10-CM

## 2025-05-19 DIAGNOSIS — F10.20 ALCOHOL USE DISORDER, SEVERE, DEPENDENCE (HCC): ICD-10-CM

## 2025-05-19 DIAGNOSIS — F41.9 ANXIETY DISORDER, UNSPECIFIED TYPE: ICD-10-CM

## 2025-05-19 DIAGNOSIS — F10.21 ALCOHOL USE DISORDER, SEVERE, IN EARLY REMISSION (HCC): Primary | ICD-10-CM

## 2025-05-19 PROCEDURE — 99213 OFFICE O/P EST LOW 20 MIN: CPT

## 2025-05-19 NOTE — ASSESSMENT & PLAN NOTE
Patient seen for follow up appointment  Early remission has 9 months of no reocurrence of alcohol use   Continues to do well on Vivitrol injections  Currently in between jobs and is concerned over insurance. Discussed possibility of switching to PO Naltrexone   CMP sent for patient   Follow up in three months

## 2025-05-19 NOTE — PROGRESS NOTES
Psychotherapy Discharge Summary    Preferred Name: Ivette Andrew  YOB: 1995    Admission date to psychotherapy: 3/20/2025    Referred by: MAT tox    Presenting Problem: addressing anxiety and depression.    Course of treatment included : individual therapy     Progress/Outcome of Treatment Goals (brief summary of course of treatment) Ivette requested a return to therapy to address her mental health needs. Ivette completed initial session where she was able to update treatment plan. Ivette no showed her subsequent session and had no further contact with this office in over 1 month. A letter of discharge was sent.     Treatment Complications (if any): n/a    Treatment Progress: fair    Current [facility] Psychiatric Provider: Dr. Han Reddy    Discharge Medications include: n/a    Discharge Date: 5/19/2025    Discharge Diagnosis:   1. Unspecified mood (affective) disorder (HCC)        2. PTSD (post-traumatic stress disorder)        3. Anxiety disorder, unspecified type        4. Alcohol use disorder, severe, dependence (HCC)            Criteria for Discharge: patient has not had contact with this agency in over 1 month.     Patient is not cleared to return to [facility] for continued treatment.    Rationale: re-assessment required.    Aftercare recommendations include (include specific referral names and phone numbers, if appropriate): Outpatient Mental Health - Adult  [facility], [address], [place]                                              [phone]  Edy Shannon MD, [address] [facility], [place]                                           [phone]  [facility], [address] [place] [address], [place]                                                          [phone]  Ben Rice MD, [address], [place]                                  [phone]  Rasheed Solis MD, [address]                                         [phone]  Outpatient Mental Health- [facility], [address] [facility] [phone]  Drug and Alcohol Services  [facility] Alcohol                                                     [phone] or [phone]  [facility]                                        [phone] or [phone]  [facility]                                                                              [phone]  [facility]                                                                                           [phone]  County Crisis Numbers  [facility]                                         [phone]  [facility]                            [phone] or [phone]  [phone] or [phone]  [facility]                                           [phone]  [facility]                                           [phone]  [facility]                     [phone] ([facility])  [facility])                [phone]  National Suicide Prevention Hotline  1 (692) 229-4567 (TALK)  Text 'HOME' to 361250  Call 9-8-8    Prognosis: fair

## 2025-05-19 NOTE — PROGRESS NOTES
[facility]    Progress Note  Ivette Andrew 29 y.o. female MRN: [id number]  @ Encounter: [id number]      1. Alcohol use disorder, severe, in early remission (HCC)  Assessment & Plan:  Patient seen for follow up appointment  Early remission has 9 months of no reocurrence of alcohol use   Continues to do well on Vivitrol injections  Currently in between jobs and is concerned over insurance. Discussed possibility of switching to PO Naltrexone   CMP sent for patient   Follow up in three months   Orders:  -     Comprehensive metabolic panel; Future; Expected date: Collect anytime        Support Services  Case Management and Certified  are following.   Patient was referred to the [facility] Certified Addiction Counselors: No  The patient is actively engaged with the [facility] Certified Addiction Counselor’s psychotherapy plan: Yes    This patient does not have an active medication from one of the medication groupers.      PDMP reviewed:     PDMP Review         Value Time User    PDMP Reviewed  Yes 3/30/2025  1:22 PM Han Reddy MD            SUBJECTIVE  Patient seen in office. Doing well with vivitrol injections. Coming up on 1 year in remission in just three months. Reports she recently quit her job which had caused her to relapse in the past. Is in between jobs at the moment but is going to be looking for another job shortly in order to keep insurance.     Drug cravings: none   Motivation to continue treatment: yes    Current Medications[1]    Objective     Vitals:    05/19/25 1128   BP: 140/88   Pulse: 94       Physical Exam  Vitals reviewed.   Constitutional:       General: She is not in acute distress.     Appearance: She is not diaphoretic.     Neurological:      Mental Status: She is alert.     Psychiatric:         Mood and Affect: Mood is not anxious or depressed.              Lab Results:   Results from last 6 Months   Lab Units 12/06/24  0744   WBC Thousand/uL 6.82   HEMOGLOBIN  g/dL 12.8   HEMATOCRIT % 41.2   PLATELETS Thousands/uL 198      Results from last 6 Months   Lab Units 12/06/24  0744   POTASSIUM mmol/L 4.4   CHLORIDE mmol/L 111*   CO2 mmol/L 24   BUN mg/dL 21   CREATININE mg/dL 0.95   CALCIUM mg/dL 9.1   ALBUMIN g/dL 4.0   ALK PHOS U/L 64   ALT U/L 13   AST U/L 17                         Counseling / Coordination of Care  Total time spent today 20 minutes. Greater than 50% of total time was spent with the patient and / or family counseling and / or coordination of care.     Smitha Nina PA-C             [1]   Current Outpatient Medications:     Addyi 100 MG TABS, , Disp: , Rfl:     ARIPiprazole (ABILIFY) 20 MG tablet, Take 1 tablet (20 mg total) by mouth daily, Disp: 30 tablet, Rfl: 2    gabapentin (NEURONTIN) 100 mg capsule, Take 2 capsules (200 mg total) by mouth 2 (two) times a day, Disp: 120 capsule, Rfl: 3    gabapentin (NEURONTIN) 300 mg capsule, Take 2 capsules (600 mg total) by mouth daily at bedtime, Disp: 60 capsule, Rfl: 3    lisinopril (ZESTRIL) 20 mg tablet, TAKE ONE TABLET BY MOUTH DAILY, Disp: 90 tablet, Rfl: 1    Naltrexone (VIVITROL IM), Inject into a muscle, Disp: , Rfl:     naproxen (Naprosyn) 500 mg tablet, Take 1 tablet (500 mg total) by mouth 2 (two) times a day with meals, Disp: 30 tablet, Rfl: 0    naproxen sodium (Aleve) 220 MG tablet, Take 220 mg by mouth daily as needed for mild pain, Disp: , Rfl:     omeprazole (PriLOSEC) 40 MG capsule, Take 1 capsule (40 mg total) by mouth daily before breakfast, Disp: 90 capsule, Rfl: 1    sertraline (ZOLOFT) 100 mg tablet, Take 2 tablets (200 mg total) by mouth daily, Disp: 60 tablet, Rfl: 3    topiramate (TOPAMAX) 25 mg tablet, Take 1 tablet (25 mg total) by mouth daily at bedtime, Disp: 30 tablet, Rfl: 3    traZODone (DESYREL) 50 mg tablet, Take 1-1.5 tablets (50-75 mg total) by mouth daily at bedtime, Disp: 45 tablet, Rfl: 3    acyclovir (ZOVIRAX) 800 mg tablet, Take 1 tablet (800 mg total) by mouth 3  (three) times a day for 7 days, Disp: 21 tablet, Rfl: 0    Cholecalciferol (VITAMIN D3) 1,000 units tablet, Take 1 tablet (1,000 Units total) by mouth daily, Disp: 30 tablet, Rfl: 0    nicotine (NICODERM CQ) 21 mg/24 hr TD 24 hr patch, Place 1 patch on the skin over 24 hours every 24 hours (Patient not taking: Reported on 5/19/2025), Disp: 28 patch, Rfl: 0    ondansetron (ZOFRAN-ODT) 4 mg disintegrating tablet, Take 1 tablet (4 mg total) by mouth every 6 (six) hours as needed for nausea or vomiting for up to 10 days, Disp: 10 tablet, Rfl: 0

## 2025-05-20 ENCOUNTER — SOCIAL WORK (OUTPATIENT)
Dept: PSYCHIATRY | Facility: CLINIC | Age: 30
End: 2025-05-20
Payer: COMMERCIAL

## 2025-05-20 DIAGNOSIS — F41.9 ANXIETY DISORDER, UNSPECIFIED TYPE: ICD-10-CM

## 2025-05-20 DIAGNOSIS — F10.20 ALCOHOL USE DISORDER, SEVERE, DEPENDENCE (HCC): ICD-10-CM

## 2025-05-20 DIAGNOSIS — F43.10 PTSD (POST-TRAUMATIC STRESS DISORDER): ICD-10-CM

## 2025-05-20 DIAGNOSIS — F39 UNSPECIFIED MOOD (AFFECTIVE) DISORDER (HCC): Primary | ICD-10-CM

## 2025-05-20 PROCEDURE — 90837 PSYTX W PT 60 MINUTES: CPT | Performed by: COUNSELOR

## 2025-05-20 NOTE — PSYCH
Behavioral Health Psychotherapy Progress Note    Psychotherapy Provided: Individual Psychotherapy     1. Unspecified mood (affective) disorder (HCC)        2. PTSD (post-traumatic stress disorder)        3. Anxiety disorder, unspecified type        4. Alcohol use disorder, severe, dependence (HCC)            Goals addressed in session: Goal 1 and Goal 2     DATA: Ivette returned to therapy, indicating she had made this appointment yesterday. Therapist had completed a discharge for Ivette prior to this scheduled appointment. Therapist will begin seeing Ivette as needed. Ivette explained she has been experiencing multiple changes in her life recently, including leaving her employment and becoming engaged. Ivette discussed recent increase in intrusive, harmful thoughts and reported some increase in suicidal thoughts. Ivette denied any suicidal thoughts at this time and stated she had not had any plans.Ivette was able to identify both her mother and her fiance as two supports she trusted if her SI thoughts increased her led to planning.  Ivette and therapist were able to discuss the content of her intrusive thoughts and therapist guided Ivette in identifying the meaning of this content and the validity of this meaning. Ivette was asked to consider what activities in her life were able to provide her with distraction from these intrusive thoughts. Ivette and therapist discussed acceptance of these thoughts and allowing them to exit as quickly as they entered. Ivette indicated difficulty with finding ways to explain this to her fiance but throughout therapy was able to identify specific feelings she was having and how she could verbalize these feelings. Ivette reports she had attempted to self decrease her medications due to fear regarding her ability pay for medication now that she no longer working and possibly uninsured but reports restarting her medication at their prescribed dose 2 days ago. Ivette was  "able to reflect she is aware her increase in intrusive thoughts may be due to her medication decrease. Ivette was provided information on how to apply for medical assistance. Ivette was scheduled for a session in 2 weeks.   During this session, this clinician used the following therapeutic modalities: Engagement Strategies, Client-centered Therapy, Cognitive Behavioral Therapy, Motivational Interviewing, and Supportive Psychotherapy    Substance Abuse was addressed during this session. If the client is diagnosed with a co-occurring substance use disorder, please indicate any changes in the frequency or amount of use: Ivette reports she is still abstinent from alcohol and had recently stopped smoking marijuana due to having to find employment.  Therapist utilized stage appropriate interventions to address this substance abuse issue. Ivette reports she stopped attending 12 step meetings and believes she has no cravings at this time. Ivette explained a recent situation in which she \"accidentally\" placed an alcohol soaked olive in her mouth and was able to spit it out. Therapist cautioned Ivette regarding how her mental health difficulties may be triggers for her alcohol use. Ivette stated she understood but maintained she did not have any cravings for alcohol.  Stage of change for addressing substance use diagnoses: Action    ASSESSMENT:  Ivette Andrew presents with a Euthymic/ normal and Anxious mood. Ivette displayed some increased anxiety due to her increase in mental health symptoms but was able to develop effective ways to address her needs.     her affect is Normal range and intensity, which is congruent, with her mood and the content of the session. The client has made progress on their goals.     Ivette Andrew presents with a minimal risk of suicide, none risk of self-harm, and none risk of harm to others.    For any risk assessment that surpasses a \"low\" rating, a safety plan must be " developed.    A safety plan was indicated: no  If yes, describe in detail n/a    PLAN: Between sessions, Ivette Andrew will utilize healthy techniques to address her intrusive thoughts and reach out to supports if she begins to struggle with SI or develops a plan to harm herself. At the next session, the therapist will use Engagement Strategies, Client-centered Therapy, Cognitive Behavioral Therapy, Motivational Interviewing, and Supportive Psychotherapy to address Ivette's development of healthy skills to address her mental health needs and relapse prevention..    Behavioral Health Treatment Plan and Discharge Planning: Ivette Andrew is aware of and agrees to continue to work on their treatment plan. They have identified and are working toward their discharge goals. yes    Depression Follow-up Plan Completed: Not applicable    Visit start and stop times:    05/20/25  Start Time: 0901  Stop Time: 0956  Total Visit Time: 55 minutes

## 2025-05-29 ENCOUNTER — HOSPITAL ENCOUNTER (OUTPATIENT)
Dept: INFUSION CENTER | Facility: HOSPITAL | Age: 30
Discharge: HOME | End: 2025-05-29

## 2025-06-03 ENCOUNTER — HOSPITAL ENCOUNTER (OUTPATIENT)
Dept: INFUSION CENTER | Facility: HOSPITAL | Age: 30
End: 2025-06-03

## 2025-06-03 DIAGNOSIS — I10 PRIMARY HYPERTENSION: ICD-10-CM

## 2025-06-04 RX ORDER — LISINOPRIL 20 MG/1
20 TABLET ORAL DAILY
Qty: 90 TABLET | Refills: 0 | Status: SHIPPED | OUTPATIENT
Start: 2025-06-04

## 2025-06-23 ENCOUNTER — OFFICE VISIT (OUTPATIENT)
Dept: PSYCHIATRY | Facility: CLINIC | Age: 30
End: 2025-06-23
Payer: COMMERCIAL

## 2025-06-23 DIAGNOSIS — F12.10 CANNABIS ABUSE, CONTINUOUS: ICD-10-CM

## 2025-06-23 DIAGNOSIS — G47.00 INSOMNIA, UNSPECIFIED TYPE: ICD-10-CM

## 2025-06-23 DIAGNOSIS — F10.20 ALCOHOL USE DISORDER, SEVERE, DEPENDENCE (HCC): ICD-10-CM

## 2025-06-23 DIAGNOSIS — F43.10 PTSD (POST-TRAUMATIC STRESS DISORDER): ICD-10-CM

## 2025-06-23 DIAGNOSIS — Z72.0 NICOTINE ABUSE: ICD-10-CM

## 2025-06-23 DIAGNOSIS — F41.9 ANXIETY DISORDER, UNSPECIFIED TYPE: ICD-10-CM

## 2025-06-23 DIAGNOSIS — R40.0 DAYTIME SLEEPINESS: ICD-10-CM

## 2025-06-23 DIAGNOSIS — G25.9 MOVEMENT DISORDER: ICD-10-CM

## 2025-06-23 DIAGNOSIS — E55.9 VITAMIN D DEFICIENCY: ICD-10-CM

## 2025-06-23 DIAGNOSIS — F39 UNSPECIFIED MOOD (AFFECTIVE) DISORDER (HCC): Primary | ICD-10-CM

## 2025-06-23 PROCEDURE — 99214 OFFICE O/P EST MOD 30 MIN: CPT | Performed by: PSYCHIATRY & NEUROLOGY

## 2025-06-23 RX ORDER — GABAPENTIN 300 MG/1
600 CAPSULE ORAL
Qty: 60 CAPSULE | Refills: 3 | Status: SHIPPED | OUTPATIENT
Start: 2025-06-23

## 2025-06-23 RX ORDER — PROPRANOLOL HYDROCHLORIDE 10 MG/1
10 TABLET ORAL 2 TIMES DAILY
Qty: 60 TABLET | Refills: 2 | Status: SHIPPED | OUTPATIENT
Start: 2025-06-23

## 2025-06-23 RX ORDER — GABAPENTIN 100 MG/1
200 CAPSULE ORAL 2 TIMES DAILY
Qty: 120 CAPSULE | Refills: 3 | Status: SHIPPED | OUTPATIENT
Start: 2025-06-23

## 2025-06-23 RX ORDER — HYDROXYZINE HYDROCHLORIDE 50 MG/1
50 TABLET, FILM COATED ORAL
Qty: 30 TABLET | Refills: 0 | Status: SHIPPED | OUTPATIENT
Start: 2025-06-23

## 2025-06-23 RX ORDER — TOPIRAMATE 25 MG/1
25 TABLET, FILM COATED ORAL
Qty: 30 TABLET | Refills: 3 | Status: SHIPPED | OUTPATIENT
Start: 2025-06-23

## 2025-06-23 RX ORDER — ARIPIPRAZOLE 20 MG/1
20 TABLET ORAL DAILY
Qty: 30 TABLET | Refills: 2 | Status: SHIPPED | OUTPATIENT
Start: 2025-06-23

## 2025-06-23 RX ORDER — SERTRALINE HYDROCHLORIDE 100 MG/1
200 TABLET, FILM COATED ORAL DAILY
Qty: 60 TABLET | Refills: 3 | Status: SHIPPED | OUTPATIENT
Start: 2025-06-23

## 2025-06-23 RX ORDER — GABAPENTIN 100 MG/1
200 CAPSULE ORAL 2 TIMES DAILY
Qty: 120 CAPSULE | Refills: 3 | Status: SHIPPED | OUTPATIENT
Start: 2025-06-23 | End: 2025-06-23 | Stop reason: SDUPTHER

## 2025-06-23 NOTE — PATIENT INSTRUCTIONS
"    Please call the office nursing staff for medication issues including refills, problems getting medications, bothersome side effects, etc., at [phone].     Please return for a follow up appointment as discussed and arrive approximately 15 minutes prior to your appointment time. If you are running late or are unable to attend your appointment, please call our [place] office at [phone] (fax: [phone]), or if you were seen in the [facility] office, please call [phone] (fax [phone]).    National Suicide Prevention Hotline: 1-246.430.1757 or call 478.    To improve sleep:  - Keep a consistent sleep schedule. Get up at the same time every day, even on weekends or during vacations.  - Set a bedtime that is early enough for you to get at least 7-8 hours of sleep.  - Don’t go to bed unless you are sleepy.  - If you don’t fall asleep after 20 minutes, get out of bed and take a brief \"break\". Go to a quiet activity without a lot of light exposure. It is especially important to not get on electronics. During this \"break,\" you might consider sitting in a chair and reading a boring book or listening to soft music, until your eyelids start to feel heavy.  Then, would go back to sleep and try again.    - Establish a relaxing bedtime routine.  - Make your bedroom quiet and relaxing. Keep the room at a comfortable, cool temperature.  - Limit exposure to bright light in the evenings.  - Turn off electronic devices at least 30 minutes before bedtime.  - Avoid watching stressful or suspenseful TV programs right before bedtime.  - Don’t eat a large meal before bedtime. If you are hungry at night, eat a light, healthy snack.  - Exercise regularly and maintain a healthy diet.  Participate in regular physical activities like exercise, although avoid approximately 3-4 hours prior to bed.  Morning exercise is ideal.  - Avoid consuming caffeine in the afternoon or evening.  - Avoid consuming alcohol " "before bedtime.  - Reduce your fluid intake before bedtime.  - Avoid daytime napping.  - Consider reading \"No More Sleepless nights\" by Vance Tabor, Ph.D.  - Consider use of online resources including:  - http://[employer]/cbt-online-insomnia-treatment.html  - http://www.[employer]Stream  - CBT-I  Teri on your Smart Phone.  - Go! To Sleep by the [facility].    Look up \"grounding techniques\" and/or \"anchoring demonstration\" online and try a few to see what may work for you. Practice these skills before you need them, when you are not feeling too anxious or triggered. You can also search for free guided meditation videos online to help improve your head space when you are feeling very anxious or triggered.      Healthy Diet   The American Heart Association and the American College of Cardiology have long recommended a healthy diet for not only patients who are at risk for atherosclerotic cardiovascular disease (ASCVD) but also the general public. In keeping with this evidence-based recommendation, the \"2018 Guideline on the Management of Blood Cholesterol\" stresses that a healthy diet should include adequate intake of these essentials:   Vegetables, fruits, and whole grains   Legumes and nuts   Low-fat dairy products   Low-fat poultry (without the skin)   Fish and seafood   Nontropical vegetable oils     The recent guidelines do provide room for cultural food preferences in a healthy diet, but in general, all patients should limit their intake of saturated and avoid all trans fats, sweets, sugar-sweetened beverages, and red meats.  Please maintain adequate hydration of at least 2 Liters of water per day, and improve nutrition by decreasing portion sizes, avoiding fried foods, fast foods, inappropriate snacking and overly processed and packaged items with 'added sugars' (whether in drinks or foods), and observing nutritional facts information on any items that are packaged to reduce intake of saturated fats and " AVOID trans fats as mentioned above. Again, consume whole foods such as vegetables, higher lean protein intake, and using healthier lifestyle plans such as the Mediterranean diet with healthier fats such as those from seeds, nuts, and using organic extra virgin olive oil or avocado oil in lieu of processed vegetable oils or margarine.    Physical Activity   Recommended DAILY exercise for at least 150 minutes of moderate exercise weekly!  In addition to a healthy diet, all patients should include regular physical activity in their weekly routines, at moderate to vigorous intensity. Any activity is better than nothing, so if your patients can't meet the recommendation of vigorous activity, moderate-intensity activity can still help them reduce their risk of ASCVD.     Below are the American Heart Association's recommendations for physical activity per week (preferably spread throughout the week):     For Overall Cardiovascular Health and Lowering Cholesterol   At least 150 minutes of moderate-intensity physical activity (for example, 30 minutes, 5 days a week), or   At least 75 minutes of vigorous-intensity physical activity (for example, 25 minutes, 3 days a week); or   A combination of moderate- and vigorous-intensity aerobic activity, and   At least 2 days of moderate- to high-intensity muscle-strengthening activities (such as resistance weight training) for additional health benefits    If you have thoughts of harming yourself or are otherwise in psychological crisis, do not hesitate to contact your County Crisis hotline, or 911 or go to the nearest emergency room.  Adult Crisis Numbers  Suicide Prevention Hotline - Dial 9-8-8  [facility]: [phone] or [phone]  [facility]: [phone]  [facility]: [phone]  [facility]: [phone]  [place]: [phone] or [phone]  [facility]: [phone]  [facility]: [phone] or [facility]:  [phone]  [facility]: [phone] (daytime).       [phone] (after hours, weekends, holidays)     Child/Adolescent Crisis Numbers   [facility]: [phone]   [facility]: [phone]   [place]: [phone]   [place]: [phone]  Please note: Some [facility] do not have a separate number for Child/Adolescent specific crisis. If your county is not listed under Child/Adolescent, please call the adult number for your county     National Talk to Text Line   [employer] - [phone]    National Suicide Prevention Hotline: 1-792.274.8857 or call 318.
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
## 2025-06-24 ENCOUNTER — SOCIAL WORK (OUTPATIENT)
Dept: PSYCHIATRY | Facility: CLINIC | Age: 30
End: 2025-06-24
Payer: COMMERCIAL

## 2025-06-24 DIAGNOSIS — F10.20 ALCOHOL USE DISORDER, SEVERE, DEPENDENCE (HCC): ICD-10-CM

## 2025-06-24 DIAGNOSIS — F43.10 PTSD (POST-TRAUMATIC STRESS DISORDER): ICD-10-CM

## 2025-06-24 DIAGNOSIS — F41.9 ANXIETY DISORDER, UNSPECIFIED TYPE: ICD-10-CM

## 2025-06-24 DIAGNOSIS — F39 UNSPECIFIED MOOD (AFFECTIVE) DISORDER (HCC): Primary | ICD-10-CM

## 2025-06-24 PROCEDURE — 90837 PSYTX W PT 60 MINUTES: CPT | Performed by: COUNSELOR

## 2025-06-24 NOTE — PSYCH
Behavioral Health Psychotherapy Progress Note    Psychotherapy Provided: Individual Psychotherapy     1. Unspecified mood (affective) disorder (HCC)        2. PTSD (post-traumatic stress disorder)        3. Anxiety disorder, unspecified type        4. Alcohol use disorder, severe, dependence (HCC)            Goals addressed in session: Goal 1 and Goal 2     DATA: Ivette was seen for therapy. Ivette reports recently meeting with the psychiatrist and starting a new medication due to feeling restlessness. Ivette discussed her fleeting thoughts about ending her life and discussed a recent episode where she felt is was more than a fleeting thought.  Ivette and therapist explored these feelings and Ivette was able to process how she often times relied on these thoughts as a coping mechanism when life had become too stressful. Ivette and therapist discussed alternatives to these thoughts that Ivette could utilize. Ivette denied ever acting on these thoughts and reported these thoughts were consistent most of her adult life. Ivette denied any SI at this time. Ivette was asked to begin utilizing compartmentalizing and this was explained the Ivette. Ivette agreed she would benefit from recognizing her life as larger than one specific problem. Ivette and therapist discussed the benefits of meditation for Ivette's restlessness. Ivette was provided some career counseling after indicating she continues to search for employment. Ivette was scheduled for a session in 1 month.   During this session, this clinician used the following therapeutic modalities: Engagement Strategies, Client-centered Therapy, Cognitive Behavioral Therapy, Motivational Interviewing, and Supportive Psychotherapy    Substance Abuse was addressed during this session. If the client is diagnosed with a co-occurring substance use disorder, please indicate any changes in the frequency or amount of use: Ivette reports she has been abstinent from  "alcohol for almost 10 months.Therapist utilized stage appropriate interventions to address this substance abuse issue. Ivette reports she has had an increase in \"drinking dreams\" and discussed her feelings after these dreams. Ivette denied any recent 12 step meeting attendance and discussion was had on how attending these meetings could be helpful. Stage of change for addressing substance use diagnoses: Action    ASSESSMENT:  Ivette Andrew presents with a Euthymic/ normal mood. Ivette presented in a stable mood and indicated multiple positive occurrences in her life, such as a wedding this coming September.      her affect is Normal range and intensity, which is congruent, with her mood and the content of the session. The client has made progress on their goals.     Ivette Andrew presents with a none risk of suicide, none risk of self-harm, and none risk of harm to others.    For any risk assessment that surpasses a \"low\" rating, a safety plan must be developed.    A safety plan was indicated: no  If yes, describe in detail n/a    PLAN: Between sessions, Ivette Andrew will practice meditation and begin using compartmentalizing. At the next session, the therapist will use Engagement Strategies, Client-centered Therapy, Cognitive Behavioral Therapy, Motivational Interviewing, and Supportive Psychotherapy to address Ivette's struggles with her mood and relapse prevention needs.    Behavioral Health Treatment Plan and Discharge Planning: Ivette Andrew is aware of and agrees to continue to work on their treatment plan. They have identified and are working toward their discharge goals. yes    Depression Follow-up Plan Completed: Not applicable    Visit start and stop times:    06/24/25  Start Time: 0809  Stop Time: 0910  Total Visit Time: 61 minutes  "

## 2025-07-01 ENCOUNTER — HOSPITAL ENCOUNTER (OUTPATIENT)
Dept: INFUSION CENTER | Facility: HOSPITAL | Age: 30
Discharge: HOME | End: 2025-07-01
Attending: EMERGENCY MEDICINE
Payer: COMMERCIAL

## 2025-07-01 DIAGNOSIS — F10.21 ALCOHOL USE DISORDER, SEVERE, IN EARLY REMISSION (HCC): Primary | ICD-10-CM

## 2025-07-01 PROCEDURE — 96372 THER/PROPH/DIAG INJ SC/IM: CPT

## 2025-07-01 RX ADMIN — NALTREXONE 380 MG: KIT at 13:19

## 2025-07-01 NOTE — PROGRESS NOTES
Patient tolerated RIGHT gluteal IM Vivitrol injection without complications. AVS declined. Next appt confirmed for July 29, at 1:00pm. Left unit ambulatory with a steady gait.

## 2025-07-22 ENCOUNTER — TELEPHONE (OUTPATIENT)
Dept: PSYCHIATRY | Facility: CLINIC | Age: 30
End: 2025-07-22

## 2025-07-22 DIAGNOSIS — G47.00 INSOMNIA, UNSPECIFIED TYPE: ICD-10-CM

## 2025-07-22 NOTE — TELEPHONE ENCOUNTER
Writer called and left message to PT regarding rescheduling a no show appointment with Javier 7/22 at 8am.

## 2025-07-23 RX ORDER — HYDROXYZINE HYDROCHLORIDE 50 MG/1
50 TABLET, FILM COATED ORAL
Qty: 30 TABLET | Refills: 0 | Status: SHIPPED | OUTPATIENT
Start: 2025-07-23

## 2025-07-29 ENCOUNTER — HOSPITAL ENCOUNTER (OUTPATIENT)
Dept: INFUSION CENTER | Facility: HOSPITAL | Age: 30
Discharge: HOME | End: 2025-07-29
Attending: EMERGENCY MEDICINE
Payer: COMMERCIAL

## 2025-07-29 DIAGNOSIS — F10.21 ALCOHOL USE DISORDER, SEVERE, IN EARLY REMISSION (HCC): Primary | ICD-10-CM

## 2025-07-29 PROCEDURE — 96372 THER/PROPH/DIAG INJ SC/IM: CPT

## 2025-07-29 RX ADMIN — NALTREXONE 380 MG: KIT at 13:17

## 2025-08-07 ENCOUNTER — TELEMEDICINE (OUTPATIENT)
Dept: PSYCHIATRY | Facility: CLINIC | Age: 30
End: 2025-08-07

## 2025-08-07 DIAGNOSIS — G25.9 MOVEMENT DISORDER: ICD-10-CM

## 2025-08-07 DIAGNOSIS — F39 UNSPECIFIED MOOD (AFFECTIVE) DISORDER (HCC): Primary | ICD-10-CM

## 2025-08-07 DIAGNOSIS — G47.00 INSOMNIA, UNSPECIFIED TYPE: ICD-10-CM

## 2025-08-07 DIAGNOSIS — R40.0 DAYTIME SLEEPINESS: ICD-10-CM

## 2025-08-07 DIAGNOSIS — F10.20 ALCOHOL USE DISORDER, SEVERE, DEPENDENCE (HCC): ICD-10-CM

## 2025-08-07 DIAGNOSIS — Z91.199 NO-SHOW FOR APPOINTMENT: ICD-10-CM

## 2025-08-07 DIAGNOSIS — F43.10 PTSD (POST-TRAUMATIC STRESS DISORDER): ICD-10-CM

## 2025-08-07 DIAGNOSIS — F41.9 ANXIETY DISORDER, UNSPECIFIED TYPE: ICD-10-CM

## 2025-08-07 DIAGNOSIS — Z72.0 NICOTINE ABUSE: ICD-10-CM

## 2025-08-07 DIAGNOSIS — F12.10 CANNABIS ABUSE, CONTINUOUS: ICD-10-CM

## 2025-08-15 ENCOUNTER — DOCUMENTATION (OUTPATIENT)
Dept: PSYCHIATRY | Facility: CLINIC | Age: 30
End: 2025-08-15

## 2025-08-17 DIAGNOSIS — E55.9 VITAMIN D DEFICIENCY: ICD-10-CM

## 2025-08-18 RX ORDER — RESVER/WINE/BFL/GRPSD/PC/C/POM 200MG-60MG
1000 CAPSULE ORAL DAILY
Qty: 60 TABLET | Refills: 0 | Status: SHIPPED | OUTPATIENT
Start: 2025-08-18

## (undated) DEVICE — PREMIUM DRY TRAY LF: Brand: MEDLINE INDUSTRIES, INC.

## (undated) DEVICE — GLOVE INDICATOR PI UNDERGLOVE SZ 6.5 BLUE

## (undated) DEVICE — NEEDLE 25GA X 1 IN SAFETY GLIDE

## (undated) DEVICE — BETHLEHEM UNIVERSAL MINOR VAG: Brand: CARDINAL HEALTH

## (undated) DEVICE — PVC URETHRAL CATHETER: Brand: DOVER

## (undated) DEVICE — PENCIL ELECTROSURG E-Z CLEAN -0035H

## (undated) DEVICE — SPONGE LAP 18 X 18 IN

## (undated) DEVICE — ELECTRODE BALL E-Z CLEAN 2IN -0015

## (undated) DEVICE — STERILE 8 INCH PROCTO SWAB: Brand: CARDINAL HEALTH

## (undated) DEVICE — GLOVE PI ULTRA TOUCH SZ 6

## (undated) DEVICE — MEDI-VAC TUBING CONNECTOR 6-IN-1 STRAIGHT: Brand: CARDINAL HEALTH

## (undated) DEVICE — CHLORHEXIDINE 4PCT 4 OZ

## (undated) DEVICE — SYRINGE 10ML LL